# Patient Record
Sex: FEMALE | Race: WHITE | NOT HISPANIC OR LATINO | Employment: FULL TIME | URBAN - METROPOLITAN AREA
[De-identification: names, ages, dates, MRNs, and addresses within clinical notes are randomized per-mention and may not be internally consistent; named-entity substitution may affect disease eponyms.]

---

## 2018-02-06 ENCOUNTER — AMBULATORY - HEALTHEAST (OUTPATIENT)
Dept: MULTI SPECIALTY CLINIC | Facility: CLINIC | Age: 63
End: 2018-02-06

## 2018-02-06 LAB
HPV_EXT - HISTORICAL: NORMAL
PAP SMEAR - HIM PATIENT REPORTED: NORMAL

## 2018-04-25 ENCOUNTER — RECORDS - HEALTHEAST (OUTPATIENT)
Dept: LAB | Facility: HOSPITAL | Age: 63
End: 2018-04-25

## 2018-04-25 LAB
MEV IGG SER IA-ACNC: POSITIVE
MUV IGG SER QL IA: POSITIVE
RUBV IGG SERPL QL IA: NEGATIVE
VZV IGG SER QL IA: POSITIVE

## 2018-04-27 LAB
QTF INTERPRETATION: NORMAL
QTF MITOGEN - NIL: >10 IU/ML
QTF NIL: 0.03 IU/ML
QTF RESULT: NEGATIVE
QTF TB ANTIGEN - NIL: 0 IU/ML

## 2019-02-18 ENCOUNTER — OFFICE VISIT - HEALTHEAST (OUTPATIENT)
Dept: OTOLARYNGOLOGY | Facility: CLINIC | Age: 64
End: 2019-02-18

## 2019-02-18 DIAGNOSIS — K11.20 PAROTIDITIS: ICD-10-CM

## 2019-03-04 ENCOUNTER — OFFICE VISIT - HEALTHEAST (OUTPATIENT)
Dept: OTOLARYNGOLOGY | Facility: CLINIC | Age: 64
End: 2019-03-04

## 2019-03-04 DIAGNOSIS — K11.9 DISEASE OF SALIVARY GLAND, UNSPECIFIED: ICD-10-CM

## 2019-03-04 DIAGNOSIS — K11.8 MASS OF PAROTID GLAND: ICD-10-CM

## 2019-04-02 ENCOUNTER — OFFICE VISIT - HEALTHEAST (OUTPATIENT)
Dept: FAMILY MEDICINE | Facility: CLINIC | Age: 64
End: 2019-04-02

## 2019-04-02 ENCOUNTER — COMMUNICATION - HEALTHEAST (OUTPATIENT)
Dept: TELEHEALTH | Facility: CLINIC | Age: 64
End: 2019-04-02

## 2019-04-02 DIAGNOSIS — F17.218 CIGARETTE NICOTINE DEPENDENCE WITH OTHER NICOTINE-INDUCED DISORDER: ICD-10-CM

## 2019-04-02 DIAGNOSIS — M54.2 NECK PAIN: ICD-10-CM

## 2019-04-02 DIAGNOSIS — R53.82 CHRONIC FATIGUE: ICD-10-CM

## 2019-04-02 DIAGNOSIS — S12.300A CLOSED DISPLACED FRACTURE OF FOURTH CERVICAL VERTEBRA, UNSPECIFIED FRACTURE MORPHOLOGY, INITIAL ENCOUNTER (H): ICD-10-CM

## 2019-04-02 DIAGNOSIS — K11.20 SIALADENITIS: ICD-10-CM

## 2019-04-02 ASSESSMENT — MIFFLIN-ST. JEOR: SCORE: 1180.91

## 2019-04-19 ENCOUNTER — COMMUNICATION - HEALTHEAST (OUTPATIENT)
Dept: OTOLARYNGOLOGY | Facility: CLINIC | Age: 64
End: 2019-04-19

## 2019-04-27 ENCOUNTER — COMMUNICATION - HEALTHEAST (OUTPATIENT)
Dept: FAMILY MEDICINE | Facility: CLINIC | Age: 64
End: 2019-04-27

## 2019-10-14 ENCOUNTER — COMMUNICATION - HEALTHEAST (OUTPATIENT)
Dept: OTOLARYNGOLOGY | Facility: CLINIC | Age: 64
End: 2019-10-14

## 2019-10-14 DIAGNOSIS — K11.20 PAROTIDITIS: ICD-10-CM

## 2019-10-21 ENCOUNTER — OFFICE VISIT - HEALTHEAST (OUTPATIENT)
Dept: OTOLARYNGOLOGY | Facility: CLINIC | Age: 64
End: 2019-10-21

## 2019-10-21 DIAGNOSIS — K11.20 PAROTIDITIS: ICD-10-CM

## 2019-10-25 ENCOUNTER — COMMUNICATION - HEALTHEAST (OUTPATIENT)
Dept: SURGERY | Facility: CLINIC | Age: 64
End: 2019-10-25

## 2020-06-23 ENCOUNTER — COMMUNICATION - HEALTHEAST (OUTPATIENT)
Dept: FAMILY MEDICINE | Facility: CLINIC | Age: 65
End: 2020-06-23

## 2020-07-13 ENCOUNTER — OFFICE VISIT - HEALTHEAST (OUTPATIENT)
Dept: FAMILY MEDICINE | Facility: CLINIC | Age: 65
End: 2020-07-13

## 2020-07-13 DIAGNOSIS — Z00.00 ROUTINE GENERAL MEDICAL EXAMINATION AT A HEALTH CARE FACILITY: ICD-10-CM

## 2020-07-13 DIAGNOSIS — M54.2 NECK PAIN: ICD-10-CM

## 2020-07-13 DIAGNOSIS — R53.82 CHRONIC FATIGUE: ICD-10-CM

## 2020-07-13 DIAGNOSIS — K11.20 SIALADENITIS: ICD-10-CM

## 2020-07-13 DIAGNOSIS — Z12.11 SCREENING FOR COLON CANCER: ICD-10-CM

## 2020-07-13 RX ORDER — POLYVINYL ALCOHOL 14 MG/ML
1 SOLUTION/ DROPS OPHTHALMIC
Status: SHIPPED | COMMUNITY
Start: 2020-07-13 | End: 2023-01-27

## 2020-07-15 ENCOUNTER — AMBULATORY - HEALTHEAST (OUTPATIENT)
Dept: LAB | Facility: CLINIC | Age: 65
End: 2020-07-15

## 2020-07-15 DIAGNOSIS — R53.82 CHRONIC FATIGUE: ICD-10-CM

## 2020-07-15 DIAGNOSIS — R73.9 ELEVATED BLOOD SUGAR: ICD-10-CM

## 2020-07-15 DIAGNOSIS — Z00.00 ROUTINE GENERAL MEDICAL EXAMINATION AT A HEALTH CARE FACILITY: ICD-10-CM

## 2020-07-15 LAB
ALBUMIN SERPL-MCNC: 4 G/DL (ref 3.5–5)
ALP SERPL-CCNC: 100 U/L (ref 45–120)
ALT SERPL W P-5'-P-CCNC: 18 U/L (ref 0–45)
ANION GAP SERPL CALCULATED.3IONS-SCNC: 9 MMOL/L (ref 5–18)
AST SERPL W P-5'-P-CCNC: 14 U/L (ref 0–40)
BILIRUB SERPL-MCNC: 0.7 MG/DL (ref 0–1)
BUN SERPL-MCNC: 12 MG/DL (ref 8–22)
CALCIUM SERPL-MCNC: 9.5 MG/DL (ref 8.5–10.5)
CHLORIDE BLD-SCNC: 102 MMOL/L (ref 98–107)
CHOLEST SERPL-MCNC: 208 MG/DL
CO2 SERPL-SCNC: 27 MMOL/L (ref 22–31)
CREAT SERPL-MCNC: 0.98 MG/DL (ref 0.6–1.1)
ERYTHROCYTE [DISTWIDTH] IN BLOOD BY AUTOMATED COUNT: 11.2 % (ref 11–14.5)
FASTING STATUS PATIENT QL REPORTED: YES
GFR SERPL CREATININE-BSD FRML MDRD: 57 ML/MIN/1.73M2
GLUCOSE BLD-MCNC: 377 MG/DL (ref 70–125)
HCT VFR BLD AUTO: 42.8 % (ref 35–47)
HDLC SERPL-MCNC: 69 MG/DL
HGB BLD-MCNC: 14.6 G/DL (ref 12–16)
IRON SATN MFR SERPL: 35 % (ref 20–50)
IRON SERPL-MCNC: 95 UG/DL (ref 42–175)
LDLC SERPL CALC-MCNC: 114 MG/DL
MCH RBC QN AUTO: 30.7 PG (ref 27–34)
MCHC RBC AUTO-ENTMCNC: 34.1 G/DL (ref 32–36)
MCV RBC AUTO: 90 FL (ref 80–100)
PLATELET # BLD AUTO: 134 THOU/UL (ref 140–440)
PMV BLD AUTO: 8.3 FL (ref 7–10)
POTASSIUM BLD-SCNC: 4.5 MMOL/L (ref 3.5–5)
PROT SERPL-MCNC: 6.9 G/DL (ref 6–8)
RBC # BLD AUTO: 4.74 MILL/UL (ref 3.8–5.4)
SODIUM SERPL-SCNC: 138 MMOL/L (ref 136–145)
TIBC SERPL-MCNC: 275 UG/DL (ref 313–563)
TRANSFERRIN SERPL-MCNC: 220 MG/DL (ref 212–360)
TRIGL SERPL-MCNC: 127 MG/DL
TSH SERPL DL<=0.005 MIU/L-ACNC: 1.1 UIU/ML (ref 0.3–5)
WBC: 5.6 THOU/UL (ref 4–11)

## 2020-07-16 ENCOUNTER — AMBULATORY - HEALTHEAST (OUTPATIENT)
Dept: FAMILY MEDICINE | Facility: CLINIC | Age: 65
End: 2020-07-16

## 2020-07-16 DIAGNOSIS — R73.9 ELEVATED BLOOD SUGAR: ICD-10-CM

## 2020-07-16 LAB — HBA1C MFR BLD: >14 % (ref 3.5–6)

## 2020-07-17 ENCOUNTER — COMMUNICATION - HEALTHEAST (OUTPATIENT)
Dept: FAMILY MEDICINE | Facility: CLINIC | Age: 65
End: 2020-07-17

## 2020-07-20 ENCOUNTER — OFFICE VISIT - HEALTHEAST (OUTPATIENT)
Dept: FAMILY MEDICINE | Facility: CLINIC | Age: 65
End: 2020-07-20

## 2020-07-20 ENCOUNTER — COMMUNICATION - HEALTHEAST (OUTPATIENT)
Dept: FAMILY MEDICINE | Facility: CLINIC | Age: 65
End: 2020-07-20

## 2020-07-20 DIAGNOSIS — E11.9 TYPE 2 DIABETES MELLITUS WITHOUT COMPLICATION, WITHOUT LONG-TERM CURRENT USE OF INSULIN (H): ICD-10-CM

## 2020-07-20 DIAGNOSIS — Z12.11 SCREEN FOR COLON CANCER: ICD-10-CM

## 2020-07-20 DIAGNOSIS — Z12.31 VISIT FOR SCREENING MAMMOGRAM: ICD-10-CM

## 2020-07-20 ASSESSMENT — MIFFLIN-ST. JEOR: SCORE: 1093.37

## 2020-07-24 ENCOUNTER — RECORDS - HEALTHEAST (OUTPATIENT)
Dept: ADMINISTRATIVE | Facility: OTHER | Age: 65
End: 2020-07-24

## 2020-07-24 LAB — RETINOPATHY: NEGATIVE

## 2020-07-28 ENCOUNTER — RECORDS - HEALTHEAST (OUTPATIENT)
Dept: HEALTH INFORMATION MANAGEMENT | Facility: CLINIC | Age: 65
End: 2020-07-28

## 2020-08-04 ENCOUNTER — HOSPITAL ENCOUNTER (OUTPATIENT)
Dept: MAMMOGRAPHY | Facility: CLINIC | Age: 65
Discharge: HOME | End: 2020-08-04
Attending: FAMILY MEDICINE

## 2020-08-04 DIAGNOSIS — Z12.31 VISIT FOR SCREENING MAMMOGRAM: ICD-10-CM

## 2020-08-06 ENCOUNTER — OFFICE VISIT - HEALTHEAST (OUTPATIENT)
Dept: EDUCATION SERVICES | Facility: CLINIC | Age: 65
End: 2020-08-06

## 2020-08-06 DIAGNOSIS — E11.9 TYPE 2 DIABETES MELLITUS WITHOUT COMPLICATION, WITHOUT LONG-TERM CURRENT USE OF INSULIN (H): ICD-10-CM

## 2020-08-16 ENCOUNTER — COMMUNICATION - HEALTHEAST (OUTPATIENT)
Dept: FAMILY MEDICINE | Facility: CLINIC | Age: 65
End: 2020-08-16

## 2020-08-16 DIAGNOSIS — E11.9 TYPE 2 DIABETES MELLITUS WITHOUT COMPLICATION, WITHOUT LONG-TERM CURRENT USE OF INSULIN (H): ICD-10-CM

## 2020-08-17 ENCOUNTER — HOSPITAL ENCOUNTER (OUTPATIENT)
Dept: MAMMOGRAPHY | Facility: CLINIC | Age: 65
Discharge: HOME | End: 2020-08-17
Attending: FAMILY MEDICINE

## 2020-08-17 DIAGNOSIS — N64.89 BREAST ASYMMETRY: ICD-10-CM

## 2020-08-17 DIAGNOSIS — N64.89 DISTORTION OF CONTOUR OF BREAST: ICD-10-CM

## 2020-08-17 RX ORDER — BLOOD SUGAR DIAGNOSTIC
STRIP MISCELLANEOUS
Qty: 200 STRIP | Refills: 3 | Status: SHIPPED | OUTPATIENT
Start: 2020-08-17 | End: 2022-03-21

## 2020-09-03 ENCOUNTER — OFFICE VISIT - HEALTHEAST (OUTPATIENT)
Dept: EDUCATION SERVICES | Facility: CLINIC | Age: 65
End: 2020-09-03

## 2020-09-03 DIAGNOSIS — E11.9 TYPE 2 DIABETES MELLITUS WITHOUT COMPLICATION, WITHOUT LONG-TERM CURRENT USE OF INSULIN (H): ICD-10-CM

## 2020-10-14 ENCOUNTER — COMMUNICATION - HEALTHEAST (OUTPATIENT)
Dept: FAMILY MEDICINE | Facility: CLINIC | Age: 65
End: 2020-10-14

## 2020-10-27 ENCOUNTER — COMMUNICATION - HEALTHEAST (OUTPATIENT)
Dept: SCHEDULING | Facility: CLINIC | Age: 65
End: 2020-10-27

## 2020-11-01 ENCOUNTER — AMBULATORY - HEALTHEAST (OUTPATIENT)
Dept: MULTI SPECIALTY CLINIC | Facility: CLINIC | Age: 65
End: 2020-11-01

## 2020-11-03 ENCOUNTER — COMMUNICATION - HEALTHEAST (OUTPATIENT)
Dept: FAMILY MEDICINE | Facility: CLINIC | Age: 65
End: 2020-11-03

## 2020-11-03 DIAGNOSIS — E11.9 TYPE 2 DIABETES MELLITUS WITHOUT COMPLICATION, WITHOUT LONG-TERM CURRENT USE OF INSULIN (H): ICD-10-CM

## 2020-11-08 ENCOUNTER — COMMUNICATION - HEALTHEAST (OUTPATIENT)
Dept: FAMILY MEDICINE | Facility: CLINIC | Age: 65
End: 2020-11-08

## 2020-11-09 ENCOUNTER — OFFICE VISIT - HEALTHEAST (OUTPATIENT)
Dept: FAMILY MEDICINE | Facility: CLINIC | Age: 65
End: 2020-11-09

## 2020-11-09 DIAGNOSIS — E11.9 TYPE 2 DIABETES MELLITUS WITHOUT COMPLICATION, WITHOUT LONG-TERM CURRENT USE OF INSULIN (H): ICD-10-CM

## 2020-11-09 DIAGNOSIS — F17.218 CIGARETTE NICOTINE DEPENDENCE WITH OTHER NICOTINE-INDUCED DISORDER: ICD-10-CM

## 2020-11-09 DIAGNOSIS — Z00.00 ROUTINE GENERAL MEDICAL EXAMINATION AT A HEALTH CARE FACILITY: ICD-10-CM

## 2020-11-09 LAB
ALBUMIN SERPL-MCNC: 4.5 G/DL (ref 3.5–5)
ALP SERPL-CCNC: 69 U/L (ref 45–120)
ALT SERPL W P-5'-P-CCNC: 19 U/L (ref 0–45)
ANION GAP SERPL CALCULATED.3IONS-SCNC: 10 MMOL/L (ref 5–18)
AST SERPL W P-5'-P-CCNC: 16 U/L (ref 0–40)
BILIRUB SERPL-MCNC: 0.5 MG/DL (ref 0–1)
BUN SERPL-MCNC: 16 MG/DL (ref 8–22)
CALCIUM SERPL-MCNC: 10.2 MG/DL (ref 8.5–10.5)
CHLORIDE BLD-SCNC: 104 MMOL/L (ref 98–107)
CHOLEST SERPL-MCNC: 164 MG/DL
CO2 SERPL-SCNC: 27 MMOL/L (ref 22–31)
CREAT SERPL-MCNC: 0.91 MG/DL (ref 0.6–1.1)
CREAT UR-MCNC: 36.1 MG/DL
FASTING STATUS PATIENT QL REPORTED: NO
GFR SERPL CREATININE-BSD FRML MDRD: >60 ML/MIN/1.73M2
GLUCOSE BLD-MCNC: 110 MG/DL (ref 70–125)
HBA1C MFR BLD: 6.6 %
HDLC SERPL-MCNC: 66 MG/DL
LDLC SERPL CALC-MCNC: 77 MG/DL
MICROALBUMIN UR-MCNC: <0.5 MG/DL (ref 0–1.99)
MICROALBUMIN/CREAT UR: NORMAL MG/G{CREAT}
POTASSIUM BLD-SCNC: 4.3 MMOL/L (ref 3.5–5)
PROT SERPL-MCNC: 7.5 G/DL (ref 6–8)
SODIUM SERPL-SCNC: 141 MMOL/L (ref 136–145)
TRIGL SERPL-MCNC: 107 MG/DL

## 2020-11-10 LAB
HCV AB SERPL QL IA: POSITIVE
HIV 1+2 AB+HIV1 P24 AG SERPL QL IA: NEGATIVE

## 2020-11-11 ENCOUNTER — COMMUNICATION - HEALTHEAST (OUTPATIENT)
Dept: FAMILY MEDICINE | Facility: CLINIC | Age: 65
End: 2020-11-11

## 2020-11-11 ENCOUNTER — AMBULATORY - HEALTHEAST (OUTPATIENT)
Dept: FAMILY MEDICINE | Facility: CLINIC | Age: 65
End: 2020-11-11

## 2020-11-11 DIAGNOSIS — R79.9 ABNORMAL BLOOD FINDING: ICD-10-CM

## 2020-11-12 ENCOUNTER — COMMUNICATION - HEALTHEAST (OUTPATIENT)
Dept: EDUCATION SERVICES | Facility: CLINIC | Age: 65
End: 2020-11-12

## 2020-11-18 ENCOUNTER — COMMUNICATION - HEALTHEAST (OUTPATIENT)
Dept: FAMILY MEDICINE | Facility: CLINIC | Age: 65
End: 2020-11-18

## 2020-11-25 ENCOUNTER — AMBULATORY - HEALTHEAST (OUTPATIENT)
Dept: LAB | Facility: CLINIC | Age: 65
End: 2020-11-25

## 2020-11-25 DIAGNOSIS — R79.9 ABNORMAL BLOOD FINDING: ICD-10-CM

## 2020-11-30 ENCOUNTER — COMMUNICATION - HEALTHEAST (OUTPATIENT)
Dept: PEDIATRICS | Facility: CLINIC | Age: 65
End: 2020-11-30

## 2020-11-30 LAB
HCV RNA SERPL NAA+PROBE-ACNC: NORMAL [IU]/ML
HCV RNA SERPL NAA+PROBE-LOG IU: NORMAL LOG IU/ML

## 2021-01-15 ENCOUNTER — HEALTH MAINTENANCE LETTER (OUTPATIENT)
Age: 66
End: 2021-01-15

## 2021-02-08 ENCOUNTER — COMMUNICATION - HEALTHEAST (OUTPATIENT)
Dept: ADMINISTRATIVE | Facility: CLINIC | Age: 66
End: 2021-02-08

## 2021-03-01 ENCOUNTER — OFFICE VISIT - HEALTHEAST (OUTPATIENT)
Dept: FAMILY MEDICINE | Facility: CLINIC | Age: 66
End: 2021-03-01

## 2021-03-01 DIAGNOSIS — E11.9 TYPE 2 DIABETES MELLITUS WITHOUT COMPLICATION, WITHOUT LONG-TERM CURRENT USE OF INSULIN (H): ICD-10-CM

## 2021-03-01 DIAGNOSIS — F17.218 CIGARETTE NICOTINE DEPENDENCE WITH OTHER NICOTINE-INDUCED DISORDER: ICD-10-CM

## 2021-03-01 RX ORDER — METFORMIN HCL 500 MG
500 TABLET, EXTENDED RELEASE 24 HR ORAL 2 TIMES DAILY
Qty: 180 TABLET | Refills: 2 | Status: SHIPPED | OUTPATIENT
Start: 2021-03-01 | End: 2022-03-21

## 2021-03-01 RX ORDER — SIMVASTATIN 20 MG
20 TABLET ORAL EVERY EVENING
Qty: 90 TABLET | Refills: 2 | Status: SHIPPED | OUTPATIENT
Start: 2021-03-01 | End: 2021-12-02

## 2021-03-01 RX ORDER — LISINOPRIL 5 MG/1
5 TABLET ORAL DAILY
Qty: 90 TABLET | Refills: 2 | Status: SHIPPED | OUTPATIENT
Start: 2021-03-01 | End: 2021-12-02

## 2021-04-13 ENCOUNTER — HOSPITAL ENCOUNTER (OUTPATIENT)
Dept: CT IMAGING | Facility: HOSPITAL | Age: 66
Discharge: HOME | End: 2021-04-13
Attending: FAMILY MEDICINE

## 2021-04-13 DIAGNOSIS — F17.218 CIGARETTE NICOTINE DEPENDENCE WITH OTHER NICOTINE-INDUCED DISORDER: ICD-10-CM

## 2021-04-14 ENCOUNTER — COMMUNICATION - HEALTHEAST (OUTPATIENT)
Dept: FAMILY MEDICINE | Facility: CLINIC | Age: 66
End: 2021-04-14

## 2021-05-15 ENCOUNTER — HEALTH MAINTENANCE LETTER (OUTPATIENT)
Age: 66
End: 2021-05-15

## 2021-05-27 NOTE — PROGRESS NOTES
Family Medicine Office Visit  [facility]  Patient Name: Taylor Aburto  Patient Age: 63 y.o.  YOB: 1955  MRN: [id number]    Date of Visit: 2019  Reason for Office Visit:   Chief Complaint   Patient presents with     Follow-up     ER 19 Neck Injury            Assessment / Plan / Medical Decision Makin. Chronic fatigue  Will check thyroid and call with the results  - Thyroid [place]; Future    2. Sialadenitis  Offered CT and pt declined at this time.  Recommend trial of repeat antibiotics and see if any improvement - if not then plan for CT  - cefuroxime (CEFTIN) 500 MG tablet; Take 1 tablet (500 mg total) by mouth 2 (two) times a day for 10 days.  Dispense: 20 tablet; Refill: 0    3. Neck pain  Trial of zanaflex at night for muscle strain  - TiZANidine (ZANAFLEX) 2 MG capsule; Take 1 capsule (2 mg total) by mouth at bedtime.  Dispense: 30 capsule; Refill: 0    4. Cigarette nicotine dependence with other nicotine-induced disorder  - nicotine (NICODERM CQ) 14 mg/24 hr; Place 1 patch on the skin daily.  Dispense: 30 patch; Refill: 0    5. Closed displaced fracture of fourth cervical vertebra, unspecified fracture morphology, initial encounter (H)  Continue to follow up with spine center as scheduled and wear brace as directed.        Health Maintenance Review  Health Maintenance   Topic Date Due     MAMMOGRAM  1955     ADVANCE DIRECTIVES DISCUSSED WITH PATIENT  1974     COLONOSCOPY  2006     ZOSTER VACCINES (1 of 2) 2006     TD 18+ HE  2019     PAP SMEAR  2022     INFLUENZA VACCINE RULE BASED  Completed     TDAP ADULT ONE TIME DOSE  Completed         I am having Taylor Aburto maintain her multivitamin therapeutic, nicotine, cefuroxime, and TiZANidine.      HPI:  Taylor Aburto is a 63 y.o. year old who presents to the office today for follow up on neck fracture from fall when slipping down the stairs.  Walking  "down the stairs and slipped on ice, smacking head on the stair and fracturing C4.  CT as follows:    CT Head 3/16/2019  1. No evidence of acute intracranial hemorrhage or mass effect.  2. Mild nonspecific white matter changes.  3. Mild brain parenchymal volume loss.    CT C-Spine 3/16/2019  1. Minimally displaced fracture involving the right C4 inferior articular facet  2. No other evidence of acute fracture or subluxation of the cervical spine by CT imaging.  3. Degenerative cervical spondylosis as described above.     Pt states pain well controlled for the most part.  Not taking oxycodone.  Taking tylenol 1-2x/day.  Pain worse at the end of the day from having to keep body position upright.  Feels like muscles in the neck are sore.  Told needed to wear the Aspen collar when out of bed and then soft collar when in bed.      Hx of \"lump\" on the left side of the neck and then developed cyst in the mouth that eventually popped and drained.  Seen by dentistry and then orthodontics and finally ENT.  ENT put her on keflex for 10 days and pt reports that helped significantly but now returned.  Recommended CT and pt would like to wait at this time.     Chronic fatigue and losing hair.  Problems gaining weight.  Would like to have thyroid checked     Smoking and would like to quit.  Currently at 1/2 PPD.  Tried lozenges and gum and both made her nausteated.        Review of Systems- pertinent positive in bold:  Constitutional: Fever, chills, night sweats, fainting, weight change, fatigue, seizures, dizziness, sleeping difficulties, loud snoring/pauses in breathing  Eyes: change in vision, blurred or double vision, redness/eye pain  Ears, nose, mouth, throat: change in hearing, ear pain, hoarseness, difficulty swallowing, sores in the mouth or throat  Respiratory: shortness of breath, cough, bloody sputum, wheezing  Cardiovascular: chest pain, palpitations   Gastrointestinal: abdominal pain, heartburn/indigestion, " nausea/vomiting, change in appetite, change in bowel habits, constipation or diarrhea, rectal bleeding/dark stools, difficulty swallowing  Urinary: painful urination, frequent urination, urinary urgency/incontinence, blood in urine/dark urine, nocturia  Musculoskeletal: backache/back pain (new or increasing), weakness, joint pain/stiffness (new or increasing), muscle cramps, swelling of hands, feet, ankles, leg pain/redness  Skin: change in moles/freckles, rash, nodules  Hematologic/lymphatic: swollen lymph glands, abnormal bruising/bleeding  Endocrine: excessive thirst/urination, cold or heat intolerance  Neurologic/emotional: worrisome memory change, numbness/tingling, anxiety, mood swings      Current Scheduled Meds:  Outpatient Encounter Medications as of 4/2/2019   Medication Sig Dispense Refill     multivitamin therapeutic tablet Take 1 tablet by mouth daily.       [DISCONTINUED] multivitamin with minerals (THERA-M) 9 mg iron-400 mcg Tab tablet Take 1 tablet by mouth daily.       cefuroxime (CEFTIN) 500 MG tablet Take 1 tablet (500 mg total) by mouth 2 (two) times a day for 10 days. 20 tablet 0     nicotine (NICODERM CQ) 14 mg/24 hr Place 1 patch on the skin daily. 30 patch 0     TiZANidine (ZANAFLEX) 2 MG capsule Take 1 capsule (2 mg total) by mouth at bedtime. 30 capsule 0     [DISCONTINUED] cefuroxime (CEFTIN) 500 MG tablet Take 1 tablet (500 mg total) by mouth 2 (two) times a day for 10 days. 20 tablet 0     [DISCONTINUED] nicotine (NICODERM CQ) 14 mg/24 hr Place 1 patch on the skin daily. 30 patch 0     [DISCONTINUED] TiZANidine (ZANAFLEX) 2 MG capsule Take 1 capsule (2 mg total) by mouth at bedtime. 30 capsule 0     No facility-administered encounter medications on file as of 4/2/2019.      No past medical history on file.  No past surgical history on file.  Social History     Tobacco Use     Smoking status: Current Every Day Smoker     Packs/day: 0.50     Smokeless tobacco: Never Used   Substance Use  "Topics     Alcohol use: Not on file     Drug use: Not on file       Objective / Physical Examination:  Vitals:    04/02/19 1338   BP: 136/74   Pulse: 64   Resp: 16   Temp: 98.1  F (36.7  C)   TempSrc: Oral   SpO2: 97%   Weight: 133 lb (60.3 kg)   Height: 5' 7\" (1.702 m)     Wt Readings from Last 3 Encounters:   04/02/19 133 lb (60.3 kg)   02/11/19 132 lb (59.9 kg)     BP Readings from Last 3 Encounters:   04/02/19 136/74   02/11/19 (!) 203/94     Body mass index is 20.83 kg/m .   Results for orders placed or performed in visit on 04/25/18   Mumps Antibody, IgG   Result Value Ref Range    Mumps Antibody, IgG Positive    Rubella Antibody, IgG   Result Value Ref Range    Rubella Antibody, IgG Negative    Rubeola Antibody, IgG   Result Value Ref Range    Rubeola Antibody, IgG Positive    Varicella Zoster Antibody, IgG   Result Value Ref Range    Varicella Zoster Antibody IgG Positive    QTF-Mycobacterium tuberculosis by QuantiFERON-TB Gold   Result Value Ref Range    QTF RESULT Negative Negative    QTF INTREPRETATION       No interferon-gamma response to M. tuberculosis antigens was detected.  Infecton with M. tuberculosis is unlikely.  A negative result alone does not exclude infection with M. tuberculosis    QTF NIL 0.03 IU/mL    QTF TB ANTIGEN - NIL 0.00 IU/mL    QTF MITOGEN - NIL >10.00 IU/mL           General Appearance: Alert and oriented, cooperative, affect appropriate, speech clear, in no apparent distress  Head: Normocephalic, atraumatic  Ears: Tympanic membrane clear with landmarks well visualized bilaterally  Eyes: PERRL, fundi appear clear bilaterally. EOMI. Conjunctivae clear and sclerae non-icteric  Nose: Septum midline, nares patent, no visible polyps, mucosa moist and without drainage  Throat: Lips and mucosa moist. Teeth in good repair, pharynx without erythema or exudate  Neck: Supple, trachea midline. No cervical adenopathy  Back: Symmetrical and nontender  Lungs: Clear to auscultation bilaterally. " Normal inspiratory and expiratory effort  Cardiovascular: Regular rate, normal S1, S2. No murmurs, rubs, or gallops  Abdomen: Bowel sounds active all four quadrants. Soft, non-tender. No hepatomegaly or splenomegaly. No bruits detected.   Extremities: Pulses 2+ and equal throughout. No edema. Strength equal throughout.  Integumentary: Warm and dry. Without suspicious looking lesions  Neuro: Alert and oriented, follows commands appropriately.     Orders Placed This Encounter   Procedures     Thyroid [place]   Followup: No Follow-up on file. earlier if needed.    Total time spent with patient was 45 min with >50% of time spent in face-to-face counseling regarding the above plan       Margaret Holly MD

## 2021-05-28 NOTE — TELEPHONE ENCOUNTER
Left message on answering service regarding the results of the CT scan. I advised follow up to discuss if questions.

## 2021-05-28 NOTE — TELEPHONE ENCOUNTER
RN cannot approve Refill Request    RN can NOT refill this medication No established PCP. Last office visit: 4/2/2019 Margaret Holly MD Last Physical: Visit date not found Last MTM visit: Visit date not found Last visit same specialty: 4/2/2019 Margaret Holly MD.  Next visit within 3 mo: Visit date not found  Next physical within 3 mo: Visit date not found      Yasmeen Mendoza, [facility] Triage/Med Refill 4/27/2019    Requested Prescriptions   Pending Prescriptions Disp Refills     cefuroxime (CEFTIN) 500 MG tablet [Pharmacy Med Name: CEFUROXIME 500MG    TAB] 20 tablet 0     Sig: TAKE 1 TABLET BY MOUTH TWICE DAILY FOR 10 DAYS       There is no refill protocol information for this order

## 2021-05-28 NOTE — TELEPHONE ENCOUNTER
Last OV 04/02/19 Vani    2. Sialadenitis  Offered CT and pt declined at this time.  Recommend trial of repeat antibiotics and see if any improvement - if not then plan for CT  - cefuroxime (CEFTIN) 500 MG tablet; Take 1 tablet (500 mg total) by mouth 2 (two) times a day for 10 days.  Dispense: 20 tablet; Refill: 0     Return in about 4 weeks (around 4/30/2019).         Nothing scheduled

## 2021-06-02 VITALS — BODY MASS INDEX: 20.88 KG/M2 | HEIGHT: 67 IN | WEIGHT: 133 LBS

## 2021-06-02 NOTE — TELEPHONE ENCOUNTER
Patient called stating she was seen by Dr. Major in February for parotiditis and was told she has a stone. She was doing well after the antibiotics but now has pain and swelling of the left cheek again and has drainage coming out as well. I spoke to Dr. Major, he would like to prescribe Ceftin and have her follow up in 1 week. Patient notified. Prescription sent to pharmacy and appointment scheduled for in a week.    Shania Castillo RN  [facility]  [phone]

## 2021-06-02 NOTE — PROGRESS NOTES
HISTORY OF PRESENT ILLNESS  Patient returns for recurrent swelling of the left parotid. It is swollen and tender today. Worse with eating. Known sialolith of the left parotid duct.    REVIEW OF SYSTEMS  Review of Systems: a 10-system review was performed. Pertinent positives are noted in the HPI and on a separate scanned document in the chart.    PMH, PSH, FH and SH has documented in the EHR.      EXAM    CONSTITUTIONAL  General Appearance:  Normal, well developed, well nourished, no obvious distress  Ability to Communicate:  communicates appropriately.    HEAD AND FACE  Appearance and Symmetry:  Normal, no scalp or facial scarring or suspicious lesions.  Paranasal sinuses tenderness:  Normal, Paranasal sinuses non tender    EARS  Clinical speech reception threshold:  Normal, able to hear normal speech.  Auricle:  Normal, Auricles without scars, lesions, masses.  External auditory canal:  Normal, External auditory canal normal.  Tympanic membrane:  Normal, Tympanic membranes normal without swelling or erythema.  Tympanic membrane mobility:  Normal, Normal tympanic membrane mobility.    NOSE (speculum or scope)  Architecture:  Normal, Grossly normal external nasal architecture with no masses or lesions.  Mucosa:  Normal mucosa, No polyps or masses.  Septum:  Normal, Septum non-obstructing.  Turbinates:  Normal, No turbinate abnormalities    ORAL CAVITY AND OROPHARYNX  Lips:  Normal.  Dental and gingiva:  Normal, No obvious dental or gingival disease.  Mucosa:  Normal, Moist mucous membranes.  Tongue:  Normal, Tongue mobile with no mucosal abnormalities  Hard and soft palate:  Normal, Hard and soft palate without cleft or mucosal lesions.  Oral pharynx:  Normal, Posterior pharynx without lesions or remarkable asymmetry.  Saliva:  Normal, Clear saliva.  Masses:  Normal, No palpable masses or pathologically enlarged lymph nodes.    NECK  Masses/lymph nodes:  Normal, No worrisome neck masses or lymph nodes.  Salivary  glands:  Left parotid swelling with pus coming from the left parotid duct.  Trachea and larynx position:  Normal, Trachea and larynx midline.  Thyroid:  Normal, No thyroid abnormality.  Tenderness:  Normal, No cervical tenderness.  Suppleness:  Normal, Neck supple    NEUROLOGICAL  Speech pattern:  Normal, Proasaic    RESPIRATORY  Symmetry and Respiratory effort:  Normal, Symmetric chest movement and expansion with no increased intercostal retractions or use of accessory muscles.     IMPRESSION  Recurrent parotid sialadenitis.     RECOMMENDATION  I discussed acute management with antibiotics and steroids. She wants to go with this prior to considering more aggressive management with surgery. Follow up as needed.    Lars Major MD

## 2021-06-02 NOTE — TELEPHONE ENCOUNTER
Patient called stating she started taking the prednisone on Wednesday that was prescribed by Dr. Major for parotitis and last evening she could not sleep and was very dizzy, unbalanced, and the room was spinning. She feels lightheaded currently, but the other symptoms have resolved. She is still talking Ceftin and says her facial swelling has much improved. Per ALVA Perez she should continue taking the Ceftin and follow up with Dr. Major if the facial swelling returns. I told her dizziness, and feeling unbalanced can be side effects from the prednisone and she can stop taking it. She agreed and will call us if symptoms persist. I also told her if she becomes more dizzy and unbalanced to go to the ER to be evaluated. She agreed.    Shania Castillo RN  [facility]  [phone]

## 2021-06-04 VITALS
WEIGHT: 113.7 LBS | OXYGEN SATURATION: 97 % | BODY MASS INDEX: 17.84 KG/M2 | SYSTOLIC BLOOD PRESSURE: 126 MMHG | HEIGHT: 67 IN | HEART RATE: 80 BPM | DIASTOLIC BLOOD PRESSURE: 66 MMHG

## 2021-06-05 VITALS
DIASTOLIC BLOOD PRESSURE: 70 MMHG | SYSTOLIC BLOOD PRESSURE: 130 MMHG | HEART RATE: 101 BPM | BODY MASS INDEX: 18.32 KG/M2 | OXYGEN SATURATION: 98 % | WEIGHT: 117 LBS

## 2021-06-09 NOTE — TELEPHONE ENCOUNTER
Upcoming Appointment Question  When is the appointment: Today  What is your appointment for?: 10:15 a  Who is your appointment scheduled with?: Dr Holly  What is your question/concern?: Can patient's daughter join her today in clinic for the appointment.  Please call.   Okay to leave a detailed message?: Yes

## 2021-06-09 NOTE — TELEPHONE ENCOUNTER
Left message for pt to call back. Pt has appointment on Monday July 6th with Dr. Holly and she will not be in clinic. Please assist in rescheduling with another provider for either office visit or video visit.

## 2021-06-09 NOTE — PROGRESS NOTES
Sugar very elevated and added on a lab to look at diabetes.  Thyroid is normal.  Iron levels are normal, cholesterol very minimally elevated.  Fatigue could be due to the high sugars and will wait on the other lab

## 2021-06-09 NOTE — PROGRESS NOTES
Family Medicine Office Visit  [facility] [place]  Patient Name: Taylor Aburto  Patient Age: 65 y.o.  YOB: 1955  MRN: [id number]    Date of Visit: 2020  Reason for Office Visit:   Chief Complaint   Patient presents with     Follow-up     labs- newly diabetic            Assessment / Plan / Medical Decision Makin. Visit for screening mammogram  Routine screening recommended.  - Mammo Screening Bilateral; Future    2. Screen for colon cancer  Colonoscopy referral placed previously    3. Type 2 diabetes mellitus without complication, without long-term current use of insulin (H)  Will order glocomter to use 2x a day, fasting and postprandial.  Recommend using log book to track sugars.  Start metformin, simvastatin, lisinopril and continue with the baby aspirin.  Referral for eye exam and to DM education.  Follow up in 3 months   - blood glucose meter (GLUCOMETER); Use 1 each As Directed as needed. Dispense glucometer brand per patient's insurance at pharmacy discretion. DX: E11.9  Dispense: 1 each; Refill: 0  - generic lancets (FINGERSTIX LANCETS); Dispense brand per patient's insurance at pharmacy discretion.  Dispense: 100 each; Refill: 2  - blood glucose test strips; Use 1 each As Directed as needed. Dispense brand per patient's insurance at pharmacy discretion. Dx: E11.65 Check sugars BID  Dispense: 100 strip; Refill: 0  - Ambulatory referral to Ophthalmology  - metFORMIN (GLUCOPHAGE XR) 500 MG 24 hr tablet; Take 1 tablet (500 mg total) by mouth 2 (two) times a day.  Dispense: 180 tablet; Refill: 0  - simvastatin (ZOCOR) 20 MG tablet; Take 1 tablet (20 mg total) by mouth every evening.  Dispense: 30 tablet; Refill: 11  - lisinopriL (PRINIVIL,ZESTRIL) 5 MG tablet; Take 1 tablet (5 mg total) by mouth daily.  Dispense: 30 tablet; Refill: 11  - Basic Metabolic Panel; Future  - Ambulatory referral to Diabetes Education Program (CDE)        Health Maintenance  Review  Health Maintenance   Topic Date Due     HEPATITIS C SCREENING  1955     PREVENTIVE CARE VISIT  1955     MAMMOGRAM  1955     HIV SCREENING  04/24/1971     ADVANCE CARE PLANNING  04/24/1974     PNEUMOCOCCAL IMMUNIZATION 19-64 MEDIUM RISK (1 of 1 - PPSV23) 04/24/1975     ZOSTER VACCINES (1 of 2) 04/24/2006     TD 18+ HE  09/29/2019     COLORECTAL CANCER SCREENING  10/20/2020 (Originally 1955)     INFLUENZA VACCINE RULE BASED (1) 08/01/2020     PAP SMEAR  02/06/2023     HPV TEST  02/06/2023     LIPID  07/15/2025     TDAP ADULT ONE TIME DOSE  Completed     HEPATITIS B VACCINES  Aged Out         I am having Taylor Aburto start on blood glucose meter, generic lancets, blood glucose test, metFORMIN, simvastatin, and lisinopriL. I am also having her maintain her multivitamin therapeutic, polyvinyl alcohol, docosahexaenoic acid-epa, cholecalciferol (vitamin D3), aspirin, MAGNESIUM HYDROXIDE ORAL, ascorbic acid (VITAMIN C ORAL), vitamin B complex-folic acid, and vitamin A-vitamin C-vit E-min.      HPI:  Taylor Aburto is a 65 y.o. year old who presents to the office today for follow up on labs.  Found to have A1c >14 on lab visit following virtual visit for fatigue, hair loss and dizziness.  Admits to enjoying sweets.  Still smoking and trying to quit.  Moved in with daughter and currently on furlough so increased stress at home.  On reflection, some incontinence and increased urination with increased thirst.  Losing weight however.  Hx of small stroke she says in the past.  Some changes in vision recently with last eye exam over 1 year ago.        Review of Systems- pertinent positive in bold:  Constitutional: Fever, chills, night sweats, fainting, weight change, fatigue, seizures, dizziness, sleeping difficulties, loud snoring/pauses in breathing  Eyes: change in vision, blurred or double vision, redness/eye pain  Ears, nose, mouth, throat: change in hearing, ear pain, hoarseness,  difficulty swallowing, sores in the mouth or throat  Respiratory: shortness of breath, cough, bloody sputum, wheezing  Cardiovascular: chest pain, palpitations   Gastrointestinal: abdominal pain, heartburn/indigestion, nausea/vomiting, change in appetite, change in bowel habits, constipation or diarrhea, rectal bleeding/dark stools, difficulty swallowing  Urinary: painful urination, frequent urination, urinary urgency/incontinence, blood in urine/dark urine, nocturia  Musculoskeletal: backache/back pain (new or increasing), weakness, joint pain/stiffness (new or increasing), muscle cramps, swelling of hands, feet, ankles, leg pain/redness  Skin: change in moles/freckles, rash, nodules  Hematologic/lymphatic: swollen lymph glands, abnormal bruising/bleeding  Endocrine: excessive thirst/urination, cold or heat intolerance  Neurologic/emotional: worrisome memory change, numbness/tingling, anxiety, mood swings      Current Scheduled Meds:  Outpatient Encounter Medications as of 7/20/2020   Medication Sig Dispense Refill     ascorbic acid (VITAMIN C ORAL) Take by mouth.       aspirin 81 MG EC tablet Take 81 mg by mouth.       cholecalciferol, vitamin D3, 50 mcg (2,000 unit) Tab Take 4,000 Units by mouth.       MAGNESIUM HYDROXIDE ORAL Take 1 tablet by mouth.       polyvinyl alcohol (LIQUIFILM TEARS) 1.4 % ophthalmic solution 1 drop.       vitamin A-vitamin C-vit E-min (OCUVITE) Tab tablet Take by mouth daily.       vitamin B complex-folic acid (B COMPLEX 1, WITH FOLIC ACID,) 0.4 mg Tab Take by mouth.       blood glucose meter (GLUCOMETER) Use 1 each As Directed as needed. Dispense glucometer brand per patient's insurance at pharmacy discretion. DX: E11.9 1 each 0     blood glucose test strips Use 1 each As Directed as needed. Dispense brand per patient's insurance at pharmacy discretion. Dx: E11.65 Check sugars  strip 0     docosahexaenoic acid-epa 120-180 mg cap Take 2 g by mouth.       generic lancets  "(FINGERSTIX LANCETS) Dispense brand per patient's insurance at pharmacy discretion. 100 each 2     lisinopriL (PRINIVIL,ZESTRIL) 5 MG tablet Take 1 tablet (5 mg total) by mouth daily. 30 tablet 11     metFORMIN (GLUCOPHAGE XR) 500 MG 24 hr tablet Take 1 tablet (500 mg total) by mouth 2 (two) times a day. 180 tablet 0     multivitamin therapeutic tablet Take 1 tablet by mouth daily.       simvastatin (ZOCOR) 20 MG tablet Take 1 tablet (20 mg total) by mouth every evening. 30 tablet 11     No facility-administered encounter medications on file as of 7/20/2020.      Past Medical History:   Diagnosis Date     Arthritis 6/27/2013     H/O ETOH abuse 2/6/2018     Moderate smoker (20 or less per day) 6/27/2013     Vitamin D deficiency 2/6/2018     No past surgical history on file.  Social History     Tobacco Use     Smoking status: Current Every Day Smoker     Packs/day: 0.50     Smokeless tobacco: Never Used   Substance Use Topics     Alcohol use: Not on file     Drug use: Not on file       Objective / Physical Examination:  Vitals:    07/20/20 1007   BP: 126/66   Patient Site: Right Arm   Patient Position: Sitting   Cuff Size: Adult Regular   Pulse: 80   SpO2: 97%   Weight: 113 lb 11.2 oz (51.6 kg)   Height: 5' 7\" (1.702 m)     Wt Readings from Last 3 Encounters:   07/20/20 113 lb 11.2 oz (51.6 kg)   10/26/19 130 lb (59 kg)   04/02/19 133 lb (60.3 kg)     BP Readings from Last 3 Encounters:   07/20/20 126/66   10/26/19 168/73   04/02/19 136/74     Body mass index is 17.81 kg/m .   Results for orders placed or performed in visit on 07/15/20   Thyroid [place]   Result Value Ref Range    TSH 1.10 0.30 - 5.00 uIU/mL   Comprehensive Metabolic Panel   Result Value Ref Range    Sodium 138 136 - 145 mmol/L    Potassium 4.5 3.5 - 5.0 mmol/L    Chloride 102 98 - 107 mmol/L    CO2 27 22 - 31 mmol/L    Anion Gap, Calculation 9 5 - 18 mmol/L    Glucose 377 (H) 70 - 125 mg/dL    BUN 12 8 - 22 mg/dL    Creatinine 0.98 0.60 - 1.10 mg/dL "    GFR MDRD Af Amer >60 >60 mL/min/1.73m2    GFR MDRD Non Af Amer 57 (L) >60 mL/min/1.73m2    Bilirubin, Total 0.7 0.0 - 1.0 mg/dL    Calcium 9.5 8.5 - 10.5 mg/dL    Protein, Total 6.9 6.0 - 8.0 g/dL    Albumin 4.0 3.5 - 5.0 g/dL    Alkaline Phosphatase 100 45 - 120 U/L    AST 14 0 - 40 U/L    ALT 18 0 - 45 U/L   HM2(CBC w/o Differential)   Result Value Ref Range    WBC 5.6 4.0 - 11.0 thou/uL    RBC 4.74 3.80 - 5.40 mill/uL    Hemoglobin 14.6 12.0 - 16.0 g/dL    Hematocrit 42.8 35.0 - 47.0 %    MCV 90 80 - 100 fL    MCH 30.7 27.0 - 34.0 pg    MCHC 34.1 32.0 - 36.0 g/dL    RDW 11.2 11.0 - 14.5 %    Platelets 134 (L) 140 - 440 thou/uL    MPV 8.3 7.0 - 10.0 fL   Iron and Transferrin Iron Binding Capacity   Result Value Ref Range    Iron 95 42 - 175 ug/dL    Transferrin 220 212 - 360 mg/dL    Transferrin Saturation, Calculated 35 20 - 50 %    Transferrin IBC, Calculated 275 (L) 313 - 563 ug/dL   Lipid Cascade   Result Value Ref Range    Cholesterol 208 (H) <=199 mg/dL    Triglycerides 127 <=149 mg/dL    HDL Cholesterol 69 >=50 mg/dL    LDL Calculated 114 <=129 mg/dL    Patient Fasting > 8hrs? Yes    Glycosylated Hemoglobin A1c   Result Value Ref Range    Hemoglobin A1c >14.0 (H) 3.5 - 6.0 %           General Appearance: Alert and oriented, cooperative, affect appropriate, speech clear, in no apparent distress  Head: Normocephalic, atraumatic  Lungs: Clear to auscultation bilaterally. Normal inspiratory and expiratory effort  Cardiovascular: Regular rate, normal S1, S2. No murmurs, rubs, or gallops  Abdomen: Bowel sounds active all four quadrants. Soft, non-tender. No hepatomegaly or splenomegaly. No bruits detected.   Extremities: Pulses 2+ and equal throughout. No edema. Strength equal throughout.  Integumentary: Warm and dry. Without suspicious looking lesions  Neuro: Alert and oriented, follows commands appropriately.     Orders Placed This Encounter   Procedures     Mammo Screening Bilateral     Td, Preservative  Free (green label)     Pneumococcal polysaccharide vaccine 23-valent 1 yo or older, subq/IM     Basic Metabolic Panel     Ambulatory referral to Ophthalmology     Ambulatory referral to Diabetes Education Program (CDE)   Followup: Return in about 3 months (around 10/20/2020) for Recheck. earlier if needed.    Total time spent with patient was 30 min with >50% of time spent in face-to-face counseling regarding the above plan       Margaret Holly MD

## 2021-06-09 NOTE — PROGRESS NOTES
"Taylor Aburto is a 65 y.o. female who is being evaluated via a billable video visit.      The patient has been notified of following:     \"This video visit will be conducted via a call between you and your physician/provider. We have found that certain health care needs can be provided without the need for an in-person physical exam.  This service lets us provide the care you need with a video conversation.  If a prescription is necessary we can send it directly to your pharmacy.  If lab work is needed we can place an order for that and you can then stop by our lab to have the test done at a later time.    Video visits are billed at different rates depending on your insurance coverage. Please reach out to your insurance provider with any questions.    If during the course of the call the physician/provider feels a video visit is not appropriate, you will not be charged for this service.\"    Patient has given verbal consent to a Video visit? Yes  How would you like to obtain your AVS? AVS Preference: MyChart.  Patient would like the video invitation sent by: Send to e-mail at: [email or web address]  Will anyone else be joining your video visit? No        Video Start Time: 9:00 AM  Family Medicine Office Visit  [facility]  Patient Name: Taylor Aburto  Patient Age: 65 y.o.  YOB: 1955  MRN: [id number]    Date of Visit: 2020  Reason for Office Visit:   Chief Complaint   Patient presents with     Weight loss     discuss having thyroid checked- thinning hair- fatigued- daily headaches            Assessment / Plan / Medical Decision Makin. Neck pain  Hx of fracture and improving    2. Sialadenitis  Still having problems at times.  Seen by ENT    3. Chronic fatigue  Will do labs and follow up   - Thyroid [place]; Future  - Comprehensive Metabolic Panel; Future  - HM2(CBC w/o Differential); Future  - Iron and Transferrin Iron Binding Capacity; Future    4. " "Screening for colon cancer  Will do referral for colonoscopy.  - Ambulatory referral for Colonoscopy    5. Routine general medical examination at a health care facility  - Lipid [place]; Future        Health Maintenance Review  Health Maintenance   Topic Date Due     HEPATITIS C SCREENING  1955     PREVENTIVE CARE VISIT  1955     MAMMOGRAM  1955     HIV SCREENING  04/24/1971     ADVANCE CARE PLANNING  04/24/1974     COLORECTAL CANCER SCREENING  04/24/1974     PNEUMOCOCCAL IMMUNIZATION 19-64 MEDIUM RISK (1 of 1 - PPSV23) 04/24/1975     LIPID  04/24/2001     ZOSTER VACCINES (1 of 2) 04/24/2006     TD 18+ HE  09/29/2019     PAP SMEAR  01/01/2020     INFLUENZA VACCINE RULE BASED (1) 08/01/2020     TDAP ADULT ONE TIME DOSE  Completed         I have discontinued Taylor Aburto's TiZANidine, predniSONE, meclizine, and ondansetron. I am also having her maintain her multivitamin therapeutic, polyvinyl alcohol, docosahexaenoic acid-epa, cholecalciferol (vitamin D3), aspirin, MAGNESIUM HYDROXIDE ORAL, ascorbic acid (VITAMIN C ORAL), vitamin B complex-folic acid, and vitamin A-vitamin C-vit E-min.      HPI:  Taylor Aburto is a 65 y.o. year old who presents for a video visit today for concerns about vertigo.  Coming on more often since broken neck last year.  Will be standing still and start to tip.  Eyes will be open.  Rarely ringing in the ears.  Some hearing loss.  No true dizziness.  Tipping sensation and \"elevator sensation\" lasting under a minute.  Not taken any meclizine when this happens.        Daughter has hypothyroidism.  Some hair thinning.  Weight loss.  Down to a size 2 now and trying to gain more weight since furloughed 100% this summer.  Fatigue.  Having to lay down 1-2 hours a day due to feeling like \"hitting a wall\".  Appetite normal.  Daily headaches.    Pap done in 2018.  No colonoscopy.      \"rock in the cheek\" - went to ENT last year with swelling and given prednisone for swollen left " parotid.  Hx of sialolith of the left parotid.  Review of Systems- pertinent positive in bold:  Constitutional: Fever, chills, night sweats, fainting, weight change, fatigue, seizures, dizziness, sleeping difficulties, loud snoring/pauses in breathing  Eyes: change in vision, blurred or double vision, redness/eye pain  Ears, nose, mouth, throat: change in hearing, ear pain, hoarseness, difficulty swallowing, sores in the mouth or throat  Respiratory: shortness of breath, cough, bloody sputum, wheezing  Cardiovascular: chest pain, palpitations   Gastrointestinal: abdominal pain, heartburn/indigestion, nausea/vomiting, change in appetite, change in bowel habits, constipation or diarrhea, rectal bleeding/dark stools, difficulty swallowing  Urinary: painful urination, frequent urination, urinary urgency/incontinence, blood in urine/dark urine, nocturia  Musculoskeletal: backache/back pain (new or increasing), weakness, joint pain/stiffness (new or increasing), muscle cramps, swelling of hands, feet, ankles, leg pain/redness  Skin: change in moles/freckles, rash, nodules  Hematologic/lymphatic: swollen lymph glands, abnormal bruising/bleeding  Endocrine: excessive thirst/urination, cold or heat intolerance  Neurologic/emotional: worrisome memory change, numbness/tingling, anxiety, mood swings      Current Scheduled Meds:  Outpatient Encounter Medications as of 7/13/2020   Medication Sig Dispense Refill     ascorbic acid (VITAMIN C ORAL) Take by mouth.       aspirin 81 MG EC tablet Take 81 mg by mouth.       cholecalciferol, vitamin D3, 50 mcg (2,000 unit) Tab Take 4,000 Units by mouth.       docosahexaenoic acid-epa 120-180 mg cap Take 2 g by mouth.       MAGNESIUM HYDROXIDE ORAL Take 1 tablet by mouth.       multivitamin therapeutic tablet Take 1 tablet by mouth daily.       polyvinyl alcohol (LIQUIFILM TEARS) 1.4 % ophthalmic solution 1 drop.       vitamin A-vitamin C-vit E-min (OCUVITE) Tab tablet Take by mouth daily.        vitamin B complex-folic acid (B COMPLEX 1, WITH FOLIC ACID,) 0.4 mg Tab Take by mouth.       [DISCONTINUED] meclizine (ANTIVERT) 25 mg tablet Take 1 tablet (25 mg total) by mouth 3 (three) times a day as needed for dizziness. 30 tablet 0     [DISCONTINUED] ondansetron (ZOFRAN ODT) 4 MG disintegrating tablet Take 1 tablet (4 mg total) by mouth every 8 (eight) hours as needed. 10 tablet 0     [DISCONTINUED] predniSONE (DELTASONE) 20 MG tablet Take 40 mg by mouth daily. 10 tablet 0     [DISCONTINUED] TiZANidine (ZANAFLEX) 2 MG capsule Take 1 capsule (2 mg total) by mouth at bedtime. 30 capsule 0     No facility-administered encounter medications on file as of 7/13/2020.      Past Medical History:   Diagnosis Date     Arthritis 6/27/2013     H/O ETOH abuse 2/6/2018     Moderate smoker (20 or less per day) 6/27/2013     Vitamin D deficiency 2/6/2018     No past surgical history on file.  Social History     Tobacco Use     Smoking status: Current Every Day Smoker     Packs/day: 0.50     Smokeless tobacco: Never Used   Substance Use Topics     Alcohol use: Not on file     Drug use: Not on file       Objective / Physical Examination:  There were no vitals filed for this visit.  Wt Readings from Last 3 Encounters:   10/26/19 130 lb (59 kg)   04/02/19 133 lb (60.3 kg)   02/11/19 132 lb (59.9 kg)     BP Readings from Last 3 Encounters:   10/26/19 168/73   04/02/19 136/74   02/11/19 (!) 203/94     There is no height or weight on file to calculate BMI.   Results for orders placed or performed during the hospital encounter of 10/26/19   HM2 (CBC W/O DIFF)   Result Value Ref Range    WBC 6.6 4.0 - 11.0 thou/uL    RBC 4.67 3.80 - 5.40 mill/uL    Hemoglobin 14.4 12.0 - 16.0 g/dL    Hematocrit 42.7 35.0 - 47.0 %    MCV 91 80 - 100 fL    MCH 30.8 27.0 - 34.0 pg    MCHC 33.7 32.0 - 36.0 g/dL    RDW 12.1 11.0 - 14.5 %    Platelets 154 140 - 440 thou/uL    MPV 10.1 8.5 - 12.5 fL   Basic Metabolic Panel   Result Value Ref Range     Sodium 136 136 - 145 mmol/L    Potassium 4.4 3.5 - 5.0 mmol/L    Chloride 103 98 - 107 mmol/L    CO2 25 22 - 31 mmol/L    Anion Gap, Calculation 8 5 - 18 mmol/L    Glucose 336 (H) 70 - 125 mg/dL    Calcium 9.9 8.5 - 10.5 mg/dL    BUN 13 8 - 22 mg/dL    Creatinine 0.92 0.60 - 1.10 mg/dL    GFR MDRD Af Amer >60 >60 mL/min/1.73m2    GFR MDRD Non Af Amer >60 >60 mL/min/1.73m2   BNP(B-type Natriuretic Peptide)   Result Value Ref Range    BNP 28 0 - 101 pg/mL   D-dimer, Quantitative   Result Value Ref Range    D-Dimer, Quant 0.46 <=0.50 FEU ug/mL   Troponin I   Result Value Ref Range    Troponin I <0.01 0.00 - 0.29 ng/mL   ECG 12 lead nursing unit performed   Result Value Ref Range    SYSTOLIC BLOOD PRESSURE      DIASTOLIC BLOOD PRESSURE      VENTRICULAR RATE 78 BPM    ATRIAL RATE 78 BPM    P-R INTERVAL 132 ms    QRS DURATION 76 ms    Q-T INTERVAL 376 ms    QTC CALCULATION (BEZET) 428 ms    P Axis 68 degrees    R AXIS 60 degrees    T AXIS 66 degrees    MUSE DIAGNOSIS       Normal sinus rhythm  Normal ECG  No previous ECGs available  Confirmed by RUDY CARRILLO MD LOC: ([id number]) on 10/27/2019 7:07:06 PM           GENERAL: Healthy, alert and no distress  EYES: Eyes grossly normal to inspection. No discharge or erythema, or obvious scleral/conjunctival abnormalities.  RESP: No audible wheeze, cough, or visible cyanosis.  No visible retractions or increased work of breathing.    NEURO: Cranial nerves grossly intact. Mentation and speech appropriate for age.  PSYCH: Mentation appears normal, affect normal/bright, judgement and insight intact, normal speech and appearance well-groomed        Orders Placed This Encounter   Procedures     Thyroid [place]     Comprehensive Metabolic Panel     HM2(CBC w/o Differential)     Iron and Transferrin Iron Binding Capacity     Lipid [place]     Ambulatory referral for Colonoscopy   Followup: Return in about 1 week (around 7/20/2020) for Recheck. earlier if needed.    Total time spent with  patient was 20 min with >50% of time spent in face-to-face counseling regarding the above plan       Margaret Holly MD          Video-Visit Details    Type of service:  Video Visit    Video End Time (time video stopped): 9:20  Originating Location (pt. Location): Home    Distant Location (provider location):  [facility]     Platform [facility] for Video Visit[facility]      Margaret Holly MD

## 2021-06-10 NOTE — TELEPHONE ENCOUNTER
Refill Approved    Rx renewed per Medication Renewal Policy. Medication was last renewed on 7/20/20.    Yokasta Love, [facility] Triage/Med Refill 8/17/2020     Requested Prescriptions   Pending Prescriptions Disp Refills     ACCU-CHEK GUIDE TEST STRIPS strips [Pharmacy Med Name: ACCU-CHEK GUIDE TEST STRIP] 100 strip 0     Sig: TEST BLOOD GLUCOSE LEVELS TWICE A DAY       Diabetic Supplies Refill Protocol Passed - 8/16/2020  1:50 PM        Passed - Visit with PCP or prescribing provider visit in last 6 months     Last office visit with prescriber/PCP: 7/20/2020 Margaret Holly MD OR same dept: 7/20/2020 Margaret Holly MD OR same specialty: 7/20/2020 Margaret Holly MD  Last physical: Visit date not found Last MTM visit: Visit date not found   Next visit within 3 mo: Visit date not found  Next physical within 3 mo: Visit date not found  Prescriber OR PCP: Margaret Holly MD  Last diagnosis associated with med order: 1. Type 2 diabetes mellitus without complication, without long-term current use of insulin (H)  - ACCU-CHEK GUIDE TEST STRIPS strips [Pharmacy Med Name: ACCU-CHEK GUIDE TEST STRIP]; TEST BLOOD GLUCOSE LEVELS TWICE A DAY  Dispense: 100 strip; Refill: 0    If protocol passes may refill for 12 months if within 3 months of last provider visit (or a total of 15 months).             Passed - A1C in last 6 months     Hemoglobin A1c   Date Value Ref Range Status   07/15/2020 >14.0 (H) 3.5 - 6.0 % Final

## 2021-06-11 NOTE — PROGRESS NOTES
Diabetes Educator has received verbal consent for a telephone visit for the patient.  Declined video visit    DIABETES CARE PLAN    Assessment/Plan:     One month follow-up telephone visit for diabetes education and counseling. At our last visit Metformin was increased. Tyalor's blood sugars have improved. Her 30 day blood sugar average is 153. Patient is working hard to eat smaller amounts more often and avoid sweets. This is a big change for her. She is pleased she gained a couple of pounds.     Taylor has been under more stress lately as her boyfriend Kostas was diagnosed with liver cancer that is not operable. This is his 3rd cancer. Reviewed factors that raise blood sugar including stress and lack of sleep.     Current Diabetes Medications:   Metformin Extended Release 9826-4-0-1000  2-3 hours after taking Metformin may feel nauseas, light headed    PLAN:   1. Continue to test blood sugar 2 time(s) per day. Pair up before breakfast and bedtime or before and 2. hours after the largest meal.  3. Continue Metformin Extended Release to 1000 mg twice per day (2 tablets twice per day).  4. Continue to eat 3 meals and 3 snacks per day. Limit bedtime snack to 30 grams of carbohydrate to see if the morning blood sugar decreases.   5. Continue to avoid sugary beverages, juice and sweets.  6. Stay active every day and consider re-starting lifting light weights.  7. Next diabetes check 10/14/2020 at 11:45 with Dr. Margaret Holly.  8. Next diabetes telephone visit 11/12/2020 at 10:30 am.    Subjective/Objective:     Taylor Aburto is a 65 y.o. female referred by Margaret Holly MD.Currently on a work [employer]. 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 Moved to an apartment with her sister. Has a daughter that is a nurse and a boyfriend Kostas with Type 2 diabetes who is supportive.     Wt Readings from Last 3 Encounters:   07/20/20 113 lb 11.2 oz (51.6 kg)   10/26/19 130 lb (59 kg)   04/02/19 133 lb  (60.3 kg)     Food Recall: Eats meals per day and snacks. Drinks almond milk. Lactose intolerant  Wakes up early and drinks coffee with cream  10:00 breakfast: boiled poached egg, whole wheat english muffin or fruit. Weekends pancakes  12:00-2:00 pm Lunch: sandwich on wheat bread  5:00-6:00 pm Dinner:Baked chicken, roasted veggies, no sugar ice cream  Snacks yogurt with berries, fruit and nuts,     Activity: Active in apartment organizing and decorating. Walking the stairs of the apartment    SMBG pattern/BG ranges:  Uses an Accu-Chek Guide. May switch to [employer] free meter (free test strips, lancets)  Date Breakfast Dinner Bedtime    Before Before    9/3/20 155     9/2 149  144   9/1 146     8/31 154  156 large dinner and snack   8/30 143  140   8/29 165  190 ice cream   8/28 150  163     8/27 150     8/26 147     8/23  155  138   8/22 139  134   8/21 136  175   8/20   118 125   8/21 182  149       Lab Results   Component Value Date    HGBA1C >14.0 (H) 07/15/2020     EDUCATION RECORD    Literature sent by email with permission  ADA Factors that raise blood sugar    Monitoring   Meter: Discussed  Monitoring: Discussed and Literature provided  BG goals: Discussed and Literature provided    Nutrition Management  Nutrition Management: Discussed and Literature provided  Weight: Assessed and Discussed  Portions/Balance: Assessed and Discussed  Carb ID/Count: Assessed and Discussed  Label Reading: Assessed and Discussed  Heart Healthy Fats: Discussed  Physical Activity: Discussed and Literature provided  Medications: Assessed and Discussed    Diabetes Disease Process: Competent    Acute Complications: Prevent, Detect, Treat:  Hypoglycemia: Competent  Hyperglycemia: Competent  Sick Days: Not addressed    Chronic Complications  Foot Care:Not addressed  Skin Care: Not addressed  Eye: Not addressed  ABC: Discussed  Teeth:Not addressed  Goal Setting and Problem Solving: Discussed and Literature provided  Barriers:  Assessed  Psychosocial Adjustments: Assessed    Time spent with the patient: 60 minutes   Visit Type:Diabetes Self-Management Training ()  Diagnosis per referral: Type 2 diabetes without complication, without long-term use of insulin (E11.9)    Mary Car RD, LD, [facility]  Certified Diabetes Care and   9/3/2020

## 2021-06-11 NOTE — PATIENT INSTRUCTIONS - HE
1. Continue to test blood sugar 2 time(s) per day. Pair up before breakfast and bedtime or before and 2. hours after the largest meal.  3. Continue Metformin Extended Release to 1000 mg twice per day (2 tablets twice per day).  4. Continue to eat 3 meals and 3 snacks per day. Limit bedtime snack to 30 grams of carbohydrate to see if the morning blood sugar decreases.   5. Continue to avoid sugary beverages, juice and sweets.  6. Stay active every day and consider re-starting lifting light weights.  7. Next diabetes check 10/14/2020 at 11:45 with Dr. Margaret Holly.  8. Next diabetes telephone visit 11/12/2020 at 10:30 am.    Blood sugar goals:  Before meals   1-2 hours after meals: less 180  Bedtime:     A1c: less than 7.0% (estimated average blood sugar of 154 mg/dl)

## 2021-06-12 NOTE — TELEPHONE ENCOUNTER
RN cannot approve Refill Request    RN can NOT refill this medication Protocol failed and NO refill given. Last office visit: 7/20/2020 Margaret Holly MD Last Physical: Visit date not found Last MTM visit: Visit date not found Last visit same specialty: 7/20/2020 Margaret Holly MD.  Next visit within 3 mo: Visit date not found  Next physical within 3 mo: Visit date not found      [facility] Love, [facility] Triage/Med Refill 11/5/2020    Requested Prescriptions   Pending Prescriptions Disp Refills     metFORMIN (GLUCOPHAGE-XR) 500 MG 24 hr tablet [Pharmacy Med Name: METFORMIN HCL  MG TABLET] 60 tablet 2     Sig: TAKE 1 TABLET BY MOUTH TWICE A DAY       Metformin Refill Protocol Failed - 11/3/2020 12:16 AM        Failed - Microalbumin in last year      No results found for: MICROALBUR               Passed - Blood pressure in last 12 months     BP Readings from Last 1 Encounters:   07/20/20 126/66             Passed - LFT or AST or ALT in last 12 months     Albumin   Date Value Ref Range Status   07/15/2020 4.0 3.5 - 5.0 g/dL Final     Bilirubin, Total   Date Value Ref Range Status   07/15/2020 0.7 0.0 - 1.0 mg/dL Final     Alkaline Phosphatase   Date Value Ref Range Status   07/15/2020 100 45 - 120 U/L Final     AST   Date Value Ref Range Status   07/15/2020 14 0 - 40 U/L Final     ALT   Date Value Ref Range Status   07/15/2020 18 0 - 45 U/L Final     Protein, Total   Date Value Ref Range Status   07/15/2020 6.9 6.0 - 8.0 g/dL Final                Passed - GFR or Serum Creatinine in last 6 months     GFR MDRD Non Af Amer   Date Value Ref Range Status   07/15/2020 57 (L) >60 mL/min/1.73m2 Final     GFR MDRD Af Amer   Date Value Ref Range Status   07/15/2020 >60 >60 mL/min/1.73m2 Final             Passed - Visit with PCP or prescribing provider visit in last 6 months or next 3 months     Last office visit with prescriber/PCP: 7/20/2020 OR same dept: 7/20/2020 Margaret Holly MD OR same specialty:  7/20/2020 Margaret Holly MD Last physical: Visit date not found Last MTM visit: Visit date not found         Next appt within 3 mo: Visit date not found  Next physical within 3 mo: Visit date not found  Prescriber OR PCP: Margaret Holly MD  Last diagnosis associated with med order: 1. Type 2 diabetes mellitus without complication, without long-term current use of insulin (H)  - metFORMIN (GLUCOPHAGE-XR) 500 MG 24 hr tablet [Pharmacy Med Name: METFORMIN HCL  MG TABLET]; TAKE 1 TABLET BY MOUTH TWICE A DAY  Dispense: 60 tablet; Refill: 2     If protocol passes may refill for 12 months if within 3 months of last provider visit (or a total of 15 months).           Passed - A1C in last 6 months     Hemoglobin A1c   Date Value Ref Range Status   07/15/2020 >14.0 (H) 3.5 - 6.0 % Final

## 2021-06-12 NOTE — TELEPHONE ENCOUNTER
Taylor requests for a COVID test, no recent exposure to COVID. She reports recovering from a cold and has runny nose. 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    Disposition: advised to call [facility] [phone], offered to transfer but she states that she will call later this afternoon. Advised that she may have to leave a message and EO will call her back.    Teresa Zhang RN/[place] Nurse Advisor          Reason for Disposition    Nursing judgment    Protocols used: INFORMATION ONLY CALL - NO TRIAGE-A-OH

## 2021-06-13 NOTE — TELEPHONE ENCOUNTER
Called patient to notify of the need for further testing.  No answer and left message.  Please relay results and ensure letter went out to the patient

## 2021-06-13 NOTE — PROGRESS NOTES
Tom Vazquez,     I tried to call you but I am also sending a my chart message.  Your hepatitis C came back positive which just tells us we need to do another blood test.  It doesn't tell us any definitive results yet.  I am going to place an order for you to come back in and have the other blood test done to determine more information so please call and make a lab only visit.  Your diabetes look much much better!  Keep on the same medications and continue with diabetic education.  Call if any questions or concerns or my chart message back.    Margaret Holly MD

## 2021-06-13 NOTE — PROGRESS NOTES
Jamila Aburto,    Your result is/are normal.  Call if any questions or concerns.    Margaret Holly MD

## 2021-06-15 NOTE — PROGRESS NOTES
Taylor Aburto is a 65 y.o. female who is being evaluated via a billable video visit.      How would you like to obtain your AVS? Mail a copy.  If dropped from the video visit, the video invitation should be resent by: Text to cell phone: [phone]  Will anyone else be joining your video visit? No      Video Start Time: 4:22 PM    Assessment & Plan     Cigarette nicotine dependence with other nicotine-induced disorder  Recommend smoking cessation and would like to see hypnotist.  - CT Low Dose Lung Screening Chest    Type 2 diabetes mellitus without complication, without long-term current use of insulin (H)  Reviewed previous labs and will refill medication and new labs today  - Glycosylated Hemoglobin A1c  - Basic Metabolic Panel  - lisinopriL (PRINIVIL,ZESTRIL) 5 MG tablet  Dispense: 90 tablet; Refill: 2  - metFORMIN (GLUCOPHAGE-XR) 500 MG 24 hr tablet  Dispense: 180 tablet; Refill: 2  - simvastatin (ZOCOR) 20 MG tablet  Dispense: 90 tablet; Refill: 2      Review of the result(s) of each unique test - labs  25 minutes spent on the date of the encounter doing chart review, history and exam, documentation and further activities as noted above       Tobacco Cessation:   reports that she has been smoking. She has a 20.00 pack-year smoking history. She has never used smokeless tobacco.  I have counseled the patient for tobacco cessation and the follow up will occur  at the next visit.  Return in about 3 months (around 6/1/2021) for Diabetic follow up.    Margaret Holly MD  [facility]   Taylor Aburto is 65 y.o. and presents today for the following health issues   HPI   Sugars doing well - slightly high in the am.  Running 200 at the highest in the am.  Typically running 140s fasting.  Postprandial running 108 last night.      Lung Cancer Screening pre-scan counseling Visit    The patient fits the risk profile of patients who benefit from this screening:  -The patient is >55  years old and <80 years old (55-77 years for Medicare patients)  -The patient has 30 pack year history (over 30)  -The patient has smoked within the past 15 years  -The patient has no medical comorbidity severe enough that it would cause mortality prior to mortality due to the lung cancer attempting to be detected.    Discussion with patient regarding the harms associated with LDCT screening include false-negative and false-positive results, incidental findings, overdiagnosis, and radiation exposure were reviewed at length.   The patient understands that pursuing this screening test may result in a biopsy that was not necessary. It may also produce added stress over a nodule that is likely not cancer.    Of 100 patients who get screening, 25 will have a positive scan. Of those 25, only 1 will have cancer.  Overdiagnosis is estimated at 10% of patients-- they would not have been detected in the patient's lifetime without screening. Less than 1% of patients likely had death related to radiation exposure increase.   Average low-dose CT associated with 0.61 to 1.5 mSv. Annual background radiation exposure in the [place] averages 2.4 mSv; mammogram is 0.7mSv.    The benefits are reduction in risk of death from lung cancer. The number needed to treat is 320 (for every 320 patients who undergo screening, 1 patient will have a benefit in mortality from early detection from the screening).    Undergoing this screening implies willingness to pursue further potentially invasive testing to discover potential cancer.    All questions were answered.    The patient was counseled regarding smoking cessation and its risk for lung cancer.          Review of Systems  A review of systems was obtained and is negative other than what is stated in the HPI.         Objective       Vitals:  No vitals were obtained today due to virtual visit.    Physical Exam    General Appearance: Alert and oriented, cooperative, affect appropriate,  speech clear, in no apparent distress  Neuro: Alert and oriented, follows commands appropriately.       Margaret Holly MD  Health Maintenance Review  Health Maintenance   Topic Date Due     PREVENTIVE CARE VISIT  1955     ADVANCE CARE PLANNING  04/24/1974     ZOSTER VACCINES (1 of 2) 04/24/2006     A1C  02/09/2021     DIABETIC EYE EXAM  07/24/2021     BMP  11/09/2021     DIABETIC FOOT EXAM  11/09/2021     LIPID  11/09/2021     MICROALBUMIN  11/09/2021     MAMMOGRAM  08/17/2022     PAP SMEAR  02/06/2023     HPV TEST  02/06/2023     Pneumococcal Vaccine: Pediatrics (0 to 5 Years) and At-Risk Patients (6 to 64 Years) (2 of 2) 07/20/2025     TD 18+ HE  07/20/2030     COLORECTAL CANCER SCREENING  11/01/2030     HEPATITIS C SCREENING  Completed     HIV SCREENING  Completed     INFLUENZA VACCINE RULE BASED  Completed     TDAP ADULT ONE TIME DOSE  Completed         I am having Taylor Aburto maintain her polyvinyl alcohol, docosahexaenoic acid-epa, cholecalciferol (vitamin D3), aspirin, MAGNESIUM HYDROXIDE ORAL, ascorbic acid (VITAMIN C ORAL), vitamin B complex-folic acid, vitamin A-vitamin C-vit E-min, blood glucose meter, generic lancets, Accu-Chek Guide test strips, lisinopriL, metFORMIN, and simvastatin.        Current Scheduled Meds:  Outpatient Encounter Medications as of 3/1/2021   Medication Sig Dispense Refill     ACCU-CHEK GUIDE TEST STRIPS strips TEST BLOOD GLUCOSE LEVELS TWICE A  strip 3     ascorbic acid (VITAMIN C ORAL) Take by mouth.       aspirin 81 MG EC tablet Take 81 mg by mouth.       blood glucose meter (GLUCOMETER) Use 1 each As Directed as needed. Dispense glucometer brand per patient's insurance at pharmacy discretion. DX: E11.9 1 each 0     cholecalciferol, vitamin D3, 50 mcg (2,000 unit) Tab Take 4,000 Units by mouth.       generic lancets (FINGERSTIX LANCETS) Dispense brand per patient's insurance at pharmacy discretion. 100 each 2     lisinopriL (PRINIVIL,ZESTRIL) 5 MG tablet Take  1 tablet (5 mg total) by mouth daily. 90 tablet 2     MAGNESIUM HYDROXIDE ORAL Take 1 tablet by mouth.       metFORMIN (GLUCOPHAGE-XR) 500 MG 24 hr tablet Take 1 tablet (500 mg total) by mouth 2 (two) times a day. 180 tablet 2     polyvinyl alcohol (LIQUIFILM TEARS) 1.4 % ophthalmic solution 1 drop.       simvastatin (ZOCOR) 20 MG tablet Take 1 tablet (20 mg total) by mouth every evening. 90 tablet 2     [DISCONTINUED] lisinopriL (PRINIVIL,ZESTRIL) 5 MG tablet Take 1 tablet (5 mg total) by mouth daily. 90 tablet 2     [DISCONTINUED] metFORMIN (GLUCOPHAGE-XR) 500 MG 24 hr tablet Take 1 tablet (500 mg total) by mouth 2 (two) times a day. 180 tablet 2     [DISCONTINUED] simvastatin (ZOCOR) 20 MG tablet Take 1 tablet (20 mg total) by mouth every evening. 90 tablet 2     docosahexaenoic acid-epa 120-180 mg cap Take 2 g by mouth.       vitamin A-vitamin C-vit E-min (OCUVITE) Tab tablet Take by mouth daily.       vitamin B complex-folic acid (B COMPLEX 1, WITH FOLIC ACID,) 0.4 mg Tab Take by mouth.       No facility-administered encounter medications on file as of 3/1/2021.      Past Medical History:   Diagnosis Date     Arthritis 6/27/2013     H/O ETOH abuse 2/6/2018     Moderate smoker (20 or less per day) 6/27/2013     Vitamin D deficiency 2/6/2018     No past surgical history on file.  Social History     Tobacco Use     Smoking status: Current Every Day Smoker     Packs/day: 0.50     Years: 40.00     Pack years: 20.00     Smokeless tobacco: Never Used   Substance Use Topics     Alcohol use: Not on file     Drug use: Not on file                       Video-Visit Details    Type of service:  Video Visit    Video End Time (time video stopped): 4:36 PM  Originating Location (pt. Location): Home    Distant Location (provider location):  [facility]     Platform used for Video Visit: Well

## 2021-06-15 NOTE — TELEPHONE ENCOUNTER
Left voicemail asking if pt can come earlier for her appointment per Dr. Holly.  Pt is okay to come at either 1:30 or 2:30 if she is able to get here early.

## 2021-06-16 PROBLEM — E11.9 DIABETES MELLITUS, TYPE 2 (H): Status: ACTIVE | Noted: 2020-11-09

## 2021-06-16 PROBLEM — F10.11 H/O ETOH ABUSE: Status: ACTIVE | Noted: 2018-02-06

## 2021-06-16 PROBLEM — F17.218 CIGARETTE NICOTINE DEPENDENCE WITH OTHER NICOTINE-INDUCED DISORDER: Status: ACTIVE | Noted: 2020-11-09

## 2021-06-16 PROBLEM — E55.9 VITAMIN D DEFICIENCY: Status: ACTIVE | Noted: 2018-02-06

## 2021-06-16 NOTE — PROGRESS NOTES
Jamila Aburto,    Your result is/are normal. We recommend yearly scans. Call if any questions or concerns.    Margaret Holly MD

## 2021-06-17 NOTE — PATIENT INSTRUCTIONS - HE
Patient Instructions by Margaret Holly MD at 4/2/2019  1:30 PM     Author: Margaret Holly MD Service: -- Author Type: Physician    Filed: 4/2/2019  2:08 PM Encounter Date: 4/2/2019 Status: Addendum    : Margaret Holly MD (Physician)    Related Notes: Original Note by Margaret Holly MD (Physician) filed at 4/2/2019  2:07 PM       1-800 quit now  Patient Education     How to Quit Smoking  Smoking is one of the hardest habits to break. About half of all people who have ever smoked have been able to quit. Most people who still smoke want to quit. Here are some of the best ways to stop smoking.    Keep trying  Most smokers make many attempts at quitting before they are successful. Its important not to give up.  Go cold turkey  Most former smokers quit cold turkey (all at once). Trying to cut back gradually doesn't seem to work as well, perhaps because it continues the smoking habit. Also, it is possible to inhale more while smoking fewer cigarettes. This results in the same amount of nicotine in your body.  Get support  Support programs can be a big help, especially for heavy smokers. These groups offer lectures, ways to change behavior, and peer support. Here are some ways to find a support program:    Free national quitline: 800-QUIT-NOW (349-999-3584).    [facility] quit-smoking programs.    American Lung Association: (841.289.2009).    American Cancer Society (240-436-3232).  Support at home is important too. Nonsmokers can offer praise and encouragement. If the smoker in your life finds it hard to quit, encourage them to keep trying.  Over-the-counter medicines  Nicotine replacement therapy may make quitting easier. Certain aids, such as the nicotine patch, gum, and lozenges, are available without a prescription. It is best to use these under a doctors care, though. The skin patch provides a steady supply of nicotine. Nicotine gum and lozenges give temporary bursts of low levels of nicotine. Both methods  "reduce the craving for cigarettes. Warning: If you have nausea, vomiting, dizziness, weakness, or a fast heartbeat, stop using these products and see your doctor.  Prescription medicines  After reviewing your smoking patterns and past attempts to quit, your doctor may offer a prescription medicine such as bupropion, varenicline, a nicotine inhaler, or nasal spray. Each has advantages and side effects. Your doctor can review these with you.  Health benefits of quitting  The benefits of quitting start right away and keep improving the longer you go without smoking. These benefits occur at any age.  So whether you are 17 or 70, quitting is a good decision. Some of the benefits include:    20 minutes: Blood pressure and pulse return to normal.    8 hours: Oxygen levels return to normal.    2 days: Ability to smell and taste begin to improve as damaged nerves regrow.    2 to 3 weeks: Circulation and lung function improve.    1 to 9 months: Coughing, congestion, and shortness of breath decrease; tiredness decreases.    1 year: Risk of heart attack decreases by half.    5 years: Risk of lung cancer decreases by half; risk of stroke becomes the same as a nonsmokers.  For more on how to quit smoking, try these online resources:     Smokefree.gov    \"Clearing the Air\" booklet from the National Cancer [place]: smokefree.gov/sites/default/files/pdf/clearing-the-air-accessible.pdf  Date Last Reviewed: 3/1/2017    4216-1075 [employer]. [address] [place] [address]. All rights reserved. This information is not intended as a substitute for professional medical care. Always follow your healthcare professional's instructions.           Patient Education     Getting Support for Quitting Smoking  You dont have to go through the process of quitting smoking without support. Tell people you are quitting. The support of friends, coworkers, and family members can make a big difference. Face-to-face or telephone " counseling can also be helpful, as can a stop-smoking class or an ex-smokers group.    Set a quit date  If youre serious about quitting smoking, choose a date within the next 2 to 4 weeks. Geraldo it in bright, bold letters on a calendar you use often. Tell people about your quit date. Ask for their support. Let your friends and family know how they can help you quit.  Make a contract  A quit-smoking contract gives you a goal. Write out the contract and sign it. Have it witnessed, if you like. Then keep the contract where youll see it often, or carry it with you. Read the contract when youre tempted to smoke.  Take action  On the day you quit, reread your quit contract. Think about the benefits you gain by quitting, such as better health and an improved sense of taste.    Remove cigarettes from your home, car, or any other place where you stash them.    Throw away all smoking materials, including matches, lighters, and ashtrays.    Review your list of triggers and your plan for coping with them.    Stay away from people or settings you link with smoking.    Make a survival kit that includes gum, mints, carrot sticks, and things to keep your hands busy.    Talk to your healthcare provider about using quit-smoking products, such as medication or a nicotine patch, inhaler, nasal spray, gum, or lozenges.  Ask for help  Sometimes you may just need to talk when you miss smoking. Ex-smokers are good to talk to, because theyre likely to know how you feel. You may need extra support in the first few weeks after you quit. Ask a friend to call you each day to see how youre doing. Telephone counseling can also help you keep on track. Ask your healthcare provider, local hospital, or public health department to put you in touch with a phone counselor. You may also have to deal with doubters when you decide to quit. Explain to any doubters why you are quitting. Tell them that quitting is important to you. Ask for their support. Tell  your smoking buddies that you can walk together instead of smoking together. If someone thinks you wont succeed, say that you have a good quit plan and ask for their support. Let him or her know youre sticking with it.     For more information    https://smokefree.gov/xbjz-oh-py-expert    National Cancer [place] Smoking Quitline: 877-44U-QUIT (764-551-3253)   Date Last Reviewed: 2/1/2017 2000-2017 [employer]. [address] [place] [address], [place] [address]. All rights reserved. This information is not intended as a substitute for professional medical care. Always follow your healthcare professional's instructions.

## 2021-06-17 NOTE — TELEPHONE ENCOUNTER
Telephone Encounter by Scarlett Marvin LPN at 11/19/2020 10:45 AM     Author: Scarlett aMrvin LPN Service: -- Author Type: Certified Medical Assistant    Filed: 11/19/2020 10:46 AM Encounter Date: 11/18/2020 Status: Signed    : Scarlett Marvin LPN (Licensed Nurse)       Called and lvm for patient to call back for message from provider regarding results. Advised patient this was also sent via [employer] as well so she can see the message there.    Letter was mailed.    If patient calls back, please relay result note and assist with scheduling     Margaret Holly MD   11/11/2020  9:44 AM CST      Tom Vazquez,      I tried to call you but I am also sending a my chart message.  Your hepatitis C came back positive which just tells us we need to do another blood test.  It doesn't tell us any definitive results yet.  I am going to place an order for you to come back in and have the other blood test done to determine more information so please call and make a lab only visit.  Your diabetes look much much better!  Keep on the same medications and continue with diabetic education.  Call if any questions or concerns or my chart message back.     Margaret Holly MD

## 2021-06-18 NOTE — PATIENT INSTRUCTIONS - HE
Patient Instructions by Margaret Holly MD at 7/20/2020 10:15 AM     Author: Margaret Holly MD Service: -- Author Type: Physician    Filed: 7/20/2020 10:31 AM Encounter Date: 7/20/2020 Status: Signed    : Margaret Holly MD (Physician)         Patient Education     The Benefits of Living Smoke Free  What do you want to gain from quitting? Check off some reasons to quit.  Health benefits  ___  Improve my ability to breathe without coughing or shortness of breath  ___  Reduce my risk of lung cancer, heart disease, chronic lung disease  ___  Have fewer wrinkles and softer skin  ___  Improve my sense of taste and smell  ___  For pregnant women--reduce the risk of having a miscarriage, stillbirth, premature birth, or low-birth-weight baby  Personal benefits  ___  Feel more in control of my life  ___  Have better-smelling hair, breath, clothes, home, and car  ___  Save time by not having to take smoke breaks, buy cigarettes, or hunt for a light  ___  Have whiter teeth  Family benefits  ___  Reduce my childrens respiratory tract infections  ___  Set a good example for my children  ___  Reduce my familys cancer risk  Financial benefits  ___  Save hundreds of dollars each year that would be spent on cigarettes  ___  Save money on medical bills  ___  Save on life, health, and car insurance premiums     Those dollars add up!  Cigarettes are expensive, and getting more expensive all the time. Do you realize how much money you are spending on cigarettes per year? What is the average amount you spend on a pack of cigarettes? What is the average number of packs that you smoke per day? Using your answers to these questions, fill in this formula to help you find out:  ($ _____ per pack) ×  ( _____ number of packs per day) × (365 days) =  $ _____ yearly cost of smoking  Besides tobacco, there are other costs, including extra cleaning bills and replacement costs for clothing and furniture; medical expenses for  smoking-related illnesses; and higher health, life, and car insurance premiums.  Cigars and pipes count too!  Cigars and pipes are also dangerous. So are smokeless (chewing) tobacco and snuff. All of these products contain nicotine, a highly addictive substance that has harmful effects on your body. Quitting smoking means giving up all tobacco products.      For more information    https://smokefree.gov/byvf-gc-fe-expert    National Cancer [place] Smoking Quitline: 877-44U-QUIT (006-300-3358)   Date Last Reviewed: 2/1/2017 2000-2019 [employer]. [address] [place], [place] [address]. All rights reserved. This information is not intended as a substitute for professional medical care. Always follow your healthcare professional's instructions.           Patient Education     Using a Blood Sugar Log    You have diabetes. This means your body has trouble controlling blood sugar (glucose). To help manage your diabetes, check your blood sugar level as directed by your healthcare provider. Keep a log of your blood sugar levels to help you track it. Its a simple and easy way to see how well you are controlling your diabetes.  Checking your blood sugar level  Check your blood sugar with a blood glucose meter. First, wash and dry your hands. Next, youll prick the side of your finger with a tiny lancet or with a spring-loaded lancing device to draw a tiny drop of blood onto a test strip.   The strip goes into the meter first. Next a drop of blood is placed on the tip of the strip. The meter shows your blood sugar level. Your readings should be in your target range as often as possible. This means not too high or too low. Staying in this range helps lower your risk for problems. Your healthcare provider will help you find the target range that is best for you.  Some glucose meters let you use another place on your body to test. But these other places should not be used in some cases as they may not be  accurate. Follow the instructions for your glucose meter. And talk with your healthcare provider before doing the test on other places.  Continuous glucose monitoring  Continuous glucose monitoring (CGM) may be an option for checking your blood sugar levels. It tracks your blood sugar level throughout the day and night. This can help you make better choices about food and physical activities, and taking medicines. It can also find trends and patterns that can help your healthcare provider better manage your diabetes. Ask your healthcare provider if CGM is right for you.  Several CGM devices are available. They are approved by the FDA with a prescription from a healthcare provider. It includes a sensor, transmitter, and a  or monitor. The sensor is a small device placed under the skin. It will measure your blood sugar several times a minute. A transmitter sends the information to a . This may be a part of an insulin pump or a separate device.  Your blood sugar will still need to be checked a few times a day with a regular glucose meter to look for accuracy. The sensor under the skin needs to be replaced every 3 to 7 days.   Tracking your readings  Record the readings in your log book or using an online rito each time you check your blood sugar. Your meter may also have a memory feature. Your healthcare provider can check this at your next visit. You may be told by your healthcare provider to check your blood sugar in the morning, at bedtime, and before and after meals. Be sure to write down all of your numbers. Also use your log to record things that might have affected your blood sugar. Some examples include being sick or dehydrated. It may also include taking certain medicines, being physically active, feeling stressed, or skipping meals.   Lessons learned from your readings  Tracking your blood sugar readings helps you see patterns. This tells you how your actions affect your blood sugar. For  instance, you may have higher numbers after eating certain foods. Or you may have lower numbers after exercise. This helps you understand how to stay in your target range.  Sharing your log with your healthcare team  Bring your blood sugar log and glucose meter with you to all of your appointments. This can help your healthcare team make changes to your treatment plan, if needed. This may mean making changes in what you eat, what medicines you take, or how much you exercise.  To learn more  The resources below can help you learn more:    American Diabetes Association, 503.607.6682, www.diabetes.org    [employer], 486.966.5968, www.lighthouse.org    [facility] [place], 773.635.4888, www.nei.nih.gov    [facility], 955.499.4811, www.hormone.org  Date Last Reviewed: 9/1/2018 2000-2019 [employer]. [address] [place] [address]. All rights reserved. This information is not intended as a substitute for professional medical care. Always follow your healthcare professional's instructions.           Patient Education     How to Check Your Blood Sugar    Keeping track of how much sugar (glucose) is in your blood is an important part of self-care when you have diabetes. This is also called self-monitoring of blood glucose (SMBG). To make sure your glucose and insulin are in balance, check your blood sugar as instructed by your healthcare provider. You may need to check your blood glucose levels at certain times every day. Or you may need to check them only a few times a week.  What you need  To check your blood sugar, make sure you have the following:     A small pricking needle (lancing device)    Test strips    A glucose meter. Be sure the test strips match the glucose meter.    A notebook, chart, or log book and pen or pencil  Using a blood glucose meter  You can check your blood sugar at home, at work, and anywhere else. Your diabetes team will help you choose a  blood glucose meter. A meter measures the amount of glucose in a tiny drop of blood. Youll use a device called a lancet to draw a drop of blood. Put the strip in the meter first. Then touch the test strip to the drop of blood. The meter then gives you a number (reading) that tells you the level of your blood sugar.  Aim for your target range  Your blood sugar should be in your target range--not too high and not too low. A target range is where your blood sugar level is healthiest. Staying in this range as much as possible will help lower your risk for health problems (complications). Your diabetes team will help you figure out the best target range for you. That range depends on many things. They include your age, other health problems, how well your diabetes is controlled, and how long you have had diabetes. In general, target ranges are:    Control of blood glucose (hemoglobin A1c or A1c). This should generally be 7.0% or less. You will usually have this test at the lab.    Before a meal (preprandial glucose). The target range is between 80 and 130 mg/dL.    One to 2 hours after a meal (postprandial glucose). The range is less than 180 mg/dL.  Track your readings  Use a notebook, chart, or log book to keep track of your readings. Write down the date, time, and your blood sugar level numbers. This helps you see patterns, such as high blood sugar after eating certain foods. Take your log along when you see your healthcare provider.  If you would rather track your readings digitally, the American Diabetes Association (ADA) has a program called Diabetes 24/7. This online program gives you tools to help monitor diabetes, including blood glucose levels. Your provider may also have an electronic medical record that offers the same type of tracking options.  Your blood glucose levels will help your provider decide if he or she needs to make any changes to your management plan. To check your blood sugar, follow the steps  below  Step 1. Get ready    Wash your hands with soap and warm (not hot) water.    Follow all of the instructions that came with your glucose meter. Be sure that your test strips were designed to be used with your meter and are not .   Step 2. Draw a drop of blood    Prick the side of your finger with the lancet. Squeeze gently until you get a drop of blood. Squeezing too hard can cause an inaccurate reading.    Put the lancet in a special sharps container. Ask your healthcare team where you can buy one or what you can use to throw away any sharps.    If you are unable to get enough blood, hold your hand at your side and gently shake it. If this is a common problem for you, ask your healthcare provider if you can use other parts of your body to get the blood from.  Step 3. Place the drop on a strip    Wait for the meter to show a message or symbol that it is time to test.    Touch the test strip to the drop of blood.    Follow the instructions included with meter.  Step 4. Read and record your results    Wait for your meter to show the result.    If you see an error message, recheck using a fresh strip and a fresh drop of blood. Also recheck if the glucose numbers aren't what you expect--too low without symptoms, or too high for no reason.    Write the results in your log book. Bring your log book to you next appointment.  Date Last Reviewed: 2018-2019 [employer]. [address] [place] [address], [place] [address]. All rights reserved. This information is not intended as a substitute for professional medical care. Always follow your healthcare professional's instructions.

## 2021-06-20 NOTE — LETTER
Letter by Margaret Holly MD at      Author: Margaret Holly MD Service: -- Author Type: --    Filed:  Encounter Date: 7/17/2020 Status: (Other)         Taylor Aburto  [address]  [address]  [facility] [address]             July 17, 2020         Dear Ms. Aburto,    Below are the results from your recent visit:    Resulted Orders   Thyroid Cascade   Result Value Ref Range    TSH 1.10 0.30 - 5.00 uIU/mL   Comprehensive Metabolic Panel   Result Value Ref Range    Sodium 138 136 - 145 mmol/L    Potassium 4.5 3.5 - 5.0 mmol/L    Chloride 102 98 - 107 mmol/L    CO2 27 22 - 31 mmol/L    Anion Gap, Calculation 9 5 - 18 mmol/L    Glucose 377 (H) 70 - 125 mg/dL    BUN 12 8 - 22 mg/dL    Creatinine 0.98 0.60 - 1.10 mg/dL    GFR MDRD Af Amer >60 >60 mL/min/1.73m2    GFR MDRD Non Af Amer 57 (L) >60 mL/min/1.73m2    Bilirubin, Total 0.7 0.0 - 1.0 mg/dL    Calcium 9.5 8.5 - 10.5 mg/dL    Protein, Total 6.9 6.0 - 8.0 g/dL    Albumin 4.0 3.5 - 5.0 g/dL    Alkaline Phosphatase 100 45 - 120 U/L    AST 14 0 - 40 U/L    ALT 18 0 - 45 U/L    Narrative    Fasting Glucose reference range is 70-99 mg/dL per  American Diabetes Association (ADA) guidelines.   HM2(CBC w/o Differential)   Result Value Ref Range    WBC 5.6 4.0 - 11.0 thou/uL    RBC 4.74 3.80 - 5.40 mill/uL    Hemoglobin 14.6 12.0 - 16.0 g/dL    Hematocrit 42.8 35.0 - 47.0 %    MCV 90 80 - 100 fL    MCH 30.7 27.0 - 34.0 pg    MCHC 34.1 32.0 - 36.0 g/dL    RDW 11.2 11.0 - 14.5 %    Platelets 134 (L) 140 - 440 thou/uL    MPV 8.3 7.0 - 10.0 fL   Iron and Transferrin Iron Binding Capacity   Result Value Ref Range    Iron 95 42 - 175 ug/dL    Transferrin 220 212 - 360 mg/dL    Transferrin Saturation, Calculated 35 20 - 50 %    Transferrin IBC, Calculated 275 (L) 313 - 563 ug/dL   Lipid Cascade   Result Value Ref Range    Cholesterol 208 (H) <=199 mg/dL    Triglycerides 127 <=149 mg/dL    HDL Cholesterol 69 >=50 mg/dL    LDL Calculated 114 <=129 mg/dL    Patient Fasting >  8hrs? Yes         Sugar very elevated and added on a lab to look at diabetes.  Thyroid is normal.  Iron levels are normal,  cholesterol very minimally elevated.  Fatigue could be due to the high sugars and will wait on the other  lab     Please call with questions or contact us using MyChart.    Sincerely,        Electronically signed by Margaret Holly MD

## 2021-06-21 NOTE — LETTER
Letter by Margaret Holly MD at      Author: Margaret Holly MD Service: -- Author Type: --    Filed:  Encounter Date: 11/30/2020 Status: (Other)       Taylor Aburto  [address]  [facility] [address]             November 30, 2020         Dear Taylor Aburto,    Below are the results from Taylor's recent visit:    Resulted Orders   Hepatitis C RNA Quantitation by RT-PCR   Result Value Ref Range    HCV RNA Quant HCV RNA Not Detected HCVND [IU]/mL      Comment:      The MARCELINO AmpliPrep/MARCELINO TaqMan HCV Test is an FDA-approved in vitro nucleic  acid amplification test for the quantitation of HCV RNA in human plasma (ETDA  plasma) or serum using the MARCELINO AmpliPrep Instrument for automated viral  nucleic acid extraction and the MARCELINO TaqMan Analyzer or MARCELINO TaqMan for  automated Real Time PCR amplification and detection of the viral nucleic acid  target.  Titer results are reported in International Units/mL (IU/mL) using the 1st WHO  International standard for HCV for Nucleic Acid Amplification based assays.    Log of HCV RNA Qt Not Calculated <1.2 Log IU/mL      Comment:        Performed and/or entered by:  [facility]  [address] [place] [address]       Your result is/are normal.    Please call with questions or contact us using [employer].    Sincerely,        Electronically signed by Margaret Holly MD

## 2021-06-21 NOTE — LETTER
Letter by Margaret Holly MD at      Author: Margaret Holly MD Service: -- Author Type: --    Filed:  Encounter Date: 4/14/2021 Status: (Other)       Talyor Aburto  [address]  [facility] [address]             April 14, 2021         Dear Taylor Lamonte,    Below are the results from Taylor's recent visit:    Resulted Orders   CT Low Dose Lung Screening Chest    Narrative    EXAM: LOW DOSE LUNG CANCER SCREENING CT CHEST  LOCATION: [facility]  DATE/TIME: 4/13/2021 2:29 PM    INDICATION: Lung cancer screening. History of smoking. High risk patient with greater than 30 pack year smoking history.  COMPARISON: None.    TECHNIQUE: Low-dose lung cancer screening non-contrast CT chest. Dose reduction techniques were used.     FINDINGS:  NODULES: Few incidental calcified granulomas.    LUNGS AND PLEURA: Minimal emphysema. Lungs are clear.    MEDIASTINUM: No adenopathy. Minimal pericardial fluid. Incidental old granulomatous terrie calcifications. Nonaneurysmal aorta.    CORONARY ARTERY CALCIFICATION: Nothing significant.    LIMITED UPPER ABDOMEN: Aortic atherosclerosis.    MUSCULOSKELETAL: Unremarkable.      Impression    1.  Negative for lung cancer screening purposes.    LungRADS CATEGORY: 1: Negative.    RADIOLOGIST RECOMMENDATION: Continue annual screening with low-dose CT chest in 12 months.          Jamila Aburto,     Your result is/are normal. We recommend yearly scans. Call if any questions or concerns.      Margaret Holly MD     Please call with questions or contact us using [employer].    Sincerely,        Electronically signed by Margaret Holly MD

## 2021-06-21 NOTE — LETTER
"Letter by Margaret Holly MD at      Author: Margaret Holly MD Service: -- Author Type: --    Filed:  Encounter Date: 11/11/2020 Status: (Other)        Taylor Aburto  [address]  [facility] [address]             November 11, 2020         Dear Taylor Aburto,    Below are the results from Taylor's recent visit:    Resulted Orders   Microalbumin, Random Urine   Result Value Ref Range    Microalbumin, Random Urine <0.50 0.00 - 1.99 mg/dL    Creatinine, Urine 36.1 mg/dL    Microalbumin/Creatinine Ratio Random Urine        Comment:      \"Unable to calculate: Creatinine and/or Microalbumin value below detectable level\"    Narrative    Microalbumin, Random Urine  <2.0 mg/dL . . . . . . . . Normal  3.0-30.0 mg/dL . . . . . . Microalbuminuria  >30.0 mg/dL . . . . . .  . Clinical Proteinuria    Microalbumin/Creatinine Ratio, Random Urine  <20 mg/g . . . . .. . . . Normal   mg/g . . . . . . . Microalbuminuria  >300 mg/g . . . . . . . . Clinical Proteinuria       Lipid panel   Result Value Ref Range    Triglycerides 107 <=149 mg/dL    Cholesterol 164 <=199 mg/dL    LDL Calculated 77 <=129 mg/dL    HDL Cholesterol 66 >=50 mg/dL    Patient Fasting > 8hrs? No    Hemoglobin A1c   Result Value Ref Range    Hemoglobin A1c 6.6 (H) <=5.6 %      Comment:      Normal <5.7% Prediabete 5.7-6.4% Diabletes 6.5% or higher - adopted from ADA consensus guidelines   Comprehensive Metabolic Panel   Result Value Ref Range    Sodium 141 136 - 145 mmol/L    Potassium 4.3 3.5 - 5.0 mmol/L    Chloride 104 98 - 107 mmol/L    CO2 27 22 - 31 mmol/L    Anion Gap, Calculation 10 5 - 18 mmol/L    Glucose 110 70 - 125 mg/dL    BUN 16 8 - 22 mg/dL    Creatinine 0.91 0.60 - 1.10 mg/dL    GFR MDRD Af Amer >60 >60 mL/min/1.73m2    GFR MDRD Non Af Amer >60 >60 mL/min/1.73m2    Bilirubin, Total 0.5 0.0 - 1.0 mg/dL    Calcium 10.2 8.5 - 10.5 mg/dL    Protein, Total 7.5 6.0 - 8.0 g/dL    Albumin 4.5 3.5 - 5.0 g/dL    Alkaline Phosphatase " 69 45 - 120 U/L    AST 16 0 - 40 U/L    ALT 19 0 - 45 U/L    Narrative    Fasting Glucose reference range is 70-99 mg/dL per  American Diabetes Association (ADA) guidelines.   Hepatitis C Antibody (Anti-HCV)   Result Value Ref Range    Hepatitis C Ab Positive (!) Negative   HIV Antigen/Antibody Screening Cascade   Result Value Ref Range    HIV Antigen / Antibody Negative Negative    Narrative    Method is Abbott HIV Ag/Ab for the detection of HIV p24 antigen, HIV-1 antibodies and HIV-2 antibodies.        Hi Taylor,       I tried to call you but I am also sending a my chart message.  Your hepatitis C came back positive which just tells us we need to do another blood test.  It doesn't tell us any definitive results yet.  I am going to place an order for you to come back in and have the other blood test done to determine more information so please call and make a lab only visit.  Your diabetes look much much better!  Keep on the same medications and continue with diabetic education.  Call if any questions or concerns or my chart message back.      Margaret Holly MD     Please call with questions or contact us using [employer].    Sincerely,        Electronically signed by Margaret Holly MD

## 2021-06-24 NOTE — PROGRESS NOTES
HISTORY OF PRESENT ILLNESS  Patient reports history of bilateral neck swelling. She reports that it has gone down. She has discomfort on the left side of her face. She also reports a pimple in the left check that she has ruptured in the past, which relieved pressure in the left neck. She has tried swishing with hydrogen peroxide without improvement. She reports some problems swallowing for quite a long time but no recent changes. No fever, sweats or chills.     REVIEW OF SYSTEMS  Review of Systems: a 10-system review was performed. Pertinent positives are noted in the HPI and on a separate scanned document in the chart.    PMH, PSH, FH and SH has documented in the EHR.      EXAM    CONSTITUTIONAL  General Appearance:  Normal, well developed, well nourished, no obvious distress  Ability to Communicate:  communicates appropriately.    HEAD AND FACE  Appearance and Symmetry:  Normal, no scalp or facial scarring or suspicious lesions.    EARS  Clinical speech reception threshold:  Normal, able to hear normal speech.  Auricle:  Normal, Auricles without scars, lesions, masses.  External auditory canal:  Normal, External auditory canal normal.  Tympanic membrane:  Normal, Tympanic membranes normal without swelling or erythema.  Tympanic membrane mobility:  Normal, Normal tympanic membrane mobility.    NOSE (speculum or scope)  Architecture:  Normal, Grossly normal external nasal architecture with no masses or lesions.  Mucosa:  Normal mucosa, No polyps or masses.  Septum:  Normal, Septum non-obstructing.  Turbinates:  Normal, No turbinate abnormalities    ORAL CAVITY AND OROPHARYNX  Lips:  Normal.  Dental and gingiva:  Normal, No obvious dental or gingival disease.  Mucosa:  Normal, Moist mucous membranes.  Tongue:  Normal, Tongue mobile with no mucosal abnormalities  Hard and soft palate:  Normal, Hard and soft palate without cleft or mucosal lesions.  Oral pharynx:  Normal, Posterior pharynx without lesions or remarkable  asymmetry.  Saliva:  Normal, Clear saliva.  Masses:  Normal, No palpable masses or pathologically enlarged lymph nodes.    NECK  Masses/lymph nodes:  Normal, No worrisome neck masses or lymph nodes.  Salivary glands:  Firm swelling of the left parotid with purulent secretions coming from the left parotid duct. Trachea and larynx position:  Normal, Trachea and larynx midline.  Thyroid:  Normal, No thyroid abnormality.  Tenderness:  Normal, No cervical tenderness.  Suppleness:  Normal, Neck supple    NEUROLOGICAL  Speech pattern:  Normal, Proasaic    RESPIRATORY  Symmetry and Respiratory effort:  Normal, Symmetric chest movement and expansion with no increased intercostal retractions or use of accessory muscles.     IMPRESSION  Bacterial right parotid sialadenitis.    RECOMMENDATION  Cefuroxime x 10 days. Follow up in 10-14 days for recheck. Consider CT scan if symptoms have not resolved.    Lars Major MD

## 2021-06-24 NOTE — PROGRESS NOTES
HISTORY OF PRESENT ILLNESS  Patient returns for recheck of left parotid gland infection. She reports that she is better. Although she continues to have a lump in the left upper neck.    REVIEW OF SYSTEMS  Review of Systems: a 10-system review was performed. Pertinent positives are noted in the HPI and on a separate scanned document in the chart.    PMH, PSH, FH and SH has documented in the EHR.      EXAM    CONSTITUTIONAL  General Appearance:  Normal, well developed, well nourished, no obvious distress  Ability to Communicate:  communicates appropriately.    HEAD AND FACE  Appearance and Symmetry:  Normal, no scalp or facial scarring or suspicious lesions.    NECK  Masses/lymph nodes:  Normal, No worrisome neck masses or lymph nodes.  Salivary glands:  Mass left tail of parotid  Trachea and larynx position:  Normal, Trachea and larynx midline.  Thyroid:  Normal, No thyroid abnormality.  Tenderness:  Normal, No cervical tenderness.  Suppleness:  Normal, Neck supple    NEUROLOGICAL  Speech pattern:  Normal, Proasaic    RESPIRATORY  Symmetry and Respiratory effort:  Normal, Symmetric chest movement and expansion with no increased intercostal retractions or use of accessory muscles.     IMPRESSION  Tail of parotid mass    RECOMMENDATION  CT soft tissue neck with contrast. Will set this up in the near future.    Lars Major MD

## 2021-06-29 NOTE — PROGRESS NOTES
Progress Notes by Mary Car RD, CDE at 8/6/2020  3:00 PM     Author: Mary Car RD, CDE Service: -- Author Type: Diabetes Ed    Filed: 8/6/2020  4:55 PM Encounter Date: 8/6/2020 Status: Attested    : Mary Car RD, CDE (Registered Dietitian) Cosigner: Lizeth Anderson PA-C at 8/7/2020 11:00 AM    Attestation signed by Lizeth Anderson PA-C at 8/7/2020 11:00 AM    Agree with plan                Diabetes Educator has received verbal consent for a telephone visit for the patient. Declined video visit    DIABETES CARE PLAN    Assessment/Plan:     Initial visit for newly diagnosed Type 2 diabetes. Instructed on what is diabetes and how to control it. Reviewed symptoms and treatment of high and low blood sugar. Instructed on general diet guidelines and the importance of daily activity. Taylor has changed the way she eats since learning she has diabetes. She has stopped eating candy, desserts and sugary beverages. Patient would like to gain some weight. Encouraged her to return to lifting light weights and eat enough protein to build muscle. She may need to eat bigger snacks to get enough calories in without spiking blood sugar.    Taylor is checking her blood sugar twice per day. See chart below. Her blood sugars have improved dramatically but continue above targets. Per medication protocol increased Metformin Extended Release to the maximum effective dose of 2000 mg per day,    7 day blood sugar average: 173  14 day blood sugar average: 181  30 day blood sugar average: 193 (has not been testing for an entire month)      Current Diabetes Medications:  Metformin  mg twice per day as prescribed. Tolerating well    PLAN  1. Test blood sugar 2 time(s) per day. Pair up before breakfast and bedtime or before and 2 hours after the largest meal.  2. Increase Metformin Extended Release to 1000 mg twice per day (2 tablets twice per day).  3. Continue to eat 3 meals and 3 snacks per day.  To help gain weight try snacks with 30-45 grams of carbohydrate. Adding protein to meals and snacks will help to stabilize your blood sugar.  4. Continue to avoid sugary beverages, juice and sweets.  5. Include high fiber, whole grain foods instead of processed white foods. Healthier choices are whole grain cereals, whole wheat pasta, brown or wild rice and whole wheat bread. Try [employer]'s brand of whole wheat pasta in a black box.   6. Aim for foods with 3 grams or more fiber per serving. Limit added sugars to 20 grams or less per day.  7. Stay active every day; try not to sit for more than 30 minutes and lift light weights will help build muscle.  8. Next telephone visit on 9/3/2020 at 9:30.    Subjective/Objective:   Taylor Aburto is a 65 y.o. female referred by Margaret Holly MD  Currently on a work [employer]. 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 Moved to an apartment with her sister. Has a daughter that is a nurse and a boyfriend with Type 2 diabetes who is supportive.     Eating Habits: Eats 2 meals per day and fruit for snacks. Addicted to chocolate-avoiding Drinks a lot of coffee with nondairy sugar-free creamer. Uses honey in tea.   Used to eat a lot of candy and cookies    Activity: packing and unpacking the past 2 months. Has weights but has not used them lately.    Date Breakfast Dinner  Bedtime    Before Before After    8/6  160      8/5 165 125  189   8/4 166   258   8/3 179  157    8/2 155   137   8/1 193   233   7/31 154  128      Usual body weight: 130 lb which is her goal weight. Lost weight over the past year without trying due to elevated blood sugars  Wt Readings from Last 3 Encounters:   07/20/20 113 lb 11.2 oz (51.6 kg)   10/26/19 130 lb (59 kg)   04/02/19 133 lb (60.3 kg)      Lab Results   Component Value Date    HGBA1C >14.0 (H) 07/15/2020       Education Record  Literature sent by email with permission  [facility] Understanding Diabetes  [facility] Healthy Snack  List  American Diabetes Association (ADA) A1c/eAG  ADA Factors that raise blood sugar    Monitoring   Meter:Assessed and Discussed and Competent  Monitoring: Discussed and Competent  BG goals: Discussed and Literature provided    Nutrition Management  Nutrition Management: Discussed and Literature provided  Healthy Snack Ideas: Discussed and Literature provided  Weight: Discussed and Competent  Portions/Balance: Discussed     Physical Activity: Discussed and Literature provided  Medications: Assessed and Discussed     Diabetes Disease Process: Discussed and Literature provided  ABC: Discussed and Literature provided  A1c test and how it relates to meter numbers: Discussed and Literature provided    Acute Complications: Prevent, Detect, Treat:  Hypoglycemia: Discussed and Literature provided  Hyperglycemia: Discussed and Literature provided    Goal Setting and Problem Solving: Discussed and Literature provided  Barriers: Assessed  Psychosocial Adjustments: Assessed    Time: 60 minutes   Visit Type: Diabetes Self-Management Training ()  Diagnosis per referral:Type 2 diabetes without complication, without long-term use of insulin (E11.9)    Mary Car, RD, LD, CDCES  Certified Diabetes Care and   8/6/2020

## 2021-07-03 NOTE — ADDENDUM NOTE
Addendum Note by Екаетрина Major MD at 2/18/2019  8:45 AM     Author: Екатерина Major MD Service: -- Author Type: Physician    Filed: 2/18/2019  9:40 AM Encounter Date: 2/18/2019 Status: Signed    : Екатерина Major MD (Physician)    Addended by: ЕКАТЕРИНА MAJOR on: 2/18/2019 09:40 AM        Modules accepted: Level of Service

## 2021-07-10 ENCOUNTER — HEALTH MAINTENANCE LETTER (OUTPATIENT)
Age: 66
End: 2021-07-10

## 2021-08-01 ENCOUNTER — TRANSFERRED RECORDS (OUTPATIENT)
Dept: MULTI SPECIALTY CLINIC | Facility: CLINIC | Age: 66
End: 2021-08-01
Payer: COMMERCIAL

## 2021-08-01 LAB — RETINOPATHY: NORMAL

## 2021-09-04 ENCOUNTER — HEALTH MAINTENANCE LETTER (OUTPATIENT)
Age: 66
End: 2021-09-04

## 2021-11-30 DIAGNOSIS — E11.9 TYPE 2 DIABETES MELLITUS WITHOUT COMPLICATION, WITHOUT LONG-TERM CURRENT USE OF INSULIN (H): ICD-10-CM

## 2021-12-02 RX ORDER — SIMVASTATIN 20 MG
20 TABLET ORAL EVERY EVENING
Qty: 90 TABLET | Refills: 0 | Status: SHIPPED | OUTPATIENT
Start: 2021-12-02 | End: 2022-03-21

## 2021-12-02 RX ORDER — LISINOPRIL 5 MG/1
5 TABLET ORAL DAILY
Qty: 90 TABLET | Refills: 0 | Status: SHIPPED | OUTPATIENT
Start: 2021-12-02 | End: 2022-03-02

## 2021-12-02 NOTE — TELEPHONE ENCOUNTER
"Last Written Prescription Date:  3/1/21  Last Fill Quantity: 90,  # refills: 2   Last office visit provider:  Margaret Holly    Refilling joshua x1, due for appt for updated labs.     Requested Prescriptions   Pending Prescriptions Disp Refills     simvastatin (ZOCOR) 20 MG tablet 90 tablet 2     Sig: Take 1 tablet (20 mg) by mouth every evening       Statins Protocol Failed - 11/30/2021  2:01 PM        Failed - LDL on file in past 12 months     Recent Labs   Lab Test 11/09/20  1732   LDL 77             Passed - No abnormal creatine kinase in past 12 months     No lab results found.             Passed - Recent (12 mo) or future (30 days) visit within the authorizing provider's specialty     Patient has had an office visit with the authorizing provider or a provider within the authorizing providers department within the previous 12 mos or has a future within next 30 days. See \"Patient Info\" tab in inbasket, or \"Choose Columns\" in Meds & Orders section of the refill encounter.              Passed - Medication is active on med list        Passed - Patient is age 18 or older        Passed - No active pregnancy on record        Passed - No positive pregnancy test in past 12 months           lisinopril (ZESTRIL) 5 MG tablet 90 tablet 2     Sig: Take 1 tablet (5 mg) by mouth daily       ACE Inhibitors (Including Combos) Protocol Failed - 11/30/2021  2:01 PM        Failed - Blood pressure under 140/90 in past 12 months     BP Readings from Last 3 Encounters:   11/09/20 130/70   07/20/20 126/66                 Failed - Normal serum creatinine on file in past 12 months     Recent Labs   Lab Test 11/09/20  1732   CR 0.91       Ok to refill medication if creatinine is low          Failed - Normal serum potassium on file in past 12 months     Recent Labs   Lab Test 11/09/20  1732   POTASSIUM 4.3             Passed - Recent (12 mo) or future (30 days) visit within the authorizing provider's specialty     Patient has had an office " "visit with the authorizing provider or a provider within the authorizing providers department within the previous 12 mos or has a future within next 30 days. See \"Patient Info\" tab in inbasket, or \"Choose Columns\" in Meds & Orders section of the refill encounter.              Passed - Medication is active on med list        Passed - Patient is age 18 or older        Passed - No active pregnancy on record        Passed - No positive pregnancy test within past 12 months             Georgiana [facility] 12/02/21 9:49 AM      "

## 2021-12-05 ENCOUNTER — MYC REFILL (OUTPATIENT)
Dept: FAMILY MEDICINE | Facility: CLINIC | Age: 66
End: 2021-12-05
Payer: COMMERCIAL

## 2021-12-05 DIAGNOSIS — E11.9 TYPE 2 DIABETES MELLITUS WITHOUT COMPLICATION, WITHOUT LONG-TERM CURRENT USE OF INSULIN (H): ICD-10-CM

## 2021-12-07 NOTE — TELEPHONE ENCOUNTER
"Routing refill request to provider for review/approval because:  Gely given x1 and patient did not follow up, please advise  Labs not current:  Multiple  BP not current  Early refill requested.    Last Written Prescription Date:  12/2/21  Last Fill Quantity: 90,  # refills: 0   Last office visit provider:  3/1/21     Requested Prescriptions   Pending Prescriptions Disp Refills     lisinopril (ZESTRIL) 5 MG tablet 90 tablet 0     Sig: Take 1 tablet (5 mg) by mouth daily       ACE Inhibitors (Including Combos) Protocol Failed - 12/5/2021  8:30 AM        Failed - Blood pressure under 140/90 in past 12 months     BP Readings from Last 3 Encounters:   11/09/20 130/70   07/20/20 126/66                 Failed - Normal serum creatinine on file in past 12 months     Recent Labs   Lab Test 11/09/20  1732   CR 0.91       Ok to refill medication if creatinine is low          Failed - Normal serum potassium on file in past 12 months     Recent Labs   Lab Test 11/09/20  1732   POTASSIUM 4.3             Passed - Recent (12 mo) or future (30 days) visit within the authorizing provider's specialty     Patient has had an office visit with the authorizing provider or a provider within the authorizing providers department within the previous 12 mos or has a future within next 30 days. See \"Patient Info\" tab in inbasket, or \"Choose Columns\" in Meds & Orders section of the refill encounter.              Passed - Medication is active on med list        Passed - Patient is age 18 or older        Passed - No active pregnancy on record        Passed - No positive pregnancy test within past 12 months             Geraldo Suazo RN 12/07/21 8:55 AM  "

## 2021-12-08 RX ORDER — LISINOPRIL 5 MG/1
5 TABLET ORAL DAILY
Qty: 90 TABLET | Refills: 0 | OUTPATIENT
Start: 2021-12-08

## 2021-12-25 ENCOUNTER — HEALTH MAINTENANCE LETTER (OUTPATIENT)
Age: 66
End: 2021-12-25

## 2022-03-02 DIAGNOSIS — E11.9 TYPE 2 DIABETES MELLITUS WITHOUT COMPLICATION, WITHOUT LONG-TERM CURRENT USE OF INSULIN (H): ICD-10-CM

## 2022-03-02 RX ORDER — SIMVASTATIN 20 MG
20 TABLET ORAL EVERY EVENING
Qty: 90 TABLET | Refills: 0 | OUTPATIENT
Start: 2022-03-02

## 2022-03-02 RX ORDER — LISINOPRIL 5 MG/1
5 TABLET ORAL DAILY
Qty: 90 TABLET | Refills: 0 | Status: SHIPPED | OUTPATIENT
Start: 2022-03-02 | End: 2022-05-20

## 2022-03-02 NOTE — TELEPHONE ENCOUNTER
Refill request for simvastatin     Pt has been advised to schedule appt for more refills     Ryan Cortes Jr., CMA on 3/2/2022 at 2:35 PM

## 2022-03-02 NOTE — TELEPHONE ENCOUNTER
Left message for patient to call back. When she calls back please relay message below and assist as needed.

## 2022-03-16 ENCOUNTER — NURSE TRIAGE (OUTPATIENT)
Dept: NURSING | Facility: CLINIC | Age: 67
End: 2022-03-16
Payer: COMMERCIAL

## 2022-03-16 NOTE — TELEPHONE ENCOUNTER
Call patient back     If isolated reading of 333 - nothing to do. Just stay hydrated and monitor symptoms. As long as sugars are coming down, nothing to do. If continuing to feel weak and dizzy, should be seen in the office with any available provider as elevated sugar may be due another cause such as infection. Needs updated bloodwork for his sugars.

## 2022-03-16 NOTE — TELEPHONE ENCOUNTER
Routing to Dr. Cherry has she is the covering provider    Ryan Cortes Jr., CHIKIS on 3/16/2022 at 9:47 AM

## 2022-03-16 NOTE — TELEPHONE ENCOUNTER
"Nurse Triage SBAR    Is this a 2nd Level Triage? YES, LICENSED PRACTITIONER REVIEW IS REQUIRED  Please leave detailed message ph.[phone]/and or Well[employer] Message.  Patient is unable to answer phone until after 1230 pm today.    Situation: Patient calling reporting elevated fasting blood sugar of 333 this morning.     Background:  Type 2 Diabetic   Taking Metformin 500 mg XR daily.       Assessment:     Patient reporting last evening feeling \"dizzy and lightheaded.\"  Stating she checked her blood sugar and it was in the \"300's, I don't remember the exact reading.\" Reporting nausea.    Reporting waking this morning \"and didn't feel right, like the feeling was coming back.\" Denies feeling dizzy or lightheaded during triage .     Denies nausea or vomiting.     Blood sugar 333 now/fasting.    Metformin taken 40 minutes ago.    Stating she had been using a neighbors creamer in past few days that was sweetened in coffee. Reporting replacing it this morning with sugar free.     Typically fasting blood sugars are \"below 200.\"     Protocol Recommended Disposition:   Discuss With PCP And Call Back By Nurse Within 1 Hour    Recommendation: Please advise patient if you prefer she be seen/or further recommendations.    Routed to provider    Does the patient meet one of the following criteria for ADS visit consideration? No      Reason for Disposition    Blood glucose > 300 mg/dL (16.7 mmol/L) AND two or more times in a row    Additional Information    Negative: Unconscious or difficult to awaken    Negative: Acting confused (e.g., disoriented, slurred speech)    Negative: Very weak (can't stand)    Negative: Sounds like a life-threatening emergency to the triager    Negative: Vomiting and signs of dehydration (e.g., very dry mouth, lightheaded, dark urine)    Negative: Blood glucose > 240 mg/dL (13.3 mmol/L) and rapid breathing    Negative: Blood glucose > 500 mg/dL (27.8 mmol/L)    Negative: Blood glucose > 240 mg/dL (13.3 " mmol/L) AND urine ketones moderate-large (or more than 1+)    Negative: Blood glucose > 240 mg/dL (13.3 mmol/L) and blood ketones > 1.4 mmol/L    Negative: Blood glucose > 240 mg/dL (13.3 mmol/L) AND vomiting AND unable to check for ketones (in blood or urine)    Negative: Vomiting lasting > 4 hours    Negative: Patient sounds very sick or weak to the triager    Negative: Fever > 100.4 F (38.0 C)    Negative: Caller has URGENT medication or insulin pump question and triager unable to answer question    Negative: Blood glucose > 400 mg/dL (22.2 mmol/L)    Protocols used: DIABETES - HIGH BLOOD SUGAR-A-OH

## 2022-03-16 NOTE — TELEPHONE ENCOUNTER
Placed call to [facility] requesting review of second level triage request below.    Amy Jade RN  [facility]

## 2022-03-16 NOTE — TELEPHONE ENCOUNTER
Contacted patient and relayed below advise from provider.  Patient reports her BG as following today-    8:30 am- 333  9:11 am- 342  10:55 am- 283  1:12 pm- 241      Patient has not any further episodes of dizziness or nausea today.  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  Advised any further dizziness, weakness, or feeling that she just isn't right would indicate need to seek evaluation in acute setting like ED or UC.  Also scheduled patient with Dr. Cherry on 3/21/22 as she is overdue for diabetic visit as well as labs.  Patient thankful for call back.  Encouraged to call with any further questions or concerns.

## 2022-03-21 ENCOUNTER — OFFICE VISIT (OUTPATIENT)
Dept: FAMILY MEDICINE | Facility: CLINIC | Age: 67
End: 2022-03-21
Payer: COMMERCIAL

## 2022-03-21 VITALS
DIASTOLIC BLOOD PRESSURE: 80 MMHG | BODY MASS INDEX: 18.75 KG/M2 | SYSTOLIC BLOOD PRESSURE: 130 MMHG | HEART RATE: 84 BPM | WEIGHT: 119.7 LBS

## 2022-03-21 DIAGNOSIS — J34.3 NASAL TURBINATE HYPERTROPHY: ICD-10-CM

## 2022-03-21 DIAGNOSIS — F17.200 NICOTINE DEPENDENCE, UNCOMPLICATED, UNSPECIFIED NICOTINE PRODUCT TYPE: ICD-10-CM

## 2022-03-21 DIAGNOSIS — E11.9 TYPE 2 DIABETES MELLITUS WITHOUT COMPLICATION, WITHOUT LONG-TERM CURRENT USE OF INSULIN (H): Primary | ICD-10-CM

## 2022-03-21 DIAGNOSIS — Z13.220 SCREENING FOR HYPERLIPIDEMIA: ICD-10-CM

## 2022-03-21 DIAGNOSIS — D22.9 ATYPICAL NEVUS: ICD-10-CM

## 2022-03-21 DIAGNOSIS — Z72.0 TOBACCO ABUSE: ICD-10-CM

## 2022-03-21 LAB
CREAT UR-MCNC: 19 MG/DL
HBA1C MFR BLD: 11.8 % (ref 0–5.6)
MICROALBUMIN UR-MCNC: <0.5 MG/DL (ref 0–1.99)
MICROALBUMIN/CREAT UR: NORMAL MG/G{CREAT}

## 2022-03-21 PROCEDURE — 82043 UR ALBUMIN QUANTITATIVE: CPT | Performed by: STUDENT IN AN ORGANIZED HEALTH CARE EDUCATION/TRAINING PROGRAM

## 2022-03-21 PROCEDURE — 99215 OFFICE O/P EST HI 40 MIN: CPT | Mod: 25 | Performed by: STUDENT IN AN ORGANIZED HEALTH CARE EDUCATION/TRAINING PROGRAM

## 2022-03-21 PROCEDURE — 84443 ASSAY THYROID STIM HORMONE: CPT | Performed by: STUDENT IN AN ORGANIZED HEALTH CARE EDUCATION/TRAINING PROGRAM

## 2022-03-21 PROCEDURE — 83036 HEMOGLOBIN GLYCOSYLATED A1C: CPT | Performed by: STUDENT IN AN ORGANIZED HEALTH CARE EDUCATION/TRAINING PROGRAM

## 2022-03-21 PROCEDURE — 80061 LIPID PANEL: CPT | Performed by: STUDENT IN AN ORGANIZED HEALTH CARE EDUCATION/TRAINING PROGRAM

## 2022-03-21 PROCEDURE — 99406 BEHAV CHNG SMOKING 3-10 MIN: CPT | Performed by: STUDENT IN AN ORGANIZED HEALTH CARE EDUCATION/TRAINING PROGRAM

## 2022-03-21 PROCEDURE — 36415 COLL VENOUS BLD VENIPUNCTURE: CPT | Performed by: STUDENT IN AN ORGANIZED HEALTH CARE EDUCATION/TRAINING PROGRAM

## 2022-03-21 PROCEDURE — 80048 BASIC METABOLIC PNL TOTAL CA: CPT | Performed by: STUDENT IN AN ORGANIZED HEALTH CARE EDUCATION/TRAINING PROGRAM

## 2022-03-21 RX ORDER — SIMVASTATIN 20 MG
20 TABLET ORAL EVERY EVENING
Qty: 90 TABLET | Refills: 0 | Status: SHIPPED | OUTPATIENT
Start: 2022-03-21 | End: 2022-06-13

## 2022-03-21 RX ORDER — METFORMIN HCL 500 MG
500 TABLET, EXTENDED RELEASE 24 HR ORAL 2 TIMES DAILY
Qty: 180 TABLET | Refills: 2 | Status: SHIPPED | OUTPATIENT
Start: 2022-03-21 | End: 2022-06-23

## 2022-03-21 RX ORDER — FLUTICASONE PROPIONATE 50 MCG
1 SPRAY, SUSPENSION (ML) NASAL DAILY
Qty: 16 G | Refills: 0 | Status: SHIPPED | OUTPATIENT
Start: 2022-03-21 | End: 2022-05-17

## 2022-03-21 RX ORDER — BLOOD SUGAR DIAGNOSTIC
STRIP MISCELLANEOUS
Qty: 200 STRIP | Refills: 3 | Status: SHIPPED | OUTPATIENT
Start: 2022-03-21 | End: 2024-08-23

## 2022-03-21 NOTE — PROGRESS NOTES
"  Assessment & Plan       ICD-10-CM    1. Type 2 diabetes mellitus without complication, without long-term current use of insulin (H)  E11.9 metFORMIN (GLUCOPHAGE-XR) 500 MG 24 hr tablet     blood glucose (ACCU-CHEK GUIDE) test strip     simvastatin (ZOCOR) 20 MG tablet     HEMOGLOBIN A1C     BASIC METABOLIC PANEL     Albumin Random Urine Quantitative with Creat Ratio     TSH with free T4 reflex     blood glucose (NO BRAND SPECIFIED) lancets standard   2. Screening for hyperlipidemia  Z13.220 Lipid panel reflex to direct LDL Fasting   3. Atypical nevus  D22.9 Adult Dermatology Referral   4. Nasal turbinate hypertrophy  J34.3 fluticasone (FLONASE) 50 MCG/ACT nasal spray   5. Tobacco abuse  Z72.0    6. Nicotine dependence, uncomplicated, unspecified nicotine product type  F17.200 SMOKING CESSATION COUNSELING 3-10 MIN       Patient was last seen almost a year ago.  At that time, PCP had ordered lab work but never got done  Last HbA1c on file is 11/2020-was 6.6.  Blood glucose at home have been 200s to 300s.  Has not been checking it consistently  Has been experiencing polyuria, polydipsia, fatigue, possible intermittent neuropathy and intermittently blurry vision.  No polyphasia  Continues to smoke cigarettes.  Has been unable to quit in the past.  Was referred to hypnotist but did not follow-up with them.  Patient acknowledges that she has not been doing a good job taking care of herself but is now motivated to \"get back\" to taking care of herself.  Is a addiction counselor.  Today: We will repeat HbA1c.  Based on HbA1c, will make further recommendations for medication titration/management.  Based on her numbers, I suspicious that she may need to start injectable medication.  Patient is not amenable to this.  Tobacco cessation advised ( ~ 5 mins) .  Continue Metformin for now 500 mg twice daily.  Follow-up will also be based on HbA1c.  Due for baseline blood work including BMP, microalbumin levels, " "TSH-ordered  Advised to start checking her AM sugars daily     Patient has several atypical nevi on her arms and trunk.  Noted that few of them on her arm have \"recently changed shape\".  Referral to [facility] dermatology placed. Patient made aware that this is an external referral and she needs to     Over the last year, has been experiencing postnasal drip, nasal congestion and itchy/runny or watery eyes.  Physical exam is consistent with irritation of the posterior oropharynx, postnasal drip and nasal turbinate hypertrophy.  Most likely in the setting of chronic tobacco abuse+ ?seasonal allergies.  Advised tobacco cessation.  Will trial Flonase daily x14 days to see if that will help with her symptoms.  Education provided on correct use of nasal spray.      48 minutes spent on the date of the encounter doing chart review, history and exam, documentation and further activities per the note    Return for based on test results .    Soraya Cherry DO  [facility]    Eb Vazquez is a 66 year old who presents for the following health issues: diabetes       Review of Systems   As per HPI       Objective    /80 (BP Location: Right arm, Patient Position: Sitting, Cuff Size: Adult Regular)   Pulse 84   Wt 54.3 kg (119 lb 11.2 oz)   BMI 18.75 kg/m    Body mass index is 18.75 kg/m .  Physical Exam   GENERAL: healthy, alert and no distress  EYES: Eyes grossly normal to inspection, PERRL and conjunctivae and sclerae normal  HENT: ear canals and TM's normal, nose and mouth without ulcers or lesions  (+) PND, (+) bilateral basal turbinate hypertrophy   NECK: no adenopathy, no asymmetry, masses, or scars and thyroid normal to palpation  RESP: lungs clear to auscultation - no rales, rhonchi or wheezes  CV: regular rate and rhythm, normal S1 S2, no S3 or S4, no murmur, click or rub, no peripheral edema and peripheral pulses strong  ABDOMEN: soft, nontender, no hepatosplenomegaly, no masses and " bowel sounds normal  MS: no gross musculoskeletal defects noted, no edema  SKIN: no suspicious lesions or rashes  NEURO: Normal strength and tone, mentation intact and speech normal  PSYCH: mentation appears normal, affect normal/bright

## 2022-03-22 ENCOUNTER — TELEPHONE (OUTPATIENT)
Dept: FAMILY MEDICINE | Facility: CLINIC | Age: 67
End: 2022-03-22
Payer: COMMERCIAL

## 2022-03-22 LAB
ANION GAP SERPL CALCULATED.3IONS-SCNC: 16 MMOL/L (ref 5–18)
BUN SERPL-MCNC: 8 MG/DL (ref 8–22)
CALCIUM SERPL-MCNC: 10.1 MG/DL (ref 8.5–10.5)
CHLORIDE BLD-SCNC: 104 MMOL/L (ref 98–107)
CHOLEST SERPL-MCNC: 147 MG/DL
CO2 SERPL-SCNC: 20 MMOL/L (ref 22–31)
CREAT SERPL-MCNC: 0.83 MG/DL (ref 0.6–1.1)
FASTING STATUS PATIENT QL REPORTED: NORMAL
GFR SERPL CREATININE-BSD FRML MDRD: 78 ML/MIN/1.73M2
GLUCOSE BLD-MCNC: 208 MG/DL (ref 70–125)
HDLC SERPL-MCNC: 61 MG/DL
LDLC SERPL CALC-MCNC: 71 MG/DL
POTASSIUM BLD-SCNC: 4 MMOL/L (ref 3.5–5)
SODIUM SERPL-SCNC: 140 MMOL/L (ref 136–145)
TRIGL SERPL-MCNC: 75 MG/DL
TSH SERPL DL<=0.005 MIU/L-ACNC: 1.62 UIU/ML (ref 0.3–5)

## 2022-03-22 NOTE — TELEPHONE ENCOUNTER
Patient received lab results via [employer]. Dr. Cherry's note indicates to increase Metformin 100 - per patient she is not sure if that is what Dr. Cherry meant.  Her RX at pharmacy did not change.    Currently she takes Metformin 500 mg 1 tablet twice per day     Requesting call back with clarification.

## 2022-04-26 NOTE — PROGRESS NOTES
"    Assessment & Plan   Problem List Items Addressed This Visit        Digestive    Vitamin D deficiency - Primary      Other Visit Diagnoses     Nicotine dependence, uncomplicated, unspecified nicotine product type             Uncontrolled type 2 diabetes:  Hemoglobin A1C   Date Value Ref Range Status   03/21/2022 11.8 (H) 0.0 - 5.6 % Final   Given uncontrolled a.m. fasting sugars, significant increase in her creatinine [id number] advise that patient add perhaps Trulicity to her regiment.  Patient is somewhat hesitant about doing injectable medication but ultimately amenable.  Had nursing come in and do diabetes education with her as she has never had to do injectable medications before.  Would continue max dose of metformin.  Continue lifestyle interventions and increase activity level.  We will see her back in 2 months for ongoing medication titration.    Tobacco abuse: Has tried to quit several times in the past without success.  Today, she is still smoking (unclear how much) but says that she is cut down significantly.  Is working on it.  Declines resources today.    Postnasal drip: Was advised to trial Flonase the last time she was here.  Did try it but it made her nauseous and so she discontinued it.  Has been using guaifenesin OTC to see if that will help with the cough.  Told her that guaifenesin is an expectorant and I do not think that will help much with postnasal drip.  Advised Allegra daily use.  Patient will trial it and let me know.    Atypical nevi-noted on the arms and trunk last visit.  Was sent to dermatology.  Patient aware of the referral and says that \"life's been busy\" but she intends to make an appointment.    Offer DEXA scan at next visit.         Tobacco Cessation:   reports that she has been smoking. She has a 20.00 pack-year smoking history. She has never used smokeless tobacco.  Tobacco Cessation Action Plan: Information offered: Patient not interested at this time      No follow-ups " on file.    Soraya Cherry DO  [facility] KYMBERLY Vazquez is a 67 year old who presents for the following health issues: DM2    Patient was recently seen in the office for type 2 diabetes, postnasal drip, tobacco cessation.  Repeat blood work at that time showed significantly worsened diabetes with HbA1c of 11.8 (was 6.6 a year ago).  Metformin was increased to 1000 mg twice daily.    Today, patient reports that she has started to check her blood sugars consistently.  A.m. fasting blood sugars are 1 .  Patient denies any polyphagia, polydipsia, polyuria or probably neuropathy.  Does seem quite concerned about her elevated blood sugars and is very surprised by how high her blood sugars have been.  Would like to discuss neck steps.    Review of Systems   As per HPI       Objective    BP (!) 150/62 (BP Location: Right arm, Patient Position: Sitting, Cuff Size: Adult Regular)   Pulse 88   Temp 97.8  F (36.6  C) (Oral)   Resp 20   Wt 54.3 kg (119 lb 9.6 oz)   SpO2 98%   BMI 18.73 kg/m    Body mass index is 18.73 kg/m .  Physical Exam   GENERAL: healthy, alert and no distress  MS: no gross musculoskeletal defects noted, no edema  PSYCH: mentation appears normal, affect normal/bright

## 2022-04-27 ENCOUNTER — OFFICE VISIT (OUTPATIENT)
Dept: FAMILY MEDICINE | Facility: CLINIC | Age: 67
End: 2022-04-27
Payer: COMMERCIAL

## 2022-04-27 VITALS
OXYGEN SATURATION: 98 % | HEART RATE: 88 BPM | BODY MASS INDEX: 18.73 KG/M2 | RESPIRATION RATE: 20 BRPM | TEMPERATURE: 97.8 F | SYSTOLIC BLOOD PRESSURE: 122 MMHG | DIASTOLIC BLOOD PRESSURE: 63 MMHG | WEIGHT: 119.6 LBS

## 2022-04-27 DIAGNOSIS — E11.9 TYPE 2 DIABETES MELLITUS WITHOUT COMPLICATION, WITHOUT LONG-TERM CURRENT USE OF INSULIN (H): ICD-10-CM

## 2022-04-27 DIAGNOSIS — F17.200 NICOTINE DEPENDENCE, UNCOMPLICATED, UNSPECIFIED NICOTINE PRODUCT TYPE: Primary | ICD-10-CM

## 2022-04-27 PROCEDURE — 99214 OFFICE O/P EST MOD 30 MIN: CPT | Performed by: STUDENT IN AN ORGANIZED HEALTH CARE EDUCATION/TRAINING PROGRAM

## 2022-04-27 RX ORDER — DULAGLUTIDE 0.75 MG/.5ML
0.75 INJECTION, SOLUTION SUBCUTANEOUS
Qty: 6 ML | Refills: 0 | Status: SHIPPED | OUTPATIENT
Start: 2022-04-27 | End: 2022-07-28

## 2022-04-27 NOTE — PATIENT INSTRUCTIONS
Try using over-the-counter Allegra daily to see if that will help with some of your postnasal drip and the cough.

## 2022-05-17 DIAGNOSIS — J34.3 NASAL TURBINATE HYPERTROPHY: ICD-10-CM

## 2022-05-18 RX ORDER — FLUTICASONE PROPIONATE 50 MCG
1 SPRAY, SUSPENSION (ML) NASAL DAILY
Qty: 16 G | Refills: 4 | Status: SHIPPED | OUTPATIENT
Start: 2022-05-18 | End: 2024-03-22

## 2022-05-19 DIAGNOSIS — E11.9 TYPE 2 DIABETES MELLITUS WITHOUT COMPLICATION, WITHOUT LONG-TERM CURRENT USE OF INSULIN (H): ICD-10-CM

## 2022-05-19 NOTE — TELEPHONE ENCOUNTER
"Last Written Prescription Date:  3/21/22  Last Fill Quantity: 16,  # refills: 0   Last office visit provider:  4/27/22     Requested Prescriptions   Pending Prescriptions Disp Refills     fluticasone (FLONASE) 50 MCG/ACT nasal spray 16 g 1     Sig: Spray 1 spray into both nostrils daily       Nasal Allergy Protocol Passed - 5/17/2022  3:17 PM        Passed - Patient is age 12 or older        Passed - Recent (12 mo) or future (30 days) visit within the authorizing provider's specialty     Patient has had an office visit with the authorizing provider or a provider within the authorizing providers department within the previous 12 mos or has a future within next 30 days. See \"Patient Info\" tab in inbasket, or \"Choose Columns\" in Meds & Orders section of the refill encounter.              Passed - Medication is active on med Yokasta Mcconnell RN 05/18/22 9:01 PM  "

## 2022-05-20 RX ORDER — LISINOPRIL 5 MG/1
5 TABLET ORAL DAILY
Qty: 90 TABLET | Refills: 3 | Status: SHIPPED | OUTPATIENT
Start: 2022-05-20 | End: 2023-06-26

## 2022-05-20 NOTE — TELEPHONE ENCOUNTER
"Last Written Prescription Date:  3/2/22  Last Fill Quantity: 90,  # refills: 0   Last office visit provider:  4/27/22     Requested Prescriptions   Pending Prescriptions Disp Refills     lisinopril (ZESTRIL) 5 MG tablet 90 tablet 0     Sig: Take 1 tablet (5 mg) by mouth daily       ACE Inhibitors (Including Combos) Protocol Passed - 5/20/2022  1:44 PM        Passed - Blood pressure under 140/90 in past 12 months     BP Readings from Last 3 Encounters:   04/27/22 122/63   03/21/22 130/80   11/09/20 130/70                 Passed - Recent (12 mo) or future (30 days) visit within the authorizing provider's specialty     Patient has had an office visit with the authorizing provider or a provider within the authorizing providers department within the previous 12 mos or has a future within next 30 days. See \"Patient Info\" tab in inbasket, or \"Choose Columns\" in Meds & Orders section of the refill encounter.              Passed - Medication is active on med list        Passed - Patient is age 18 or older        Passed - No active pregnancy on record        Passed - Normal serum creatinine on file in past 12 months     Recent Labs   Lab Test 03/21/22  1356   CR 0.83       Ok to refill medication if creatinine is low          Passed - Normal serum potassium on file in past 12 months     Recent Labs   Lab Test 03/21/22  1356   POTASSIUM 4.0             Passed - No positive pregnancy test within past 12 months             Geraldo Sauzo RN 05/20/22 1:44 PM  "

## 2022-06-12 DIAGNOSIS — E11.9 TYPE 2 DIABETES MELLITUS WITHOUT COMPLICATION, WITHOUT LONG-TERM CURRENT USE OF INSULIN (H): ICD-10-CM

## 2022-06-13 RX ORDER — SIMVASTATIN 20 MG
20 TABLET ORAL EVERY EVENING
Qty: 90 TABLET | Refills: 3 | Status: SHIPPED | OUTPATIENT
Start: 2022-06-13 | End: 2023-06-26

## 2022-06-13 NOTE — TELEPHONE ENCOUNTER
"Last Written Prescription Date:  3/21/22  Last Fill Quantity: 90,  # refills: 0   Last office visit provider:  4/27/22     Requested Prescriptions   Pending Prescriptions Disp Refills     simvastatin (ZOCOR) 20 MG tablet 90 tablet 0     Sig: Take 1 tablet (20 mg) by mouth every evening       Statins Protocol Passed - 6/13/2022 10:32 AM        Passed - LDL on file in past 12 months     Recent Labs   Lab Test 03/21/22  1356   LDL 71             Passed - No abnormal creatine kinase in past 12 months     No lab results found.             Passed - Recent (12 mo) or future (30 days) visit within the authorizing provider's specialty     Patient has had an office visit with the authorizing provider or a provider within the authorizing providers department within the previous 12 mos or has a future within next 30 days. See \"Patient Info\" tab in inbasket, or \"Choose Columns\" in Meds & Orders section of the refill encounter.              Passed - Medication is active on med list        Passed - Patient is age 18 or older        Passed - No active pregnancy on record        Passed - No positive pregnancy test in past 12 months             Geraldo Suazo RN 06/13/22 10:32 AM  "

## 2022-06-22 ENCOUNTER — TELEPHONE (OUTPATIENT)
Dept: FAMILY MEDICINE | Facility: CLINIC | Age: 67
End: 2022-06-22

## 2022-06-22 DIAGNOSIS — E11.9 TYPE 2 DIABETES MELLITUS WITHOUT COMPLICATION, WITHOUT LONG-TERM CURRENT USE OF INSULIN (H): Primary | ICD-10-CM

## 2022-06-22 NOTE — TELEPHONE ENCOUNTER
Incoming FAX Prescription Refill Request    Patient is requesting NEW RX    Metformin ER 1000 mg instead of taking two 500 mg tablets a day

## 2022-06-23 RX ORDER — METFORMIN HYDROCHLORIDE 1000 MG/1
1000 TABLET, FILM COATED, EXTENDED RELEASE ORAL
Qty: 90 TABLET | Refills: 3 | Status: SHIPPED | OUTPATIENT
Start: 2022-06-23 | End: 2022-06-30

## 2022-06-28 ENCOUNTER — TELEPHONE (OUTPATIENT)
Dept: FAMILY MEDICINE | Facility: CLINIC | Age: 67
End: 2022-06-28

## 2022-06-28 NOTE — TELEPHONE ENCOUNTER
[facility] Alternative Request    Message to Provider:Alternative requested insurance won't cover resent with regular metformin 1000 mg tablet

## 2022-06-30 DIAGNOSIS — E11.9 TYPE 2 DIABETES MELLITUS WITHOUT COMPLICATION, WITHOUT LONG-TERM CURRENT USE OF INSULIN (H): ICD-10-CM

## 2022-06-30 RX ORDER — METFORMIN HYDROCHLORIDE 1000 MG/1
1000 TABLET, FILM COATED, EXTENDED RELEASE ORAL
Qty: 90 TABLET | Refills: 3 | Status: SHIPPED | OUTPATIENT
Start: 2022-06-30 | End: 2022-09-06

## 2022-06-30 NOTE — TELEPHONE ENCOUNTER
Left message for pt to call back. Please verify if she would like the medication dose change still.

## 2022-06-30 NOTE — TELEPHONE ENCOUNTER
The 1000mg ER was not covered by insurance so sent in immediate release 1000mg.  If patient would like to go back on her 500mg ER 2 tablets twice a day please let us know

## 2022-06-30 NOTE — TELEPHONE ENCOUNTER
"      TELEPHONE CALL - Returning a call   She has received a VOICE MAIL from the clinic:  Left message for pt to call back. Please verify if she would like the medication dose change still.  Reason for call-  Prescription - She is not requesting a changed and her dose - She CLARIFY stating that at her last OFFICE VISIT her metformin was increased by Dr. Cherry - who [facility] saw instead of her PCP Dr. Holly     Notes from appt visit 04/27/2022   Patient was recently seen in the office for type 2 diabetes, postnasal drip, tobacco cessation.  Repeat blood work at that time showed significantly worsened diabetes with HbA1c of 11.8 (was 6.6 a year ago).  Metformin was increased to 1000 mg twice daily.    Since then, she has been using the increased in dose, running out of her medication to soon - Her pharmacy has sent a FAX Prescription Refill Request \" Patient is requesting NEW RX - Metformin ER 1000 mg instead of taking two 500 mg tablets a day\" - But refills continued to be metformin 1000 mg tablet, Take 1 tablet (1,000 mg) by mouth daily.    She is currently again out of her medication and would like her prescription to be send to her [facility]    [id number] [id number]   A note to her [facility]   and CT for   Soraya Cherry Don -Family Medicine      Margaret Holly MD - Family Medicine    For a NEW Rx for  Metformin 1000 mg twice daily.  Elena Malone RN [place] Nurse Advisor,  2:51 PM 6/30/2022              "

## 2022-07-01 NOTE — TELEPHONE ENCOUNTER
LMTCB  2nd attempt. Please relay below messages if call is returned and update message for care team   Scottie Arias CMA on 7/1/2022 at 9:46 AM

## 2022-07-01 NOTE — TELEPHONE ENCOUNTER
LMTCB please relay below message from Dr. Holly if call is returned. Please update care team if patient has any further questions or needs.   Scottie Arias CMA on 7/1/2022 at 9:37 AM

## 2022-07-05 ENCOUNTER — MYC MEDICAL ADVICE (OUTPATIENT)
Dept: FAMILY MEDICINE | Facility: CLINIC | Age: 67
End: 2022-07-05

## 2022-07-06 RX ORDER — METFORMIN HCL 500 MG
1000 TABLET, EXTENDED RELEASE 24 HR ORAL 2 TIMES DAILY
Qty: 360 TABLET | Refills: 3 | Status: SHIPPED | OUTPATIENT
Start: 2022-07-06 | End: 2023-07-19

## 2022-07-06 NOTE — TELEPHONE ENCOUNTER
Per mychart message from pt:  Thank you for this message. I apologize if I didn t answer your call & did not receive a message.  I get so many robo / spam calls while I m working that I changed my voice message to say that if I don t recognize the phone number or a message is not left, I won t return the call. Yes, if I can get enough of the 500 mg metformin to last until the next refill is due, that would be great! Much appreciated!       Rx sarah'd up for review & approval

## 2022-08-06 ENCOUNTER — HEALTH MAINTENANCE LETTER (OUTPATIENT)
Age: 67
End: 2022-08-06

## 2022-09-06 ENCOUNTER — OFFICE VISIT (OUTPATIENT)
Dept: FAMILY MEDICINE | Facility: CLINIC | Age: 67
End: 2022-09-06
Payer: COMMERCIAL

## 2022-09-06 VITALS
DIASTOLIC BLOOD PRESSURE: 60 MMHG | WEIGHT: 111.2 LBS | HEART RATE: 68 BPM | TEMPERATURE: 98.4 F | BODY MASS INDEX: 17.42 KG/M2 | OXYGEN SATURATION: 96 % | SYSTOLIC BLOOD PRESSURE: 120 MMHG | RESPIRATION RATE: 16 BRPM

## 2022-09-06 DIAGNOSIS — Z78.0 POST-MENOPAUSAL: ICD-10-CM

## 2022-09-06 DIAGNOSIS — R63.4 UNINTENTIONAL WEIGHT LOSS: ICD-10-CM

## 2022-09-06 DIAGNOSIS — Z23 ENCOUNTER FOR IMMUNIZATION: ICD-10-CM

## 2022-09-06 DIAGNOSIS — R09.82 POST-NASAL DRIP: ICD-10-CM

## 2022-09-06 DIAGNOSIS — E11.9 TYPE 2 DIABETES MELLITUS WITHOUT COMPLICATION, WITHOUT LONG-TERM CURRENT USE OF INSULIN (H): Primary | ICD-10-CM

## 2022-09-06 DIAGNOSIS — D17.30 LIPOMA OF SKIN AND SUBCUTANEOUS TISSUE: ICD-10-CM

## 2022-09-06 DIAGNOSIS — Z71.6 ENCOUNTER FOR TOBACCO USE CESSATION COUNSELING: ICD-10-CM

## 2022-09-06 DIAGNOSIS — R82.90 CLOUDY URINE: ICD-10-CM

## 2022-09-06 DIAGNOSIS — Z12.31 VISIT FOR SCREENING MAMMOGRAM: ICD-10-CM

## 2022-09-06 DIAGNOSIS — F17.200 NICOTINE DEPENDENCE, UNCOMPLICATED, UNSPECIFIED NICOTINE PRODUCT TYPE: ICD-10-CM

## 2022-09-06 DIAGNOSIS — R05.3 CHRONIC COUGH: ICD-10-CM

## 2022-09-06 LAB
ALBUMIN UR-MCNC: NEGATIVE MG/DL
APPEARANCE UR: CLEAR
BACTERIA #/AREA URNS HPF: ABNORMAL /HPF
BILIRUB UR QL STRIP: NEGATIVE
COLOR UR AUTO: YELLOW
GLUCOSE UR STRIP-MCNC: NEGATIVE MG/DL
HBA1C MFR BLD: 5.8 % (ref 0–5.6)
HGB UR QL STRIP: NEGATIVE
KETONES UR STRIP-MCNC: NEGATIVE MG/DL
LEUKOCYTE ESTERASE UR QL STRIP: NEGATIVE
NITRATE UR QL: NEGATIVE
PH UR STRIP: 5.5 [PH] (ref 5–8)
RBC #/AREA URNS AUTO: ABNORMAL /HPF
SP GR UR STRIP: <=1.005 (ref 1–1.03)
SQUAMOUS #/AREA URNS AUTO: ABNORMAL /LPF
UROBILINOGEN UR STRIP-ACNC: 0.2 E.U./DL
WBC #/AREA URNS AUTO: ABNORMAL /HPF

## 2022-09-06 PROCEDURE — 90662 IIV NO PRSV INCREASED AG IM: CPT | Performed by: STUDENT IN AN ORGANIZED HEALTH CARE EDUCATION/TRAINING PROGRAM

## 2022-09-06 PROCEDURE — 36415 COLL VENOUS BLD VENIPUNCTURE: CPT | Performed by: STUDENT IN AN ORGANIZED HEALTH CARE EDUCATION/TRAINING PROGRAM

## 2022-09-06 PROCEDURE — 99214 OFFICE O/P EST MOD 30 MIN: CPT | Mod: 25 | Performed by: STUDENT IN AN ORGANIZED HEALTH CARE EDUCATION/TRAINING PROGRAM

## 2022-09-06 PROCEDURE — 99406 BEHAV CHNG SMOKING 3-10 MIN: CPT | Mod: 59 | Performed by: STUDENT IN AN ORGANIZED HEALTH CARE EDUCATION/TRAINING PROGRAM

## 2022-09-06 PROCEDURE — 83036 HEMOGLOBIN GLYCOSYLATED A1C: CPT | Performed by: STUDENT IN AN ORGANIZED HEALTH CARE EDUCATION/TRAINING PROGRAM

## 2022-09-06 PROCEDURE — 90471 IMMUNIZATION ADMIN: CPT | Performed by: STUDENT IN AN ORGANIZED HEALTH CARE EDUCATION/TRAINING PROGRAM

## 2022-09-06 PROCEDURE — 81001 URINALYSIS AUTO W/SCOPE: CPT | Performed by: STUDENT IN AN ORGANIZED HEALTH CARE EDUCATION/TRAINING PROGRAM

## 2022-09-06 PROCEDURE — 90472 IMMUNIZATION ADMIN EACH ADD: CPT | Performed by: STUDENT IN AN ORGANIZED HEALTH CARE EDUCATION/TRAINING PROGRAM

## 2022-09-06 PROCEDURE — 90677 PCV20 VACCINE IM: CPT | Performed by: STUDENT IN AN ORGANIZED HEALTH CARE EDUCATION/TRAINING PROGRAM

## 2022-09-06 RX ORDER — INHALER, ASSIST DEVICES
SPACER (EA) MISCELLANEOUS
Qty: 1 EACH | Refills: 0 | Status: SHIPPED | OUTPATIENT
Start: 2022-09-06 | End: 2023-01-27

## 2022-09-06 RX ORDER — ALBUTEROL SULFATE 90 UG/1
2 AEROSOL, METERED RESPIRATORY (INHALATION) EVERY 6 HOURS
Qty: 18 G | Refills: 1 | Status: SHIPPED | OUTPATIENT
Start: 2022-09-06 | End: 2023-01-27

## 2022-09-06 ASSESSMENT — PAIN SCALES - GENERAL: PAINLEVEL: NO PAIN (0)

## 2022-09-06 NOTE — PROGRESS NOTES
Assessment & Plan       ICD-10-CM    1. Type 2 diabetes mellitus without complication, without long-term current use of insulin (H)  E11.9 Hemoglobin A1c   2. Visit for screening mammogram  Z12.31 MA SCREENING DIGITAL BILAT - Future  (s+30)   3. Lipoma of skin and subcutaneous tissue  D17.30    4. Unintentional weight loss  R63.4    5. Encounter for tobacco use cessation counseling  Z71.6    6. Cloudy urine  R82.90 UA with Microscopic reflex to Culture - lab collect   7. Encounter for immunization  Z23 Pneumococcal 20 Valent Conjugate (PCV20)     INFLUENZA, QUAD, HD, PF, 65+ (FLUZONE HD)   8. Post-menopausal  Z78.0 DEXA HIP/PELVIS/SPINE - Future   9. Chronic cough  R05.3 albuterol (PROAIR HFA/PROVENTIL HFA/VENTOLIN HFA) 108 (90 Base) MCG/ACT inhaler     spacer (OPTICHAMBER MIRIAM) holding chamber   10. Post-nasal drip  R09.82 albuterol (PROAIR HFA/PROVENTIL HFA/VENTOLIN HFA) 108 (90 Base) MCG/ACT inhaler   11. Nicotine dependence, uncomplicated, unspecified nicotine product type  F17.200 SMOKING CESSATION COUNSELING 3-10 MIN         Type 2 diabetes:  Unintentional weight loss  Was last seen March 2022-at that time her A1c was 11.8.  Was started on Trulicity + metformin dose was increased.  Today, patient notes that she has been losing weight but her sugars are much improved.  Her a.m. fasting sugars are 130s and last.  Her low sugar has been 88x1.  No side effects from the medications.  Feels she has not been eating as much.  Plan:  - Due for repeat A1c, will check today  - I am a bit worried about her weight loss.  She is not 111 pounds today.  This is the smallest she has been in a long time.  Most likely 2/2 Trulicity.  Discussed this with the patient.  Trulicity may not be the best for her and we may opt to switch to daily insulin as that is anabolic.  Patient is a bit hesitant.  After much discussion, we will see what the A1c looks like and decide neck steps.  Patient notes that if she has to start insulin,  "she will.    Lipoma:  Patient had noted \"swelling\" on the left flank.  Is worried about it because her mother had kidney cancer.  On exam, it is a lipoma.  Freely mobile, nontender.  Okay to monitor clinically unless patient would like it to be removed.    Atypical nevi: Patient has atypical nevi on left arm and the trunk.  Has been referred to dermatology but has not been able to make it out there.  We will just have her come back to me within the next few weeks to do a shave biopsy.  Patient verbalized understanding.    Postmenopausal  Low BMI  Tobacco abuse  Patient is 67 and has never had DEXA scan.  Has several risk factors for osteoporosis.  Is amenable to getting a DEXA scan done.  Referral placed.    Tobacco abuse:  3 to 5 minutes spent on tobacco cessation  Longstanding history of tobacco abuse.  Is now smoking 6 to 10 cigarettes a day.  Has had a lot of change in her work and that has been stressful.  Her significant other has cancer and she has stresses at home from being the primary caregiver.  Declines resources today.  Is working on cutting down on her own.    Chronic cough  Tobacco abuse  Postnasal drip  Last time she was here, was advised to try Allegra nightly.  Patient has not done so.  Instead, she retrial Flonase which has been helpful.  However, she continues to have a cough and a lot of drainage in the back of her throat.  Advised her to add Allegra nightly to daily Flonase.  We will also trial albuterol to see if there is underlying COPD that could be contributing.  Patient amenable.    Cloudy/dark urine:  Patient feels that her urine has been cloudy or the normal and there is a foul smell with it.  Will check UA.          36 minutes spent on the date of the encounter doing chart review, history and exam, documentation and further activities per the note    Return for anywhere on my schedule, ok to override VV for shave bx and also need appt for AWV+ DM in 3 months .    Soraya Cherry, DO HERNANDEZ " [facility] KYMBERLY Vazquez is a 67 year old, presenting for the following health issues:  Diabetes (Pt reports doing well since last visit- she has been staying under 120 after meals and notes her glu is high in am and she is still losing wt) and Kidney Problem (Pt notes dark foul smelling year and low back x 4-6 wks- pt has concerns as fm hx of kidney ca)        Review of Systems   As per HPI       Objective    /60 (BP Location: Left arm, Patient Position: Sitting, Cuff Size: Adult Regular)   Pulse 68   Temp 98.4  F (36.9  C) (Oral)   Resp 16   Wt 50.4 kg (111 lb 3.2 oz)   SpO2 96%   BMI 17.42 kg/m    Body mass index is 17.42 kg/m .  Physical Exam   GENERAL:  alert and no distress, thin, frail appearing  NECK: no adenopathy, no asymmetry, masses, or scars and thyroid normal to palpation  RESP: lungs clear to auscultation - no rales, rhonchi or wheezes  CV: regular rate and rhythm, normal S1 S2, no S3 or S4, no murmur, click or rub, no peripheral edema and peripheral pulses strong  MS: no gross musculoskeletal defects noted, no edema  SKIN:   Atypical nevi on the left arm + one on the mid back   No ulceration or bleeding  PSYCH: mentation appears normal, affect slightly flat, somewhat anxious

## 2022-10-07 ENCOUNTER — OFFICE VISIT (OUTPATIENT)
Dept: FAMILY MEDICINE | Facility: CLINIC | Age: 67
End: 2022-10-07
Payer: COMMERCIAL

## 2022-10-07 ENCOUNTER — HOSPITAL ENCOUNTER (OUTPATIENT)
Dept: BONE DENSITY | Facility: HOSPITAL | Age: 67
Discharge: HOME | End: 2022-10-07
Attending: STUDENT IN AN ORGANIZED HEALTH CARE EDUCATION/TRAINING PROGRAM | Admitting: STUDENT IN AN ORGANIZED HEALTH CARE EDUCATION/TRAINING PROGRAM
Payer: COMMERCIAL

## 2022-10-07 VITALS
WEIGHT: 115 LBS | BODY MASS INDEX: 18.01 KG/M2 | DIASTOLIC BLOOD PRESSURE: 74 MMHG | RESPIRATION RATE: 16 BRPM | HEART RATE: 66 BPM | SYSTOLIC BLOOD PRESSURE: 116 MMHG

## 2022-10-07 DIAGNOSIS — Z78.0 POST-MENOPAUSAL: ICD-10-CM

## 2022-10-07 DIAGNOSIS — D22.9 ATYPICAL NEVI: Primary | ICD-10-CM

## 2022-10-07 PROCEDURE — 77080 DXA BONE DENSITY AXIAL: CPT

## 2022-10-07 PROCEDURE — 11401 EXC TR-EXT B9+MARG 0.6-1 CM: CPT | Mod: 59 | Performed by: STUDENT IN AN ORGANIZED HEALTH CARE EDUCATION/TRAINING PROGRAM

## 2022-10-07 PROCEDURE — 88305 TISSUE EXAM BY PATHOLOGIST: CPT | Mod: 59 | Performed by: PATHOLOGY

## 2022-10-07 PROCEDURE — 11400 EXC TR-EXT B9+MARG 0.5 CM<: CPT | Performed by: STUDENT IN AN ORGANIZED HEALTH CARE EDUCATION/TRAINING PROGRAM

## 2022-10-07 RX ORDER — LIDOCAINE HYDROCHLORIDE AND EPINEPHRINE 10; 10 MG/ML; UG/ML
0.25 INJECTION, SOLUTION INFILTRATION; PERINEURAL ONCE
Status: DISCONTINUED | OUTPATIENT
Start: 2022-10-07 | End: 2023-04-07

## 2022-10-07 ASSESSMENT — PAIN SCALES - GENERAL: PAINLEVEL: NO PAIN (0)

## 2022-10-07 NOTE — PROGRESS NOTES
Procedure: Shave Biopsy x 2  The patient was informed of the nature of the procedure and was informed that the procedure will heal with a scar. She  was also informed of the risk of unattractive scarring, recurrence of the lesion, infection, bleeding and pain and that further treatment may be needed. She  consents to the procedure.  The area surrounding the lesion(s) were  prepped with alcohol. Local anesthesia (1% xylocaine with epinephrine) was injected. The lesion(s) was  biopsied with a blue blade. The specimen was sent to Pathology. silver nitratewas used for hemostasis. The wound(s) was  dressed with bacitracin ointment and a bandage. Wound care instructions were explained and a wound care sheet was given. Follow up with any wound problems, poor healing or infection.     The following lesions were removed/biopsied:  A) Location: left lateral bicep,  0.6 cm  Clinical dx: atypical nevi   B) Location: upper left back, less than 0.5 cm  Clinical Diagnosis: atypical nevi

## 2022-10-10 PROBLEM — M85.89 OSTEOPENIA OF MULTIPLE SITES: Status: ACTIVE | Noted: 2022-10-10

## 2022-10-11 LAB
PATH REPORT.COMMENTS IMP SPEC: NORMAL
PATH REPORT.COMMENTS IMP SPEC: NORMAL
PATH REPORT.FINAL DX SPEC: NORMAL
PATH REPORT.GROSS SPEC: NORMAL
PATH REPORT.RELEVANT HX SPEC: NORMAL
PHOTO IMAGE: NORMAL

## 2022-12-04 ENCOUNTER — HEALTH MAINTENANCE LETTER (OUTPATIENT)
Age: 67
End: 2022-12-04

## 2023-01-27 ENCOUNTER — OFFICE VISIT (OUTPATIENT)
Dept: FAMILY MEDICINE | Facility: CLINIC | Age: 68
End: 2023-01-27
Payer: COMMERCIAL

## 2023-01-27 VITALS
RESPIRATION RATE: 20 BRPM | TEMPERATURE: 98 F | WEIGHT: 112 LBS | OXYGEN SATURATION: 97 % | BODY MASS INDEX: 18 KG/M2 | SYSTOLIC BLOOD PRESSURE: 138 MMHG | HEART RATE: 88 BPM | DIASTOLIC BLOOD PRESSURE: 62 MMHG | HEIGHT: 66 IN

## 2023-01-27 DIAGNOSIS — R63.4 UNINTENTIONAL WEIGHT LOSS: ICD-10-CM

## 2023-01-27 DIAGNOSIS — N39.46 MIXED INCONTINENCE URGE AND STRESS (MALE)(FEMALE): ICD-10-CM

## 2023-01-27 DIAGNOSIS — E11.9 TYPE 2 DIABETES MELLITUS WITHOUT COMPLICATION, WITHOUT LONG-TERM CURRENT USE OF INSULIN (H): ICD-10-CM

## 2023-01-27 DIAGNOSIS — R35.1 NOCTURIA: ICD-10-CM

## 2023-01-27 DIAGNOSIS — Z12.2 ENCOUNTER FOR SCREENING FOR LUNG CANCER: ICD-10-CM

## 2023-01-27 DIAGNOSIS — R53.83 OTHER FATIGUE: ICD-10-CM

## 2023-01-27 DIAGNOSIS — Z63.79 STRESS DUE TO ILLNESS OF FAMILY MEMBER: ICD-10-CM

## 2023-01-27 DIAGNOSIS — Z72.0 TOBACCO ABUSE: ICD-10-CM

## 2023-01-27 DIAGNOSIS — Z72.820 POOR SLEEP: ICD-10-CM

## 2023-01-27 DIAGNOSIS — Z87.891 PERSONAL HISTORY OF TOBACCO USE: ICD-10-CM

## 2023-01-27 DIAGNOSIS — Z00.00 ENCOUNTER FOR MEDICARE ANNUAL WELLNESS EXAM: Primary | ICD-10-CM

## 2023-01-27 DIAGNOSIS — F17.200 NICOTINE DEPENDENCE, UNCOMPLICATED, UNSPECIFIED NICOTINE PRODUCT TYPE: ICD-10-CM

## 2023-01-27 DIAGNOSIS — R35.0 URINARY FREQUENCY: ICD-10-CM

## 2023-01-27 LAB
ALBUMIN UR-MCNC: NEGATIVE MG/DL
APPEARANCE UR: CLEAR
BACTERIA #/AREA URNS HPF: NORMAL /HPF
BASOPHILS # BLD AUTO: 0 10E3/UL (ref 0–0.2)
BASOPHILS NFR BLD AUTO: 0 %
BILIRUB UR QL STRIP: NEGATIVE
CHOLEST SERPL-MCNC: 161 MG/DL
COLOR UR AUTO: YELLOW
EOSINOPHIL # BLD AUTO: 0.1 10E3/UL (ref 0–0.7)
EOSINOPHIL NFR BLD AUTO: 2 %
ERYTHROCYTE [DISTWIDTH] IN BLOOD BY AUTOMATED COUNT: 12.3 % (ref 10–15)
GLUCOSE UR STRIP-MCNC: NEGATIVE MG/DL
HBA1C MFR BLD: 6.8 % (ref 0–5.6)
HCT VFR BLD AUTO: 41.4 % (ref 35–47)
HDLC SERPL-MCNC: 67 MG/DL
HGB BLD-MCNC: 13.7 G/DL (ref 11.7–15.7)
HGB UR QL STRIP: NEGATIVE
IMM GRANULOCYTES # BLD: 0 10E3/UL
IMM GRANULOCYTES NFR BLD: 0 %
KETONES UR STRIP-MCNC: ABNORMAL MG/DL
LDLC SERPL CALC-MCNC: 64 MG/DL
LEUKOCYTE ESTERASE UR QL STRIP: NEGATIVE
LYMPHOCYTES # BLD AUTO: 1.7 10E3/UL (ref 0.8–5.3)
LYMPHOCYTES NFR BLD AUTO: 29 %
MCH RBC QN AUTO: 30.9 PG (ref 26.5–33)
MCHC RBC AUTO-ENTMCNC: 33.1 G/DL (ref 31.5–36.5)
MCV RBC AUTO: 94 FL (ref 78–100)
MONOCYTES # BLD AUTO: 0.4 10E3/UL (ref 0–1.3)
MONOCYTES NFR BLD AUTO: 7 %
NEUTROPHILS # BLD AUTO: 3.5 10E3/UL (ref 1.6–8.3)
NEUTROPHILS NFR BLD AUTO: 61 %
NITRATE UR QL: NEGATIVE
NONHDLC SERPL-MCNC: 94 MG/DL
PH UR STRIP: 6.5 [PH] (ref 5–8)
PLATELET # BLD AUTO: 127 10E3/UL (ref 150–450)
RBC # BLD AUTO: 4.43 10E6/UL (ref 3.8–5.2)
RBC #/AREA URNS AUTO: NORMAL /HPF
SP GR UR STRIP: 1.02 (ref 1–1.03)
SQUAMOUS #/AREA URNS AUTO: NORMAL /LPF
TRIGL SERPL-MCNC: 150 MG/DL
TSH SERPL DL<=0.005 MIU/L-ACNC: 2.36 UIU/ML (ref 0.3–4.2)
UROBILINOGEN UR STRIP-ACNC: 0.2 E.U./DL
VIT B12 SERPL-MCNC: 666 PG/ML (ref 232–1245)
WBC # BLD AUTO: 5.8 10E3/UL (ref 4–11)
WBC #/AREA URNS AUTO: NORMAL /HPF

## 2023-01-27 PROCEDURE — 82607 VITAMIN B-12: CPT | Performed by: STUDENT IN AN ORGANIZED HEALTH CARE EDUCATION/TRAINING PROGRAM

## 2023-01-27 PROCEDURE — 36415 COLL VENOUS BLD VENIPUNCTURE: CPT | Performed by: STUDENT IN AN ORGANIZED HEALTH CARE EDUCATION/TRAINING PROGRAM

## 2023-01-27 PROCEDURE — 85025 COMPLETE CBC W/AUTO DIFF WBC: CPT | Performed by: STUDENT IN AN ORGANIZED HEALTH CARE EDUCATION/TRAINING PROGRAM

## 2023-01-27 PROCEDURE — 83036 HEMOGLOBIN GLYCOSYLATED A1C: CPT | Performed by: STUDENT IN AN ORGANIZED HEALTH CARE EDUCATION/TRAINING PROGRAM

## 2023-01-27 PROCEDURE — 81001 URINALYSIS AUTO W/SCOPE: CPT | Performed by: STUDENT IN AN ORGANIZED HEALTH CARE EDUCATION/TRAINING PROGRAM

## 2023-01-27 PROCEDURE — G0296 VISIT TO DETERM LDCT ELIG: HCPCS | Performed by: STUDENT IN AN ORGANIZED HEALTH CARE EDUCATION/TRAINING PROGRAM

## 2023-01-27 PROCEDURE — 80061 LIPID PANEL: CPT | Performed by: STUDENT IN AN ORGANIZED HEALTH CARE EDUCATION/TRAINING PROGRAM

## 2023-01-27 PROCEDURE — 82306 VITAMIN D 25 HYDROXY: CPT | Performed by: STUDENT IN AN ORGANIZED HEALTH CARE EDUCATION/TRAINING PROGRAM

## 2023-01-27 PROCEDURE — 99397 PER PM REEVAL EST PAT 65+ YR: CPT | Performed by: STUDENT IN AN ORGANIZED HEALTH CARE EDUCATION/TRAINING PROGRAM

## 2023-01-27 PROCEDURE — 84443 ASSAY THYROID STIM HORMONE: CPT | Performed by: STUDENT IN AN ORGANIZED HEALTH CARE EDUCATION/TRAINING PROGRAM

## 2023-01-27 RX ORDER — MIRTAZAPINE 7.5 MG/1
7.5 TABLET, FILM COATED ORAL AT BEDTIME
Qty: 90 TABLET | Refills: 0 | Status: SHIPPED | OUTPATIENT
Start: 2023-01-27 | End: 2023-03-31

## 2023-01-27 RX ORDER — OXYBUTYNIN CHLORIDE 5 MG/1
5 TABLET, EXTENDED RELEASE ORAL DAILY
Qty: 90 TABLET | Refills: 0 | Status: SHIPPED | OUTPATIENT
Start: 2023-01-27 | End: 2023-03-31

## 2023-01-27 ASSESSMENT — PAIN SCALES - GENERAL: PAINLEVEL: NO PAIN (0)

## 2023-01-27 ASSESSMENT — ENCOUNTER SYMPTOMS
PARESTHESIAS: 0
FREQUENCY: 1
BREAST MASS: 0
DIARRHEA: 0
CONSTIPATION: 0
ARTHRALGIAS: 1
PALPITATIONS: 0
COUGH: 1
NERVOUS/ANXIOUS: 0
SORE THROAT: 0
HEMATURIA: 0
EYE PAIN: 0
SHORTNESS OF BREATH: 1
FEVER: 0
DIZZINESS: 0
HEARTBURN: 0
HEMATOCHEZIA: 0
WEAKNESS: 0
CHILLS: 0
ABDOMINAL PAIN: 0
MYALGIAS: 0
DYSURIA: 0

## 2023-01-27 ASSESSMENT — ACTIVITIES OF DAILY LIVING (ADL): CURRENT_FUNCTION: NO ASSISTANCE NEEDED

## 2023-01-27 NOTE — PATIENT INSTRUCTIONS
Patient Education   Personalized Prevention Plan  You are due for the preventive services outlined below.  Your care team is available to assist you in scheduling these services.  If you have already completed any of these items, please share that information with your care team to update in your medical record.  Health Maintenance Due   Topic Date Due     ANNUAL REVIEW OF HM ORDERS  Never done     Discuss Advance Care Planning  Never done     Zoster (Shingles) Vaccine (1 of 2) Never done     Annual Wellness Visit  Never done     LUNG CANCER SCREENING  04/13/2022     Diabetic Foot Exam  05/20/2022     Eye Exam  08/01/2022     Mammogram  08/17/2022     A1[facility] Lab  12/06/2022       Exercise for a Healthier Heart  You may wonder how you can improve the health of your heart. If you re thinking about exercise, you re on the right track. You don t need to become an athlete. But you do need a certain amount of brisk exercise to help strengthen your heart. If you have been diagnosed with a heart condition, your healthcare provider may advise exercise to help stabilize your condition. To help make exercise a habit, choose safe, fun activities.      Exercise with a friend. When activity is fun, you're more likely to stick with it.   Before you start  Check with your healthcare provider before starting an exercise program. This is especially important if you have not been active for a while. It's also important if you have a long-term (chronic) health problem such as heart disease, diabetes, or obesity. Or if you are at high risk for having these problems.   Why exercise?  Exercising regularly offers many healthy rewards. It can help you do all of the following:     Improve your blood cholesterol level to help prevent further heart trouble    Lower your blood pressure to help prevent a stroke or heart attack    Control diabetes, or reduce your risk of getting this disease    Improve your heart and lung function    Reach and  stay at a healthy weight    Make your muscles stronger so you can stay active    Prevent falls and fractures by slowing the loss of bone mass (osteoporosis)    Manage stress better    Reduce your blood pressure    Improve your sense of self and your body image  Exercise tips      Ease into your routine. Set small goals. Then build on them. If you are not sure what your activity level should be, talk with your healthcare provider first before starting an exercise routine.    Exercise on most days. Aim for a total of 150 minutes (2 hours and 30 minutes) or more of moderate-intensity aerobic activity each week. Or 75 minutes (1 hour and 15 minutes) or more of vigorous-intensity aerobic activity each week. Or try for a combination of both. Moderate activity means that you breathe heavier and your heart rate increases but you can still talk. Think about doing 40 minutes of moderate exercise, 3 to 4 times a week. For best results, activity should last for about 40 minutes to lower blood pressure and cholesterol. It's OK to work up to the 40-minute period over time. Examples of moderate-intensity activity are walking 1 mile in 15 minutes. Or doing 30 to 45 minutes of yard work.    Step up your daily activity level.  Along with your exercise program, try being more active the whole day. Walk instead of drive. Or park further away so that you take more steps each day. Do more household tasks or yard work. You may not be able to meet the advised mount of physical activity. But doing some moderate- or vigorous-intensity aerobic activity can help reduce your risk for heart disease. Your healthcare provider can help you figure out what is best for you.    Choose 1 or more activities you enjoy.  Walking is one of the easiest things you can do. You can also try swimming, riding a bike, dancing, or taking an exercise class.    When to call your healthcare provider  Call your healthcare provider if you have any of these:     Chest  pain or feel dizzy or lightheaded    Burning, tightness, pressure, or heaviness in your chest, neck, shoulders, back, or arms    Abnormal shortness of breath    More joint or muscle pain    A very fast or irregular heartbeat (palpitations)  Ronan last reviewed this educational content on 7/1/2019 2000-2021 [employer]. All rights reserved. This information is not intended as a substitute for professional medical care. Always follow your healthcare professional's instructions.          Understanding USDA MyPlate  The USDA has guidelines to help you make healthy food choices. These are called MyPlate. MyPlate shows the food groups that make up healthy meals using the image of a place setting. Before you eat, think about the healthiest choices for what to put on your plate or in your cup or bowl. To learn more about building a healthy plate, visit www.choosemyplate.gov.    The food groups    Fruits. Any fruit or 100% fruit juice counts as part of the Fruit Group. Fruits may be fresh, canned, frozen, or dried, and may be whole, cut-up, or pureed. Make 1/2 of your plate fruits and vegetables.    Vegetables. Any vegetable or 100% vegetable juice counts as a member of the Vegetable Group. Vegetables may be fresh, frozen, canned, or dried. They can be served raw or cooked and may be whole, cut-up, or mashed. Make 1/2 of your plate fruits and vegetables.    Grains. All foods made from grains are part of the Grains Group. These include wheat, rice, oats, cornmeal, and barley. Grains are often used to make foods such as bread, pasta, oatmeal, cereal, tortillas, and grits. Grains should be no more than 1/4 of your plate. At least half of your grains should be whole grains.    Protein. This group includes meat, poultry, seafood, beans and peas, eggs, processed soy products (such as tofu), nuts (including nut butters), and seeds. Make protein choices no more than 1/4 of your plate. Meat and poultry choices  should be lean or low fat.    Dairy. The Dairy Group includes all fluid milk products and foods made from milk that contain calcium, such as yogurt and cheese. (Foods that have little calcium, such as cream, butter, and cream cheese, are not part of this group.) Most dairy choices should be low-fat or fat-free.    Oils. Oils aren't a food group, but they do contain essential nutrients. However it's important to watch your intake of oils. These are fats that are liquid at room temperature. They include canola, corn, olive, soybean, vegetable, and sunflower oil. Foods that are mainly oil include mayonnaise, certain salad dressings, and soft margarines. You likely already get your daily oil allowance from the foods you eat.  Things to limit  Eating healthy also means limiting these things in your diet:       Salt (sodium). Many processed foods have a lot of sodium. To keep sodium intake down, eat fresh vegetables, meats, poultry, and seafood when possible. Purchase low-sodium, reduced-sodium, or no-salt-added food products at the store. And don't add salt to your meals at home. Instead, season them with herbs and spices such as dill, oregano, cumin, and paprika. Or try adding flavor with lemon or lime zest and juice.    Saturated fat. Saturated fats are most often found in animal products such as beef, pork, and chicken. They are often solid at room temperature, such as butter. To reduce your saturated fat intake, choose leaner cuts of meat and poultry. And try healthier cooking methods such as grilling, broiling, roasting, or baking. For a simple lower-fat swap, use plain nonfat yogurt instead of mayonnaise when making potato salad or macaroni salad.    Added sugars. These are sugars added to foods. They are in foods such as ice cream, candy, soda, fruit drinks, sports drinks, energy drinks, cookies, pastries, jams, and syrups. Cut down on added sugars by sharing sweet treats with a family member or friend. You can  also choose fruit for dessert, and drink water or other unsweetened beverages.     [employer] last reviewed this educational content on 6/1/2020 2000-2021 [employer]. All rights reserved. This information is not intended as a substitute for professional medical care. Always follow your healthcare professional's instructions.          Signs of Hearing Loss      Hearing much better with one ear can be a sign of hearing loss.   Hearing loss is a problem shared by many people. In fact, it is one of the most common health problems, particularly as people age. Most people age 65 and older have some hearing loss. By age 80, almost everyone does. Hearing loss often occurs slowly over the years. So you may not realize your hearing has gotten worse.  Have your hearing checked  Call your healthcare provider if you:    Have to strain to hear normal conversation    Have to watch other people s faces very carefully to follow what they re saying    Need to ask people to repeat what they ve said    Often misunderstand what people are saying    Turn the volume of the television or radio up so high that others complain    Feel that people are mumbling when they re talking to you    Find that the effort to hear leaves you feeling tired and irritated    Notice, when using the phone, that you hear better with one ear than the other  [employer] last reviewed this educational content on 1/1/2020 2000-2021 [employer]. All rights reserved. This information is not intended as a substitute for professional medical care. Always follow your healthcare professional's instructions.          Urinary Incontinence, Female (Adult)   Urinary incontinence means loss of bladder control. This problem affects many women, especially as they get older. If you have incontinence, you may be embarrassed to ask for help. But know that this problem can be treated.   Types of Incontinence  There are different types of incontinence.  Two of the main types are described here. You can have more than one type.     Stress incontinence. With this type, urine leaks when pressure (stress) is put on the bladder. This may happen when you cough, sneeze, or laugh. Stress incontinence most often occurs because the pelvic floor muscles that support the bladder and urethra are weak. This can happen after pregnancy and vaginal childbirth or a hysterectomy. It can also be due to excess body weight or hormone changes.    Urge incontinence (also called overactive bladder). With this type, a sudden urge to urinate is felt often. This may happen even though there may not be much urine in the bladder. The need to urinate often during the night is common. Urge incontinence most often occurs because of bladder spasms. This may be due to bladder irritation or infection. Damage to bladder nerves or pelvic muscles, constipation, and certain medicines can also lead to urge incontinence.  Treatment depends on the cause. Further evaluation is needed to find the type you have. This will likely include an exam and certain tests. Based on the results, you and your healthcare provider can then plan treatment. Until a diagnosis is made, the home care tips below can help ease symptoms.   Home care    Do pelvic floor muscle exercises, if they are prescribed. The pelvic floor muscles help support the bladder and urethra. Many women find that their symptoms improve when doing special exercises that strengthen these muscles. To do the exercises, contract the muscles you would use to stop your stream of urine. But do this when you re not urinating. Hold for 10 seconds, then relax. Repeat 10 to 20 times in a row, at least 3 times a day. Your healthcare provider may give you other instructions for how to do the exercises and how often.    Keep a bladder diary. This helps track how often and how much you urinate over a set period of time. Bring this diary with you to your next visit with  the provider. The information can help your provider learn more about your bladder problem.    Lose weight, if advised to by your provider. Extra weight puts pressure on the bladder. Your provider can help you create a weight-loss plan that s right for you. This may include exercising more and making certain diet changes.    Don't have foods and drinks that may irritate the bladder. These can include alcohol and caffeinated drinks.    Quit smoking. Smoking and other tobacco use can lead to a long-term (chronic) cough that strains the pelvic floor muscles. Smoking may also damage the bladder and urethra. Talk with your provider about treatments or methods you can use to quit smoking.    If drinking large amounts of fluid makes you have symptoms, you may be advised to limit your fluid intake. You may also be advised to drink most of your fluids during the day and to limit fluids at night.    If you re worried about urine leakage or accidents, you may wear absorbent pads to catch urine. Change the pads often. This helps reduce discomfort. It may also reduce the risk of skin or bladder infections.    Follow-up care  Follow up with your healthcare provider, or as directed. It may take some to find the right treatment for your problem. But healthy lifestyle changes can be made right away. These include such things as exercising on a regular basis, eating a healthy diet, losing weight (if needed), and quitting smoking. Your treatment plan may include special therapies or medicines. Certain procedures or surgery may also be options. Talk about any questions you have with your provider.   When to seek medical advice  Call the healthcare provider right away if any of these occur:    Fever of 100.4 F (38 C) or higher, or as directed by your provider    Bladder pain or fullness    Belly swelling    Nausea or vomiting    Back pain    Weakness, dizziness, or fainting  [employer]onna last reviewed this educational content on 1/1/2020     2964-6861 [employer]. All rights reserved. This information is not intended as a substitute for professional medical care. Always follow your healthcare professional's instructions.           Lung Cancer Screening   Frequently Asked Questions  If you are at high-risk for lung cancer, getting screened with low-dose computed tomography (LDCT) every year can help save your life. This handout offers answers to some of the most common questions about lung cancer screening. If you have other questions, please call [phone] ([phone]).     What is it?  Lung cancer screening uses special X-ray technology to create an image of your lung tissue. The exam is quick and easy and takes less than 10 seconds. We don t give you any medicine or use any needles. You can eat before and after the exam. You don t need to change your clothes as long as the clothing on your chest doesn t contain metal. But, you do need to be able to hold your breath for at least 6 seconds during the exam.    What is the goal of lung cancer screening?  The goal of lung cancer screening is to save lives. Many times, lung cancer is not found until a person starts having physical symptoms. Lung cancer screening can help detect lung cancer in the earliest stages when it may be easier to treat.    Who should be screened for lung cancer?  We suggest lung cancer screening for anyone who is at high-risk for lung cancer. You are in the high-risk group if you:      are between the ages of 55 and 79, and    have smoked at least 1 pack of cigarettes a day for 20 or more years, and    still smoke or have quit within the past 15 years.    However, if you have a new cough or shortness of breath, you should talk to your doctor before being screened.    Why does it matter if I have symptoms?  Certain symptoms can be a sign that you have a condition in your lungs that should be checked and treated by your doctor. These symptoms include fever,  chest pain, a new or changing cough, shortness of breath that you have never felt before, coughing up blood or unexplained weight loss. Having any of these symptoms can greatly affect the results of lung cancer screening.       Should all smokers get an LDCT lung cancer screening exam?  It depends. Lung cancer screening is for a very specific group of men and women who have a history of heavy smoking over a long period of time (see  Who should be screened for lung cancer  above).  I am in the high-risk group, but have been diagnosed with cancer in the past. Is LDCT lung cancer screening right for me?  In some cases, you should not have LDCT lung screening, such as when your doctor is already following your cancer with CT scan studies. Your doctor will help you decide if LDCT lung screening is right for you.  Do I need to have a screening exam every year?  Yes. If you are in the high-risk group described earlier, you should get an LDCT lung cancer screening exam every year until you are 79, or are no longer willing or able to undergo screening and possible procedures to diagnose and treat lung cancer.  How effective is LDCT at preventing death from lung cancer?  Studies have shown that LDCT lung cancer screening can lower the risk of death from lung cancer by 20 percent in people who are at high-risk.  What are the risks?  There are some risks and limitations of LDCT lung cancer screening. We want to make sure you understand the risks and benefits, so please let us know if you have any questions. Your doctor may want to talk with you more about these risks.    Radiation exposure: As with any exam that uses radiation, there is a very small increased risk of cancer. The amount of radiation in LDCT is small--about the same amount a person would get from a mammogram. Your doctor orders the exam when he or she feels the potential benefits outweigh the risks.    False negatives: No test is perfect, including LDCT. It is  possible that you may have a medical condition, including lung cancer, that is not found during your exam. This is called a false negative result.    False positives and more testing: LDCT very often finds something in the lung that could be cancer, but in fact is not. This is called a false positive result. False positive tests often cause anxiety. To make sure these findings are not cancer, you may need to have more tests. These tests will be done only if you give us permission. Sometimes patients need a treatment that can have side effects, such as a biopsy. For more information on false positives, see  What can I expect from the results?     Findings not related to lung cancer: Your LDCT exam also takes pictures of areas of your body next to your lungs. In a very small number of cases, the CT scan will show an abnormal finding in one of these areas, such as your kidneys, adrenal glands, liver or thyroid. This finding may not be serious, but you may need more tests. Your doctor can help you decide what other tests you may need, if any.  What can I expect from the results?  About 1 out of 4 LDCT exams will find something that may need more tests. Most of the time, these findings are lung nodules. Lung nodules are very small collections of tissue in the lung. These nodules are very common, and the vast majority--more than 97 percent--are not cancer (benign). Most are normal lymph nodes or small areas of scarring from past infections.  But, if a small lung nodule is found to be cancer, the cancer can be cured more than 90 percent of the time. To know if the nodule is cancer, we may need to get more images before your next yearly screening exam. If the nodule has suspicious features (for example, it is large, has an odd shape or grows over time), we will refer you to a specialist for further testing.  Will my doctor also get the results?  Yes. Your doctor will get a copy of your results.  Is it okay to keep smoking  now that there s a cancer screening exam?  No. Tobacco is one of the strongest cancer-causing agents. It causes not only lung cancer, but other cancers and cardiovascular (heart) diseases as well. The damage caused by smoking builds over time. This means that the longer you smoke, the higher your risk of disease. While it is never too late to quit, the sooner you quit, the better.  Where can I find help to quit smoking?  The best way to prevent lung cancer is to stop smoking. If you have already quit smoking, congratulations and keep it up! For help on quitting smoking, please call [employer] at 5-231-QUIT-NOW (1-157.169.8543) or the [employer] at 1-751.526.4529 to find local resources near you.  One-on-one health coaching:  If you d prefer to work individually with a health care provider on tobacco cessation, we offer:      Medication Therapy Management:  Our specially trained pharmacists work closely with you and your doctor to help you quit smoking.  Call [phone] or [phone] (toll free).

## 2023-01-27 NOTE — PROGRESS NOTES
"SUBJECTIVE:   Taylor is a 67 year old who presents for Preventive Visit.  Patient has been advised of split billing requirements and indicates understanding: Yes  Are you in the first 12 months of your Medicare coverage?  No    Healthy Habits:     In general, how would you rate your overall health?  Good    Frequency of exercise:  1 day/week    Duration of exercise:  Less than 15 minutes    Do you usually eat at least 4 servings of fruit and vegetables a day, include whole grains    & fiber and avoid regularly eating high fat or \"junk\" foods?  No    Taking medications regularly:  Yes    Medication side effects:  None    Ability to successfully perform activities of daily living:  No assistance needed    Home Safety:  No safety concerns identified    Hearing Impairment:  Difficulty understanding soft or whispered speech    In the past 6 months, have you been bothered by leaking of urine? Yes    In general, how would you rate your overall mental or emotional health?  Excellent      PHQ-2 Total Score: 0    Additional concerns today:  No      Have you ever done Advance Care Planning? (For example, a Health Directive, POLST, or a discussion with a medical provider or your loved ones about your wishes): No, advance care planning information given to patient to review.  Patient declined advance care planning discussion at this time.       Fall risk  Fallen 2 or more times in the past year?: No  Any fall with injury in the past year?: No    Cognitive Screening pass     Do you have sleep apnea, excessive snoring or daytime drowsiness?: yes    Reviewed and updated as needed this visit by clinical staff   Tobacco  Allergies  Meds              Reviewed and updated as needed this visit by Provider                 Social History     Tobacco Use     Smoking status: Every Day     Packs/day: 0.50     Years: 40.00     Pack years: 20.00     Types: Cigarettes     Smokeless tobacco: Never   Substance Use Topics     Alcohol use: Not " Currently     Comment: Sober     If you drink alcohol do you typically have >3 drinks per day or >7 drinks per week? No    Alcohol Use 1/27/2023   Prescreen: >3 drinks/day or >7 drinks/week? Not Applicable   Prescreen: >3 drinks/day or >7 drinks/week? -       BP Readings from Last 2 Encounters:   01/27/23 138/62   10/07/22 116/74     Hemoglobin A1C (%)   Date Value   01/27/2023 6.8 (H)   09/06/2022 5.8 (H)     LDL Cholesterol Calculated (mg/dL)   Date Value   01/27/2023 64   03/21/2022 71                 Current providers sharing in care for this patient include:   Patient Care Team:  Soraya Cherry DO as PCP - General (Family Medicine)  Soraya Cherry DO as Assigned PCP    The following health maintenance items are reviewed in Epic and correct as of today:  Health Maintenance   Topic Date Due     ANNUAL REVIEW OF HM ORDERS  Never done     ADVANCE CARE PLANNING  Never done     ZOSTER IMMUNIZATION (1 of 2) Never done     MEDICARE ANNUAL WELLNESS VISIT  Never done     LUNG CANCER SCREENING  04/13/2022     DIABETIC FOOT EXAM  05/20/2022     EYE EXAM  08/01/2022     MAMMO SCREENING  08/17/2022     BMP  03/21/2023     MICROALBUMIN  03/21/2023     A1C  04/27/2023     NICOTINE/TOBACCO CESSATION COUNSELING Q 1 YR  01/27/2024     LIPID  01/27/2024     FALL RISK ASSESSMENT  01/27/2024     DTAP/TDAP/TD IMMUNIZATION (3 - Td or Tdap) 07/20/2030     COLORECTAL CANCER SCREENING  11/01/2030     DEXA  10/07/2037     HEPATITIS C SCREENING  Completed     PHQ-2 (once per calendar year)  Completed     INFLUENZA VACCINE  Completed     Pneumococcal Vaccine: 65+ Years  Completed     COVID-19 Vaccine  Completed     IPV IMMUNIZATION  Aged Out     MENINGITIS IMMUNIZATION  Aged Out     PAP  Discontinued     Lab work is in process  Labs reviewed in EPIC  BP Readings from Last 3 Encounters:   01/27/23 138/62   10/07/22 116/74   09/06/22 120/60    Wt Readings from Last 3 Encounters:   01/27/23 50.8 kg (112 lb)   10/07/22 52.2 kg (115 lb)    09/06/22 50.4 kg (111 lb 3.2 oz)                  Patient Active Problem List   Diagnosis     H/O ETOH abuse     Arthritis     Moderate smoker (20 or less per day)     Vitamin D deficiency     Diabetes mellitus, type 2 (H)     Cigarette nicotine dependence with other nicotine-induced disorder     Lipoma of skin and subcutaneous tissue     Post-menopausal     Chronic cough     Unintentional weight loss     Osteopenia of multiple sites     No past surgical history on file.    Social History     Tobacco Use     Smoking status: Every Day     Packs/day: 0.50     Years: 40.00     Pack years: 20.00     Types: Cigarettes     Smokeless tobacco: Never   Substance Use Topics     Alcohol use: Not Currently     Comment: Sober     No family history on file.      Current Outpatient Medications   Medication Sig Dispense Refill     aspirin 81 MG EC tablet [ASPIRIN 81 MG EC TABLET] Take 81 mg by mouth.       blood glucose (ACCU-CHEK GUIDE) test strip [ACCU-CHEK GUIDE TEST STRIPS STRIPS] TEST BLOOD GLUCOSE LEVELS TWICE A  strip 3     blood glucose (NO BRAND SPECIFIED) lancets standard Use to test blood sugar two times daily or as directed. 200 lancet 0     blood glucose meter (GLUCOMETER) [BLOOD GLUCOSE METER (GLUCOMETER)] Use 1 each As Directed as needed. Dispense glucometer brand per patient's insurance at pharmacy discretion. DX: E11.9 1 each 0     cholecalciferol, vitamin D3, 50 mcg (2,000 unit) Tab [CHOLECALCIFEROL, VITAMIN D3, 50 MCG (2,000 UNIT) TAB] Take 4,000 Units by mouth.       docosahexaenoic acid-epa 120-180 mg cap Take 2 g by mouth       fluticasone (FLONASE) 50 MCG/ACT nasal spray Spray 1 spray into both nostrils daily 16 g 4     generic lancets (FINGERSTIX LANCETS) [GENERIC LANCETS (FINGERSTIX LANCETS)] Dispense brand per patient's insurance at pharmacy discretion. 100 each 2     lisinopril (ZESTRIL) 5 MG tablet Take 1 tablet (5 mg) by mouth daily 90 tablet 3     metFORMIN (GLUCOPHAGE XR) 500 MG 24 hr tablet  "Take 2 tablets (1,000 mg) by mouth 2 times daily 360 tablet 3     mirtazapine (REMERON) 7.5 MG tablet Take 1 tablet (7.5 mg) by mouth At Bedtime 90 tablet 0     oxybutynin ER (DITROPAN XL) 5 MG 24 hr tablet Take 1 tablet (5 mg) by mouth daily 90 tablet 0     simvastatin (ZOCOR) 20 MG tablet Take 1 tablet (20 mg) by mouth every evening 90 tablet 3     vitamin A-vitamin C-vit E-min (OCUVITE) Tab tablet [VITAMIN A-VITAMIN C-VIT E-MIN (OCUVITE) TAB TABLET] Take by mouth daily.       vitamin B complex-folic acid (B COMPLEX 1, WITH FOLIC ACID,) 0.4 mg Tab [VITAMIN B COMPLEX-FOLIC ACID (B COMPLEX 1, WITH FOLIC ACID,) 0.4 MG TAB] Take by mouth.       albuterol (PROAIR HFA/PROVENTIL HFA/VENTOLIN HFA) 108 (90 Base) MCG/ACT inhaler Inhale 2 puffs into the lungs every 6 hours (Patient not taking: Reported on 1/27/2023) 18 g 1     No Known Allergies  Pneumonia Vaccine:For adults 65 years or older who do not have an immunocompromising condition, cerebrospinal fluid leak, or cochlear implant and want to receive PPSV23 ONLY: Administer 1 dose of PPSV23. Anyone who received any doses of PPSV23 before age 65 should receive 1 final dose of the vaccine at age 65 or older. Administer this last dose at least 5 years after the prior PPSV23 dose.  Mammogram Screening: Mammogram Screening: Recommended mammography every 1-2 years with patient discussion and risk factor consideration  Last 3 Pap and HPV Results:   PAP / HPV 2/6/2018   HPV See Scanned Report     Any new diagnosis of family breast, ovarian, or bowel cancer? No    FHS-7: No flowsheet data found.      Pertinent mammograms are reviewed under the imaging tab.    Review of Systems  As per HPI     OBJECTIVE:   /62 (BP Location: Right arm)   Pulse 88   Temp 98  F (36.7  C) (Oral)   Resp 20   Ht 1.676 m (5' 6\")   Wt 50.8 kg (112 lb)   SpO2 97%   BMI 18.08 kg/m   Estimated body mass index is 18.08 kg/m  as calculated from the following:    Height as of this encounter: 1.676 " "m (5' 6\").    Weight as of this encounter: 50.8 kg (112 lb).  Physical Exam  GENERAL: healthy, alert and no distress  NECK: no adenopathy, no asymmetry, masses, or scars and thyroid normal to palpation  RESP: lungs clear to auscultation - no rales, rhonchi or wheezes  CV: regular rate and rhythm, normal S1 S2, no S3 or S4, no murmur, click or rub, no peripheral edema and peripheral pulses strong  ABDOMEN: soft, nontender, no hepatosplenomegaly, no masses and bowel sounds normal  MS: no gross musculoskeletal defects noted, no edema  PSYCH: mentation appears normal, affect flat, patient appears tired.     ASSESSMENT / PLAN:       ICD-10-CM    1. Encounter for Medicare annual wellness exam  Z00.00 *MA Screening Digital Bilateral      2. Urinary frequency  R35.0 UA with Microscopic reflex to Culture - lab collect     Urine Microscopic      3. Mixed incontinence urge and stress (male)(female)  N39.46 oxybutynin ER (DITROPAN XL) 5 MG 24 hr tablet      4. Unintentional weight loss  R63.4 Vitamin D Deficiency     Vitamin B12     Colonoscopy Screening  Referral     CBC with platelets and differential     Lipid Profile (Chol, Trig, HDL, LDL calc)     TSH with free T4 reflex     Hemoglobin A1c     Comprehensive metabolic panel (BMP + Alb, Alk Phos, ALT, AST, Total. Bili, TP)      5. Tobacco abuse  Z72.0 General PFT Lab (Please always keep checked)     Pulmonary Function Test     CT Chest Lung Cancer Scrn Low Dose wo      6. Other fatigue  R53.83 Vitamin D Deficiency     Vitamin B12     General PFT Lab (Please always keep checked)     Pulmonary Function Test     Comprehensive metabolic panel (BMP + Alb, Alk Phos, ALT, AST, Total. Bili, TP)      7. Poor sleep  Z72.820 mirtazapine (REMERON) 7.5 MG tablet      8. Encounter for screening for lung cancer  Z12.2 CT Chest Lung Cancer Scrn Low Dose wo      9. Type 2 diabetes mellitus without complication, without long-term current use of insulin (H)  E11.9       10. Nicotine " "dependence, uncomplicated, unspecified nicotine product type  F17.200 SMOKING CESSATION COUNSELING 3-10 MIN     Prof fee: Shared Decision Making for Lung Cancer Screening      11. Personal history of tobacco use  Z87.891       12. Stress due to illness of family member  Z63.79       13. Nocturia  R35.1         Patient is a pleasant 67-year-old female with PMH of EtOH abuse in remission,Tobacco abuse, type 2 diabetes, vitamin D deficiency, osteopenia who presents today for annual wellness visit.    Home life: Lives with her boyfriend, Landon.  The last year has been stressful as Landon was diagnosed with leukemia and is currently undergoing chemotherapy.  Occupation: Works as a therapist for addiction  Sexually active: Unknown  Safety concerns: None identified  Diet/exercise: Poor  Family history of cancers: None identified  Eye exam/dental exam: Needs updated eye exam  Alcohol use: None, in remission for several years  Tobacco use/marijuana use/drug use: Smoking 0.5 -1PPD for 20+ years.  States she \"knows she needs to quit\".  She has tried to quit on her own but has been unsuccessful.  Quit cold turkey for about 2 days and then felt like she was very anxious/lightheaded.  Feels like she needs help but is open to medications.  We will initially trial her on the mirtazapine to see if that will help with some of her anxiety which can then inform smoking habits.  Ultimately, we may opt for Wellbutrin.  She declined MTM referral today.  35 minutes spent on tobacco cessation.  Mental health: Feels that she has underlying anxiety and has been dealing with a lot over the last year being the primary caretaker for her boyfriend who has leukemia  Vaccines: UTD, will check in with insurance about shingles  Blood work: Ordered      Menses/menopause: Postmenopausal without any postmenopausal bleeding  DEXA : Recently underwent DEXA 10/22- consistent with osteopenia.  Taking daily vitamin D and calcium.  Last Pap smear: Last Pap " "smear on file was 2018.  I am not able to access the records.  Per patient it was normal.  Is due for repeat Pap this year.  Would like to come back at a different time for it.  Mammogram: Last done 2020.  Repeat ordered today. In 2020, she had BI-RADS 0 on initial screening with diagnostic mammogram with no findings to suggest malignancy.  Colon cancer screening: Is due, ordered today  lung cancer screening: Ordered today after shared decision-making was done.  CT chest last year was negative.    IADLs/ADLs intact: Yes  Driving: Yes  Memory: Okay  Constipation, vision, hearing concerns: No issues  Falls over the last year: None    Unintentional weight loss:  Tobacco abuse  Fatigue  Patient has been steadily losing weight since 2019.  Then dropped quite a few pounds from 119 --> 111 when she was put on Trulicity.  Trulicity was discontinued and she was started to gain weight.  Today, however her she is back down to 112.  Patient was recently sick with COVID x12 days and recently tested negative for COVID on Wednesday morning.  She denies any loss of appetite.  Says she has been eating regularly.  However, she notes that she eats perhaps 1-2 meals a day.  Generally skips breakfast and tries to \"power through her meeting\".  Is also continuing to smoke and has been smoking for 20+ years  Patient also tries to \"eat healthy and sparingly\" given her diabetes.  She tries to strike a balance which she finds difficult on most days  Has also been feeling extremely fatigued.  Denies any fevers or chills.  Plan:  - Given longstanding tobacco abuse and continued unintentional weight loss, need to broaden our work-up.  - Stressed the importance of updating her preventative care.  Colonoscopy, lung cancer screening and mammogram ordered today.  - Patient would like to come back for Pap smear which is fine.  - We will getBaseline blood work as well.  - Patient has also been quite stressed/anxious with her partner's leukemia " diagnosis over the last year.  Her sleep is quite disordered and and wondering if some of that is contributing to her weight loss.  We will start her on mirtazapine.  She is amenable.  - We will follow-up with her electively soon in 1 to 2 months for ongoing surveillance.  In the interim, if she develops any systemic symptoms, she is to call me immediately.  - Stressed importance of tobacco cessation (see discussion above)  - Patient is a longtime smoker, wondering if underlying COPD or pulmonary process is contributing to some of the weight loss.  PFTs ordered.    Nocturia  Stress/urge incontinence  She wakes up several times at night to go urinate.  She will also have urinary incontinence if she coughs/laughs too hard.  Most likely has combination of stress/urge incontinence.  Was trialed on oxybutynin in the past which was very helpful for her.  We will retrial that today.  Patient is not amenable to going to PT today.  Continue this discussion at future visits.    Patient has been advised of split billing requirements and indicates understanding: Yes      COUNSELING:  Reviewed preventive health counseling, as reflected in patient instructions        She reports that she has been smoking. She has a 20.00 pack-year smoking history. She has never used smokeless tobacco.  Nicotine/Tobacco Cessation Plan:   See discussion above      Appropriate preventive services were discussed with this patient, including applicable screening as appropriate for cardiovascular disease, diabetes, osteopenia/osteoporosis, and glaucoma.  As appropriate for age/gender, discussed screening for colorectal cancer, prostate cancer, breast cancer, and cervical cancer. Checklist reviewing preventive services available has been given to the patient.    Reviewed patients plan of care and provided an AVS. The Basic Care Plan (routine screening as documented in Health Maintenance) for Taylor meets the Care Plan requirement. This Care Plan has  been established and reviewed with the Patient.      Soraya Cherry DO  [facility]    Identified Health Risks:    She is at risk for lack of exercise and has been provided with information to increase physical activity for the benefit of her well-being.  The patient was counseled and encouraged to consider modifying their diet and eating habits. She was provided with information on recommended healthy diet options.  The patient was provided with written information regarding signs of hearing loss.  Information on urinary incontinence and treatment options given to patient.  Lung Cancer Screening Shared Decision Making Visit     Taylor Aburto, a 67 year old female, is eligible for lung cancer screening    History   Smoking Status     Every Day     Packs/day: 0.50     Years: 40.00   Smokeless Tobacco     Never       I have discussed with patient the risks and benefits of screening for lung cancer with low-dose CT.     The risks include:    radiation exposure: one low dose chest CT has as much ionizing radiation as about 15 chest x-rays, or 6 months of background radiation living in [place]      false positives: most findings/nodules are NOT cancer, but some might still require additional diagnostic evaluation, including biopsy    over-diagnosis: some slow growing cancers that might never have been clinically significant will be detected and treated unnecessarily     The benefit of early detection of lung cancer is contingent upon adherence to annual screening or more frequent follow up if indicated.     Furthermore, to benefit from screening, Taylor must be willing and able to undergo diagnostic procedures, if indicated. Although no specific guide is available for determining severity of comorbidities, it is reasonable to withhold screening in patients who have greater mortality risk from other diseases.     We did discuss that the best way to prevent lung cancer is to not smoke.    Some  patients may value a numeric estimation of lung cancer risk when evaluating if lung cancer screening is right for them, here is one calculator:    ShouldIScreen

## 2023-01-29 DIAGNOSIS — D69.6 THROMBOCYTOPENIA (H): ICD-10-CM

## 2023-01-29 DIAGNOSIS — R63.4 UNINTENTIONAL WEIGHT LOSS: Primary | ICD-10-CM

## 2023-01-30 ENCOUNTER — MYC MEDICAL ADVICE (OUTPATIENT)
Dept: ONCOLOGY | Facility: CLINIC | Age: 68
End: 2023-01-30
Payer: COMMERCIAL

## 2023-01-30 DIAGNOSIS — D69.6 THROMBOCYTOPENIA (H): Primary | ICD-10-CM

## 2023-01-30 LAB — DEPRECATED CALCIDIOL+CALCIFEROL SERPL-MC: 80 UG/L (ref 20–75)

## 2023-02-01 ENCOUNTER — TELEPHONE (OUTPATIENT)
Dept: GASTROENTEROLOGY | Facility: CLINIC | Age: 68
End: 2023-02-01
Payer: COMMERCIAL

## 2023-02-01 ENCOUNTER — HOSPITAL ENCOUNTER (OUTPATIENT)
Facility: CLINIC | Age: 68
End: 2023-02-01
Attending: INTERNAL MEDICINE | Admitting: INTERNAL MEDICINE
Payer: COMMERCIAL

## 2023-02-01 ENCOUNTER — HOSPITAL ENCOUNTER (OUTPATIENT)
Facility: AMBULATORY SURGERY CENTER | Age: 68
End: 2023-02-01
Attending: INTERNAL MEDICINE
Payer: COMMERCIAL

## 2023-02-01 NOTE — PROGRESS NOTES
RECORDS STATUS - ALL OTHER DIAGNOSIS    downtrending thrombocytopenia with unintentional weight losss  RECORDS RECEIVED FROM: [facility]   DATE RECEIVED: 2/3/2023   NOTES STATUS DETAILS   OFFICE NOTE from referring provider Complete   referral Soraya Cherry DO, records [facility]   DISCHARGE SUMMARY from hospital     DISCHARGE REPORT from the ER     OPERATIVE REPORT     MEDICATION LIST Complete [facility]   CLINICAL TRIAL TREATMENTS TO DATE     LABS     PATHOLOGY REPORTS     ANYTHING RELATED TO DIAGNOSIS Complete Labs last updated on 1/31/2023   GENONOMIC TESTING     TYPE:     IMAGING (NEED IMAGES & REPORT)     CT SCANS     MRI     MAMMO     ULTRASOUND     PET

## 2023-02-01 NOTE — TELEPHONE ENCOUNTER
"    Screening Questions  BLUE  KIND OF PREP RED  LOCATION [review exclusion criteria] GREEN  SEDATION TYPE        Y Are you active on mychart?       Soraya Cherry, DO   Ordering/Referring Provider?        [facility] What type of coverage do you have?      YES. 1/25  Have you had a positive covid test in the last 14 days?     18 1. BMI  [BMI 40+ - review exclusion criteria]    Y  2. Are you able to give consent for your medical care? [IF NO,RN REVIEW]          N  3. Are you taking any prescription pain medications on a routine schedule   (ex narcotics: tramadol, oxycodone, roxicodone, oxycontin,  and percocet)?          3a. EXTENDED PREP What kind of prescription?     N 4. Do you have any chemical dependencies such as alcohol, street drugs, or methadone?        **If yes 3- 5 , please schedule with MAC sedation.**          IF YES TO ANY 6 - 10 - HOSPITAL SETTING ONLY.     N 6.   Do you need assistance transferring?     N 7.   Have you had a heart or lung transplant?    N 8.   Are you currently on dialysis?   N 9.   Do you use daily home oxygen?   N 10. Do you take nitroglycerin?   10a.  If yes, how often?     11. [FEMALES]  N Are you currently pregnant?    11a.  If yes, how many weeks? [ Greater than 12 weeks, OR NEEDED]    N 12. Do you have Pulmonary Hypertension? *NEED PAC APPT AT [facility]*     N 13. [review exclusion criteria]  Do you have any implantable devices in your body (pacemaker, defib, LVAD)?    N 14. In the past 6 months, have you had any heart related issues including cardiomyopathy or heart attack?     14a.  If yes, did it require cardiac stenting if so when?     N 15. Have you had a stroke or Transient ischemic attack (TIA - aka  mini stroke ) within 6 months?      N 16. Do you have mod to severe Obstructive Sleep Apnea?  [Hospital only]    N 17. Do you have SEVERE AND UNCONTROLLED asthma? *NEED PAC APPT AT [facility]*     N 18. Are you currently taking any blood thinners?     18a. If yes, inform patient to \"follow " "up w/ ordering provider for bridging instructions.\"    N 19. Do you take the medication Phentermine?    19a. If yes, \"Hold for 7 days before procedure.  Please consult your prescribing provider if you have questions about holding this medication.\"     N  20. Do you have chronic kidney disease?      Y  21. Do you have a diagnosis of diabetes?     N  22. On a regular basis do you go 3-5 days between bowel movements?      23. Preferred LOCAL Pharmacy for Pre Prescription    [ LIST ONLY ONE PHARMACY]       [facility] [id number] [place] [facility] [place], [place] - [address]      - CLOSING REMINDERS -    Informed patient they will need an adult    Cannot take any type of public or medical transportation alone    Conscious Sedation- Needs  for 6 hours after the procedure       MAC/General-Needs  for 24 hours after procedure    Pre-Procedure Covid test to be completed [[facility] PCR Testing Required]    Confirmed Nurse will call to complete assessment       - SCHEDULING DETAILS -  NO Hospital Setting Required? If yes, what is the exclusion?:    RUPERTO  Surgeon    3/31  Date of Procedure  Lower Endoscopy [Colonoscopy]  Type of Procedure Scheduled  [facility]-If you answer yes to questions #8, #20, #21Which Colonoscopy Prep was Sent?     MODERATE Sedation Type     N PAC / Pre-op Required                 "

## 2023-02-03 ENCOUNTER — LAB (OUTPATIENT)
Dept: LAB | Facility: CLINIC | Age: 68
End: 2023-02-03
Payer: COMMERCIAL

## 2023-02-03 ENCOUNTER — PRE VISIT (OUTPATIENT)
Dept: TRANSPLANT | Facility: CLINIC | Age: 68
End: 2023-02-03

## 2023-02-03 ENCOUNTER — ANCILLARY PROCEDURE (OUTPATIENT)
Dept: CT IMAGING | Facility: CLINIC | Age: 68
End: 2023-02-03
Attending: STUDENT IN AN ORGANIZED HEALTH CARE EDUCATION/TRAINING PROGRAM
Payer: COMMERCIAL

## 2023-02-03 ENCOUNTER — ANCILLARY PROCEDURE (OUTPATIENT)
Dept: MAMMOGRAPHY | Facility: CLINIC | Age: 68
End: 2023-02-03
Attending: STUDENT IN AN ORGANIZED HEALTH CARE EDUCATION/TRAINING PROGRAM
Payer: COMMERCIAL

## 2023-02-03 DIAGNOSIS — D69.6 THROMBOCYTOPENIA (H): Primary | ICD-10-CM

## 2023-02-03 DIAGNOSIS — Z12.31 VISIT FOR SCREENING MAMMOGRAM: ICD-10-CM

## 2023-02-03 DIAGNOSIS — D69.6 THROMBOCYTOPENIA (H): ICD-10-CM

## 2023-02-03 DIAGNOSIS — Z12.2 ENCOUNTER FOR SCREENING FOR LUNG CANCER: ICD-10-CM

## 2023-02-03 DIAGNOSIS — Z72.0 TOBACCO ABUSE: ICD-10-CM

## 2023-02-03 DIAGNOSIS — Z00.00 ENCOUNTER FOR MEDICARE ANNUAL WELLNESS EXAM: ICD-10-CM

## 2023-02-03 LAB
ALBUMIN SERPL BCG-MCNC: 4.6 G/DL (ref 3.5–5.2)
ALP SERPL-CCNC: 55 U/L (ref 35–104)
ALT SERPL W P-5'-P-CCNC: 17 U/L (ref 10–35)
ANION GAP SERPL CALCULATED.3IONS-SCNC: 10 MMOL/L (ref 7–15)
AST SERPL W P-5'-P-CCNC: 19 U/L (ref 10–35)
BASOPHILS # BLD AUTO: 0 10E3/UL (ref 0–0.2)
BASOPHILS NFR BLD AUTO: 0 %
BILIRUB SERPL-MCNC: 0.5 MG/DL
BUN SERPL-MCNC: 16 MG/DL (ref 8–23)
CALCIUM SERPL-MCNC: 10.3 MG/DL (ref 8.8–10.2)
CHLORIDE SERPL-SCNC: 103 MMOL/L (ref 98–107)
CREAT SERPL-MCNC: 0.9 MG/DL (ref 0.51–0.95)
CRP SERPL-MCNC: <3 MG/L
DEPRECATED HCO3 PLAS-SCNC: 27 MMOL/L (ref 22–29)
EOSINOPHIL # BLD AUTO: 0.1 10E3/UL (ref 0–0.7)
EOSINOPHIL NFR BLD AUTO: 1 %
ERYTHROCYTE [DISTWIDTH] IN BLOOD BY AUTOMATED COUNT: 12.6 % (ref 10–15)
FERRITIN SERPL-MCNC: 308 NG/ML (ref 11–328)
FOLATE SERPL-MCNC: 17.6 NG/ML (ref 4.6–34.8)
GFR SERPL CREATININE-BSD FRML MDRD: 70 ML/MIN/1.73M2
GLUCOSE SERPL-MCNC: 138 MG/DL (ref 70–99)
HBV CORE AB SERPL QL IA: NONREACTIVE
HBV SURFACE AG SERPL QL IA: NONREACTIVE
HCT VFR BLD AUTO: 40.9 % (ref 35–47)
HGB BLD-MCNC: 13.4 G/DL (ref 11.7–15.7)
HIV 1+2 AB+HIV1 P24 AG SERPL QL IA: NONREACTIVE
IMM GRANULOCYTES # BLD: 0 10E3/UL
IMM GRANULOCYTES NFR BLD: 0 %
INR PPP: 1.01 (ref 0.85–1.15)
LDH SERPL L TO P-CCNC: 145 U/L (ref 0–250)
LYMPHOCYTES # BLD AUTO: 1.3 10E3/UL (ref 0.8–5.3)
LYMPHOCYTES NFR BLD AUTO: 23 %
MCH RBC QN AUTO: 30.6 PG (ref 26.5–33)
MCHC RBC AUTO-ENTMCNC: 32.8 G/DL (ref 31.5–36.5)
MCV RBC AUTO: 93 FL (ref 78–100)
MONOCYTES # BLD AUTO: 0.4 10E3/UL (ref 0–1.3)
MONOCYTES NFR BLD AUTO: 7 %
NEUTROPHILS # BLD AUTO: 4.1 10E3/UL (ref 1.6–8.3)
NEUTROPHILS NFR BLD AUTO: 69 %
NRBC # BLD AUTO: 0 10E3/UL
NRBC BLD AUTO-RTO: 0 /100
PLATELET # BLD AUTO: 170 10E3/UL (ref 150–450)
POTASSIUM SERPL-SCNC: 4.5 MMOL/L (ref 3.4–5.3)
PROT SERPL-MCNC: 7.3 G/DL (ref 6.4–8.3)
RBC # BLD AUTO: 4.38 10E6/UL (ref 3.8–5.2)
RETICS # AUTO: 0.04 10E6/UL (ref 0.03–0.1)
RETICS/RBC NFR AUTO: 1 % (ref 0.5–2)
SODIUM SERPL-SCNC: 140 MMOL/L (ref 136–145)
URATE SERPL-MCNC: 6.6 MG/DL (ref 2.4–5.7)
WBC # BLD AUTO: 6 10E3/UL (ref 4–11)

## 2023-02-03 PROCEDURE — 87340 HEPATITIS B SURFACE AG IA: CPT | Performed by: INTERNAL MEDICINE

## 2023-02-03 PROCEDURE — 85025 COMPLETE CBC W/AUTO DIFF WBC: CPT | Performed by: PATHOLOGY

## 2023-02-03 PROCEDURE — 84550 ASSAY OF BLOOD/URIC ACID: CPT | Performed by: INTERNAL MEDICINE

## 2023-02-03 PROCEDURE — 86704 HEP B CORE ANTIBODY TOTAL: CPT | Performed by: INTERNAL MEDICINE

## 2023-02-03 PROCEDURE — 36415 COLL VENOUS BLD VENIPUNCTURE: CPT | Performed by: PATHOLOGY

## 2023-02-03 PROCEDURE — 86803 HEPATITIS C AB TEST: CPT | Performed by: INTERNAL MEDICINE

## 2023-02-03 PROCEDURE — 85610 PROTHROMBIN TIME: CPT | Performed by: PATHOLOGY

## 2023-02-03 PROCEDURE — 77067 SCR MAMMO BI INCL CAD: CPT | Performed by: RADIOLOGY

## 2023-02-03 PROCEDURE — 86140 C-REACTIVE PROTEIN: CPT | Performed by: PATHOLOGY

## 2023-02-03 PROCEDURE — 99207 BLOOD MORPHOLOGY PATHOLOGIST REVIEW: CPT | Performed by: PATHOLOGY

## 2023-02-03 PROCEDURE — 82525 ASSAY OF COPPER: CPT | Mod: 90 | Performed by: PATHOLOGY

## 2023-02-03 PROCEDURE — 71271 CT THORAX LUNG CANCER SCR C-: CPT | Mod: GC | Performed by: RADIOLOGY

## 2023-02-03 PROCEDURE — 82728 ASSAY OF FERRITIN: CPT | Performed by: INTERNAL MEDICINE

## 2023-02-03 PROCEDURE — 99000 SPECIMEN HANDLING OFFICE-LAB: CPT | Performed by: PATHOLOGY

## 2023-02-03 PROCEDURE — 80053 COMPREHEN METABOLIC PANEL: CPT | Performed by: PATHOLOGY

## 2023-02-03 PROCEDURE — 87522 HEPATITIS C REVRS TRNSCRPJ: CPT | Performed by: INTERNAL MEDICINE

## 2023-02-03 PROCEDURE — 82746 ASSAY OF FOLIC ACID SERUM: CPT | Performed by: INTERNAL MEDICINE

## 2023-02-03 PROCEDURE — 86038 ANTINUCLEAR ANTIBODIES: CPT | Performed by: INTERNAL MEDICINE

## 2023-02-03 PROCEDURE — 87389 HIV-1 AG W/HIV-1&-2 AB AG IA: CPT | Performed by: INTERNAL MEDICINE

## 2023-02-03 PROCEDURE — 83615 LACTATE (LD) (LDH) ENZYME: CPT | Performed by: INTERNAL MEDICINE

## 2023-02-03 PROCEDURE — 85045 AUTOMATED RETICULOCYTE COUNT: CPT | Performed by: PATHOLOGY

## 2023-02-03 PROCEDURE — 77063 BREAST TOMOSYNTHESIS BI: CPT | Performed by: RADIOLOGY

## 2023-02-05 LAB — COPPER SERPL-MCNC: 101.7 UG/DL

## 2023-02-06 LAB
ANA SER QL IF: NEGATIVE
HCV AB SERPL QL IA: REACTIVE
HCV RNA SERPL NAA+PROBE-ACNC: NOT DETECTED IU/ML
PATH REPORT.COMMENTS IMP SPEC: NORMAL
PATH REPORT.COMMENTS IMP SPEC: NORMAL
PATH REPORT.FINAL DX SPEC: NORMAL
PATH REPORT.MICROSCOPIC SPEC OTHER STN: NORMAL
PATH REPORT.MICROSCOPIC SPEC OTHER STN: NORMAL
PATH REPORT.RELEVANT HX SPEC: NORMAL

## 2023-02-13 NOTE — PROGRESS NOTES
RECORDS STATUS - ALL OTHER DIAGNOSIS      Unintentional weight loss   Thrombocytopenia (H)     RECORDS RECEIVED FROM: [facility]   DATE RECEIVED: 2/23/2023   NOTES STATUS DETAILS   OFFICE NOTE from referring provider Complete [facility]   Soraya Cherry DO   DISCHARGE SUMMARY from hospital     DISCHARGE REPORT from the ER     MEDICATION LIST Complete [facility]   CLINICAL TRIAL TREATMENTS TO DATE     LABS     PATHOLOGY REPORTS     ANYTHING RELATED TO DIAGNOSIS Complete Labs last updated on 2/3/2023    GENONOMIC TESTING     TYPE:     IMAGING (NEED IMAGES & REPORT)     CT SCANS Complete CT Chest 2/3/2023, 4/13/2021   MRI     MAMMO     ULTRASOUND     PET

## 2023-02-16 ENCOUNTER — TELEPHONE (OUTPATIENT)
Dept: FAMILY MEDICINE | Facility: CLINIC | Age: 68
End: 2023-02-16
Payer: COMMERCIAL

## 2023-02-16 NOTE — TELEPHONE ENCOUNTER
Lung CT showed some nodules but they are below 6mm threshold and appears to be benign in appearance. Recommendation is to repeat in 1 year.

## 2023-02-16 NOTE — TELEPHONE ENCOUNTER
Left message for patient to call back. When she calls back please relay results below.    Letter has been sent.

## 2023-02-23 ENCOUNTER — PRE VISIT (OUTPATIENT)
Dept: TRANSPLANT | Facility: CLINIC | Age: 68
End: 2023-02-23
Payer: COMMERCIAL

## 2023-02-23 ENCOUNTER — VIRTUAL VISIT (OUTPATIENT)
Dept: TRANSPLANT | Facility: CLINIC | Age: 68
End: 2023-02-23
Attending: STUDENT IN AN ORGANIZED HEALTH CARE EDUCATION/TRAINING PROGRAM
Payer: COMMERCIAL

## 2023-02-23 DIAGNOSIS — D69.6 THROMBOCYTOPENIA (H): ICD-10-CM

## 2023-02-23 DIAGNOSIS — R63.4 UNINTENTIONAL WEIGHT LOSS: ICD-10-CM

## 2023-02-23 PROCEDURE — 99205 OFFICE O/P NEW HI 60 MIN: CPT | Mod: VID | Performed by: INTERNAL MEDICINE

## 2023-02-23 NOTE — NURSING NOTE
Is the patient currently in the state of [place]? YES    Visit mode:VIDEO    If the visit is dropped, the patient can be reconnected by: VIDEO VISIT: Text to cell phone: [phone]    Will anyone else be joining the visit? NO      How would you like to obtain your AVS? MyChart    Are changes needed to the allergy or medication list? NO    Reason for visit: brenda SEE

## 2023-02-23 NOTE — LETTER
2023         RE: Taylor Aburto   [address]  [facility] [address]        Dear Colleague,    Thank you for referring your patient, Taylor Aburto, to the [facility] [place]. Please see a copy of my visit note below.    Video-Visit Details    Type of service:  Video Visit    Video Start Time (time video started): 3:48pm    Video End Time (time video stopped): 4:08pm    Originating Location (pt. Location): Home        Distant Location (provider location):  Off-site (snow emergency)    Mode of Communication:  Video Conference via [facility]  HEMATOLOGY CONSULTATION    Taylor Aubrto   : 1955   MRN: [id number]  Date of service: 2023     REASON FOR CONSULTATION:  We are asked by Dr. Soraya Cherry to evaluate Taylor Aburto for concern for thrombocytopenia.    HISTORY OF PRESENT ILLNESS:  Taylor Aburto is a 67 year old woman with a history of EtOH abuse in the past, arthrtitis, continued moderate tobacco smoker, vitamin D deficiency, diabetes type 2, osteopenia, chronic cough, who is referred for thrombocytopenia.    Per chart review and discussion with the patient, her earliest available CBCs for my review are in  and . Her CBC in  was normal, platelet count was 154. Her CBC in 7/15/20 was normal except for a platelet count of 134. She then had another CBC on 23 which showed plt count of 127. WBC, diff, and hgb were all normal. She has been having some weight loss and fatigue. Because of this she was referred to hematology. She had labs done for me on 2/3 (appt needed to be rescheduled from my end) which showed normal CBC, plts 170, Ca 10.3, no paraprotein gap, continued hep C reactivity but negative HCV viral load, negative HIV, Hep C, MARK, ferritin, folic acid, copper level, LDH, and INR. Uric acid mildly elevated at 6.6. CRP neg. A previous  B12 was normal. Mammogram negative 2/3. Screening chest CT with category 2 benign nodules.    She had COVID around the time of the blood test. She had just gotten over the COVID and had just negative on her COVID test. No blood clots or swelling of the legs. 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 She did have some swelling in her leg radiating up to her hip area, but that has resolved. She has some swelling above the hip in the waist area. It comes and goes and can be painful. It is always gone when it is time for her to go to the doctor. She is still trying to quit smoking.     She denies epistaxis, gum bleeding, hematemesis, hemoptysis, hematochezia, melena, hematuria. She is post menopausal. She has not had excessive bleeding with dental procedures or after any surgeries.    She denies fevers, chills. Night sweats. She does wake up with insomnia. She has been having some weight loss. She has been eating well and her hair has been falling out. No early satiety. 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 Does get headache daily, she thinks it is due to dental issues. She does have bruxism. She also had some neck fracture a few years ago. No chest pain, does have shortness of breath, and some cough with her smoking. She does have nausea when she is hungry or if some vitamins don't mix well with her medications. She has stopped taking the MVI and takes a different vitamin. No abdominal pain, constipation, diarrhea. Focal weakness, numbness. No peripheral neuropathy which is better with diabetes care.     She is independent at home and is able to take care of her ADLs.     REVIEW OF SYSTEMS  A 10 point review of systems was performed and was otherwise negative except as mentioned in the HPI.     PAST MEDICAL HISTORY  Past Medical History:   Diagnosis Date     Arthritis 6/27/2013     H/O ETOH abuse 2/6/2018     Moderate smoker (20 or less per day) 6/27/2013     Vitamin D  deficiency 2/6/2018     PAST SURGICAL HISTORY  No past surgical history on file.  SOCIAL HISTORY  Reviewed, and any changes made accordingly  Social History     Socioeconomic History     Marital status: Single     Spouse name: Not on file     Number of children: Not on file     Years of education: Not on file     Highest education level: Not on file   Occupational History     Not on file   Tobacco Use     Smoking status: Every Day     Packs/day: 0.50     Years: 40.00     Pack years: 20.00     Types: Cigarettes     Smokeless tobacco: Never   Substance and Sexual Activity     Alcohol use: Not Currently     Comment: Sober     Drug use: Not Currently     Sexual activity: Not on file   Other Topics Concern     Not on file   Social History Narrative    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    FAMILY HISTORY  Reviewed, and any changes made accordingly  No family history on file.    MEDICATIONS  Current Outpatient Medications   Medication     aspirin 81 MG EC tablet     blood glucose (ACCU-CHEK GUIDE) test strip     blood glucose (NO BRAND SPECIFIED) lancets standard     blood glucose meter (GLUCOMETER)     cholecalciferol, vitamin D3, 50 mcg (2,000 unit) Tab     docosahexaenoic acid-epa 120-180 mg cap     fluticasone (FLONASE) 50 MCG/ACT nasal spray     generic lancets (FINGERSTIX LANCETS)     lisinopril (ZESTRIL) 5 MG tablet     metFORMIN (GLUCOPHAGE XR) 500 MG 24 hr tablet     mirtazapine (REMERON) 7.5 MG tablet     oxybutynin ER (DITROPAN XL) 5 MG 24 hr tablet     simvastatin (ZOCOR) 20 MG tablet     vitamin A-vitamin C-vit E-min (OCUVITE) Tab tablet     vitamin B  complex-folic acid (B COMPLEX 1, WITH FOLIC ACID,) 0.4 mg Tab     Current Facility-Administered Medications   Medication     lidocaine 1% with EPINEPHrine 1:100,000 injection 0.25 mL     ALLERGIES  No Known Allergies    PHYSICAL EXAM  There were no vitals taken for this visit.   Wt Readings from Last 10 Encounters:   01/27/23 50.8 kg (112 lb)   10/07/22 52.2 kg (115 lb)   09/06/22 50.4 kg (111 lb 3.2 oz)   04/27/22 54.3 kg (119 lb 9.6 oz)   03/21/22 54.3 kg (119 lb 11.2 oz)   11/09/20 53.1 kg (117 lb)   07/20/20 51.6 kg (113 lb 11.2 oz)   04/02/19 60.3 kg (133 lb)     General: Well appearing.  HEENT: Sclerae anicteric.  Lungs: Breathing comfortably. No cough.  MSK: Grossly normal movement.  Neuro: Grossly non-focal.  Skin/access: Normal skin tone.  Psych: Alert and oriented. No distress    The rest of a comprehensive physical examination is deferred due to PHE (public health emergency) video visit restrictions    LABS  Recent Labs   Lab Test 02/03/23  1005   WBC 6.0   HGB 13.4      MCV 93   RDW 12.6     Recent Labs   Lab Test 02/03/23  1005      POTASSIUM 4.5   CHLORIDE 103   CO2 27   BUN 16.0   CR 0.90   CONSUELO 10.3*      URIC 6.6*     Recent Labs   Lab Test 02/03/23  1005 11/09/20  1732 07/15/20  1059   AST 19 16 14   ALT 17 19 18   ALKPHOS 55 69 100   ALBUMIN 4.6 4.5 4.0   PROTTOTAL 7.3 7.5 6.9   BILITOTAL 0.5 0.5 0.7      No results for input(s): IGA, IGM, IGE, ELPM, ELPINT, IEP, KAPPAFREELT, LAMBDAFREELT, KLR in the last 56727 hours.    Invalid input(s): LGG   Recent Labs   Lab Test 02/03/23  1005 01/27/23  1437 07/15/20  1059   RETICABSCT 0.044  --   --    RETP 1.0  --   --    IRON  --   --  95   XOCHITL 308  --   --    B12  --  666  --    FOLIC 17.6  --   --      --   --      No results for input(s): MORPH in the last 23671 hours.  Recent Labs   Lab Test 02/03/23  1005   HCVAB Reactive*   HBCAB Nonreactive     Recent Labs   Lab Test 02/03/23  1005   INR 1.01        IMAGING  As mentioned  above in HPI.    IMPRESSION  Taylor Aburto is a 67 year old woman with the above history, with the following issues:  1. Intermittent or transient thrombocytopenia  2. Abdominal/flank fullness on left side  3. Weight loss despite good appetite and good intake    The thrombocytopenia was just x 1 and around the time she was recovering from COVID. It may just have been from the recent infection. I don't know what caused her low platelets in 2020. WIth her weight loss and L flank fullness, will get an ultrasound to rule out splenomegaly, kidney masses, etc. She has already had TSH checked.     PLAN  - Reassurance given regarding platelet count  - Will check abd US, looking at liver, spleen, kidneys. If normal and still having issues would recommend getting a CT A/P through her PCP.   - Continue to monitor with PCP yearly.  - Depending on ultrasound result, will see if needs further follow up with hematology.    We had a long discussion with the patient about the diagnostic possibilities and necessary workup. All questions were answered to their satisfaction.    I spent 60 minutes on the date of service reviewing medical records from the referring provider, reviewing previous lab and imaging results as summarized above, obtaining and reviewing records from [facility] as summarized above, obtaining a history from the patient, performing a physical exam, counseling and educating the patient on the diagnosis and treatment, entering orders for tests, communication with the referring provider, evaluating a problem with exacerbation or progression, evaluating a problem with uncertain prognosis, coordinating care and documenting in the electronic medical record.    Thank you for allowing me to participate in the care of this patient. Please do not hesitate to contact me if there are any concerns or questions.     Brant Shankar MD   of Medicine  Classical Hematology and Blood and Marrow  Transplantation  Division of Hematology, Oncology, and Transplantation  [facility] [place][note 2] [phone]

## 2023-02-23 NOTE — PROGRESS NOTES
Video-Visit Details    Type of service:  Video Visit    Video Start Time (time video started): 3:48pm    Video End Time (time video stopped): 4:08pm    Originating Location (pt. Location): Home        Distant Location (provider location):  Off-site (snow emergency)    Mode of Communication:  Video Conference via [facility]  HEMATOLOGY CONSULTATION    Taylor Aburto   : 1955   MRN: [id number]  Date of service: 2023     REASON FOR CONSULTATION:  We are asked by Dr. Soraya Cherry to evaluate Taylor Aburto for concern for thrombocytopenia.    HISTORY OF PRESENT ILLNESS:  Taylor Aburto is a 67 year old woman with a history of EtOH abuse in the past, arthrtitis, continued moderate tobacco smoker, vitamin D deficiency, diabetes type 2, osteopenia, chronic cough, who is referred for thrombocytopenia.    Per chart review and discussion with the patient, her earliest available CBCs for my review are in  and . Her CBC in  was normal, platelet count was 154. Her CBC in 7/15/20 was normal except for a platelet count of 134. She then had another CBC on 23 which showed plt count of 127. WBC, diff, and hgb were all normal. She has been having some weight loss and fatigue. Because of this she was referred to hematology. She had labs done for me on 2/3 (appt needed to be rescheduled from my end) which showed normal CBC, plts 170, Ca 10.3, no paraprotein gap, continued hep C reactivity but negative HCV viral load, negative HIV, Hep C, MARK, ferritin, folic acid, copper level, LDH, and INR. Uric acid mildly elevated at 6.6. CRP neg. A previous B12 was normal. Mammogram negative 2/3. Screening chest CT with category 2 benign nodules.    She had COVID around the time of the blood test. She had just gotten over the COVID and had just negative on her COVID test. No blood clots or swelling of the legs. 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 She did have some swelling in her leg radiating up to her hip area, but that has resolved. She has some swelling above the hip in the waist area. It comes and goes and can be painful. It is always gone when it is time for her to go to the doctor. She is still trying to quit smoking.     She denies epistaxis, gum bleeding, hematemesis, hemoptysis, hematochezia, melena, hematuria. She is post menopausal. She has not had excessive bleeding with dental procedures or after any surgeries.    She denies fevers, chills. Night sweats. She does wake up with insomnia. She has been having some weight loss. She has been eating well and her hair has been falling out. No early satiety. 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 Does get headache daily, she thinks it is due to dental issues. She does have bruxism. She also had some neck fracture a few years ago. No chest pain, does have shortness of breath, and some cough with her smoking. She does have nausea when she is hungry or if some vitamins don't mix well with her medications. She has stopped taking the MVI and takes a different vitamin. No abdominal pain, constipation, diarrhea. Focal weakness, numbness. No peripheral neuropathy which is better with diabetes care.     She is independent at home and is able to take care of her ADLs.     REVIEW OF SYSTEMS  A 10 point review of systems was performed and was otherwise negative except as mentioned in the HPI.     PAST MEDICAL HISTORY  Past Medical History:   Diagnosis Date     Arthritis 6/27/2013     H/O ETOH abuse 2/6/2018     Moderate smoker (20 or less per day) 6/27/2013     Vitamin D deficiency 2/6/2018     PAST SURGICAL HISTORY  No past surgical history on file.  SOCIAL HISTORY  Reviewed, and any changes made accordingly  Social History     Socioeconomic History     Marital status: Single     Spouse name: Not on file     Number of children: Not on file     Years of education:  Not on file     Highest education level: Not on file   Occupational History     Not on file   Tobacco Use     Smoking status: Every Day     Packs/day: 0.50     Years: 40.00     Pack years: 20.00     Types: Cigarettes     Smokeless tobacco: Never   Substance and Sexual Activity     Alcohol use: Not Currently     Comment: Sober     Drug use: Not Currently     Sexual activity: Not on file   Other Topics Concern     Not on file   Social History Narrative    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    FAMILY HISTORY  Reviewed, and any changes made accordingly  No family history on file.    MEDICATIONS  Current Outpatient Medications   Medication     aspirin 81 MG EC tablet     blood glucose (ACCU-CHEK GUIDE) test strip     blood glucose (NO BRAND SPECIFIED) lancets standard     blood glucose meter (GLUCOMETER)     cholecalciferol, vitamin D3, 50 mcg (2,000 unit) Tab     docosahexaenoic acid-epa 120-180 mg cap     fluticasone (FLONASE) 50 MCG/ACT nasal spray     generic lancets (FINGERSTIX LANCETS)     lisinopril (ZESTRIL) 5 MG tablet     metFORMIN (GLUCOPHAGE XR) 500 MG 24 hr tablet     mirtazapine (REMERON) 7.5 MG tablet     oxybutynin ER (DITROPAN XL) 5 MG 24 hr tablet     simvastatin (ZOCOR) 20 MG tablet     vitamin A-vitamin C-vit E-min (OCUVITE) Tab tablet     vitamin B complex-folic acid (B COMPLEX 1, WITH FOLIC ACID,) 0.4 mg Tab     Current Facility-Administered Medications   Medication     lidocaine 1% with EPINEPHrine 1:100,000 injection 0.25 mL     ALLERGIES  No Known Allergies    PHYSICAL EXAM  There were no vitals taken for this visit.   Wt Readings from Last 10  Encounters:   01/27/23 50.8 kg (112 lb)   10/07/22 52.2 kg (115 lb)   09/06/22 50.4 kg (111 lb 3.2 oz)   04/27/22 54.3 kg (119 lb 9.6 oz)   03/21/22 54.3 kg (119 lb 11.2 oz)   11/09/20 53.1 kg (117 lb)   07/20/20 51.6 kg (113 lb 11.2 oz)   04/02/19 60.3 kg (133 lb)     General: Well appearing.  HEENT: Sclerae anicteric.  Lungs: Breathing comfortably. No cough.  MSK: Grossly normal movement.  Neuro: Grossly non-focal.  Skin/access: Normal skin tone.  Psych: Alert and oriented. No distress    The rest of a comprehensive physical examination is deferred due to PHE (public health emergency) video visit restrictions    LABS  Recent Labs   Lab Test 02/03/23  1005   WBC 6.0   HGB 13.4      MCV 93   RDW 12.6     Recent Labs   Lab Test 02/03/23  1005      POTASSIUM 4.5   CHLORIDE 103   CO2 27   BUN 16.0   CR 0.90   CONSUELO 10.3*      URIC 6.6*     Recent Labs   Lab Test 02/03/23  1005 11/09/20  1732 07/15/20  1059   AST 19 16 14   ALT 17 19 18   ALKPHOS 55 69 100   ALBUMIN 4.6 4.5 4.0   PROTTOTAL 7.3 7.5 6.9   BILITOTAL 0.5 0.5 0.7      No results for input(s): IGA, IGM, IGE, ELPM, ELPINT, IEP, KAPPAFREELT, LAMBDAFREELT, KLR in the last 90955 hours.    Invalid input(s): LGG   Recent Labs   Lab Test 02/03/23  1005 01/27/23  1437 07/15/20  1059   RETICABSCT 0.044  --   --    RETP 1.0  --   --    IRON  --   --  95   XOCHITL 308  --   --    B12  --  666  --    FOLIC 17.6  --   --      --   --      No results for input(s): MORPH in the last 35846 hours.  Recent Labs   Lab Test 02/03/23  1005   HCVAB Reactive*   HBCAB Nonreactive     Recent Labs   Lab Test 02/03/23  1005   INR 1.01        IMAGING  As mentioned above in HPI.    IMPRESSION  Taylor Aburto is a 67 year old woman with the above history, with the following issues:  1. Intermittent or transient thrombocytopenia  2. Abdominal/flank fullness on left side  3. Weight loss despite good appetite and good intake    The thrombocytopenia was just x 1 and  around the time she was recovering from COVID. It may just have been from the recent infection. I don't know what caused her low platelets in 2020. WIth her weight loss and L flank fullness, will get an ultrasound to rule out splenomegaly, kidney masses, etc. She has already had TSH checked.     PLAN  - Reassurance given regarding platelet count  - Will check abd US, looking at liver, spleen, kidneys. If normal and still having issues would recommend getting a CT A/P through her PCP.   - Continue to monitor with PCP yearly.  - Depending on ultrasound result, will see if needs further follow up with hematology.    We had a long discussion with the patient about the diagnostic possibilities and necessary workup. All questions were answered to their satisfaction.    I spent 60 minutes on the date of service reviewing medical records from the referring provider, reviewing previous lab and imaging results as summarized above, obtaining and reviewing records from [facility] as summarized above, obtaining a history from the patient, performing a physical exam, counseling and educating the patient on the diagnosis and treatment, entering orders for tests, communication with the referring provider, evaluating a problem with exacerbation or progression, evaluating a problem with uncertain prognosis, coordinating care and documenting in the electronic medical record.    Thank you for allowing me to participate in the care of this patient. Please do not hesitate to contact me if there are any concerns or questions.     Brant Shankar MD   of Medicine  Classical Hematology and Blood and Marrow Transplantation  Division of Hematology, Oncology, and Transplantation  [facility] [phone]

## 2023-03-03 ENCOUNTER — HOSPITAL ENCOUNTER (OUTPATIENT)
Dept: ULTRASOUND IMAGING | Facility: HOSPITAL | Age: 68
Discharge: HOME | End: 2023-03-03
Attending: INTERNAL MEDICINE | Admitting: INTERNAL MEDICINE
Payer: COMMERCIAL

## 2023-03-03 DIAGNOSIS — D69.6 THROMBOCYTOPENIA (H): ICD-10-CM

## 2023-03-03 DIAGNOSIS — R63.4 UNINTENTIONAL WEIGHT LOSS: ICD-10-CM

## 2023-03-03 PROCEDURE — 76700 US EXAM ABDOM COMPLETE: CPT

## 2023-03-08 NOTE — RESULT ENCOUNTER NOTE
There is no enlarged spleen here but the liver does look like there might have been some damage from the past. The mild prominence of the pancreatic and extrahepatic common bile ducts is of unclear significance. Will defer to PMD if worth pursuing with an MRI/MRCP liver.

## 2023-03-10 ENCOUNTER — TELEPHONE (OUTPATIENT)
Dept: FAMILY MEDICINE | Facility: CLINIC | Age: 68
End: 2023-03-10
Payer: COMMERCIAL

## 2023-03-10 NOTE — TELEPHONE ENCOUNTER
Left VM for patient to call back     Nursing: if we don't hear back from her, please call her back next week     Received communication from Heme/onc that work up was negative but she did have mild dilation of a pancreatic duct and were wondering if further work up with MRCP would be worth it. I wanted to see how patient felt about going to see GI regarding this consideration. If she doesn't want to see the specialist in person just yet, we can send an E-visit to see if they can weight in ( they may ultimately say she needs to be seeing in person). E-visit, you can incur an out of pocket cost of max of 140$.  Let me know your preference.

## 2023-03-15 ENCOUNTER — TELEPHONE (OUTPATIENT)
Dept: GASTROENTEROLOGY | Facility: CLINIC | Age: 68
End: 2023-03-15
Payer: COMMERCIAL

## 2023-03-15 DIAGNOSIS — R63.4 UNINTENTIONAL WEIGHT LOSS: Primary | ICD-10-CM

## 2023-03-15 DIAGNOSIS — Z12.11 ENCOUNTER FOR SCREENING COLONOSCOPY: ICD-10-CM

## 2023-03-15 RX ORDER — BISACODYL 5 MG/1
TABLET, DELAYED RELEASE ORAL
Qty: 4 TABLET | Refills: 0 | Status: SHIPPED | OUTPATIENT
Start: 2023-03-15 | End: 2023-04-07

## 2023-03-15 RX ORDER — ONDANSETRON 4 MG/1
TABLET, FILM COATED ORAL
Qty: 3 TABLET | Refills: 0 | Status: SHIPPED | OUTPATIENT
Start: 2023-03-15 | End: 2023-06-30

## 2023-03-15 NOTE — TELEPHONE ENCOUNTER
Pt returned call.    Pre assessment questions completed for upcoming Colonoscopy  procedure scheduled on 3/31/23    COVID policy reviewed.     Reviewed procedural arrival time 0745, procedure time 0845 and facility location [facility]; [address], [place] [address]    Designated  policy reviewed. Instructed to have someone stay 6 hours post procedure.     Diabetic? Yes - Patient to hold oral diabetic medications day of procedure    Pt reports hx nausea with Golytely prep. Pt asked about Miralax/Gatorade prep. Discussed magnesium citrate recall - pt understood and willing to proceed with Golytely prep.    Zofran script sent to pharmacy d/t hx nausea with bowel prep. Informed pt they can take 1 tablet Zofran 30 minutes prior to starting bowel prep to prevent nausea and then may take 1 tablet every 6 hours as needed for nausea.    Discussed option to space out glasses of Golytely from every 15 minutes to every 20-25 minutes if needed for nausea.    Pt declined to review procedure prep instructions/timing over the phone. Pt said they were working and wanted to review [employer] message. Instructed pt to review colonoscopy prep instructions at least 7 days prior to procedure due to dietary modifications and necessary supplement holding. NPO instructions given.     Pt verbalized understanding and was instructed to call if any questions or concerns arise.       Shanice Montoya RN  Endoscopy Procedure Pre Assessment RN

## 2023-03-15 NOTE — TELEPHONE ENCOUNTER
Attempted to contact patient regarding upcoming Colonoscopy  procedure on 3/31/23 for pre assessment questions. No answer.     Left message to return call to [phone] #[phone]    Discuss Covid policy and designated  policy.    Pre op exam? N/A    Arrival time: 0745. Procedure time: 0845    Facility location: [facility]; [address], [address], [place] [address]    Sedation type: Conscious sedation     Anticoagulants: No    Electronic implanted devices? No    Diabetic? Yes - Patient to hold oral diabetic medications day of procedure- Metformin    Indication for procedure: screening colonoscopy    Bowel prep recommendation: Standard Golytely      Prep instructions sent via [employer]. Bowel prep script sent to     [facility] [address] [facility] [employer] - [place], [place] - [address]      Myranda Flood RN  Endoscopy Procedure Pre Assessment RN

## 2023-03-27 ENCOUNTER — TELEPHONE (OUTPATIENT)
Dept: GASTROENTEROLOGY | Facility: CLINIC | Age: 68
End: 2023-03-27
Payer: COMMERCIAL

## 2023-03-27 NOTE — TELEPHONE ENCOUNTER
Caller: Taylor  Reason for Reschedule/Cancellation (please be detailed, any staff messages or encounters to note?): Has another [facility] appt and dentist appt same day.       Prior to reschedule please review:    Ordering Provider:CHRISTOPHER REECE    Sedation per order:Moderate    Does patient have any ASC Exclusions, please identify?: No      Notes on Cancelled Procedure:    Procedure:Lower Endoscopy [Colonoscopy]     Date: 3/31/2023    Location:[facility]; [address] [place] [address]    Surgeon: Doroteo        Rescheduled: Yes    Procedure: Lower Endoscopy [Colonoscopy]     Date: 5/5/2023    Location:[facility]; [address] [place] [address]    Surgeon: Ramu    Sedation Level Scheduled  Moderate,  Reason for Sedation Level Order    Prep/Instructions updated and sent: Ramesh

## 2023-03-31 ENCOUNTER — OFFICE VISIT (OUTPATIENT)
Dept: FAMILY MEDICINE | Facility: CLINIC | Age: 68
End: 2023-03-31
Payer: COMMERCIAL

## 2023-03-31 VITALS
TEMPERATURE: 98.2 F | WEIGHT: 114.2 LBS | OXYGEN SATURATION: 98 % | RESPIRATION RATE: 24 BRPM | BODY MASS INDEX: 18.43 KG/M2 | SYSTOLIC BLOOD PRESSURE: 148 MMHG | DIASTOLIC BLOOD PRESSURE: 80 MMHG | HEART RATE: 87 BPM

## 2023-03-31 DIAGNOSIS — E11.9 TYPE 2 DIABETES MELLITUS WITHOUT COMPLICATION, WITHOUT LONG-TERM CURRENT USE OF INSULIN (H): ICD-10-CM

## 2023-03-31 DIAGNOSIS — N39.46 MIXED INCONTINENCE URGE AND STRESS (MALE)(FEMALE): ICD-10-CM

## 2023-03-31 DIAGNOSIS — K86.89 PANCREATIC DUCT DILATED: ICD-10-CM

## 2023-03-31 DIAGNOSIS — R22.1 LUMP IN NECK: ICD-10-CM

## 2023-03-31 DIAGNOSIS — Z72.0 TOBACCO ABUSE: Primary | ICD-10-CM

## 2023-03-31 DIAGNOSIS — F17.200 NICOTINE DEPENDENCE, UNCOMPLICATED, UNSPECIFIED NICOTINE PRODUCT TYPE: ICD-10-CM

## 2023-03-31 DIAGNOSIS — R63.4 WEIGHT LOSS: ICD-10-CM

## 2023-03-31 DIAGNOSIS — J43.9 PULMONARY EMPHYSEMA, UNSPECIFIED EMPHYSEMA TYPE (H): ICD-10-CM

## 2023-03-31 DIAGNOSIS — E83.52 HYPERCALCEMIA: ICD-10-CM

## 2023-03-31 LAB
CALCIUM SERPL-MCNC: 10.3 MG/DL (ref 8.8–10.2)
HBA1C MFR BLD: 6.5 % (ref 0–5.6)

## 2023-03-31 PROCEDURE — 99406 BEHAV CHNG SMOKING 3-10 MIN: CPT | Performed by: STUDENT IN AN ORGANIZED HEALTH CARE EDUCATION/TRAINING PROGRAM

## 2023-03-31 PROCEDURE — 82310 ASSAY OF CALCIUM: CPT | Performed by: STUDENT IN AN ORGANIZED HEALTH CARE EDUCATION/TRAINING PROGRAM

## 2023-03-31 PROCEDURE — 36415 COLL VENOUS BLD VENIPUNCTURE: CPT | Performed by: STUDENT IN AN ORGANIZED HEALTH CARE EDUCATION/TRAINING PROGRAM

## 2023-03-31 PROCEDURE — 99215 OFFICE O/P EST HI 40 MIN: CPT | Mod: 25 | Performed by: STUDENT IN AN ORGANIZED HEALTH CARE EDUCATION/TRAINING PROGRAM

## 2023-03-31 PROCEDURE — 83036 HEMOGLOBIN GLYCOSYLATED A1C: CPT | Performed by: STUDENT IN AN ORGANIZED HEALTH CARE EDUCATION/TRAINING PROGRAM

## 2023-03-31 RX ORDER — OXYBUTYNIN CHLORIDE 10 MG/1
10 TABLET, EXTENDED RELEASE ORAL DAILY
Qty: 90 TABLET | Refills: 0
Start: 2023-03-31 | End: 2023-04-24

## 2023-03-31 ASSESSMENT — PAIN SCALES - GENERAL: PAINLEVEL: NO PAIN (0)

## 2023-03-31 NOTE — PROGRESS NOTES
Assessment & Plan   Problem List Items Addressed This Visit        Endocrine    Diabetes mellitus, type 2 (H)    Relevant Orders    Hemoglobin A1c (Completed)   Other Visit Diagnoses     Tobacco abuse    -  Primary    Relevant Orders    Adult GI  Referral - Consult Only    Med Therapy Management Referral    Mixed incontinence urge and stress (male)(female)        Relevant Medications    oxybutynin ER (DITROPAN XL) 10 MG 24 hr tablet    Pancreatic duct dilated        Relevant Orders    General PFT Lab (Please always keep checked)    Adult GI  Referral - Consult Only    Weight loss        Relevant Orders    Adult GI  Referral - Consult Only    Hypercalcemia        Relevant Orders    Calcium (Completed)         Unintentional weight loss  Have not seen patient for unintentional weight loss over the last few office visits with no obvious etiology identified as of yet  Has been steadily losing weight since 2019.  Initially, she dropped from 119 pounds to 111 pounds while on Trulicity in late 2022.    Trulicity was discontinued and she started to gain weight.  Then got sick with COVID in January and since then has been losing weight  Work-up thus far including lung CT, mammogram and blood work has been negative.  Patient did have some mild thrombocytopenia identified on CBC for which she was sent to hematology/oncology.  They felt that number cytopenia could have been 2/2 COVID infection.  No further intervention was recommended  They did get a liver ultrasound which showed incidental dilation of the pancreatic duct and the common bile duct.  Today, patient says she feels overall well  She found out that her partner's leukemia is now in remission.  He still battling pancreatic cancer but overall, this is good news  She has been working on eating more consistently and eating bigger meals.  Today, her weight is stable which is reassuring  Unfortunately, she is continuing to  "smoke  Plan:  -Reviewed work-up thus far with patient for her weight loss.  It may be that most recent dip in her weight  was ultimately in the setting of COVID.  Did review incidental findingthe pancreatic duct dilation.  Patient is not having any GI symptoms but is continuing to smoke.  - Discussed with patient that we could do a GI E consult or have her go see GI in person to make sure no further work-up is needed.  - After some discussion, patient opted to go see GI in person  - In the interim, we will hold on any further work-up and continue to monitor her weight  - We will follow-up in 3 months  -Patient was trialed on mirtazapine last time but she discontinued this because it made her too groggy.      Nocturia  Noted by patient last time  Was historically on oxybutynin which did help  Was started on oxybutynin 5 mg.  Today, she notes that it has not helped \"that much\"  She has no hematuria or UTI symptoms  Will increase oxybutynin to 10 mg.  If no improvement the next time I see her, we will collect UA.      Tobacco abuse:  Has been smoking for 20+ pack years  Last time patient was here, she was smoking about half PPD.  Since then, she has tried to cut down on her own.  She is now down to \"less than half PPD\" but she is unable to quit on her own. Knows she needs to \"quit\" but is frustrated by her lack of progress.  Acknowledges that there is a level of cognitive dissonance and that she may need more support/resources to help her quit this time.  Did quit cold turkey in the past\".  Revisited possibility of MTM referral for tobacco cessation.  Patient is amenable today.  We will not start any medication for tobacco cessation.  Will defer that to MTM.  We will continue to discuss this in the future.  ~ 10 mins spent on tobacco cessation       Type 2 diabetes:  Patient notes that her numbers have been \"doing really well\"  A.m. fasting sugars have been 110s to 120s  Has been working on eating healthy and not " "missing any meals  Doing well on her medications  Plan:  - We will repeat A1c    Reactive lymph node?  Patient has chronic throat irritation.  Presumably from chronic tobacco use.  About 2 weeks ago, she woke up with significant right-sided irritation.  She felt \"a lump\" on the right side of her neck which has her concerned.  Patient has had no progressive dysphagia or difficulty swallowing with the \"bump\".  On exam patient does have singular lymph node on the right anterior cervical chain but seems to be more prominent than the others.  Given history of ongoing tobacco abuse and recent weight loss, will get ultrasound for further clarification of the structure.  May just be a reactive lymph node    Emphysema  Tobacco abuse  Most recent lung cancer screening was negative for lung cancer purposes.  However, I did identify emphysema.  Patient does not feel short of breath.  Reviewed this finding with her.  Patient would like to know if she has underlying COPD.  We will get PFTs.  Did review with the patient that even if she has a diagnosis of COPD, she is asymptomatic, she may not need any medications to manage it.  The best thing she could do to help prevent progression of her lung disease is to discontinue smoking.      Patient is due for Pap smear.  Ran out of time today to address this.  We will discuss this at the next visit.      40 minutes spent by me on the date of the encounter doing chart review, history and exam, documentation and further activities per the note      Soraya Cherry DO  [facility]    Eb Vazquez is a 67 year old, presenting for the following health issues:  Follow Up (Diabetes/) and Throat Problem (Lump three weeks ago//)    Additional Questions 3/31/2023   Roomed by HOLGER Lala         BP Readings from Last 2 Encounters:   03/31/23 (!) 148/80   01/27/23 138/62     Hemoglobin A1C (%)   Date Value   03/31/2023 6.5 (H)   01/27/2023 6.8 (H)     LDL " Cholesterol Calculated (mg/dL)   Date Value   01/27/2023 64   03/21/2022 71         Review of Systems   As per HPI       Objective    BP (!) 148/80 (BP Location: Right arm, Patient Position: Sitting, Cuff Size: Adult Small)   Pulse 87   Temp 98.2  F (36.8  C) (Oral)   Resp 24   Wt 51.8 kg (114 lb 3.2 oz)   SpO2 98%   BMI 18.43 kg/m    Body mass index is 18.43 kg/m .  Physical Exam   GENERAL: healthy, alert and no distress, thin   NECK: no adenopathy, no asymmetry, masses, or scars and thyroid normal to palpation  (+) area of localized prominence along the anterior cervical lymph node chain with mild TTP   No overlying cellulitis or fluctuance  RESP: lungs clear to auscultation - no rales, rhonchi or wheezes  CV: regular rate and rhythm,no murmur, click or rub, no peripheral edema and peripheral pulses strong  ABDOMEN: soft, nontender, no hepatosplenomegaly, no masses and bowel sounds normal  PSYCH: mentation appears normal, affect normal

## 2023-04-01 NOTE — PATIENT INSTRUCTIONS
"    How to Quit Smoking  Smoking is a hard habit to break. About half of all people who've ever smoked have been able to quit. Most people who still smoke want to quit. Here are some of the best ways to stop smoking.     Keep in mind the health benefits of quitting  The health benefits of quitting start right away. They keep improving the longer you go without smoking. Knowing this can help inspire you to stay on track. These benefits occur at any age. If you're 17 or 70, quitting is a good choice. Some of the health benefits after your last cigarette include:     After 20 minutes:  Your blood pressure and pulse return to normal.    After 8 hours: Your oxygen levels return to normal.    After 2 days: Your ability to smell and taste start to improve as damaged nerves regrow.    After 2 to 3 weeks: Your circulation and lung function improve.    After 1 to 9 months: Your coughing, congestion, and shortness of breath decrease. Your tiredness decreases.    After 1 year: Your risk of heart attack decreases by 50%.    After 5 years: Your risk of lung cancer decreases by 50%. Your risk of stroke becomes the same as a nonsmoker s.  What about going cold turkey?  You may have heard about quitting \"cold turkey.\" This means stopping all at once. Going cold turkey is an option. But it's not the most successful way to quit smoking. Also, trying to cut back slowly often doesn't work as well. This may be because it continues the habit of smoking. You may also inhale more smoke while smoking fewer cigarettes. This leads to the same amount of nicotine in your body.   But quitting cold turkey or cutting back slowly aren't your only choices. Using tobacco cessation medicines with behavioral counseling may be a better option to help you succeed. Talk with your provider about your options for support while you quit smoking.   Get support  Support programs can be a big help, especially for heavy smokers. These groups offer information, " ways to change behavior, and peer support. Ask your provider for some resources. Here are some other ways to find support:     Smokefree.gov at www.smokefree.gov  800-QUIT-NOW (865-743-1370)    American Lung Association at www.lung.org/quit-smoking  242.760.2193    American Cancer Society  at www.cancer.org/quitsmoking  118.968.5174  Support at home is important too. Family and friends can offer praise and reassurance. Ask your friends who smoke to support your decision. Try to stay away from situations that trigger the desire to smoke. This may include smoking with your morning coffee. Or smoking after a meal. Create new routines to help decrease your cravings.   If the smoker in your life finds it hard to quit, encourage them to keep trying. Remind them of all of the benefits to themselves and people they love.   Try over-the-counter nicotine replacements   Nicotine replacement therapy may make it easier to quit. You can buy some aids without a prescription. These include a nicotine patch, gum, and lozenges. But it's best to use these under the care of your healthcare provider. The skin patch gives a steady supply of nicotine. Nicotine gum and lozenges give short-time doses of low levels of nicotine. Both methods reduce the craving for cigarettes. If you have upset stomach (nausea), vomiting, dizziness, weakness, or a fast heartbeat, stop using these products and see your provider.   Ask about prescription medicine  After reviewing your smoking patterns and past attempts to quit, your provider may offer a prescription medicine. These include bupropion, varenicline, a nicotine inhaler, or nasal spray. Each has advantages and side effects. Your provider can go over these with you.   Keep trying  Most smokers try to quit many times before they succeed. It s important not to give up.   [employer] last reviewed this educational content on 12/1/2019 2000-2022 [employer]. All rights reserved. This  information is not intended as a substitute for professional medical care. Always follow your healthcare professional's instructions.

## 2023-04-03 ENCOUNTER — TELEPHONE (OUTPATIENT)
Dept: GASTROENTEROLOGY | Facility: CLINIC | Age: 68
End: 2023-04-03
Payer: COMMERCIAL

## 2023-04-03 NOTE — TELEPHONE ENCOUNTER
Advanced [facility]     Referring provider:  Soraya Cherry DO    Referred to: [facility]     Provider Requested:   NA     Referral Received: 04/03/23     Records received: Epic     Images received: PACS    Evaluation for: dilated pancreatic duct, weight loss     Clinical History (per RN review):      67 year old woman with a history of EtOH abuse in the past, arthrtitis, continued moderate tobacco smoker, vitamin D deficiency, diabetes type 2, osteopenia, chronic cough,  thrombocytopenia.       Abdominal Ultra sound 3-3-23  INDICATION: Abdominal pain and swelling. Liver, spleen, kidneys, feels swelling in the L flank.  PANCREAS: The visualized parenchyma is normal. Prominence of the main pancreatic duct measuring up to 4 mm in diameter.                                                                   IMPRESSION:  1.  Sonographic features suggestive of hepatic steatosis/fibrosis. No suspicious hepatic mass.  2.  Mild prominence of the main pancreatic and extrahepatic common bile ducts of unclear etiology or significance.  3.  Simple subcentimeter cyst in the left kidney requiring no further imaging follow-up.    MD review date: 04/03/23    MD Decision for clinic consultation/Orders:            Referral updates/Patient contacted:

## 2023-04-06 NOTE — TELEPHONE ENCOUNTER
Per Dr. Vidal  Nothing urgent as of yet, but imaging may suggest otherwise   We should begin with an MRI/MRCP with IV contrast and likely move on to a clinic visit from there.     Called to discuss with patient, left message    ML

## 2023-04-06 NOTE — PROGRESS NOTES
Medication Therapy Management (MTM) Encounter    ASSESSMENT:                            Medication Adherence/Access: No issues identified    Smoking Cessation: Combination therapy is appropriate for patient. Due to dreams and somnambulism, Chantix was ruled out per previous provider. Considering no history of seizures, it is reasonable to order Zyban. She is also a candidate for Nicotine replacement therapy (NRT), which had used before,but did not complete. There are some studies that support triple therapy; one medication and two types of NRT. Reasonable to add nicotine gum to be used as PRN when patient feels strong craving. Will order depending on how patient responds to the current ordered medications.     Type 2 Diabetes: Patient is meeting A1c goal of < 7%. Controlled on metformin.     Mixed incontinence urge and stress:  Improved with increased dose of Oxybutynin.     Unintentional weight loss: Patient is referred to Adult GI . Not able to gain weight.       PLAN:                            1.  Start taking bupropion 150 mg once daily for 3 days, then after 3 days of initial dose, increase to 300 mg/day (150 mg BID) with minimum 8-hour interval between doses. Treatment should be continued for 7 to 12 weeks. If complete smoking cessation not achieved after 7 -12 weeks, stop taking bupropion and PharmD will reassess.         - You can take the medication in the morning with or without food. The side effects include dry mouth, insomnia, and headache. If side effects are not tolerated, please report to your provider or your pharmacist.       2. Start applying nicotine transdermal Patch 21 mg per day for weeks 1 to 6, then use 14 mg patch per day for weeks 7 to 8, then use the 7 mg patch per day for weeks 9 - 10.              Proper instruction on how to apply the nicotine patch:                   - Apply one new patch every 24 hours on skin that is dry, clean and hairless (save pouch for disposing of the  patch after use)           - Remove backing from patch and immediately press onto skin (hold for 10 seconds)           - Wash hands after applying or removing patches           - Throw away the use patch by folding sticky ends together (replace in its pouch and discard)           - The used patch should be removed and a new one applied to a different skin site at the same time each day           - Do not wear more than one patch at a time           - Do not cut patch in half or into smaller pieces           - Do not leave patch on for more than 24 hours because it may irritate your skin and loses strength after 24 hours          - Wear each patch for 16-24 hours per day          -  If you crave cigarettes when you wake up, wear the patch for 24 hours            -  If you have vivid dreams or other sleep disturbances, you may remove the patch at bedtime and apply a new one in the morning      Follow-up: Due to 05/12/2023    SUBJECTIVE/OBJECTIVE:                          Taylor Aburto is a 67 year old female contacted via secure video for an initial visit. She was referred to me from Dr. Cherry.      Reason for visit: Smoking Cessation.    Allergies/ADRs: Reviewed in chart  Past Medical History: Reviewed in chart  Tobacco: She reports that she has been smoking. She has a 20.00 pack-year smoking history. She has never used smokeless tobacco.Nicotine/Tobacco Cessation Plan:   Pharmacotherapies : bupropion (Zyban) and Nicotine patch  Alcohol: none  Caffeine: Drinks coffee in the morning    Medication Adherence/Access: no issues reported    Smoking Cessation:  Currently not taking any medication for smoking cessation. Patient had recent visit with PCP, and discussed about quitting smoking. Per last PCP visit, patient was smoking less than half PPD, which is lower than before.      Reported she was able to successfully reduce the number of cigarettes she used to smoke down to 5 a day in the past, but with recent family  issues she is back to 12 to 15 cigarettes per day.     Patient reports that rituals of waking up in the morning and drinking a coffee and smoking a couple of cigarettes, and also being alone in her car triggers her to want to smoke. Said Chantix was ruled out by her previous provider due to her sleep walking, and vivid dreams.  Said she tried cold turkey in the past, but it was really hard due to withdrawal symptoms.     Patient reported her mood is good at this time, but she works a lot because she has to take care of her boyfriend (partner) who is dealing with number of conditions like leukemia that is in remission and pancreatic cancer. Due to the above situations patient said that she is not getting enough time to socialize. Recently she also dealt with her ceiling falling on her apartment, which took a month or so to get fixed.  Patient notes normally she is not a person be easily stressed.     Reports she does not have cardiovascular issues, but sometimes her blood pressure goes high in the morning.     Type 2 Diabetes:  Currently taking metformin  mg 2000mg/day. Patient is not experiencing any medication related side effects.  Blood sugar monitoring: 3-5 time(s) week. Ranges (patient reported): Higher in the morning around 140, but normal during the day. The other day it was 180s after dinner, and on the same day at 9:30 AM it was 144.   Symptoms of low blood sugar? none  Symptoms of high blood sugar?Said she gets tired and her vision is changing, and she gets light headed sometimes.   Eye exam: up to date  Foot exam: up to date  Diet/Exercise: Said she watches what she eats; she tries to avoid sugary food. Said she is not exercising right now, but she plans to exercise in the future.   Aspirin: Taking 81mg daily   Statin: Yes:Simvastatin 20 mg  ACEi/ARB: Yes: Lisinopril 5mg  Urine Albumin: No results found for: UMALCR   Lab Results   Component Value Date    A1C 6.5 03/31/2023    A1C 6.8 01/27/2023     A1C 5.8 09/06/2022    A1C 11.8 03/21/2022    A1C 6.6 11/09/2020       Mixed incontinence urge and stress:  Currently taking oxybutynin XL 10 mg daily. Said dose got doubled and it is working now. She has less visits to the bathroom than before.     Unintentional weight loss: Patient is referred to [facility]. Said she did not know she had diabetes until she was diagnosed. Then, she was losing weight, and even not it really hard for her to gain weight.     Today's Vitals: There were no vitals taken for this visit.  ----------------    I spent 55 minutes with this patient today. All changes were made via collaborative practice agreement with Soraya Cherry DO. A copy of the visit note was provided to the patient's provider(s).    A summary of these recommendations was sent via [employer].      Telemedicine Visit Details  Type of service:  Video Conference via [employer]  Start Time: 09:07 AM  End Time: 10:02 AM     Medication Therapy Recommendations  Tobacco abuse disorder    Current Medication: buPROPion (ZYBAN) 150 MG 12 hr tablet   Rationale: Untreated condition - Needs additional medication therapy - Indication   Recommendation: Start Medication - Zyban 150 MG Tb12   Status: Accepted per CPA          Current Medication: nicotine (NICODERM CQ) 21 MG/24HR 24 hr patch   Rationale: Synergistic therapy - Needs additional medication therapy - Indication   Recommendation: Start Medication - Nicoderm CQ 21 MG/24HR Pt24 - Apply a 21 mg patch daily for 6 weeks, then 14 mg patch daily for 2 weeks, and then 7 mg patch daily for another 2 weeks.   Status: Accepted per CPA                Namrata Eckert, PharmD     Medication Therapy Management (MTM) Pharmacist     [phone]     [email or web address]

## 2023-04-07 ENCOUNTER — VIRTUAL VISIT (OUTPATIENT)
Dept: PHARMACY | Facility: CLINIC | Age: 68
End: 2023-04-07
Attending: STUDENT IN AN ORGANIZED HEALTH CARE EDUCATION/TRAINING PROGRAM
Payer: COMMERCIAL

## 2023-04-07 DIAGNOSIS — E11.9 TYPE 2 DIABETES MELLITUS WITHOUT COMPLICATION, WITHOUT LONG-TERM CURRENT USE OF INSULIN (H): ICD-10-CM

## 2023-04-07 DIAGNOSIS — N39.46 MIXED INCONTINENCE URGE AND STRESS (MALE)(FEMALE): ICD-10-CM

## 2023-04-07 DIAGNOSIS — Z72.0 TOBACCO ABUSE DISORDER: Primary | ICD-10-CM

## 2023-04-07 DIAGNOSIS — R63.4 UNINTENTIONAL WEIGHT LOSS: ICD-10-CM

## 2023-04-07 PROCEDURE — 99605 MTMS BY PHARM NP 15 MIN: CPT | Mod: VID

## 2023-04-07 PROCEDURE — 99607 MTMS BY PHARM ADDL 15 MIN: CPT | Mod: VID

## 2023-04-07 RX ORDER — BUPROPION HYDROCHLORIDE 150 MG/1
TABLET, FILM COATED, EXTENDED RELEASE ORAL
Qty: 60 TABLET | Refills: 3 | Status: SHIPPED | OUTPATIENT
Start: 2023-04-07 | End: 2023-06-30

## 2023-04-07 RX ORDER — NICOTINE 21 MG/24HR
1 PATCH, TRANSDERMAL 24 HOURS TRANSDERMAL EVERY 24 HOURS
Qty: 14 PATCH | Refills: 0 | Status: SHIPPED | OUTPATIENT
Start: 2023-04-07 | End: 2023-04-21

## 2023-04-07 RX ORDER — NICOTINE 21 MG/24HR
1 PATCH, TRANSDERMAL 24 HOURS TRANSDERMAL EVERY 24 HOURS
Qty: 42 PATCH | Refills: 0 | Status: SHIPPED | OUTPATIENT
Start: 2023-04-07 | End: 2023-05-19

## 2023-04-07 NOTE — PATIENT INSTRUCTIONS
Recommendations from today's MTM visit:                                                    MTM (medication therapy management) is a service provided by a clinical pharmacist designed to help you get the most of out of your medicines.   Today we reviewed what your medicines are for, how to know if they are working, that your medicines are safe and how to make your medicine regimen as easy as possible.        1.  Start taking bupropion 50 mg once daily for 3 days, then after 3 days of initial dose, increase to 300 mg/day (150 mg BID) with minimum 8-hour interval between doses. Treatment should be continued for 7 to 12 weeks. If complete smoking cessation not achieved after 7 -12 weeks, stop taking bupropion and PharmD will reassess.         - You can take the medication in the morning with or without food. The side effects include dry mouth, insomnia, and headache. If side effects are not tolerated, please report to your provider or your pharmacist.       2. Start applying nicotine transdermal Patch 21 mg per day for weeks 1 to 6, then use 14 mg patch per day for weeks 7 to 8, then use the 7 mg patch per day for weeks 9 - 10.              Proper instruction on how to apply the nicotine patch:                   - Apply one new patch every 24 hours on skin that is dry, clean and hairless (save pouch for disposing of the patch after use)           - Remove backing from patch and immediately press onto skin (hold for 10 seconds)           - Wash hands after applying or removing patches           - Throw away the use patch by folding sticky ends together (replace in its pouch and discard)           - The used patch should be removed and a new one applied to a different skin site at the same time each day           - Do not wear more than one patch at a time           - Do not cut patch in half or into smaller pieces           - Do not leave patch on for more than 24 hours because it may irritate your skin and loses  "strength after 24 hours          - Wear each patch for 16-24 hours per day          -  If you crave cigarettes when you wake up, wear the patch for 24 hours            -  If you have vivid dreams or other sleep disturbances, you may remove the patch at bedtime and apply a new one in the morning      Follow-up: Due to 05/12/2023    It was great speaking with you today.  I value your experience and would be very thankful for your time in providing feedback in our clinic survey. In the next few days, you may receive an email or text message from [facility] [employer] with a link to a survey related to your  clinical pharmacist.\"     To schedule another MTM appointment, please call the clinic directly or you may call the MTM scheduling line at [phone] or toll-free at [phone].     My Clinical Pharmacist's contact information:                                                      Please feel free to contact me with any questions or concerns you have.        Namrata Eckert, PharmD     Medication Therapy Management (MTM) Pharmacist     [phone]     [email or web address]     [facility]    "

## 2023-04-17 ENCOUNTER — MYC REFILL (OUTPATIENT)
Dept: FAMILY MEDICINE | Facility: CLINIC | Age: 68
End: 2023-04-17
Payer: COMMERCIAL

## 2023-04-17 DIAGNOSIS — N39.46 MIXED INCONTINENCE URGE AND STRESS (MALE)(FEMALE): ICD-10-CM

## 2023-04-18 RX ORDER — OXYBUTYNIN CHLORIDE 10 MG/1
10 TABLET, EXTENDED RELEASE ORAL DAILY
Qty: 90 TABLET | Refills: 0 | OUTPATIENT
Start: 2023-04-18

## 2023-04-18 NOTE — TELEPHONE ENCOUNTER
"Refill too soon.      Disp Refills Start End CHEIKH   oxybutynin ER (DITROPAN XL) 10 MG 24 hr tablet 90 tablet 0 3/31/2023  No   Sig - Route: Take 1 tablet (10 mg) by mouth daily - Oral         Requested Prescriptions   Pending Prescriptions Disp Refills     oxybutynin ER (DITROPAN XL) 10 MG 24 hr tablet 90 tablet 0     Sig: Take 1 tablet (10 mg) by mouth daily       Muscarinic Antagonists (Urinary Incontinence Agents) Passed - 4/17/2023  7:54 PM        Passed - Recent (12 mo) or future (30 days) visit within the authorizing provider's specialty     Patient has had an office visit with the authorizing provider or a provider within the authorizing providers department within the previous 12 mos or has a future within next 30 days. See \"Patient Info\" tab in inbasket, or \"Choose Columns\" in Meds & Orders section of the refill encounter.              Passed - Medication is Oxybutynin and patient is 5 years of age or older        Passed - Patient does not have a diagnosis of glaucoma on the problem list     If glaucoma diagnosis is new, refer refill to physician.          Passed - Medication is active on med list        Passed - Patient is 18 years of age or older             Sapphire Canchola RN 04/18/23 12:06 PM  "

## 2023-04-19 ENCOUNTER — TELEPHONE (OUTPATIENT)
Dept: PULMONOLOGY | Facility: OTHER | Age: 68
End: 2023-04-19

## 2023-04-20 NOTE — TELEPHONE ENCOUNTER
Rescheduled colonoscopy    Attempted to contact patient regarding upcoming Colonoscopy  procedure on 5/5/23 for pre assessment questions. No answer.     Mailbox is full - unable to leave a message    Discuss Covid policy and designated  policy.    Pre op exam? N/A    Arrival time: 0630. Procedure time: 0730    Facility location: [facility]; [address], [address], [place] [address]    Sedation type: Conscious sedation     Anticoagulants: No    Electronic implanted devices? No    Diabetic? Yes - Patient to hold oral diabetic medications day of procedure - Metformin    Indication for procedure: Unintentional weight loss    Bowel prep recommendation: Standard Golytely  +zofran - from previously scheduled procedure    Prep instructions sent via [employer]. Bowel prep script sent to     [facility] [id number] [facility] [employer] - [place], [place] - [address]    See if pt has from previously scheduled procedure    Myranda Flood RN  Endoscopy Procedure Pre Assessment RN

## 2023-04-23 DIAGNOSIS — N39.46 MIXED INCONTINENCE URGE AND STRESS (MALE)(FEMALE): ICD-10-CM

## 2023-04-23 NOTE — TELEPHONE ENCOUNTER
FAX Refill Request     Epic reflect RX created on 03/31/23 PRINT instead of ePrescribe    oxybutynin ER (DITROPAN XL) 10 MG 24 hr tablet 90 tablet 0 3/31/2023  No   Sig - Route: Take 1 tablet (10 mg) by mouth daily - Oral   Class: No Print Out   Order: [id number]

## 2023-04-24 RX ORDER — OXYBUTYNIN CHLORIDE 10 MG/1
10 TABLET, EXTENDED RELEASE ORAL DAILY
Qty: 90 TABLET | Refills: 0 | Status: SHIPPED | OUTPATIENT
Start: 2023-04-24 | End: 2023-08-01

## 2023-04-28 NOTE — TELEPHONE ENCOUNTER
Second attempt for pre-assessment prior to upcoming colonoscopy     No answer.  Left message to return call [phone] #[phone]    Mychart message sent    Lucille Lane RN  Endoscopy Procedure Pre Assessment RN

## 2023-05-03 RX ORDER — PROCHLORPERAZINE MALEATE 5 MG
5 TABLET ORAL EVERY 6 HOURS PRN
Status: CANCELLED | OUTPATIENT
Start: 2023-05-03

## 2023-05-03 RX ORDER — BISACODYL 5 MG/1
TABLET, DELAYED RELEASE ORAL
Qty: 4 TABLET | Refills: 0 | Status: SHIPPED | OUTPATIENT
Start: 2023-05-03 | End: 2023-06-30

## 2023-05-03 RX ORDER — LIDOCAINE 40 MG/G
CREAM TOPICAL
Status: CANCELLED | OUTPATIENT
Start: 2023-05-03

## 2023-05-03 RX ORDER — NALOXONE HYDROCHLORIDE 0.4 MG/ML
0.2 INJECTION, SOLUTION INTRAMUSCULAR; INTRAVENOUS; SUBCUTANEOUS
Status: CANCELLED | OUTPATIENT
Start: 2023-05-03

## 2023-05-03 RX ORDER — NALOXONE HYDROCHLORIDE 0.4 MG/ML
0.4 INJECTION, SOLUTION INTRAMUSCULAR; INTRAVENOUS; SUBCUTANEOUS
Status: CANCELLED | OUTPATIENT
Start: 2023-05-03

## 2023-05-03 RX ORDER — ONDANSETRON 4 MG/1
4 TABLET, ORALLY DISINTEGRATING ORAL EVERY 6 HOURS PRN
Status: CANCELLED | OUTPATIENT
Start: 2023-05-03

## 2023-05-03 RX ORDER — FLUMAZENIL 0.1 MG/ML
0.2 INJECTION, SOLUTION INTRAVENOUS
Status: CANCELLED | OUTPATIENT
Start: 2023-05-03 | End: 2023-05-03

## 2023-05-03 RX ORDER — ONDANSETRON 2 MG/ML
4 INJECTION INTRAMUSCULAR; INTRAVENOUS EVERY 6 HOURS PRN
Status: CANCELLED | OUTPATIENT
Start: 2023-05-03

## 2023-05-03 RX ORDER — ONDANSETRON 2 MG/ML
4 INJECTION INTRAMUSCULAR; INTRAVENOUS
Status: CANCELLED | OUTPATIENT
Start: 2023-05-03

## 2023-05-03 NOTE — TELEPHONE ENCOUNTER
Third attempt for pre-assessment prior to upcoming colonoscopy     No answer.  Left message to return call [phone] #[phone]    Leesa Ruth RN  Endoscopy Procedure Pre Assessment RN

## 2023-05-04 ENCOUNTER — TELEPHONE (OUTPATIENT)
Dept: GASTROENTEROLOGY | Facility: CLINIC | Age: 68
End: 2023-05-04
Payer: COMMERCIAL

## 2023-05-04 NOTE — TELEPHONE ENCOUNTER
Caller:   Reason for Reschedule/Cancellation (please be detailed, any staff messages or encounters to note?): MESSED UP ON DIET      Prior to reschedule please review:    Ordering Provider:CHRISTOPHER REECE    Sedation per order:CS    Does patient have any ASC Exclusions, please identify?: N      Notes on Cancelled Procedure:    Procedure:Lower Endoscopy [Colonoscopy]     Date: 5/5    Location:[facility]; [address], [address] [place] [address]    Surgeon: ARIC        Rescheduled: Yes    Procedure: Lower Endoscopy [Colonoscopy]     Date: 6/30    Location:[facility]; [address], [place] [address]    Surgeon: ARIC    Sedation Level Scheduled  CS,  Reason for Sedation Level ORDER    Prep/Instructions updated and sent: N

## 2023-05-04 NOTE — TELEPHONE ENCOUNTER
Patient called to go over pre assessment but has not changed her diet at all.  Over the last few days has been eating high fiber foods.  Informed patient that it is our recommendation to change diet but ultimately her decision if she wants to cancel appointment or not.  Patient understood and decided to reschedule and not take chance of having to redo.  Transferred to endo scheduling.     Gisel Aburto RN

## 2023-05-12 ENCOUNTER — TELEPHONE (OUTPATIENT)
Dept: FAMILY MEDICINE | Facility: CLINIC | Age: 68
End: 2023-05-12
Payer: COMMERCIAL

## 2023-05-12 ENCOUNTER — TELEPHONE (OUTPATIENT)
Dept: PHARMACY | Facility: CLINIC | Age: 68
End: 2023-05-12
Payer: COMMERCIAL

## 2023-05-12 DIAGNOSIS — Z72.0 TOBACCO ABUSE: Primary | ICD-10-CM

## 2023-05-12 NOTE — TELEPHONE ENCOUNTER
Iona,    Patient is not able to afford deductible that applies for MTM and cancelled appointment. She states she will reach out to schedule follow up when she is more financially secured.    Thank you,    Lili Mata, [facility]

## 2023-05-12 NOTE — TELEPHONE ENCOUNTER
I can do a quick clinical consult with her that is at no cost for her. Let me try to call her? What do you think?

## 2023-05-12 NOTE — TELEPHONE ENCOUNTER
Patient cancelled today's appointment due to financial constraints, but notes the struggle she has with quitting smoking. She did not start Bupropion for the fear of side effects she read on the Internet. Said she is not doing as she intended or desired to do with smoking cessation, and started going in person to the smoking cessation group she have.     To help her with strong crazing for cigarettes, we will order nicotine gums. Patient smokes within 30 minutes after waking up in the morning so, the nicotine 4 mg is appropriate dose for her.      Thanks,    Namrata Eckert, PharmD     Medication Therapy Management (MTM) Pharmacist     [phone]     [email or web address]

## 2023-06-19 NOTE — TELEPHONE ENCOUNTER
Called Pt to PA for her upcoming colonoscopy on 6/30/23. Pt has a lot going on and has another DrPancho Appointment that same day so she will need to reschedule. She was transferred to scheduling.     Myranda Flood RN on 6/19/2023 at 10:10 AM

## 2023-06-21 ENCOUNTER — TELEPHONE (OUTPATIENT)
Dept: GASTROENTEROLOGY | Facility: CLINIC | Age: 68
End: 2023-06-21
Payer: COMMERCIAL

## 2023-06-21 NOTE — TELEPHONE ENCOUNTER
Caller: Taylor Aburto    Reason for Reschedule/Cancellation (please be detailed, any staff messages or encounters to note?): Patient has an appointment with her PCP same day and requested to reschedule. Patient request to schedule on Fridays at [facility].      Prior to reschedule please review:    Ordering Provider:Soraya Cherry DO    Sedation per order:MODERATE    Does patient have any ASC Exclusions, please identify?: NO      Notes on Cancelled Procedure:    Procedure:Lower Endoscopy [Colonoscopy]     Date: 6/30    Location:[facility]; [address], [address] [place] [address]    Surgeon: ARIC        Rescheduled: Yes    Procedure: Lower Endoscopy [Colonoscopy]     Date: 9/8    Location:[facility]; [address], [address] [place] [address]    Surgeon: IAN    Sedation Level Scheduled  MODERATE,  Reason for Sedation Level PER ORDER    Prep/Instructions updated and sent: CRYSTAL

## 2023-06-26 DIAGNOSIS — E11.9 TYPE 2 DIABETES MELLITUS WITHOUT COMPLICATION, WITHOUT LONG-TERM CURRENT USE OF INSULIN (H): ICD-10-CM

## 2023-06-26 RX ORDER — SIMVASTATIN 20 MG
20 TABLET ORAL EVERY EVENING
Qty: 90 TABLET | Refills: 1 | Status: SHIPPED | OUTPATIENT
Start: 2023-06-26 | End: 2023-11-07

## 2023-06-26 RX ORDER — LISINOPRIL 5 MG/1
5 TABLET ORAL DAILY
Qty: 90 TABLET | Refills: 3 | Status: SHIPPED | OUTPATIENT
Start: 2023-06-26 | End: 2024-03-22

## 2023-06-26 NOTE — TELEPHONE ENCOUNTER
"Routing refill request to provider for review/approval because:  bp out of range    Last Written Prescription Date:  5/20/22  Last Fill Quantity: 90,  # refills: 3   Last office visit provider:  3/31/23     Requested Prescriptions   Pending Prescriptions Disp Refills     simvastatin (ZOCOR) 20 MG tablet 90 tablet 3     Sig: Take 1 tablet (20 mg) by mouth every evening       Statins Protocol Passed - 6/26/2023  9:55 AM        Passed - LDL on file in past 12 months     Recent Labs   Lab Test 01/27/23  1437   LDL 64             Passed - No abnormal creatine kinase in past 12 months     No lab results found.             Passed - Recent (12 mo) or future (30 days) visit within the authorizing provider's specialty     Patient has had an office visit with the authorizing provider or a provider within the authorizing providers department within the previous 12 mos or has a future within next 30 days. See \"Patient Info\" tab in inbasket, or \"Choose Columns\" in Meds & Orders section of the refill encounter.              Passed - Medication is active on med list        Passed - Patient is age 18 or older        Passed - No active pregnancy on record        Passed - No positive pregnancy test in past 12 months           lisinopril (ZESTRIL) 5 MG tablet 90 tablet 3     Sig: Take 1 tablet (5 mg) by mouth daily       ACE Inhibitors (Including Combos) Protocol Failed - 6/26/2023  9:55 AM        Failed - Blood pressure under 140/90 in past 12 months     BP Readings from Last 3 Encounters:   03/31/23 (!) 148/80   01/27/23 138/62   10/07/22 116/74                 Passed - Recent (12 mo) or future (30 days) visit within the authorizing provider's specialty     Patient has had an office visit with the authorizing provider or a provider within the authorizing providers department within the previous 12 mos or has a future within next 30 days. See \"Patient Info\" tab in inbasket, or \"Choose Columns\" in Meds & Orders section of the refill " encounter.              Passed - Medication is active on med list        Passed - Patient is age 18 or older        Passed - No active pregnancy on record        Passed - Normal serum creatinine on file in past 12 months     Recent Labs   Lab Test 02/03/23  1005   CR 0.90       Ok to refill medication if creatinine is low          Passed - Normal serum potassium on file in past 12 months     Recent Labs   Lab Test 02/03/23  1005   POTASSIUM 4.5             Passed - No positive pregnancy test within past 12 months             Yokasta Love, RN 06/26/23 5:31 PM

## 2023-06-30 ENCOUNTER — HOSPITAL ENCOUNTER (OUTPATIENT)
Dept: ULTRASOUND IMAGING | Facility: HOSPITAL | Age: 68
Discharge: HOME | End: 2023-06-30
Attending: STUDENT IN AN ORGANIZED HEALTH CARE EDUCATION/TRAINING PROGRAM | Admitting: STUDENT IN AN ORGANIZED HEALTH CARE EDUCATION/TRAINING PROGRAM
Payer: COMMERCIAL

## 2023-06-30 ENCOUNTER — OFFICE VISIT (OUTPATIENT)
Dept: FAMILY MEDICINE | Facility: CLINIC | Age: 68
End: 2023-06-30
Payer: COMMERCIAL

## 2023-06-30 VITALS
RESPIRATION RATE: 16 BRPM | DIASTOLIC BLOOD PRESSURE: 68 MMHG | HEART RATE: 72 BPM | SYSTOLIC BLOOD PRESSURE: 114 MMHG | BODY MASS INDEX: 18.09 KG/M2 | WEIGHT: 112.6 LBS | HEIGHT: 66 IN

## 2023-06-30 DIAGNOSIS — Z79.4 TYPE 2 DIABETES MELLITUS WITHOUT COMPLICATION, WITH LONG-TERM CURRENT USE OF INSULIN (H): ICD-10-CM

## 2023-06-30 DIAGNOSIS — K86.89 PANCREATIC DUCT DILATED: ICD-10-CM

## 2023-06-30 DIAGNOSIS — E83.52 HYPERCALCEMIA: Primary | ICD-10-CM

## 2023-06-30 DIAGNOSIS — F17.200 NICOTINE DEPENDENCE, UNCOMPLICATED, UNSPECIFIED NICOTINE PRODUCT TYPE: ICD-10-CM

## 2023-06-30 DIAGNOSIS — J43.9 PULMONARY EMPHYSEMA, UNSPECIFIED EMPHYSEMA TYPE (H): ICD-10-CM

## 2023-06-30 DIAGNOSIS — R22.1 LUMP IN NECK: ICD-10-CM

## 2023-06-30 DIAGNOSIS — R63.4 UNINTENTIONAL WEIGHT LOSS: ICD-10-CM

## 2023-06-30 DIAGNOSIS — Z72.0 TOBACCO ABUSE: ICD-10-CM

## 2023-06-30 DIAGNOSIS — E11.9 TYPE 2 DIABETES MELLITUS WITHOUT COMPLICATION, WITH LONG-TERM CURRENT USE OF INSULIN (H): ICD-10-CM

## 2023-06-30 PROCEDURE — 76536 US EXAM OF HEAD AND NECK: CPT

## 2023-06-30 PROCEDURE — 99406 BEHAV CHNG SMOKING 3-10 MIN: CPT | Performed by: STUDENT IN AN ORGANIZED HEALTH CARE EDUCATION/TRAINING PROGRAM

## 2023-06-30 PROCEDURE — 99214 OFFICE O/P EST MOD 30 MIN: CPT | Performed by: STUDENT IN AN ORGANIZED HEALTH CARE EDUCATION/TRAINING PROGRAM

## 2023-06-30 ASSESSMENT — PAIN SCALES - GENERAL: PAINLEVEL: MILD PAIN (2)

## 2023-06-30 NOTE — PROGRESS NOTES
Assessment & Plan     .    ICD-10-CM    1. Hypercalcemia  E83.52 Ionized Calcium     Hepatic panel (Albumin, ALT, AST, Bili, Alk Phos, TP)     Vitamin D Deficiency     Parathyroid Hormone Intact      2. Type 2 diabetes mellitus without complication, with long-term current use of insulin (H)  E11.9 Albumin Random Urine Quantitative with Creat Ratio    Z79.4 HEMOGLOBIN A1C      3. Unintentional weight loss  R63.4       4. Pulmonary emphysema, unspecified emphysema type (H)  J43.9       5. Tobacco abuse  Z72.0       6. Pancreatic duct dilated  K86.89       7. Nicotine dependence, uncomplicated, unspecified nicotine product type  F17.200 SMOKING CESSATION COUNSELING 3-10 MIN          Emphysema  Tobacco abuse  PFTs were ordered last time  Patient would like to hold off for now    Hypercalcemia:  Somewhat persistent on last set of blood work  Patient is not currently on any calcium supplementation  Is not vitamin D deficient  Patient does have history of osteopenia  At this time, would like to start work-up to rule out secondary etiologies of hypercalcemia.  We will get repeat vitamin D, PTH, iCal as a starting point.  Further work-up will be dictated by the results.    ?Reactive lymph node  Noted on the right side of her neck last time  Ultrasound was ordered to further clarify the structure but it does not look like she had it done  Today, patient continues to have mild prominence of the right side of the neck.  Would like to get ultrasound.  #4 radiology provided so she can get that imaging done ASAP.    Unintentional weight loss:  Incidental pancreatic dilatation  Extensive work-up for weight loss has been negative except for the image finding of the pancreatic dilatation  At this time, weight loss may be due to acute COVID infection  Weight today is down a few pounds but overall stable.  Was sent to GI for the pancreatic dilatation.  Patient did not make the appointment.  Advised her to do so ASAP.    Colon cancer  screening:  Patient has a colonoscopy scheduled     Nocturia:  Oxybutynin was increased to 10 mg last time  Patient notes that it has helped  She is going to the bathroom less and overall sense of urgency is decreased throughout the day  Patient is happy at the current dose and not interested in augmentation of therapy.  Continue to monitor clinically    Tobacco abuse:  Was sent to [facility]  They started her on tapered ramp-up of bupropion but it caused her to have significant increasing hunger anxiety  The bill for the consultation was very high and she was unable to afford ongoing sessions  She has been try to cut down on her own.  She has been using the Nicorette gum.  She is now down to less than half a pack per day.  She set a date and she will start using the patch tomorrow  3 to 5 minutes spent on tobacco cessation    Type 2 diabetes:  Stable  Notes that her a.m. fasting sugars are generally less than 130s  We will recheck A1c today        32 minutes spent by me on the date of the encounter doing chart review, history and exam, documentation and further activities per the note    DO MARY Navas [facility]    Eb Vazquez is a 68 year old, presenting for the following health issues:  Diabetes (3 month follow up)        3/31/2023     2:17 PM   Additional Questions   Roomed by HOLGER Lala       Diabetes Follow-up    How often are you checking your blood sugar? A few times a week  What time of day are you checking your blood sugars (select all that apply)?  Before and after meals  Have you had any blood sugars above 200?  Yes 210  Have you had any blood sugars below 70?  No    What symptoms do you notice when your blood sugar is low?  Shaky, Weak and Blurred vision    What concerns do you have today about your diabetes? None     Do you have any of these symptoms? (Select all that apply)  No numbness or tingling in feet.  No redness, sores or blisters on feet.  No complaints of  "excessive thirst.  No reports of blurry vision.  No significant changes to weight.    Have you had a diabetic eye exam in the last 12 months? No        BP Readings from Last 2 Encounters:   06/30/23 114/68   03/31/23 (!) 148/80     Hemoglobin A1C (%)   Date Value   03/31/2023 6.5 (H)   01/27/2023 6.8 (H)     LDL Cholesterol Calculated (mg/dL)   Date Value   01/27/2023 64   03/21/2022 71               Review of Systems   As per HPI       Objective    /68 (BP Location: Right arm, Patient Position: Sitting, Cuff Size: Adult Regular)   Pulse 72   Resp 16   Ht 1.676 m (5' 6\")   Wt 51.1 kg (112 lb 9.6 oz)   LMP  (LMP Unknown)   BMI 18.17 kg/m    Body mass index is 18.17 kg/m .  Physical Exam   GENERAL: healthy, alert and no distress  RESP: lungs clear to auscultation - no rales, rhonchi or wheezes  CV: regular rate and rhythm, normal S1 S2, no S3 or S4, no murmur, click or rub, no peripheral edema and peripheral pulses strong  MS: no gross musculoskeletal defects noted, no edema  PSYCH: mentation appears normal, affect normal                     "

## 2023-07-10 ENCOUNTER — LAB (OUTPATIENT)
Dept: LAB | Facility: CLINIC | Age: 68
End: 2023-07-10
Payer: COMMERCIAL

## 2023-07-10 DIAGNOSIS — E11.9 TYPE 2 DIABETES MELLITUS WITHOUT COMPLICATION, WITH LONG-TERM CURRENT USE OF INSULIN (H): ICD-10-CM

## 2023-07-10 DIAGNOSIS — Z79.4 TYPE 2 DIABETES MELLITUS WITHOUT COMPLICATION, WITH LONG-TERM CURRENT USE OF INSULIN (H): ICD-10-CM

## 2023-07-10 DIAGNOSIS — E83.52 HYPERCALCEMIA: ICD-10-CM

## 2023-07-10 LAB
ALBUMIN SERPL BCG-MCNC: 4.7 G/DL (ref 3.5–5.2)
ALP SERPL-CCNC: 56 U/L (ref 35–104)
ALT SERPL W P-5'-P-CCNC: 16 U/L (ref 0–50)
AST SERPL W P-5'-P-CCNC: 22 U/L (ref 0–45)
BILIRUB DIRECT SERPL-MCNC: <0.2 MG/DL (ref 0–0.3)
BILIRUB SERPL-MCNC: 0.7 MG/DL
CA-I BLD-MCNC: 5 MG/DL (ref 4.4–5.2)
CREAT UR-MCNC: 56.9 MG/DL
DEPRECATED CALCIDIOL+CALCIFEROL SERPL-MC: 109 UG/L (ref 20–75)
HBA1C MFR BLD: 6.3 % (ref 0–5.6)
MICROALBUMIN UR-MCNC: 64.1 MG/L
MICROALBUMIN/CREAT UR: 112.65 MG/G CR (ref 0–25)
PROT SERPL-MCNC: 7.2 G/DL (ref 6.4–8.3)
PTH-INTACT SERPL-MCNC: 29 PG/ML (ref 15–65)

## 2023-07-10 PROCEDURE — 82330 ASSAY OF CALCIUM: CPT

## 2023-07-10 PROCEDURE — 83970 ASSAY OF PARATHORMONE: CPT

## 2023-07-10 PROCEDURE — 80076 HEPATIC FUNCTION PANEL: CPT

## 2023-07-10 PROCEDURE — 36415 COLL VENOUS BLD VENIPUNCTURE: CPT

## 2023-07-10 PROCEDURE — 82570 ASSAY OF URINE CREATININE: CPT

## 2023-07-10 PROCEDURE — 83036 HEMOGLOBIN GLYCOSYLATED A1C: CPT

## 2023-07-10 PROCEDURE — 82306 VITAMIN D 25 HYDROXY: CPT

## 2023-07-10 PROCEDURE — 82043 UR ALBUMIN QUANTITATIVE: CPT

## 2023-07-18 ENCOUNTER — TELEPHONE (OUTPATIENT)
Dept: FAMILY MEDICINE | Facility: CLINIC | Age: 68
End: 2023-07-18
Payer: COMMERCIAL

## 2023-07-18 DIAGNOSIS — E11.9 TYPE 2 DIABETES MELLITUS WITHOUT COMPLICATION, WITHOUT LONG-TERM CURRENT USE OF INSULIN (H): ICD-10-CM

## 2023-07-18 NOTE — TELEPHONE ENCOUNTER
Called and left message for patient to return call, please relay the below provider message to patient if she calls back. Letter was also created and mailed.    Zuly MCLEAN  [facility] Clinical Staff      ----- Message from Soraya Cherry DO sent at 7/18/2023  9:45 AM CDT -----  MyC message not read. Please call x 2 and if no response, please send letter.     Vit D levels are going up. Are you on Vit D supplementations?      A1c is stable      No concerning cause for elevated calcium identified. Ok to monitor for now.      Soraya Cherry DO

## 2023-07-19 RX ORDER — METFORMIN HCL 500 MG
1000 TABLET, EXTENDED RELEASE 24 HR ORAL 2 TIMES DAILY
Qty: 360 TABLET | Refills: 1 | Status: SHIPPED | OUTPATIENT
Start: 2023-07-19 | End: 2024-02-02

## 2023-07-19 NOTE — TELEPHONE ENCOUNTER
"    Last Written Prescription Date:  7/6/2022  Last Fill Quantity: 360,  # refills: 3   Last office visit provider:  6/30/2023     Requested Prescriptions   Pending Prescriptions Disp Refills    metFORMIN (GLUCOPHAGE XR) 500 MG 24 hr tablet 360 tablet 3     Sig: Take 2 tablets (1,000 mg) by mouth 2 times daily       Biguanide Agents Passed - 7/19/2023  9:54 AM        Passed - Patient is age 10 or older        Passed - Patient has documented A1c within the specified period of time.     If HgbA1C is 8 or greater, it needs to be on file within the past 3 months.  If less than 8, must be on file within the past 6 months.     Recent Labs   Lab Test 07/10/23  1325   A1C 6.3*             Passed - Patient's CR is NOT>1.4 OR Patient's EGFR is NOT<45 within past 12 mos.     Recent Labs   Lab Test 02/03/23  1005 03/21/22  1356 11/09/20  1732   GFRESTIMATED 70   < > >60   GFRESTBLACK  --   --  >60    < > = values in this interval not displayed.       Recent Labs   Lab Test 02/03/23  1005   CR 0.90             Passed - Patient does NOT have a diagnosis of CHF.        Passed - Medication is active on med list        Passed - Patient is not pregnant        Passed - Patient has not had a positive pregnancy test within the past 12 mos.         Passed - Recent (6 mo) or future (30 days) visit within the authorizing provider's specialty     Patient had office visit in the last 6 months or has a visit in the next 30 days with authorizing provider or within the authorizing provider's specialty.  See \"Patient Info\" tab in inbasket, or \"Choose Columns\" in Meds & Orders section of the refill encounter.                 Marva Corley RN 07/19/23 9:54 AM  "

## 2023-07-30 DIAGNOSIS — N39.46 MIXED INCONTINENCE URGE AND STRESS (MALE)(FEMALE): ICD-10-CM

## 2023-08-01 NOTE — TELEPHONE ENCOUNTER
Refill request received from [facility] pharmacy for refill on Oxybutynin.    Zuly MCLEAN  [facility]

## 2023-08-02 RX ORDER — OXYBUTYNIN CHLORIDE 10 MG/1
10 TABLET, EXTENDED RELEASE ORAL DAILY
Qty: 90 TABLET | Refills: 3 | Status: SHIPPED | OUTPATIENT
Start: 2023-08-02 | End: 2024-03-22

## 2023-08-02 NOTE — TELEPHONE ENCOUNTER
"    Last Written Prescription Date:  4/24/2023  Last Fill Quantity: 90,  # refills: 0   Last office visit provider:  6/30/2023     Requested Prescriptions   Pending Prescriptions Disp Refills    oxybutynin ER (DITROPAN XL) 10 MG 24 hr tablet 90 tablet 1     Sig: Take 1 tablet (10 mg) by mouth daily       Muscarinic Antagonists (Urinary Incontinence Agents) Passed - 8/2/2023 10:50 AM        Passed - Recent (12 mo) or future (30 days) visit within the authorizing provider's specialty     Patient has had an office visit with the authorizing provider or a provider within the authorizing providers department within the previous 12 mos or has a future within next 30 days. See \"Patient Info\" tab in inbasket, or \"Choose Columns\" in Meds & Orders section of the refill encounter.              Passed - Medication is Oxybutynin and patient is 5 years of age or older        Passed - Patient does not have a diagnosis of glaucoma on the problem list     If glaucoma diagnosis is new, refer refill to physician.          Passed - Medication is active on med list        Passed - Patient is 18 years of age or older             Marva Corley RN 08/02/23 10:50 AM  "

## 2023-08-23 RX ORDER — BISACODYL 5 MG/1
TABLET, DELAYED RELEASE ORAL
Qty: 4 TABLET | Refills: 0 | Status: SHIPPED | OUTPATIENT
Start: 2023-08-23 | End: 2024-02-14

## 2023-08-23 NOTE — TELEPHONE ENCOUNTER
Attempted to contact patient in order to complete pre assessment questions.     No answer. Left message to return call to [phone] option 4      Leesa Ruth RN  Endoscopy Procedure Pre Assessment RN

## 2023-08-23 NOTE — TELEPHONE ENCOUNTER
Pre visit planning completed.      Procedure details:    Patient scheduled for Colonoscopy  on 9/8/2023.     Arrival time: 0745. Procedure time 0845    Pre op exam needed? N/A    Facility location: [facility]; [address] [place] [address], [address] Floor, [place] [address]    Sedation type: Conscious sedation     Indication for procedure: Unintentional weight loss       Chart review:     Electronic implanted devices? No    Diabetic? Yes. See medication holding recommendations     Diabetic medication HOLDING recommendations: (if applicable)  Oral diabetic medications: Yes:  Metformin (glucophage): HOLD day of procedure.  Diabetic injectables: No  Insulin: No      Medication review:    Anticoagulants? No    NSAIDS? No NSAID medications per patient's medication list.  RN will verify with pre-assessment call.    Other medication HOLDING recommendations:  N/A      Prep for procedure:     Bowel prep recommendation: Standard Golytely    Due to:  diabetes.     Prep instructions sent via [employer]     Bowel prep script sent to [facility] [id number] [place], [place] - [address]        Leesa Ruth RN  Endoscopy Procedure Pre Assessment RN  [phone] option 4

## 2023-08-26 ENCOUNTER — HEALTH MAINTENANCE LETTER (OUTPATIENT)
Age: 68
End: 2023-08-26

## 2023-08-30 NOTE — TELEPHONE ENCOUNTER
Second call attempt to complete pre assessment.     No answer.  Left message to return call to [phone] #4 within 24 hours or risk procedure being cancelled.     Additional information needed?  N/A      Leesa Ruth RN  Endoscopy Procedure Pre Assessment RN

## 2023-09-01 ENCOUNTER — TELEPHONE (OUTPATIENT)
Dept: GASTROENTEROLOGY | Facility: CLINIC | Age: 68
End: 2023-09-01
Payer: COMMERCIAL

## 2023-09-01 NOTE — TELEPHONE ENCOUNTER
Reason for Reschedule/Cancellation (please be detailed, any staff messages or encounters to note?): pre assessment call not returned       Prior to reschedule please review:  Ordering Provider:     Soraya Cherry DO in [facility] FAMILY MEDICINE/OB     Sedation per order: moderate  Does patient have any ASC Exclusions, please identify?: n      Notes on Cancelled Procedure:  Procedure: Lower Endoscopy [Colonoscopy]   Date: 09/08/2023  Location: [facility]; [address] [place] [address], [address] Floor, [place] [address]  Surgeon: Balwinder      Rescheduled: No

## 2023-09-01 NOTE — TELEPHONE ENCOUNTER
No return call received.   Pre assessment was not completed for upcoming scheduled procedure.     Staff message sent to endoscopy scheduling to cancel procedure per policy.       Traci Schneider RN   Endoscopy Procedure Pre Assessment RN

## 2023-09-01 NOTE — TELEPHONE ENCOUNTER
----- Message from Traci Schneider RN sent at 9/1/2023 10:14 AM CDT -----  Regarding: cancel  Hello,    No return call received.   Pre assessment was not completed for upcoming scheduled procedure.     Please cancel per policy.    Thank you,    Traci

## 2023-09-05 ENCOUNTER — TELEPHONE (OUTPATIENT)
Dept: GASTROENTEROLOGY | Facility: CLINIC | Age: 68
End: 2023-09-05

## 2023-09-05 ENCOUNTER — HOSPITAL ENCOUNTER (OUTPATIENT)
Facility: CLINIC | Age: 68
End: 2023-09-05
Attending: INTERNAL MEDICINE | Admitting: INTERNAL MEDICINE
Payer: COMMERCIAL

## 2023-09-05 NOTE — TELEPHONE ENCOUNTER
"Endoscopy Scheduling Screen    Have you had a positive Covid test in the last 14 days?  No    Are you active on MyChart?   Yes    What insurance is in the chart?  Other:  [facility]    Ordering/Referring Provider:     CHRISTOPHER REECE      (If ordering provider performs procedure, schedule with ordering provider unless otherwise instructed. )    BMI: Estimated body mass index is 18.17 kg/m  as calculated from the following:    Height as of 6/30/23: 1.676 m (5' 6\").    Weight as of 6/30/23: 51.1 kg (112 lb 9.6 oz).     Sedation Ordered  moderate sedation.   If patient BMI > 50 do not schedule in ASC.    If patient BMI > 45 do not schedule at [facility].    Are you taking methadone or Suboxone?  No    Are you taking any prescription medications for pain 3 or more times per week?   No    Do you have a history of malignant hyperthermia or adverse reaction to anesthesia?  No    (Females) Are you currently pregnant?        Have you been diagnosed or told you have pulmonary hypertension?   No    Do you have an LVAD?  No    Have you been told you have moderate to severe sleep apnea?  No    Have you been told you have COPD, asthma, or any other lung disease?  Yes     What breathing problems do you have?  COPD     Do you use home oxygen?  No    Have your breathing problems required an ED visit or hospitalization in the last year?  No    Do you have any heart conditions?  No     Have you ever had an organ transplant?   No    Have you ever had or are you awaiting a heart or lung transplant?   No    Have you had a stroke or transient ischemic attack (TIA aka \"mini stroke\" in the last 6 months?   No    Have you been diagnosed with or been told you have cirrhosis of the liver?   No    Are you currently on dialysis?   No    Do you need assistance transferring?   No    BMI: Estimated body mass index is 18.17 kg/m  as calculated from the following:    Height as of 6/30/23: 1.676 m (5' 6\").    Weight as of 6/30/23: 51.1 kg (112 lb 9.6 oz).     Is " patients BMI > 40 and scheduling location [facility]?  No    Do you take an injectable medication for weight loss or diabetes (excluding insulin)?  No    Do you take the medication Naltrexone?  No    Do you take blood thinners?  No       Prep   Are you currently on dialysis or do you have chronic kidney disease?  No    Do you have a diagnosis of diabetes?  Yes (Golytely Prep)    Do you have a diagnosis of cystic fibrosis (CF)?  No    On a regular basis do you go 3 -5 days between bowel movements?  No    BMI > 40?  No    Preferred Pharmacy:    [id number] - [place], [place] - [address]  [address] [address]  Phone: [phone] Fax: [phone]      Final Scheduling Details   Colonoscopy prep sent?  Standard Golytely    Procedure scheduled  Colonoscopy    Surgeon:  LYNDA     Date of procedure:  12/1     Pre-OP / PAC:   No - Not required for this site.    Location  [facility] - Patient preference.    Sedation   Moderate Sedation - Per order.      Patient Reminders:   You will receive a call from a Nurse to review instructions and health history.  This assessment must be completed prior to your procedure.  Failure to complete the Nurse assessment may result in the procedure being cancelled.      On the day of your procedure, please designate an adult(s) who can drive you home stay with you for the next 24 hours. The medicines used in the exam will make you sleepy. You will not be able to drive.      You cannot take public transportation, ride share services, or non-medical taxi service without a responsible caregiver.  Medical transport services are allowed with the requirement that a responsible caregiver will receive you at your destination.  We require that drivers and caregivers are confirmed prior to your procedure.

## 2023-11-04 ENCOUNTER — HEALTH MAINTENANCE LETTER (OUTPATIENT)
Age: 68
End: 2023-11-04

## 2023-11-06 ENCOUNTER — HOSPITAL ENCOUNTER (OUTPATIENT)
Dept: CT IMAGING | Facility: HOSPITAL | Age: 68
Discharge: HOME | End: 2023-11-06
Attending: STUDENT IN AN ORGANIZED HEALTH CARE EDUCATION/TRAINING PROGRAM | Admitting: STUDENT IN AN ORGANIZED HEALTH CARE EDUCATION/TRAINING PROGRAM
Payer: COMMERCIAL

## 2023-11-06 ENCOUNTER — OFFICE VISIT (OUTPATIENT)
Dept: FAMILY MEDICINE | Facility: CLINIC | Age: 68
End: 2023-11-06
Payer: COMMERCIAL

## 2023-11-06 VITALS
RESPIRATION RATE: 12 BRPM | TEMPERATURE: 97.8 F | HEIGHT: 65 IN | OXYGEN SATURATION: 98 % | HEART RATE: 88 BPM | BODY MASS INDEX: 17.59 KG/M2 | WEIGHT: 105.6 LBS | DIASTOLIC BLOOD PRESSURE: 60 MMHG | SYSTOLIC BLOOD PRESSURE: 134 MMHG

## 2023-11-06 DIAGNOSIS — F17.200 NICOTINE DEPENDENCE, UNCOMPLICATED, UNSPECIFIED NICOTINE PRODUCT TYPE: Primary | ICD-10-CM

## 2023-11-06 DIAGNOSIS — R63.4 UNINTENTIONAL WEIGHT LOSS: ICD-10-CM

## 2023-11-06 DIAGNOSIS — E67.3 HYPERVITAMINOSIS D: ICD-10-CM

## 2023-11-06 DIAGNOSIS — K86.89 DILATION OF PANCREATIC DUCT: ICD-10-CM

## 2023-11-06 DIAGNOSIS — I31.39 PERICARDIAL EFFUSION: ICD-10-CM

## 2023-11-06 DIAGNOSIS — R53.83 OTHER FATIGUE: ICD-10-CM

## 2023-11-06 DIAGNOSIS — Z23 ENCOUNTER FOR IMMUNIZATION: ICD-10-CM

## 2023-11-06 DIAGNOSIS — E11.9 TYPE 2 DIABETES MELLITUS WITHOUT COMPLICATION, WITHOUT LONG-TERM CURRENT USE OF INSULIN (H): ICD-10-CM

## 2023-11-06 LAB
ALBUMIN SERPL BCG-MCNC: 4.8 G/DL (ref 3.5–5.2)
ALP SERPL-CCNC: 58 U/L (ref 35–104)
ALT SERPL W P-5'-P-CCNC: 13 U/L (ref 0–50)
ANION GAP SERPL CALCULATED.3IONS-SCNC: 11 MMOL/L (ref 7–15)
AST SERPL W P-5'-P-CCNC: 26 U/L (ref 0–45)
BILIRUB SERPL-MCNC: 0.5 MG/DL
BUN SERPL-MCNC: 14.8 MG/DL (ref 8–23)
CALCIUM SERPL-MCNC: 10.3 MG/DL (ref 8.8–10.2)
CHLORIDE SERPL-SCNC: 104 MMOL/L (ref 98–107)
CREAT BLD-MCNC: 0.7 MG/DL (ref 0.6–1.1)
CREAT SERPL-MCNC: 0.75 MG/DL (ref 0.51–0.95)
DEPRECATED HCO3 PLAS-SCNC: 26 MMOL/L (ref 22–29)
EGFRCR SERPLBLD CKD-EPI 2021: 87 ML/MIN/1.73M2
EGFRCR SERPLBLD CKD-EPI 2021: >60 ML/MIN/1.73M2
GLUCOSE SERPL-MCNC: 121 MG/DL (ref 70–99)
HBA1C MFR BLD: 6.4 % (ref 0–5.6)
POTASSIUM SERPL-SCNC: 4.2 MMOL/L (ref 3.4–5.3)
PROT SERPL-MCNC: 7.9 G/DL (ref 6.4–8.3)
SODIUM SERPL-SCNC: 141 MMOL/L (ref 135–145)
VIT D+METAB SERPL-MCNC: 52 NG/ML (ref 20–50)

## 2023-11-06 PROCEDURE — 74177 CT ABD & PELVIS W/CONTRAST: CPT

## 2023-11-06 PROCEDURE — 36415 COLL VENOUS BLD VENIPUNCTURE: CPT | Performed by: STUDENT IN AN ORGANIZED HEALTH CARE EDUCATION/TRAINING PROGRAM

## 2023-11-06 PROCEDURE — 90480 ADMN SARSCOV2 VAC 1/ONLY CMP: CPT | Performed by: STUDENT IN AN ORGANIZED HEALTH CARE EDUCATION/TRAINING PROGRAM

## 2023-11-06 PROCEDURE — 99406 BEHAV CHNG SMOKING 3-10 MIN: CPT | Mod: 25 | Performed by: STUDENT IN AN ORGANIZED HEALTH CARE EDUCATION/TRAINING PROGRAM

## 2023-11-06 PROCEDURE — 82565 ASSAY OF CREATININE: CPT

## 2023-11-06 PROCEDURE — 82306 VITAMIN D 25 HYDROXY: CPT | Performed by: STUDENT IN AN ORGANIZED HEALTH CARE EDUCATION/TRAINING PROGRAM

## 2023-11-06 PROCEDURE — 86364 TISS TRNSGLTMNASE EA IG CLAS: CPT | Performed by: STUDENT IN AN ORGANIZED HEALTH CARE EDUCATION/TRAINING PROGRAM

## 2023-11-06 PROCEDURE — 82565 ASSAY OF CREATININE: CPT | Performed by: STUDENT IN AN ORGANIZED HEALTH CARE EDUCATION/TRAINING PROGRAM

## 2023-11-06 PROCEDURE — 90471 IMMUNIZATION ADMIN: CPT | Performed by: STUDENT IN AN ORGANIZED HEALTH CARE EDUCATION/TRAINING PROGRAM

## 2023-11-06 PROCEDURE — 250N000011 HC RX IP 250 OP 636: Mod: JZ | Performed by: STUDENT IN AN ORGANIZED HEALTH CARE EDUCATION/TRAINING PROGRAM

## 2023-11-06 PROCEDURE — 90678 RSV VACC PREF BIVALENT IM: CPT | Performed by: STUDENT IN AN ORGANIZED HEALTH CARE EDUCATION/TRAINING PROGRAM

## 2023-11-06 PROCEDURE — 86301 IMMUNOASSAY TUMOR CA 19-9: CPT | Mod: 90 | Performed by: STUDENT IN AN ORGANIZED HEALTH CARE EDUCATION/TRAINING PROGRAM

## 2023-11-06 PROCEDURE — 83036 HEMOGLOBIN GLYCOSYLATED A1C: CPT | Performed by: STUDENT IN AN ORGANIZED HEALTH CARE EDUCATION/TRAINING PROGRAM

## 2023-11-06 PROCEDURE — 91320 SARSCV2 VAC 30MCG TRS-SUC IM: CPT | Performed by: STUDENT IN AN ORGANIZED HEALTH CARE EDUCATION/TRAINING PROGRAM

## 2023-11-06 PROCEDURE — 99000 SPECIMEN HANDLING OFFICE-LAB: CPT | Performed by: STUDENT IN AN ORGANIZED HEALTH CARE EDUCATION/TRAINING PROGRAM

## 2023-11-06 PROCEDURE — 99215 OFFICE O/P EST HI 40 MIN: CPT | Mod: 25 | Performed by: STUDENT IN AN ORGANIZED HEALTH CARE EDUCATION/TRAINING PROGRAM

## 2023-11-06 PROCEDURE — 86140 C-REACTIVE PROTEIN: CPT | Performed by: STUDENT IN AN ORGANIZED HEALTH CARE EDUCATION/TRAINING PROGRAM

## 2023-11-06 RX ORDER — IOPAMIDOL 755 MG/ML
90 INJECTION, SOLUTION INTRAVASCULAR ONCE
Status: COMPLETED | OUTPATIENT
Start: 2023-11-06 | End: 2023-11-06

## 2023-11-06 RX ORDER — IOPAMIDOL 755 MG/ML
72 INJECTION, SOLUTION INTRAVASCULAR ONCE
Status: DISCONTINUED | OUTPATIENT
Start: 2023-11-06 | End: 2023-11-06

## 2023-11-06 RX ADMIN — IOPAMIDOL 90 ML: 755 INJECTION, SOLUTION INTRAVENOUS at 09:53

## 2023-11-06 NOTE — PROGRESS NOTES
Assessment & Plan       Unintentional weight loss   Ongoing issue for the patient  Patient is down 7 pounds since the last time I saw her in JuneShe attributes this to the stress of moving and getting her certification for her job   Patient is at her lowest weight thus far  Initially, she dropped from 119 pounds to 111 pounds while on Trulicity in late 2022.    Trulicity was discontinued and she started to gain weight.  Then got sick with COVID in January and since then has been losing weight  Work-up thus far including lung CT, mammogram and blood work has been negative.  Patient did have some mild thrombocytopenia identified on CBC for which she was sent to hematology/oncology.  They felt that number cytopenia could have been 2/2 COVID infection.  No further intervention was recommended  Liver USN 3/23 showed incidental dilation of the pancreatic duct and the common bile duct.  At the time.had advised patient to go see GI.  Had also offered E consult.  Patient had declined E consult and had wanted to see GI in person.  Since then, unfortunately, patient has not scheduled with GI.  Has also rescheduled her colonoscopy x2 at this time.  Also had prominent lymph node on the right submandibular neck that was imaged with ultrasound and unremarkable 6/30/2023  Has no GI symptoms but continues to lose weight  Some of it may be stress-induced but I am concerned about malignancy.  Had a very leila conversation with the patient about the importance of GI follow-up at this time.  Further delay in setting up with GI could result in adverse outcomes.  Patient verbalized understanding.  Plan:  - I placed an urgent referral today to GI.  The number for GI provided to the patient again.  I also told her that I would send her a [employer] message with the number.  - I would like her to get CT abdomen/pelvis with contrast today  - We will get CA 19-9,Celiac's testing   - Discussed reconsidering E consult.  Patient is amenable to  getting an E consult.  We will hold off for now and see what the CT abdomen shows.  - Very important that patient does get colonoscopies to rule out any lower GI etiology  - Strongly encourage patient to quit smoking  - Encourage patient to schedule all her meals  - We will have her come back in 2 weeks for weight check  -Has been trialed on mirtazapine but she did not    Urge incontinence:  Continues to be stable on current dose of oxybutynin    Tobacco abuse:  Unable to afford MTM  Had been trying to cut down on cigarette smoking on her own last time  She was down to half a pack per day last time I spoke to her  Had discussed starting the patch the last time she was here but she had told me she was going to do Nicorette gum  Today, she notes that she was on the Nicorette gum x3 days before she resumes smoking  Have been under increased stress due to the recent move  5 to 10 minutes spent on cessation  Strongly encouraged patient to start nicotine patches which she has at home to help with tobacco cessation  Patient is receptive to the information  She notes that she will trial the patch starting tonight    Type 2 diabetes:  Last A1c 6.3 7/10/2023  We will repeat today   She has misplaced her glucometer and is not sure what her sugars have been running at currently    reactive lymph node:  Noted on the of the right side of the neck, submandibular region  Ultrasound was unremarkable  The lymph node is stable without any changes per patient    Hypercalcemia:  Work-up thus far is negative  Recheck calcium today    Hypervitaminosis D:  Vitamin D levels are elevated at 109  Patient was taking 3000 IUs daily at home of vitamin D.  She saw my last message on [employer] regarding the elevated vitamin D and has discontinued vitamin D supplementation since that  We will recheck and decide whether or not patient should resume vitamin D supplementation      55 minutes spent by me on the date of the encounter doing chart review,  "history and exam, documentation and further activities per the note    DO MARY Navas [facility] KYMBERLY Vazquez is a 68 year old, presenting for the following health issues:  Follow Up (Patient is here for diabetes management. Patient states she keeps her diabetes very well manage. )        11/6/2023     8:25 AM   Additional Questions   Roomed by Christopher CUNNINGHAM MA       HPI       Review of Systems   As per HPI       Objective    /60 (BP Location: Right arm, Patient Position: Sitting, Cuff Size: Adult Regular)   Pulse 88   Temp 97.8  F (36.6  C)   Resp 12   Ht 1.651 m (5' 5\")   Wt 47.9 kg (105 lb 9.6 oz)   LMP  (LMP Unknown)   SpO2 98%   BMI 17.57 kg/m    Body mass index is 17.57 kg/m .  Physical Exam   GENERAL: healthy, alert and no distress, thin  RESP: lungs clear to auscultation - no rales, rhonchi or wheezes  CV: regular rate and rhythm,  no murmur, click or rub, no peripheral edema and peripheral pulses strong  MS: no gross musculoskeletal defects noted, no edema  PSYCH: mentation appears normal, affect anxious      "

## 2023-11-07 DIAGNOSIS — E83.52 HYPERCALCEMIA: Primary | ICD-10-CM

## 2023-11-07 DIAGNOSIS — I31.39 PERICARDIAL EFFUSION: ICD-10-CM

## 2023-11-07 DIAGNOSIS — E78.5 HYPERLIPIDEMIA LDL GOAL <100: ICD-10-CM

## 2023-11-07 LAB
CANCER AG19-9 SERPL IA-ACNC: 18 U/ML
CRP SERPL-MCNC: <3 MG/L

## 2023-11-07 RX ORDER — ROSUVASTATIN CALCIUM 40 MG/1
40 TABLET, COATED ORAL DAILY
Qty: 90 TABLET | Refills: 3 | Status: SHIPPED | OUTPATIENT
Start: 2023-11-07 | End: 2024-03-22

## 2023-11-08 ENCOUNTER — PATIENT OUTREACH (OUTPATIENT)
Dept: GASTROENTEROLOGY | Facility: CLINIC | Age: 68
End: 2023-11-08
Payer: COMMERCIAL

## 2023-11-08 LAB
TTG IGA SER-ACNC: 0.2 U/ML
TTG IGG SER-ACNC: <0.6 U/ML

## 2023-11-08 NOTE — TELEPHONE ENCOUNTER
"Called pt , no answer and no way to leave VM, to discuss GI referral entered by Dr Cherry with documentation of:    Has no GI symptoms but continues to lose weight  Some of it may be stress-induced but I am concerned about malignancy.  Had a very leila conversation with the patient about the importance of GI follow-up at this time.  Further delay in setting up with GI could result in adverse outcomes.  Patient verbalized understanding.  Plan:  - I placed an urgent referral today to GI.  The number for GI provided to the patient again.  I also told her that I would send her a [employer] message with the number    CT abd/pelvis 11/6/23  IMPRESSION:   1.  Borderline dilated pancreatic duct at 0.3 cm, but no obstructing stone or lesion. This is likely physiologic/senescent, but if there is high clinical concern, suggest further evaluation with MRCP.  2.  Morphologic changes of cirrhosis.  3.  Pericardial effusion partially imaged.    Referral reviewed by Dr Price's, who is performing her colonoscopy in Dec. His response:  \"Yes, I can do EUS + colonoscopy.  I won't be able to do the EUS 12/1 b/c there is no MAC for me at [facility].  Options below.    Option 1.  Keep colonoscopy 12/1 appt and schedule EUS MAC at MG or SD 60 minutes.  Option 2.  Reschedule for EUS+COL MC @ [facility] or SD 90 minutes.\"    Will send [employer] message as well. No orders for EUS entered at this time.    Felisa Johnson, RN, BSN,   [facility]  Care coordinator      "

## 2023-11-10 ENCOUNTER — TELEPHONE (OUTPATIENT)
Dept: GASTROENTEROLOGY | Facility: CLINIC | Age: 68
End: 2023-11-10
Payer: COMMERCIAL

## 2023-11-10 DIAGNOSIS — K86.89 DILATED PANCREATIC DUCT: ICD-10-CM

## 2023-11-10 DIAGNOSIS — R63.4 UNINTENTIONAL WEIGHT LOSS: Primary | ICD-10-CM

## 2023-11-10 NOTE — TELEPHONE ENCOUNTER
----- Message from Felisa Johnson, RN sent at 11/10/2023  8:26 AM CST -----  Regarding: EUS for Dr Price's to schedule  Hi team    Please see the EUS order placed by Dr Alexander. Pt is scheduled with Dr Alexander for a colon on 12/1 and now needs an EUS with him as well. Per Dr Thomas:    Yes, I can do EUS + colonoscopy.  I won't be able to do the EUS 12/1 b/c there is no MAC for me at [facility].  Options below.    Option 1.  Keep colonoscopy 12/1 appt and schedule EUS MAC at [facility] or SD 60 minutes.  Option 2.  Reschedule for EUS+COL MC @ [facility] or SD 90 minutes.    Thank you,  Tereza

## 2023-11-10 NOTE — TELEPHONE ENCOUNTER
"Endoscopy Scheduling Screen    Have you had a positive Covid test in the last 14 days?  No    Are you active on MyChart?   Yes    What insurance is in the chart?  Other:  [facility]    Ordering/Referring Provider: LYNDA   (If ordering provider performs procedure, schedule with ordering provider unless otherwise instructed. )    BMI: Estimated body mass index is 17.57 kg/m  as calculated from the following:    Height as of 11/6/23: 1.651 m (5' 5\").    Weight as of 11/6/23: 47.9 kg (105 lb 9.6 oz).     Sedation Ordered  MAC/deep sedation.   BMI<= 45 45 < BMI <= 48 48 < BMI < = 50  BMI > 50   No Restrictions No [facility]  No [facility]  [facility] with exceptions [facility] Only [place] Only       Are you taking any prescription medications for pain 3 or more times per week?   No    Do you have a history of malignant hyperthermia or adverse reaction to anesthesia?  No    (Females) Are you currently pregnant?   No     Have you been diagnosed or told you have pulmonary hypertension?   No    Do you have an LVAD?  No    Have you been told you have moderate to severe sleep apnea?  No    Have you been told you have COPD, asthma, or any other lung disease?  Yes     What breathing problems do you have?  COPD     Do you use home oxygen?  No    Have your breathing problems required an ED visit or hospitalization in the last year?  No    Do you have any heart conditions?  No     Have you ever had an organ transplant?   No    Have you ever had or are you awaiting a heart or lung transplant?   No    Have you had a stroke or transient ischemic attack (TIA aka \"mini stroke\" in the last 6 months?   No    Have you been diagnosed with or been told you have cirrhosis of the liver?   No    Are you currently on dialysis?   No    Do you need assistance transferring?   No    BMI: Estimated body mass index is 17.57 kg/m  as calculated from the following:    Height as of 11/6/23: 1.651 m (5' 5\").    Weight as of 11/6/23: 47.9 kg (105 lb 9.6 oz).     Is " patients BMI > 40 and scheduling location [facility]?  No    Do you take an injectable medication for weight loss or diabetes (excluding insulin)?  No    Do you take the medication Naltrexone?  No    Do you take blood thinners?  No       Prep   Are you currently on dialysis or do you have chronic kidney disease?  No    Do you have a diagnosis of diabetes?  Yes (Golytely Prep)    Do you have a diagnosis of cystic fibrosis (CF)?  No    On a regular basis do you go 3 -5 days between bowel movements?  No    BMI > 40?  No    Preferred Pharmacy:    [facility] [place], [place] - [address]  [address] [address]  Phone: [phone] Fax: [phone]      Final Scheduling Details   Colonoscopy prep sent?  N/A    Procedure scheduled  Endoscopic ultrasound (EUS)    Surgeon:  LYNDA     Date of procedure:  2/22/2024     Pre-OP / PAC:   Yes - Patient informed of pre-op requirement.    Location  [facility] - Per order.    Sedation   MAC/Deep Sedation - Per order.      Patient Reminders:   You will receive a call from a Nurse to review instructions and health history.  This assessment must be completed prior to your procedure.  Failure to complete the Nurse assessment may result in the procedure being cancelled.      On the day of your procedure, please designate an adult(s) who can drive you home stay with you for the next 24 hours. The medicines used in the exam will make you sleepy. You will not be able to drive.      You cannot take public transportation, ride share services, or non-medical taxi service without a responsible caregiver.  Medical transport services are allowed with the requirement that a responsible caregiver will receive you at your destination.  We require that drivers and caregivers are confirmed prior to your procedure.

## 2023-11-15 ENCOUNTER — TELEPHONE (OUTPATIENT)
Dept: GASTROENTEROLOGY | Facility: CLINIC | Age: 68
End: 2023-11-15

## 2023-11-15 NOTE — TELEPHONE ENCOUNTER
Pre visit planning completed.      Procedure details:    Patient scheduled for Colonoscopy  on 12.01.2023.     Arrival time: 0630. Procedure time 0730    Pre op exam needed? N/A    Facility location: [facility]; [address], [address], [place] [address]    Sedation type: Conscious sedation     Indication for procedure: Unintentional weight loss        Chart review:     Electronic implanted devices? No    Recent diagnosis of diverticulitis within the last 6 weeks? No    Diabetic? Yes. See medication holding recommendations     Diabetic medication HOLDING recommendations: (if applicable)  Oral diabetic medications: Yes:  Metformin (glucophage): HOLD day of procedure.  Diabetic injectables: N/A  Insulin: N/A      Medication review:    Anticoagulants? No    NSAIDS? No NSAID medications per patient's medication list.  RN will verify with pre-assessment call.    Other medication HOLDING recommendations:  N/A      Prep for procedure:     Bowel prep recommendation: Standard Golytely    Due to:  diabetes.     Prep instructions sent via [employer] Bowel prep script sent to [facility] [id number] [place] [facility] - [place], [place] - [address]-prep was previously sent         Myranda Aburto RN  Endoscopy Procedure Pre Assessment RN  [phone] option 4

## 2023-11-16 NOTE — TELEPHONE ENCOUNTER
Attempted to contact patient in order to complete pre assessment questions.     No answer. Left message to return call to [phone] option 4    Leesa Ruth RN  Endoscopy Procedure Pre Assessment RN

## 2023-11-22 NOTE — TELEPHONE ENCOUNTER
Second call attempt to complete pre assessment.     No answer.  Left message to return call to [phone] #4 by next business day prior to 4PM or procedure will be sent to cancel.     Additional information needed?  N/A      Leesa Ruth RN  Endoscopy Procedure Pre Assessment RN

## 2023-11-27 ENCOUNTER — TELEPHONE (OUTPATIENT)
Dept: GASTROENTEROLOGY | Facility: CLINIC | Age: 68
End: 2023-11-27
Payer: COMMERCIAL

## 2023-11-27 NOTE — TELEPHONE ENCOUNTER
----- Message from Leesa Ruth RN sent at 11/27/2023  8:58 AM CST -----  Hi,    No return call received.   Pre assessment was not completed for upcoming scheduled procedure.     Please cancel procedure per policy.     Leesa Ruth RN

## 2023-11-27 NOTE — TELEPHONE ENCOUNTER
Keyanna HOFF RN messages to add patient back to scheduled procedure on 12/01/24 at 730AM procedure.  Case had to be recreated.

## 2023-11-27 NOTE — TELEPHONE ENCOUNTER
[employer] message routed to writer where patient was inquiring about keeping procedure scheduled for 12/1/23 if they called back today 11/27/23.     Upon review procedure was sent to cancel. Writer will consult with endoscopy scheduling to see if patient could be added back.    --------------------------------------------------  Addendum 11/27/23 12:46 PM     Patient was able to added back to schedule.     Writer attempted to call patient to complete pre assessment but no answer.     Left message to return call [phone] #4. [employer] message also sent.     NOTE: If no return call by 11/28/23 will send to cancel per cancellation policy.     Myranda Lorenzo RN  Endoscopy Procedure Pre Assessment RN  [phone] option 4

## 2023-11-27 NOTE — TELEPHONE ENCOUNTER
No return call received.   Pre assessment was not completed for upcoming scheduled procedure.     Staff message sent to endoscopy scheduling to cancel procedure per policy.       Leesa Ruth RN   Endoscopy Procedure Pre Assessment RN

## 2023-11-27 NOTE — TELEPHONE ENCOUNTER
Caller: No call made  Reason for Reschedule/Cancellation (please be detailed, any staff messages or encounters to note?): Cancellation policy - pre-assessment call not completed.      Prior to reschedule please review:  Ordering Provider: Soraya Cherry, DO   Sedation per order: Moderate  Does patient have any ASC Exclusions, please identify?: No      Notes on Cancelled Procedure:  Procedure: Lower Endoscopy [Colonoscopy]   Date: 12/1/2023  Location: [facility]; [address] [place] [address], [address] Floor, [place] [address]  Surgeon: Martha      Rescheduled: No, sent MyChart and CTL.     CASE IN DEPOT

## 2023-11-28 NOTE — TELEPHONE ENCOUNTER
Pre assessment completed for upcoming procedure.   (Please see previous telephone encounter notes for complete details)      Procedure details:    Arrival time and facility location reviewed.    Pre op exam needed? N/A    Designated  policy reviewed. Instructed to have someone stay 6 hours post procedure.     COVID policy reviewed.      Medication review:    Oral diabetic medication(s): Metformin (glucophage): HOLD day of procedure.  NSAID medication(s): Ibuprofen (Advil, Motrin): HOLD 1 day before procedure.      Prep for procedure:     Procedure prep instructions reviewed.        Additional information needed?  N/A      Patient  verbalized understanding and had no questions or concerns at this time.      Leesa Ruth RN  Endoscopy Procedure Pre Assessment RN  [phone] option 4

## 2023-12-01 ENCOUNTER — HOSPITAL ENCOUNTER (OUTPATIENT)
Facility: CLINIC | Age: 68
Discharge: HOME | End: 2023-12-01
Attending: INTERNAL MEDICINE | Admitting: INTERNAL MEDICINE
Payer: COMMERCIAL

## 2023-12-01 VITALS
HEART RATE: 75 BPM | DIASTOLIC BLOOD PRESSURE: 63 MMHG | RESPIRATION RATE: 14 BRPM | SYSTOLIC BLOOD PRESSURE: 128 MMHG | OXYGEN SATURATION: 95 %

## 2023-12-01 LAB — COLONOSCOPY: NORMAL

## 2023-12-01 PROCEDURE — G0500 MOD SEDAT ENDO SERVICE >5YRS: HCPCS | Performed by: INTERNAL MEDICINE

## 2023-12-01 PROCEDURE — 88305 TISSUE EXAM BY PATHOLOGIST: CPT | Mod: 26 | Performed by: PATHOLOGY

## 2023-12-01 PROCEDURE — 88305 TISSUE EXAM BY PATHOLOGIST: CPT | Mod: TC | Performed by: INTERNAL MEDICINE

## 2023-12-01 PROCEDURE — 250N000011 HC RX IP 250 OP 636: Performed by: INTERNAL MEDICINE

## 2023-12-01 PROCEDURE — 99153 MOD SED SAME PHYS/QHP EA: CPT | Performed by: INTERNAL MEDICINE

## 2023-12-01 PROCEDURE — 45385 COLONOSCOPY W/LESION REMOVAL: CPT | Performed by: INTERNAL MEDICINE

## 2023-12-01 RX ORDER — NALOXONE HYDROCHLORIDE 0.4 MG/ML
0.4 INJECTION, SOLUTION INTRAMUSCULAR; INTRAVENOUS; SUBCUTANEOUS
Status: DISCONTINUED | OUTPATIENT
Start: 2023-12-01 | End: 2023-12-01 | Stop reason: HOSPADM

## 2023-12-01 RX ORDER — ONDANSETRON 2 MG/ML
4 INJECTION INTRAMUSCULAR; INTRAVENOUS EVERY 6 HOURS PRN
Status: DISCONTINUED | OUTPATIENT
Start: 2023-12-01 | End: 2023-12-01 | Stop reason: HOSPADM

## 2023-12-01 RX ORDER — FLUMAZENIL 0.1 MG/ML
0.2 INJECTION, SOLUTION INTRAVENOUS
Status: DISCONTINUED | OUTPATIENT
Start: 2023-12-01 | End: 2023-12-01 | Stop reason: HOSPADM

## 2023-12-01 RX ORDER — LIDOCAINE 40 MG/G
CREAM TOPICAL
Status: DISCONTINUED | OUTPATIENT
Start: 2023-12-01 | End: 2023-12-01 | Stop reason: HOSPADM

## 2023-12-01 RX ORDER — NALOXONE HYDROCHLORIDE 0.4 MG/ML
0.2 INJECTION, SOLUTION INTRAMUSCULAR; INTRAVENOUS; SUBCUTANEOUS
Status: DISCONTINUED | OUTPATIENT
Start: 2023-12-01 | End: 2023-12-01 | Stop reason: HOSPADM

## 2023-12-01 RX ORDER — ONDANSETRON 4 MG/1
4 TABLET, ORALLY DISINTEGRATING ORAL EVERY 6 HOURS PRN
Status: DISCONTINUED | OUTPATIENT
Start: 2023-12-01 | End: 2023-12-01 | Stop reason: HOSPADM

## 2023-12-01 RX ORDER — FENTANYL CITRATE 50 UG/ML
INJECTION, SOLUTION INTRAMUSCULAR; INTRAVENOUS PRN
Status: DISCONTINUED | OUTPATIENT
Start: 2023-12-01 | End: 2023-12-01 | Stop reason: HOSPADM

## 2023-12-01 RX ORDER — PROCHLORPERAZINE MALEATE 5 MG
5 TABLET ORAL EVERY 6 HOURS PRN
Status: DISCONTINUED | OUTPATIENT
Start: 2023-12-01 | End: 2023-12-01 | Stop reason: HOSPADM

## 2023-12-01 RX ORDER — ONDANSETRON 2 MG/ML
4 INJECTION INTRAMUSCULAR; INTRAVENOUS
Status: DISCONTINUED | OUTPATIENT
Start: 2023-12-01 | End: 2023-12-01 | Stop reason: HOSPADM

## 2023-12-01 ASSESSMENT — ACTIVITIES OF DAILY LIVING (ADL): ADLS_ACUITY_SCORE: 33

## 2023-12-01 NOTE — H&P
ENDOSCOPY PRE-SEDATION H&P FOR OUTPATIENT PROCEDURES    Taylor Aburto  [id number]  1955    Procedure: colonoscopy    Pre-procedure diagnosis: unexplained weight loss    Past medical history:   Past Medical History:   Diagnosis Date    Arthritis 6/27/2013    H/O ETOH abuse 2/6/2018    Moderate smoker (20 or less per day) 6/27/2013    Vitamin D deficiency 2/6/2018       Past surgical history: No past surgical history on file.    No current facility-administered medications for this encounter.       Allergies   Allergen Reactions    Prednisone Dizziness       History of Anesthesia/Sedation Problems: no    PHYSICAL EXAMINATION:  Constitutional: aaox3, cooperative, pleasant  Vitals reviewed: LMP  (LMP Unknown)   Wt:   Wt Readings from Last 2 Encounters:   11/06/23 47.9 kg (105 lb 9.6 oz)   06/30/23 51.1 kg (112 lb 9.6 oz)      Eyes: Sclera anicteric/injected  Ears/nose/mouth/throat: Normal oropharynx without ulcers or exudate, mucus membranes moist, hearing intact  Neck: supple, thyroid normal size  CV: No edema  Respiratory: Unlabored breathing  Lymph: No submandibular, supraclavicular or inguinal lymphadenopathy  Abd: Nondistended, no masses, nontender  Skin: warm, perfused, no jaundice  Psych: Normal affect  MSK: normal movement on limited exam.    ASA Score: See Provation note    Assessment/Plan:     The patient is an appropriate candidate to receive sedation.    Informed consent was discussed with the patient/family, including the risks, benefits, potential complications and any alternative options associated with sedation.    Patient assessment completed just prior to sedation and while under constant observation by the provider. Condition determined to be adequate for proceeding with sedation.    The specific risks for the procedure were discussed with the patient at the time of informed consent and include but are not limited to perforation which could require surgery, missing significant neoplasm or  lesion, hemorrhage and adverse sedative complication.      Jake Thomas MD

## 2023-12-01 NOTE — OR NURSING
Pt tolerated colonoscopy with polypectomy very well, under conscious sedation.  All 4 polyps were taken with a cold Exacto snare

## 2023-12-04 LAB
PATH REPORT.COMMENTS IMP SPEC: NORMAL
PATH REPORT.COMMENTS IMP SPEC: NORMAL
PATH REPORT.FINAL DX SPEC: NORMAL
PATH REPORT.GROSS SPEC: NORMAL
PATH REPORT.MICROSCOPIC SPEC OTHER STN: NORMAL
PATH REPORT.RELEVANT HX SPEC: NORMAL
PHOTO IMAGE: NORMAL

## 2023-12-05 ENCOUNTER — VIRTUAL VISIT (OUTPATIENT)
Dept: FAMILY MEDICINE | Facility: CLINIC | Age: 68
End: 2023-12-05
Payer: COMMERCIAL

## 2023-12-05 DIAGNOSIS — Z72.0 TOBACCO ABUSE: ICD-10-CM

## 2023-12-05 DIAGNOSIS — R63.4 UNINTENTIONAL WEIGHT LOSS: Primary | ICD-10-CM

## 2023-12-05 DIAGNOSIS — E83.52 HYPERCALCEMIA: ICD-10-CM

## 2023-12-05 DIAGNOSIS — F17.200 NICOTINE DEPENDENCE, UNCOMPLICATED, UNSPECIFIED NICOTINE PRODUCT TYPE: ICD-10-CM

## 2023-12-05 PROCEDURE — 99406 BEHAV CHNG SMOKING 3-10 MIN: CPT | Mod: 25 | Performed by: STUDENT IN AN ORGANIZED HEALTH CARE EDUCATION/TRAINING PROGRAM

## 2023-12-05 PROCEDURE — 99213 OFFICE O/P EST LOW 20 MIN: CPT | Mod: VID | Performed by: STUDENT IN AN ORGANIZED HEALTH CARE EDUCATION/TRAINING PROGRAM

## 2023-12-05 NOTE — PROGRESS NOTES
"Taylor is a 68 year old who is being evaluated via a billable video visit.      How would you like to obtain your AVS? MyChart  If the video visit is dropped, the invitation should be resent by: Text to cell phone: [phone]  Will anyone else be joining your video visit? No          Assessment & Plan       ICD-10-CM    1. Unintentional weight loss  R63.4       2. Hypercalcemia  E83.52       3. Tobacco abuse  Z72.0       4. Nicotine dependence, uncomplicated, unspecified nicotine product type  F17.200 SMOKING CESSATION COUNSELING 3-10 MIN          Unintentional weight loss:  Meeting today for a weight check  Patient notes that she has been eating meals without missing them  She notes she has put on weight because \"her clothes are tighter\"  However, she cannot tell me exactly how much she weighs because she does not have a scale at home  She says she \"feels good\" without any B type symptoms or night sweats  Underwent colonoscopy which was negative for malignancy  They did recommend upper endoscopy with EUS for further for incidental dilation of pancreatic duct and CBD.  She has that set up in February.  Plan:  - We will follow-up on results of EUS and EGD  - Patient has no way of checking her weight on visit today.  Will see her back in 6 weeks and get weight check    Tobacco abuse:  Unable to afford MTM  Has cut down smoking  Did not do well with the nicotine patch  She is cut down to 5 cigarettes  Counseling provided for 5 to 10 minutes  Plan is for her to cut down to 3 cigarettes over the next few weeks.  Patient is amenable    Hypercalcemia:  Patient had persistent hypercalcemia on last set of blood work  Patient notes that she is not taking any supplements with calcium in it currently.  She is only on B12 supplementation only  PTH, vitamin D Normal.  iCal was within normal range  Will do PTH RP as last check.  If unremarkable, okay to monitor.      Soraya Cherry, DO  [facility] " KYMBERLY Vazquez is a 68 year old, presenting for the following health issues:  Follow Up and Weight Problem (Size 4 clothing is fitting better. Eating healthier )      12/5/2023     3:20 PM   Additional Questions   Roomed by Ryan MCLEAN CMA       HPI             Review of Systems   As per HPI       Objective       Jan 16th at 10 AM       Vitals:  No vitals were obtained today due to virtual visit.    Physical Exam   GENERAL: Healthy, alert and no distress  EYES: Eyes grossly normal to inspection.  No discharge or erythema, or obvious scleral/conjunctival abnormalities.  RESP: No audible wheeze, cough, or visible cyanosis.  No visible retractions or increased work of breathing.    SKIN: Visible skin clear. No significant rash, abnormal pigmentation or lesions.  NEURO: Cranial nerves grossly intact.  Mentation and speech appropriate for age.  PSYCH: Mentation appears normal, affect normal/bright, judgement and insight intact, normal speech and appearance well-groomed.    Video-Visit Details    Type of service:  Video Visit     Originating Location (pt. Location): Home    Video Start: ~3:30 pm   Video End: ~3:45 pm     Distant Location (provider location):  On-site  Platform used for Video Visit: [email or web address]

## 2024-02-02 DIAGNOSIS — E11.9 TYPE 2 DIABETES MELLITUS WITHOUT COMPLICATION, WITHOUT LONG-TERM CURRENT USE OF INSULIN (H): ICD-10-CM

## 2024-02-02 NOTE — TELEPHONE ENCOUNTER
Refill requested for Metformin.    Zuly Jacobo CMA (AAMA)  [facility]

## 2024-02-05 RX ORDER — METFORMIN HCL 500 MG
1000 TABLET, EXTENDED RELEASE 24 HR ORAL 2 TIMES DAILY
Qty: 360 TABLET | Refills: 0 | Status: SHIPPED | OUTPATIENT
Start: 2024-02-05 | End: 2024-03-22

## 2024-02-06 ENCOUNTER — TELEPHONE (OUTPATIENT)
Dept: GASTROENTEROLOGY | Facility: CLINIC | Age: 69
End: 2024-02-06
Payer: COMMERCIAL

## 2024-02-06 NOTE — TELEPHONE ENCOUNTER
Attempted to contact pt to remind them of their PAC appt prior to their endoscopy procedure. No answer- LVM- Sent [facility]

## 2024-02-08 ENCOUNTER — TELEPHONE (OUTPATIENT)
Dept: GASTROENTEROLOGY | Facility: CLINIC | Age: 69
End: 2024-02-08
Payer: COMMERCIAL

## 2024-02-08 NOTE — TELEPHONE ENCOUNTER
Pre visit planning completed.      Procedure details:    Patient scheduled for Endoscopic ultrasound (EUS) on 2/22/2024.     Arrival time: 0715. Procedure time 0815    Pre op exam needed? Yes.  Patient needs to complete a pre-operative exam prior to procedure.  Informed patient pre-op is needed via [employer] message    Facility location: [facility]; [facility] [address] [place] [address]    Sedation type: MAC    Indication for procedure:   Unintentional weight loss   Dilated pancreatic duct         Chart review:     Electronic implanted devices? No    Recent diagnosis of diverticulitis within the last 6 weeks? No    Diabetic? Yes. See medication holding recommendations     Diabetic medication HOLDING recommendations: (if applicable)  Oral diabetic medications: Yes:  Metformin (glucophage): HOLD day of procedure.  Diabetic injectables: No  Insulin: No      Medication review:    Anticoagulants? No    NSAIDS? No NSAID medications per patient's medication list.  RN will verify with pre-assessment call.    Other medication HOLDING recommendations:  N/A      Prep for procedure:     Bowel prep recommendation: N/A     Prep instructions sent via [employer]       Elizabeth Sotelo RN  Endoscopy Procedure Pre Assessment RN  [phone] option 4

## 2024-02-09 NOTE — TELEPHONE ENCOUNTER
Attempted to contact patient in order to complete pre assessment questions.     No answer. Left message to return call to [phone] option 4    Missed call communication sent via [employer].    Pre op exam needed? Yes.  Patient needs to complete a pre-operative exam prior to procedure.  Informed patient pre-op is needed via voicemail message and MyChart message    Elizabeth Sotelo RN  Endoscopy Procedure Pre-Assessment RN

## 2024-02-09 NOTE — TELEPHONE ENCOUNTER
Pre assessment completed for upcoming procedure.   (Please see previous telephone encounter notes for complete details)    Patient  returned call.       Procedure details:    Arrival time and facility location reviewed.    Pre op exam needed? Yes. Patient is aware she will need pre-op.  She will try and get in with her PCP but if not she was given the number for the PAC eval [phone] and she knows it needs to be in person visit.     Designated  policy reviewed. Instructed to have someone stay 24  hours post procedure.     COVID policy reviewed.      Medication review:    Medications reviewed. Please see supporting documentation below. Holding recommendations discussed (if applicable).       Prep for procedure:     Procedure prep instructions reviewed.        Any additional information needed:  N/A      Patient  verbalized understanding and had no questions or concerns at this time.      Myranda Flood RN  Endoscopy Procedure Pre Assessment RN  [phone] option 4

## 2024-02-14 ENCOUNTER — OFFICE VISIT (OUTPATIENT)
Dept: FAMILY MEDICINE | Facility: CLINIC | Age: 69
End: 2024-02-14
Payer: COMMERCIAL

## 2024-02-14 VITALS
HEART RATE: 78 BPM | RESPIRATION RATE: 16 BRPM | DIASTOLIC BLOOD PRESSURE: 72 MMHG | WEIGHT: 108.6 LBS | TEMPERATURE: 97.2 F | OXYGEN SATURATION: 97 % | SYSTOLIC BLOOD PRESSURE: 138 MMHG | BODY MASS INDEX: 18.09 KG/M2 | HEIGHT: 65 IN

## 2024-02-14 DIAGNOSIS — Z13.0 SCREENING FOR ENDOCRINE, NUTRITIONAL, METABOLIC AND IMMUNITY DISORDER: ICD-10-CM

## 2024-02-14 DIAGNOSIS — Z13.29 SCREENING FOR ENDOCRINE, NUTRITIONAL, METABOLIC AND IMMUNITY DISORDER: ICD-10-CM

## 2024-02-14 DIAGNOSIS — F17.218 CIGARETTE NICOTINE DEPENDENCE WITH OTHER NICOTINE-INDUCED DISORDER: ICD-10-CM

## 2024-02-14 DIAGNOSIS — Z13.21 SCREENING FOR ENDOCRINE, NUTRITIONAL, METABOLIC AND IMMUNITY DISORDER: ICD-10-CM

## 2024-02-14 DIAGNOSIS — E11.00 TYPE 2 DIABETES MELLITUS WITH HYPEROSMOLARITY WITHOUT COMA, WITHOUT LONG-TERM CURRENT USE OF INSULIN (H): Primary | ICD-10-CM

## 2024-02-14 DIAGNOSIS — Z01.818 PREOP GENERAL PHYSICAL EXAM: ICD-10-CM

## 2024-02-14 DIAGNOSIS — Z13.228 SCREENING FOR ENDOCRINE, NUTRITIONAL, METABOLIC AND IMMUNITY DISORDER: ICD-10-CM

## 2024-02-14 DIAGNOSIS — R63.4 UNINTENTIONAL WEIGHT LOSS: ICD-10-CM

## 2024-02-14 PROCEDURE — 99214 OFFICE O/P EST MOD 30 MIN: CPT | Mod: 25 | Performed by: FAMILY MEDICINE

## 2024-02-14 PROCEDURE — 90471 IMMUNIZATION ADMIN: CPT | Performed by: FAMILY MEDICINE

## 2024-02-14 PROCEDURE — 90750 HZV VACC RECOMBINANT IM: CPT | Performed by: FAMILY MEDICINE

## 2024-02-14 ASSESSMENT — PAIN SCALES - GENERAL: PAINLEVEL: MODERATE PAIN (4)

## 2024-02-14 NOTE — PROGRESS NOTES
Preoperative Evaluation  [facility]  [address] [place] [address]  Phone: [phone]  Fax: [phone]  Primary Provider: Soraya Cherry  Pre-op Performing Provider: ARIANNA TRUJILLO  Feb 14, 2024       Taylor is a 68 year old, presenting for the following:  Pre-Op Exam (Surgery/Procedure: Endoscopic ultrasound upper gastrointestinal tract (GI) /Surgery Location: [facility] /Surgeon: Dr. Thomas/Surgery Date: 02/22/24/Time of Surgery: TBD /Where patient plans to recover: At home with family/Fax number for surgical facility: Note does not need to be faxed, will be available electronically in Epic.) and Recheck Medication        2/14/2024     8:31 AM   Additional Questions   Roomed by Jo Ann     Surgical Information  Surgery/Procedure: Endoscopic ultrasound upper gastrointestinal tract (GI)   Surgery Location: [facility]   Surgeon: Dr. Thomas  Surgery Date: 02/22/24  Time of Surgery: TBD   Where patient plans to recover: At home with family  Fax number for surgical facility: Note does not need to be faxed, will be available electronically in Epic.    Assessment & Plan   The proposed surgical procedure is considered LOW risk.  Preop general physical exam    Type 2 diabetes mellitus with hyperosmolarity without coma, without long-term current use of insulin (H)  - Adult Eye  Referral    Screening for endocrine, nutritional, metabolic and immunity disorder  - Lipid Profile (Chol, Trig, HDL, LDL calc)    Unintentional weight loss  She gained 3# since her last check, but still underweight.    Cigarette nicotine dependence with other nicotine-induced disorder  Using lozenges.    Possible Sleep Apnea: no      - No identified additional risk factors other than previously addressed    Antiplatelet or Anticoagulation Medication Instructions   - aspirin: Discontinue aspirin 7-10 days prior to procedure to reduce bleeding risk. It  should be resumed postoperatively.     Additional Medication Instructions  Patient is to take all scheduled medications on the day of surgery EXCEPT for modifications listed below:  Do not take metformin that morning.  Stop OTC's for 14 days, except Tylenol.  Stop Aleve 4 days before the procedure.    Recommendation  APPROVAL GIVEN to proceed with proposed procedure, without further diagnostic evaluation.  Subjective       HPI related to upcoming procedure: Being worked up for unintentional weight loss with an upper endoscopy and EUS.        2/9/2024     5:33 PM   Preop Questions   1. Have you ever had a heart attack or stroke? UNKNOWN    2. Have you ever had surgery on your heart or blood vessels, such as a stent placement, a coronary artery bypass, or surgery on an artery in your head, neck, heart, or legs? No   3. Do you have chest pain with activity? YES    4. Do you have a history of  heart failure? No   5. Do you currently have a cold, bronchitis or symptoms of other infection? No   6. Do you have a cough, shortness of breath, or wheezing? No   7. Do you or anyone in your family have previous history of blood clots? UNKNOWN    8. Do you or does anyone in your family have a serious bleeding problem such as prolonged bleeding following surgeries or cuts? No   9. Have you ever had problems with anemia or been told to take iron pills? UNKNOWN    10. Have you had any abnormal blood loss such as black, tarry or bloody stools, or abnormal vaginal bleeding? No   11. Have you ever had a blood transfusion? No   12. Are you willing to have a blood transfusion if it is medically needed before, during, or after your surgery? Yes   13. Have you or any of your relatives ever had problems with anesthesia? YES    14. Do you have sleep apnea, excessive snoring or daytime drowsiness? YES    14a. Do you have a CPAP machine? No   15. Do you have any artifical heart valves or other implanted medical devices like a pacemaker,  defibrillator, or continuous glucose monitor? No   16. Do you have artificial joints? No   17. Are you allergic to latex? No       Health Care Directive  Patient does not have a Health Care Directive or Living Will: Discussed advance care planning with patient; information given to patient to review.    Preoperative Review of    reviewed - no record of controlled substances prescribed.    Patient Active Problem List    Diagnosis Date Noted    Osteopenia of multiple sites 10/10/2022     Priority: Medium     Dexa - 10/2022      Lipoma of skin and subcutaneous tissue 09/06/2022     Priority: Medium    Post-menopausal 09/06/2022     Priority: Medium    Chronic cough 09/06/2022     Priority: Medium    Unintentional weight loss 09/06/2022     Priority: Medium    Diabetes mellitus, type 2 (H) 11/09/2020     Priority: Medium    Cigarette nicotine dependence with other nicotine-induced disorder 11/09/2020     Priority: Medium    H/O ETOH abuse 02/06/2018     Priority: Medium    Vitamin D deficiency 02/06/2018     Priority: Medium    Arthritis 06/27/2013     Priority: Medium    Moderate smoker (20 or less per day) 06/27/2013     Priority: Medium      Past Medical History:   Diagnosis Date    Arthritis 6/27/2013    H/O ETOH abuse 2/6/2018    Moderate smoker (20 or less per day) 6/27/2013    Vitamin D deficiency 2/6/2018     Past Surgical History:   Procedure Laterality Date    COLONOSCOPY N/A 12/1/2023    Procedure: COLONOSCOPY, FLEXIBLE, WITH LESION REMOVAL USING SNARE;  Surgeon: Jake Thomas MD;  Location:  GI     Current Outpatient Medications   Medication Sig Dispense Refill    aspirin 81 MG EC tablet [ASPIRIN 81 MG EC TABLET] Take 81 mg by mouth.      blood glucose (ACCU-CHEK GUIDE) test strip [ACCU-CHEK GUIDE TEST STRIPS STRIPS] TEST BLOOD GLUCOSE LEVELS TWICE A  strip 3    blood glucose (NO BRAND SPECIFIED) lancets standard Use to test blood sugar two times daily or as directed. 200 lancet  0    blood glucose meter (GLUCOMETER) [BLOOD GLUCOSE METER (GLUCOMETER)] Use 1 each As Directed as needed. Dispense glucometer brand per patient's insurance at pharmacy discretion. DX: E11.9 1 each 0    fluticasone (FLONASE) 50 MCG/ACT nasal spray Spray 1 spray into both nostrils daily 16 g 4    lisinopril (ZESTRIL) 5 MG tablet Take 1 tablet (5 mg) by mouth daily 90 tablet 3    metFORMIN (GLUCOPHAGE XR) 500 MG 24 hr tablet Take 2 tablets (1,000 mg) by mouth 2 times daily 360 tablet 0    nicotine polacrilex (NICORETTE) 4 MG gum Chew one piece every 1- 2 hours for cravings as needed. Try to limit using the gum for strong craving only since you are taking the patch, and previous history of nausea and vomiting. 50 each 3    oxybutynin ER (DITROPAN XL) 10 MG 24 hr tablet Take 1 tablet (10 mg) by mouth daily 90 tablet 3    rosuvastatin (CRESTOR) 40 MG tablet Take 1 tablet (40 mg) by mouth daily 90 tablet 3    bisacodyl (DULCOLAX) 5 MG EC tablet Take 2 tablets at 3 pm the day before your procedure. If your procedure is before 11 am, take 2 additional tablets at 11 pm. If your procedure is after 11 am, take 2 additional tablets at 6 am. For additional instructions refer to your colonoscopy prep instructions. 4 tablet 0    docosahexaenoic acid-epa 120-180 mg cap Take 2 g by mouth (Patient not taking: Reported on 2/14/2024)      polyethylene glycol (GOLYTELY) 236 g suspension The night before the exam at 6 pm drink an 8-ounce glass every 15 minutes until the jug is half empty. If you arrive before 11 AM: Drink the other half of the Golytely jug at 11 PM night before procedure. If you arrive after 11 AM: Drink the other half of the Golytely jug at 6 AM day of procedure. For additional instructions refer to your colonoscopy prep instructions. 4000 mL 0    vitamin A-vitamin C-vit E-min (OCUVITE) Tab tablet [VITAMIN A-VITAMIN C-VIT E-MIN (OCUVITE) TAB TABLET] Take by mouth daily. (Patient not taking: Reported on 2/14/2024)       "vitamin B complex-folic acid (B COMPLEX 1, WITH FOLIC ACID,) 0.4 mg Tab [VITAMIN B COMPLEX-FOLIC ACID (B COMPLEX 1, WITH FOLIC ACID,) 0.4 MG TAB] Take by mouth. (Patient not taking: Reported on 2/14/2024)         Allergies   Allergen Reactions    Prednisone Dizziness        Social History     Tobacco Use    Smoking status: Every Day     Packs/day: 0.25     Years: 40.00     Additional pack years: 0.00     Total pack years: 10.00     Types: Cigarettes    Smokeless tobacco: Never   Substance Use Topics    Alcohol use: Not Currently     Comment: Sober     History reviewed. No pertinent family history.  History   Drug Use Unknown     Review of Systems  Constitutional, HEENT, cardiovascular, pulmonary, GI, , musculoskeletal, neuro, skin, endocrine and psych systems are negative, except as otherwise noted.  Objective    /72 (BP Location: Left arm, Patient Position: Sitting, Cuff Size: Adult Regular)   Pulse 78   Temp 97.2  F (36.2  C) (Tympanic)   Resp 16   Ht 1.651 m (5' 5\")   Wt 49.3 kg (108 lb 9.6 oz)   LMP  (LMP Unknown)   SpO2 97%   BMI 18.07 kg/m     Estimated body mass index is 18.07 kg/m  as calculated from the following:    Height as of this encounter: 1.651 m (5' 5\").    Weight as of this encounter: 49.3 kg (108 lb 9.6 oz).  Physical Exam  GENERAL: alert and no distress  EYES: Eyes grossly normal to inspection, PERRL and conjunctivae and sclerae normal  HENT: ear canals and TM's normal, nose and mouth without ulcers or lesions  NECK: no adenopathy, no asymmetry, masses, or scars  RESP: lungs clear to auscultation - no rales, rhonchi or wheezes  CV: regular rate and rhythm, normal S1 S2, no S3 or S4, no murmur, click or rub, no peripheral edema  ABDOMEN: soft, nontender, no hepatosplenomegaly, no masses and bowel sounds normal  MS: no gross musculoskeletal defects noted, no edema  SKIN: no suspicious lesions or rashes  NEURO: Normal strength and tone, mentation intact and speech normal  PSYCH: " mentation appears normal, affect normal/bright    Recent Labs   Lab Test 11/06/23  0950 11/06/23  0929 07/10/23  1325 03/31/23  1523 02/03/23  1005 01/27/23  1437   HGB  --   --   --   --  13.4 13.7   PLT  --   --   --   --  170 127*   INR  --   --   --   --  1.01  --    NA  --  141  --   --  140  --    POTASSIUM  --  4.2  --   --  4.5  --    CR 0.7 0.75  --   --  0.90  --    A1C  --  6.4* 6.3*   < >  --  6.8*    < > = values in this interval not displayed.        Diagnostics  No results found for this or any previous visit (from the past 720 hour(s)).   No EKG required for low risk surgery (cataract, skin procedure, breast biopsy, etc).    Revised Cardiac Risk Index (RCRI)  The patient has the following serious cardiovascular risks for perioperative complications:   - No serious cardiac risks = 0 points     RCRI Interpretation: 0 points: Class I (very low risk - 0.4% complication rate)  Signed Electronically by: ARIANNA TRUJILLO MD  Copy of this evaluation report is provided to requesting physician.

## 2024-02-14 NOTE — PATIENT INSTRUCTIONS
Preparing for Your Surgery  Getting started  A nurse will call you to review your health history and instructions. They will give you an arrival time based on your scheduled surgery time. Please be ready to share:  Your doctor's clinic name and phone number  Your medical, surgical, and anesthesia history  A list of allergies and sensitivities  A list of medicines, including herbal treatments and over-the-counter drugs  Whether the patient has a legal guardian (ask how to send us the papers in advance)  Please tell us if you're pregnant--or if there's any chance you might be pregnant. Some surgeries may injure a fetus (unborn baby), so they require a pregnancy test. Surgeries that are safe for a fetus don't always need a test, and you can choose whether to have one.   If you have a child who's having surgery, please ask for a copy of Preparing for Your Child's Surgery.    Preparing for surgery  Within 10 to 30 days of surgery: Have a pre-op exam (sometimes called an H&P, or History and Physical). This can be done at a clinic or pre-operative center.  If you're having a , you may not need this exam. Talk to your care team.  At your pre-op exam, talk to your care team about all medicines you take. If you need to stop any medicines before surgery, ask when to start taking them again.  We do this for your safety. Many medicines can make you bleed too much during surgery. Some change how well surgery (anesthesia) drugs work.  Call your insurance company to let them know you're having surgery. (If you don't have insurance, call [phone].)  Call your clinic if there's any change in your health. This includes signs of a cold or flu (sore throat, runny nose, cough, rash, fever). It also includes a scrape or scratch near the surgery site.  If you have questions on the day of surgery, call your hospital or surgery center.  Eating and drinking guidelines  For your safety: Unless your surgeon tells you otherwise,  follow the guidelines below.  Eat and drink as usual until 8 hours before you arrive for surgery. After that, no food or milk.  Drink clear liquids until 2 hours before you arrive. These are liquids you can see through, like water, Gatorade, and Propel Water. They also include plain black coffee and tea (no cream or milk), candy, and breath mints. You can spit out gum when you arrive.  If you drink alcohol: Stop drinking it the night before surgery.  If your care team tells you to take medicine on the morning of surgery, it's okay to take it with a sip of water.  Preventing infection  Shower or bathe the night before and morning of your surgery. Follow the instructions your clinic gave you. (If no instructions, use regular soap.)  Don't shave or clip hair near your surgery site. We'll remove the hair if needed.  Don't smoke or vape the morning of surgery. You may chew nicotine gum up to 2 hours before surgery. A nicotine patch is okay.  Note: Some surgeries require you to completely quit smoking and nicotine. Check with your surgeon.  Your care team will make every effort to keep you safe from infection. We will:  Clean our hands often with soap and water (or an alcohol-based hand rub).  Clean the skin at your surgery site with a special soap that kills germs.  Give you a special gown to keep you warm. (Cold raises the risk of infection.)  Wear special hair covers, masks, gowns and gloves during surgery.  Give antibiotic medicine, if prescribed. Not all surgeries need antibiotics.  What to bring on the day of surgery  Photo ID and insurance card  Copy of your health care directive, if you have one  Glasses and hearing aids (bring cases)  You can't wear contacts during surgery  Inhaler and eye drops, if you use them (tell us about these when you arrive)  CPAP machine or breathing device, if you use them  A few personal items, if spending the night  If you have . . .  A pacemaker, ICD (cardiac defibrillator) or other  implant: Bring the ID card.  An implanted stimulator: Bring the remote control.  A legal guardian: Bring a copy of the certified (court-stamped) guardianship papers.  Please remove any jewelry, including body piercings. Leave jewelry and other valuables at home.  If you're going home the day of surgery  You must have a responsible adult drive you home. They should stay with you overnight as well.  If you don't have someone to stay with you, and you aren't safe to go home alone, we may keep you overnight. Insurance often won't pay for this.  After surgery  If it's hard to control your pain or you need more pain medicine, please call your surgeon's office.  Questions?   If you have any questions for your care team, list them here: _________________________________________________________________________________________________________________________________________________________________________ ____________________________________ ____________________________________ ____________________________________  For informational purposes only. Not to replace the advice of your health care provider. Copyright   2003, 2019 [employer] [facility]. All rights reserved. Clinically reviewed by Beatris Calles MD. [employer] 901964 - REV 12/22.    How to Take Your Medication Before Surgery  - Take all of your medications before surgery except as noted below  - HOLD (do not take) your METFORMIN on the morning of surgery.  - HOLD (do not take) Aspirin for 4 days  - STOP taking all vitamins and herbal supplements 14 days before surgery.  - STOP Aleve 4 days before the procedure.

## 2024-02-21 ENCOUNTER — ANESTHESIA EVENT (OUTPATIENT)
Dept: GASTROENTEROLOGY | Facility: CLINIC | Age: 69
End: 2024-02-21
Payer: COMMERCIAL

## 2024-02-22 ENCOUNTER — HOSPITAL ENCOUNTER (OUTPATIENT)
Facility: CLINIC | Age: 69
Discharge: HOME | End: 2024-02-22
Attending: INTERNAL MEDICINE | Admitting: INTERNAL MEDICINE
Payer: COMMERCIAL

## 2024-02-22 ENCOUNTER — ANESTHESIA (OUTPATIENT)
Dept: GASTROENTEROLOGY | Facility: CLINIC | Age: 69
End: 2024-02-22
Payer: COMMERCIAL

## 2024-02-22 VITALS
DIASTOLIC BLOOD PRESSURE: 86 MMHG | BODY MASS INDEX: 17.99 KG/M2 | WEIGHT: 108 LBS | HEART RATE: 69 BPM | SYSTOLIC BLOOD PRESSURE: 180 MMHG | HEIGHT: 65 IN | OXYGEN SATURATION: 96 % | RESPIRATION RATE: 11 BRPM

## 2024-02-22 LAB — UPPER EUS: NORMAL

## 2024-02-22 PROCEDURE — 43239 EGD BIOPSY SINGLE/MULTIPLE: CPT | Performed by: INTERNAL MEDICINE

## 2024-02-22 PROCEDURE — 370N000017 HC ANESTHESIA TECHNICAL FEE, PER MIN: Performed by: INTERNAL MEDICINE

## 2024-02-22 PROCEDURE — 258N000003 HC RX IP 258 OP 636: Performed by: ANESTHESIOLOGY

## 2024-02-22 PROCEDURE — 43237 ENDOSCOPIC US EXAM ESOPH: CPT | Performed by: INTERNAL MEDICINE

## 2024-02-22 PROCEDURE — 43239 EGD BIOPSY SINGLE/MULTIPLE: CPT

## 2024-02-22 PROCEDURE — 999N000010 HC STATISTIC ANES STAT CODE-CRNA PER MINUTE: Performed by: INTERNAL MEDICINE

## 2024-02-22 PROCEDURE — 250N000011 HC RX IP 250 OP 636: Performed by: ANESTHESIOLOGY

## 2024-02-22 PROCEDURE — 88305 TISSUE EXAM BY PATHOLOGIST: CPT | Mod: TC | Performed by: INTERNAL MEDICINE

## 2024-02-22 PROCEDURE — 250N000009 HC RX 250: Performed by: ANESTHESIOLOGY

## 2024-02-22 RX ORDER — SODIUM CHLORIDE, SODIUM LACTATE, POTASSIUM CHLORIDE, CALCIUM CHLORIDE 600; 310; 30; 20 MG/100ML; MG/100ML; MG/100ML; MG/100ML
INJECTION, SOLUTION INTRAVENOUS CONTINUOUS PRN
Status: DISCONTINUED | OUTPATIENT
Start: 2024-02-22 | End: 2024-02-22

## 2024-02-22 RX ORDER — PROCHLORPERAZINE MALEATE 5 MG
5 TABLET ORAL EVERY 6 HOURS PRN
Status: DISCONTINUED | OUTPATIENT
Start: 2024-02-22 | End: 2024-02-22 | Stop reason: HOSPADM

## 2024-02-22 RX ORDER — ONDANSETRON 4 MG/1
4 TABLET, ORALLY DISINTEGRATING ORAL EVERY 6 HOURS PRN
Status: DISCONTINUED | OUTPATIENT
Start: 2024-02-22 | End: 2024-02-22 | Stop reason: HOSPADM

## 2024-02-22 RX ORDER — OXYCODONE HYDROCHLORIDE 5 MG/1
5 TABLET ORAL
Status: DISCONTINUED | OUTPATIENT
Start: 2024-02-22 | End: 2024-02-22 | Stop reason: HOSPADM

## 2024-02-22 RX ORDER — ONDANSETRON 2 MG/ML
INJECTION INTRAMUSCULAR; INTRAVENOUS PRN
Status: DISCONTINUED | OUTPATIENT
Start: 2024-02-22 | End: 2024-02-22

## 2024-02-22 RX ORDER — ONDANSETRON 2 MG/ML
4 INJECTION INTRAMUSCULAR; INTRAVENOUS EVERY 30 MIN PRN
Status: DISCONTINUED | OUTPATIENT
Start: 2024-02-22 | End: 2024-02-22 | Stop reason: HOSPADM

## 2024-02-22 RX ORDER — NALOXONE HYDROCHLORIDE 0.4 MG/ML
0.1 INJECTION, SOLUTION INTRAMUSCULAR; INTRAVENOUS; SUBCUTANEOUS
Status: DISCONTINUED | OUTPATIENT
Start: 2024-02-22 | End: 2024-02-22 | Stop reason: HOSPADM

## 2024-02-22 RX ORDER — NALOXONE HYDROCHLORIDE 0.4 MG/ML
0.2 INJECTION, SOLUTION INTRAMUSCULAR; INTRAVENOUS; SUBCUTANEOUS
Status: DISCONTINUED | OUTPATIENT
Start: 2024-02-22 | End: 2024-02-22 | Stop reason: HOSPADM

## 2024-02-22 RX ORDER — ACETAMINOPHEN 325 MG/1
975 TABLET ORAL
Status: DISCONTINUED | OUTPATIENT
Start: 2024-02-22 | End: 2024-02-22 | Stop reason: HOSPADM

## 2024-02-22 RX ORDER — FLUMAZENIL 0.1 MG/ML
0.2 INJECTION, SOLUTION INTRAVENOUS
Status: DISCONTINUED | OUTPATIENT
Start: 2024-02-22 | End: 2024-02-22 | Stop reason: HOSPADM

## 2024-02-22 RX ORDER — PROPOFOL 10 MG/ML
INJECTION, EMULSION INTRAVENOUS CONTINUOUS PRN
Status: DISCONTINUED | OUTPATIENT
Start: 2024-02-22 | End: 2024-02-22

## 2024-02-22 RX ORDER — ONDANSETRON 2 MG/ML
4 INJECTION INTRAMUSCULAR; INTRAVENOUS
Status: DISCONTINUED | OUTPATIENT
Start: 2024-02-22 | End: 2024-02-22 | Stop reason: HOSPADM

## 2024-02-22 RX ORDER — ONDANSETRON 4 MG/1
4 TABLET, ORALLY DISINTEGRATING ORAL EVERY 30 MIN PRN
Status: DISCONTINUED | OUTPATIENT
Start: 2024-02-22 | End: 2024-02-22 | Stop reason: HOSPADM

## 2024-02-22 RX ORDER — PROPOFOL 10 MG/ML
INJECTION, EMULSION INTRAVENOUS PRN
Status: DISCONTINUED | OUTPATIENT
Start: 2024-02-22 | End: 2024-02-22

## 2024-02-22 RX ORDER — FENTANYL CITRATE 50 UG/ML
25 INJECTION, SOLUTION INTRAMUSCULAR; INTRAVENOUS
Status: DISCONTINUED | OUTPATIENT
Start: 2024-02-22 | End: 2024-02-22 | Stop reason: HOSPADM

## 2024-02-22 RX ORDER — ONDANSETRON 2 MG/ML
4 INJECTION INTRAMUSCULAR; INTRAVENOUS EVERY 6 HOURS PRN
Status: DISCONTINUED | OUTPATIENT
Start: 2024-02-22 | End: 2024-02-22 | Stop reason: HOSPADM

## 2024-02-22 RX ORDER — OXYCODONE HYDROCHLORIDE 5 MG/1
10 TABLET ORAL
Status: DISCONTINUED | OUTPATIENT
Start: 2024-02-22 | End: 2024-02-22 | Stop reason: HOSPADM

## 2024-02-22 RX ORDER — NALOXONE HYDROCHLORIDE 0.4 MG/ML
0.4 INJECTION, SOLUTION INTRAMUSCULAR; INTRAVENOUS; SUBCUTANEOUS
Status: DISCONTINUED | OUTPATIENT
Start: 2024-02-22 | End: 2024-02-22 | Stop reason: HOSPADM

## 2024-02-22 RX ORDER — LIDOCAINE 40 MG/G
CREAM TOPICAL
Status: DISCONTINUED | OUTPATIENT
Start: 2024-02-22 | End: 2024-02-22 | Stop reason: HOSPADM

## 2024-02-22 RX ORDER — LIDOCAINE HYDROCHLORIDE 20 MG/ML
INJECTION, SOLUTION INFILTRATION; PERINEURAL PRN
Status: DISCONTINUED | OUTPATIENT
Start: 2024-02-22 | End: 2024-02-22

## 2024-02-22 RX ADMIN — ONDANSETRON 4 MG: 2 INJECTION INTRAMUSCULAR; INTRAVENOUS at 08:14

## 2024-02-22 RX ADMIN — PROPOFOL 200 MCG/KG/MIN: 10 INJECTION, EMULSION INTRAVENOUS at 08:16

## 2024-02-22 RX ADMIN — LIDOCAINE HYDROCHLORIDE 50 MG: 20 INJECTION, SOLUTION INFILTRATION; PERINEURAL at 08:14

## 2024-02-22 RX ADMIN — SODIUM CHLORIDE, POTASSIUM CHLORIDE, SODIUM LACTATE AND CALCIUM CHLORIDE: 600; 310; 30; 20 INJECTION, SOLUTION INTRAVENOUS at 08:14

## 2024-02-22 RX ADMIN — PROPOFOL 20 MG: 10 INJECTION, EMULSION INTRAVENOUS at 08:20

## 2024-02-22 RX ADMIN — PROPOFOL 20 MG: 10 INJECTION, EMULSION INTRAVENOUS at 08:29

## 2024-02-22 RX ADMIN — LIDOCAINE HYDROCHLORIDE 50 MG: 20 INJECTION, SOLUTION INFILTRATION; PERINEURAL at 08:19

## 2024-02-22 ASSESSMENT — LIFESTYLE VARIABLES: TOBACCO_USE: 1

## 2024-02-22 ASSESSMENT — ACTIVITIES OF DAILY LIVING (ADL)
ADLS_ACUITY_SCORE: 35
ADLS_ACUITY_SCORE: 35

## 2024-02-22 NOTE — ANESTHESIA PREPROCEDURE EVALUATION
Anesthesia Pre-Procedure Evaluation    Patient: Taylor Aburto   MRN: [id number] : 1955        Procedure : Procedure(s):  Endoscopic ultrasound upper gastrointestinal tract (GI)          Past Medical History:   Diagnosis Date    Arthritis 2013    H/O ETOH abuse 2018    Moderate smoker (20 or less per day) 2013    Vitamin D deficiency 2018      Past Surgical History:   Procedure Laterality Date    COLONOSCOPY N/A 2023    Procedure: COLONOSCOPY, FLEXIBLE, WITH LESION REMOVAL USING SNARE;  Surgeon: Jake Thomas MD;  Location:  GI      Allergies   Allergen Reactions    Prednisone Dizziness      Social History     Tobacco Use    Smoking status: Every Day     Packs/day: 0.25     Years: 40.00     Additional pack years: 0.00     Total pack years: 10.00     Types: Cigarettes    Smokeless tobacco: Never   Substance Use Topics    Alcohol use: Not Currently     Comment: Sober      Wt Readings from Last 1 Encounters:   24 49 kg (108 lb)        Anesthesia Evaluation   Pt has had prior anesthetic.     No history of anesthetic complications       ROS/MED HX  ENT/Pulmonary:     (+)                tobacco use, Current use,  10  Pack-Year Hx,                      Neurologic:       Cardiovascular:  - neg cardiovascular ROS     METS/Exercise Tolerance:     Hematologic:       Musculoskeletal:       GI/Hepatic:  - neg GI/hepatic ROS     Renal/Genitourinary:       Endo:     (+)  type II DM,                    Psychiatric/Substance Use: Comment: H/o alcohol abuse      Infectious Disease:       Malignancy:       Other:            Physical Exam    Airway        Mallampati: II   TM distance: > 3 FB   Neck ROM: full   Mouth opening: > 3 cm    Respiratory Devices and Support         Dental       (+) Multiple crowns, permanant bridges      Cardiovascular          Rhythm and rate: regular and normal     Pulmonary           breath sounds clear to auscultation           OUTSIDE LABS:  CBC:   Lab  "Results   Component Value Date    WBC 6.0 02/03/2023    WBC 5.8 01/27/2023    HGB 13.4 02/03/2023    HGB 13.7 01/27/2023    HCT 40.9 02/03/2023    HCT 41.4 01/27/2023     02/03/2023     (L) 01/27/2023     BMP:   Lab Results   Component Value Date     11/06/2023     02/03/2023    POTASSIUM 4.2 11/06/2023    POTASSIUM 4.5 02/03/2023    CHLORIDE 104 11/06/2023    CHLORIDE 103 02/03/2023    CO2 26 11/06/2023    CO2 27 02/03/2023    BUN 14.8 11/06/2023    BUN 16.0 02/03/2023    CR 0.7 11/06/2023    CR 0.75 11/06/2023     (H) 11/06/2023     (H) 02/03/2023     COAGS:   Lab Results   Component Value Date    INR 1.01 02/03/2023     POC: No results found for: \"BGM\", \"HCG\", \"HCGS\"  HEPATIC:   Lab Results   Component Value Date    ALBUMIN 4.8 11/06/2023    PROTTOTAL 7.9 11/06/2023    ALT 13 11/06/2023    AST 26 11/06/2023    ALKPHOS 58 11/06/2023    BILITOTAL 0.5 11/06/2023     OTHER:   Lab Results   Component Value Date    A1C 6.4 (H) 11/06/2023    CONSUELO 10.3 (H) 11/06/2023    TSH 2.36 01/27/2023       Anesthesia Plan    ASA Status:  2    NPO Status:  NPO Appropriate    Anesthesia Type: MAC.              Consents    Anesthesia Plan(s) and associated risks, benefits, and realistic alternatives discussed. Questions answered and patient/representative(s) expressed understanding.     - Discussed: Risks, Benefits and Alternatives for BOTH SEDATION and the PROCEDURE were discussed     - Discussed with:  Patient            Postoperative Care            Comments:               Rebekah More MD      "

## 2024-02-22 NOTE — ANESTHESIA POSTPROCEDURE EVALUATION
Patient: Taylor Aburto    Procedure: Procedure(s):  Endoscopic ultrasound upper gastrointestinal tract (GI)       Anesthesia Type:  MAC    Note:  Disposition: Outpatient   Postop Pain Control: Uneventful            Sign Out: Well controlled pain   PONV: No   Neuro/Psych: Uneventful            Sign Out: Acceptable/Baseline neuro status   Airway/Respiratory: Uneventful            Sign Out: Acceptable/Baseline resp. status   CV/Hemodynamics: Uneventful            Sign Out: Acceptable CV status; No obvious hypovolemia; No obvious fluid overload   Other NRE:    DID A NON-ROUTINE EVENT OCCUR?            Last vitals:  Vitals Value Taken Time   /86 02/22/24 0920   Temp     Pulse 69 02/22/24 0926   Resp 11 02/22/24 0926   SpO2 96 % 02/22/24 0926       Electronically Signed By: Rebekah More MD  February 22, 2024  10:32 AM

## 2024-02-22 NOTE — H&P
I have seen and evaluated the patient today.  There are no significant changes in the patient's history or physical exam from the prior date of service.  The patient is stable and appropriate to undergo the proposed endoscopic procedure today.  Jake Thomas MD

## 2024-02-22 NOTE — ANESTHESIA CARE TRANSFER NOTE
Patient: Taylor Aburto    Procedure: Procedure(s):  Endoscopic ultrasound upper gastrointestinal tract (GI)       Diagnosis: Unintentional weight loss [R63.4]  Dilated pancreatic duct [K86.89]  Diagnosis Additional Information: No value filed.    Anesthesia Type:   MAC     Note:    Oropharynx: oropharynx clear of all foreign objects and spontaneously breathing  Level of Consciousness: awake  Oxygen Supplementation: room air    Independent Airway: airway patency satisfactory and stable  Dentition: dentition unchanged  Vital Signs Stable: post-procedure vital signs reviewed and stable  Report to RN Given: handoff report given  Patient transferred to: Phase II    Handoff Report: Identifed the Patient, Identified the Reponsible Provider, Reviewed the pertinent medical history, Discussed the surgical course, Reviewed Intra-OP anesthesia mangement and issues during anesthesia, Set expectations for post-procedure period and Allowed opportunity for questions and acknowledgement of understanding      Vitals:  Vitals Value Taken Time   /79 02/22/24 0855   Temp     Pulse 87 02/22/24 0856   Resp 13 02/22/24 0857   SpO2 97 % 02/22/24 0857   Vitals shown include unfiled device data.    MDA aware of intra-procedural hypertensive episode. Resolved upon cessation of procedure and removal of scope.     Electronically Signed By: RIMA Garcia CRNA  February 22, 2024  8:58 AM

## 2024-02-23 PROCEDURE — 88305 TISSUE EXAM BY PATHOLOGIST: CPT | Mod: 26 | Performed by: PATHOLOGY

## 2024-02-28 NOTE — RESULT ENCOUNTER NOTE
Covering for PCP while they are out of office. Noted no change in management.     GAGANDEEP LONG, DO

## 2024-03-22 ENCOUNTER — VIRTUAL VISIT (OUTPATIENT)
Dept: FAMILY MEDICINE | Facility: CLINIC | Age: 69
End: 2024-03-22
Payer: COMMERCIAL

## 2024-03-22 DIAGNOSIS — K86.89 PANCREATIC DUCT DILATED: ICD-10-CM

## 2024-03-22 DIAGNOSIS — F41.9 ANXIETY: ICD-10-CM

## 2024-03-22 DIAGNOSIS — E11.9 TYPE 2 DIABETES MELLITUS WITHOUT COMPLICATION, WITHOUT LONG-TERM CURRENT USE OF INSULIN (H): ICD-10-CM

## 2024-03-22 DIAGNOSIS — N39.46 MIXED INCONTINENCE URGE AND STRESS (MALE)(FEMALE): ICD-10-CM

## 2024-03-22 DIAGNOSIS — Z87.891 PERSONAL HISTORY OF TOBACCO USE: ICD-10-CM

## 2024-03-22 DIAGNOSIS — E78.5 HYPERLIPIDEMIA LDL GOAL <100: ICD-10-CM

## 2024-03-22 DIAGNOSIS — Z72.0 TOBACCO ABUSE: ICD-10-CM

## 2024-03-22 DIAGNOSIS — F17.200 NICOTINE DEPENDENCE, UNCOMPLICATED, UNSPECIFIED NICOTINE PRODUCT TYPE: ICD-10-CM

## 2024-03-22 DIAGNOSIS — R63.4 UNINTENTIONAL WEIGHT LOSS: Primary | ICD-10-CM

## 2024-03-22 DIAGNOSIS — J34.3 NASAL TURBINATE HYPERTROPHY: ICD-10-CM

## 2024-03-22 PROCEDURE — 99406 BEHAV CHNG SMOKING 3-10 MIN: CPT | Mod: 95 | Performed by: STUDENT IN AN ORGANIZED HEALTH CARE EDUCATION/TRAINING PROGRAM

## 2024-03-22 PROCEDURE — G2211 COMPLEX E/M VISIT ADD ON: HCPCS | Mod: 95 | Performed by: STUDENT IN AN ORGANIZED HEALTH CARE EDUCATION/TRAINING PROGRAM

## 2024-03-22 PROCEDURE — 99214 OFFICE O/P EST MOD 30 MIN: CPT | Mod: 95 | Performed by: STUDENT IN AN ORGANIZED HEALTH CARE EDUCATION/TRAINING PROGRAM

## 2024-03-22 RX ORDER — LISINOPRIL 5 MG/1
5 TABLET ORAL DAILY
Qty: 90 TABLET | Refills: 3 | Status: SHIPPED | OUTPATIENT
Start: 2024-03-22 | End: 2024-07-30

## 2024-03-22 RX ORDER — OXYBUTYNIN CHLORIDE 10 MG/1
10 TABLET, EXTENDED RELEASE ORAL DAILY
Qty: 90 TABLET | Refills: 3 | Status: SHIPPED | OUTPATIENT
Start: 2024-03-22

## 2024-03-22 RX ORDER — ROSUVASTATIN CALCIUM 40 MG/1
40 TABLET, COATED ORAL DAILY
Qty: 90 TABLET | Refills: 3 | Status: SHIPPED | OUTPATIENT
Start: 2024-03-22 | End: 2024-07-26

## 2024-03-22 RX ORDER — FLUTICASONE PROPIONATE 50 MCG
1 SPRAY, SUSPENSION (ML) NASAL DAILY
Qty: 16 G | Refills: 4 | Status: SHIPPED | OUTPATIENT
Start: 2024-03-22

## 2024-03-22 RX ORDER — METFORMIN HCL 500 MG
1000 TABLET, EXTENDED RELEASE 24 HR ORAL 2 TIMES DAILY
Qty: 360 TABLET | Refills: 0 | Status: SHIPPED | OUTPATIENT
Start: 2024-03-22 | End: 2024-07-22

## 2024-03-22 NOTE — PROGRESS NOTES
"Taylor is a 68 year old who is being evaluated via a billable video visit.    How would you like to obtain your AVS? MyChart  If the video visit is dropped, the invitation should be resent by: Text to cell phone: [phone]  Will anyone else be joining your video visit? No      Assessment & Plan         ICD-10-CM    1. Unintentional weight loss  R63.4       2. Hyperlipidemia LDL goal <100  E78.5 rosuvastatin (CRESTOR) 40 MG tablet      3. Mixed incontinence urge and stress (male)(female)  N39.46 oxyBUTYnin ER (DITROPAN XL) 10 MG 24 hr tablet      4. Tobacco abuse  Z72.0       5. Type 2 diabetes mellitus without complication, without long-term current use of insulin (H)  E11.9 metFORMIN (GLUCOPHAGE XR) 500 MG 24 hr tablet     lisinopril (ZESTRIL) 5 MG tablet      6. Nasal turbinate hypertrophy  J34.3 fluticasone (FLONASE) 50 MCG/ACT nasal spray      7. Personal history of tobacco use  Z87.891       8. Anxiety  F41.9       9. Pancreatic duct dilated  K86.89       10. Nicotine dependence, uncomplicated, unspecified nicotine product type  F17.200 SMOKING CESSATION COUNSELING 3-10 MIN            Unintentional weight loss:  Today, were doing an appointment at her home but she does not have a scale at home to let me know whether or not she is actually continued to lose weight or gain weight  She notes that her pants are fitting \"tighter\"  She is trying to eat meals consistently  Plan:  - Despite extensive workup, no obvious cause of the weight loss has been identified  - As noted above, we will start mirtazapine to help with the weight gain  - I would like her to see me in person next time for      Tobacco abuse:  Anxiety  Last time we spoke, patient was smoking about 5 cigarettes a day  She is continuing to smoke 5 cigarettes.  She feels like she is \"white knuckling\" in between the smokes.  She does feel slightly more irritable and has been difficult to continue to cut down  Historically was trialed on Wellbutrin but " did not tolerate it well  Has been advised against Chantix due to her vivid dreams  Discussed potentially addressing underlying mood symptoms-anxiety/depression and seeing if that will help with calming down on the smoking.  Patient shared that when she stopped drinking, she did start antidepressants.  She was on Lexapro and then escitalopram.  Both of them were extremely helpful but coming off the escitalopram was very difficult and he is a bit hesitant about retrying any medications.  Discussed potentially trialing something other than SSRIs.  We could trial Remeron as we have been trying to have her gain weight and increased appetite.  Patient is amenable  Plan:  - We will start with low-dose mirtazapine at 15 mg and have her follow-up with me in 2 to 3 months  - 5 to 10 minutes spent on tobacco cessation  - Patient is currently doing nicotine gum.  Would continue that for now.    Type 2 diabetes:  Reports stable sugars ans asx    Cirrhosis:  In the past, imaging has shown potential cirrhosis on her imaging.  On her EGD report, they recommended potential FibroScan.  Reviewed with patient that the scarring is most likely due to history of drinking.  Currently, her liver function is intact.  If she does not feel strongly about getting the FibroScan (as this would not necessarily  at this point), we can continue to monitor.  Patient is opting to monitor.    Colon polyps:  2 polyps were identified on her colonoscopy.  They advised repeat colonoscopy in 7 years.  No other findings.    Enlarged pancreatic duct:  Noted on ultrasound and during her upper endoscopy, report noted that her pancreatic duct is at the upper limit of normal measuring 2 to 3 mm throughout the pancreas with no signs of pancreatitis or mass.  They do not think that this is related to her weight loss..  Patient relieved to hear this news.  We can just continue to monitor for now    Requesting med fills, provided    32 minutes spent  by me on the date of the encounter doing chart review, history and exam, documentation and further activities per the note        Eb Vazquez is a 68 year old, presenting for the following health issues:  Follow Up (3 months follow up diabetes, tabacco cessation and review endoscopy results. )      3/22/2024     9:18 AM   Additional Questions   Roomed by Christopher CUNNINGHAM MA   Accompanied by Self           Review of Systems  As per HPI       Objective           Vitals:  No vitals were obtained today due to virtual visit.    Physical Exam   GENERAL: alert and no distress  EYES: Eyes grossly normal to inspection.  No discharge or erythema, or obvious scleral/conjunctival abnormalities.  RESP: No audible wheeze, cough, or visible cyanosis.    SKIN: Visible skin clear. No significant rash, abnormal pigmentation or lesions.  NEURO: Cranial nerves grossly intact.  Mentation and speech appropriate for age.  PSYCH: Appropriate affect, tone, and pace of words        Video-Visit Details    Type of service:  Video Visit     Video start time: 9:35 AM    Video end time 9:54 AM    Distant Location (provider location):  On-site  Platform used for Video Visit: Laila  Signed Electronically by: Soraya Cherry DO

## 2024-03-23 ENCOUNTER — HEALTH MAINTENANCE LETTER (OUTPATIENT)
Age: 69
End: 2024-03-23

## 2024-03-25 ENCOUNTER — TELEPHONE (OUTPATIENT)
Dept: FAMILY MEDICINE | Facility: CLINIC | Age: 69
End: 2024-03-25
Payer: COMMERCIAL

## 2024-04-24 ENCOUNTER — TELEPHONE (OUTPATIENT)
Dept: OPHTHALMOLOGY | Facility: CLINIC | Age: 69
End: 2024-04-24
Payer: COMMERCIAL

## 2024-04-27 ENCOUNTER — OFFICE VISIT (OUTPATIENT)
Dept: OPHTHALMOLOGY | Facility: CLINIC | Age: 69
End: 2024-04-27
Payer: COMMERCIAL

## 2024-04-27 DIAGNOSIS — H52.4 HYPEROPIA OF BOTH EYES WITH ASTIGMATISM AND PRESBYOPIA: ICD-10-CM

## 2024-04-27 DIAGNOSIS — H52.03 HYPEROPIA OF BOTH EYES WITH ASTIGMATISM AND PRESBYOPIA: ICD-10-CM

## 2024-04-27 DIAGNOSIS — Z79.4 TYPE 2 DIABETES MELLITUS WITHOUT COMPLICATION, WITH LONG-TERM CURRENT USE OF INSULIN (H): Primary | ICD-10-CM

## 2024-04-27 DIAGNOSIS — H25.13 AGE-RELATED NUCLEAR CATARACT OF BOTH EYES: ICD-10-CM

## 2024-04-27 DIAGNOSIS — H52.203 HYPEROPIA OF BOTH EYES WITH ASTIGMATISM AND PRESBYOPIA: ICD-10-CM

## 2024-04-27 DIAGNOSIS — E11.9 TYPE 2 DIABETES MELLITUS WITHOUT COMPLICATION, WITH LONG-TERM CURRENT USE OF INSULIN (H): Primary | ICD-10-CM

## 2024-04-27 PROCEDURE — 92014 COMPRE OPH EXAM EST PT 1/>: CPT | Performed by: OPTOMETRIST

## 2024-04-27 PROCEDURE — 92015 DETERMINE REFRACTIVE STATE: CPT | Performed by: OPTOMETRIST

## 2024-04-27 ASSESSMENT — REFRACTION_MANIFEST
OD_ADD: +2.50
OD_SPHERE: +2.25
OS_CYLINDER: SPHERE
OD_CYLINDER: +0.25
OS_ADD: +2.50
OS_SPHERE: +2.75
OD_AXIS: 005

## 2024-04-27 ASSESSMENT — VISUAL ACUITY
METHOD: SNELLEN - LINEAR
OD_CC+: -2
OS_CC: 20/40
CORRECTION_TYPE: GLASSES
OD_CC: 20/25
OS_CC+: -1

## 2024-04-27 ASSESSMENT — CUP TO DISC RATIO
OS_RATIO: 0.1
OD_RATIO: 0.1

## 2024-04-27 ASSESSMENT — CONF VISUAL FIELD
OD_INFERIOR_TEMPORAL_RESTRICTION: 0
OS_SUPERIOR_TEMPORAL_RESTRICTION: 0
OD_SUPERIOR_NASAL_RESTRICTION: 0
OS_INFERIOR_NASAL_RESTRICTION: 0
OD_SUPERIOR_TEMPORAL_RESTRICTION: 0
OD_NORMAL: 1
OS_SUPERIOR_NASAL_RESTRICTION: 0
OS_INFERIOR_TEMPORAL_RESTRICTION: 0
OD_INFERIOR_NASAL_RESTRICTION: 0
OS_NORMAL: 1
METHOD: COUNTING FINGERS

## 2024-04-27 ASSESSMENT — REFRACTION_WEARINGRX
OS_CYLINDER: SPHERE
OD_ADD: +2.50
OD_CYLINDER: +0.25
OD_SPHERE: +1.75
SPECS_TYPE: BIFOCAL
OS_ADD: +2.50
OS_SPHERE: +1.75
OD_AXIS: 006

## 2024-04-27 ASSESSMENT — TONOMETRY
IOP_METHOD: ICARE
OS_IOP_MMHG: 16
OD_IOP_MMHG: 16

## 2024-04-27 ASSESSMENT — SLIT LAMP EXAM - LIDS
COMMENTS: NORMAL
COMMENTS: NORMAL

## 2024-04-27 NOTE — PATIENT INSTRUCTIONS
NO DB R each eye  reinforced control BS \  New prescription for glasses   Cataracts each eye follow pt will contact us when wanting consult  Yearly exam

## 2024-04-27 NOTE — PROGRESS NOTES
Assessment & Plan       Taylor Aburto is a 69 year old female with the following diagnoses:   1. Type 2 diabetes mellitus without complication, with long-term current use of insulin (H) - Both Eyes    2. Hyperopia of both eyes with astigmatism and presbyopia - Both Eyes    3. Age-related nuclear cataract of both eyes - Both Eyes        NO DB R each eye  reinforced control BS \  New prescription for glasses   Cataracts each eye follow pt will contact us when wanting consult  Yearly exam     Patient disposition:   No follow-ups on file.          Complete documentation of historical and exam elements from today's encounter can be found in the full encounter summary report (not reduplicated in this progress note). I personally obtained the chief complaint(s) and history of present illness.  I confirmed and edited as necessary the review of systems, past medical/surgical history, family history, social history, and examination findings as documented by others; and I examined the patient myself. I personally reviewed the relevant tests, images, and reports as documented above. I formulated and edited as necessary the assessment and plan and discussed the findings and management plan with the patient and family.  Dr. Bull Miles

## 2024-04-27 NOTE — NURSING NOTE
Chief Complaints and History of Present Illnesses   Patient presents with    COMPREHENSIVE EYE EXAM     Chief Complaint(s) and History of Present Illness(es)       COMPREHENSIVE EYE EXAM              Laterality: both eyes    Course: stable    Associated symptoms: tearing, itching and foreign body sensation.  Negative for eye pain, flashes and floaters    Treatments tried: no treatments              Comments    Taylor Aburto is a(n) 69 year old female who presents for a comprehensive exam. Last eye exam was several year(s) ago. Since exam, vision has decreased. No lubricating drops. No flashes and floaters. No eye pain.     Lab Results       Component                Value               Date                       A1C                      6.4                 11/06/2023                 A1C                      6.3                 07/10/2023                 A1C                      6.5                 03/31/2023                 A1C                      6.8                 01/27/2023                 A1C                      5.8                 09/06/2022              Nick Angulo COT 9:33 AM April 27, 2024

## 2024-06-07 ENCOUNTER — HOSPITAL ENCOUNTER (INPATIENT)
Facility: CLINIC | Age: 69
LOS: 6 days | Discharge: HOME | DRG: 321 | End: 2024-06-13
Attending: EMERGENCY MEDICINE | Admitting: STUDENT IN AN ORGANIZED HEALTH CARE EDUCATION/TRAINING PROGRAM
Payer: COMMERCIAL

## 2024-06-07 ENCOUNTER — APPOINTMENT (OUTPATIENT)
Dept: CARDIOLOGY | Facility: CLINIC | Age: 69
DRG: 321 | End: 2024-06-07
Attending: EMERGENCY MEDICINE
Payer: COMMERCIAL

## 2024-06-07 ENCOUNTER — APPOINTMENT (OUTPATIENT)
Dept: CT IMAGING | Facility: CLINIC | Age: 69
DRG: 321 | End: 2024-06-07
Attending: EMERGENCY MEDICINE
Payer: COMMERCIAL

## 2024-06-07 DIAGNOSIS — I21.4 NSTEMI (NON-ST ELEVATED MYOCARDIAL INFARCTION) (H): ICD-10-CM

## 2024-06-07 DIAGNOSIS — I25.5 ISCHEMIC CARDIOMYOPATHY: ICD-10-CM

## 2024-06-07 DIAGNOSIS — R05.3 CHRONIC COUGH: Primary | ICD-10-CM

## 2024-06-07 DIAGNOSIS — I31.39 PERICARDIAL EFFUSION: ICD-10-CM

## 2024-06-07 DIAGNOSIS — I25.10 CORONARY ARTERY DISEASE INVOLVING NATIVE CORONARY ARTERY OF NATIVE HEART WITHOUT ANGINA PECTORIS: ICD-10-CM

## 2024-06-07 DIAGNOSIS — I77.3 FIBROMUSCULAR DYSPLASIA (H): ICD-10-CM

## 2024-06-07 DIAGNOSIS — G71.02 FMD (FACIOSCAPULOHUMERAL MUSCULAR DYSTROPHY) (H): ICD-10-CM

## 2024-06-07 LAB
ALBUMIN SERPL BCG-MCNC: 4.4 G/DL (ref 3.5–5.2)
ALBUMIN UR-MCNC: NEGATIVE MG/DL
ALP SERPL-CCNC: 67 U/L (ref 40–150)
ALT SERPL W P-5'-P-CCNC: 23 U/L (ref 0–50)
ANION GAP SERPL CALCULATED.3IONS-SCNC: 10 MMOL/L (ref 7–15)
APPEARANCE UR: CLEAR
AST SERPL W P-5'-P-CCNC: 30 U/L (ref 0–45)
ATRIAL RATE - MUSE: 95 BPM
BASOPHILS # BLD AUTO: 0 10E3/UL (ref 0–0.2)
BASOPHILS NFR BLD AUTO: 0 %
BILIRUB SERPL-MCNC: 0.6 MG/DL
BILIRUB UR QL STRIP: NEGATIVE
BUN SERPL-MCNC: 11.1 MG/DL (ref 8–23)
CALCIUM SERPL-MCNC: 10.1 MG/DL (ref 8.8–10.2)
CHLORIDE SERPL-SCNC: 101 MMOL/L (ref 98–107)
COLOR UR AUTO: NORMAL
CREAT SERPL-MCNC: 0.72 MG/DL (ref 0.51–0.95)
CRP SERPL-MCNC: <3 MG/L
D DIMER PPP FEU-MCNC: 0.47 UG/ML FEU (ref 0–0.5)
DEPRECATED HCO3 PLAS-SCNC: 25 MMOL/L (ref 22–29)
DIASTOLIC BLOOD PRESSURE - MUSE: NORMAL MMHG
EGFRCR SERPLBLD CKD-EPI 2021: >90 ML/MIN/1.73M2
EOSINOPHIL # BLD AUTO: 0.1 10E3/UL (ref 0–0.7)
EOSINOPHIL NFR BLD AUTO: 1 %
ERYTHROCYTE [DISTWIDTH] IN BLOOD BY AUTOMATED COUNT: 12.5 % (ref 10–15)
ERYTHROCYTE [SEDIMENTATION RATE] IN BLOOD BY WESTERGREN METHOD: 2 MM/HR (ref 0–30)
GLUCOSE SERPL-MCNC: 159 MG/DL (ref 70–99)
GLUCOSE UR STRIP-MCNC: NEGATIVE MG/DL
HCT VFR BLD AUTO: 43.6 % (ref 35–47)
HGB BLD-MCNC: 14.5 G/DL (ref 11.7–15.7)
HGB UR QL STRIP: NEGATIVE
HOLD SPECIMEN: NORMAL
IMM GRANULOCYTES # BLD: 0 10E3/UL
IMM GRANULOCYTES NFR BLD: 0 %
INTERPRETATION ECG - MUSE: NORMAL
KETONES UR STRIP-MCNC: NEGATIVE MG/DL
LEUKOCYTE ESTERASE UR QL STRIP: NEGATIVE
LIPASE SERPL-CCNC: 26 U/L (ref 13–60)
LVEF ECHO: NORMAL
LYMPHOCYTES # BLD AUTO: 1.9 10E3/UL (ref 0.8–5.3)
LYMPHOCYTES NFR BLD AUTO: 19 %
MAGNESIUM SERPL-MCNC: 1.7 MG/DL (ref 1.7–2.3)
MCH RBC QN AUTO: 30.5 PG (ref 26.5–33)
MCHC RBC AUTO-ENTMCNC: 33.3 G/DL (ref 31.5–36.5)
MCV RBC AUTO: 92 FL (ref 78–100)
MONOCYTES # BLD AUTO: 0.8 10E3/UL (ref 0–1.3)
MONOCYTES NFR BLD AUTO: 8 %
NEUTROPHILS # BLD AUTO: 7 10E3/UL (ref 1.6–8.3)
NEUTROPHILS NFR BLD AUTO: 72 %
NITRATE UR QL: NEGATIVE
NRBC # BLD AUTO: 0 10E3/UL
NRBC BLD AUTO-RTO: 0 /100
NT-PROBNP SERPL-MCNC: 5335 PG/ML (ref 0–900)
P AXIS - MUSE: 84 DEGREES
PH UR STRIP: 5.5 [PH] (ref 5–7)
PHOSPHATE SERPL-MCNC: 3.5 MG/DL (ref 2.5–4.5)
PLATELET # BLD AUTO: 151 10E3/UL (ref 150–450)
POTASSIUM SERPL-SCNC: 4.7 MMOL/L (ref 3.4–5.3)
PR INTERVAL - MUSE: 116 MS
PROT SERPL-MCNC: 7.1 G/DL (ref 6.4–8.3)
QRS DURATION - MUSE: 80 MS
QT - MUSE: 352 MS
QTC - MUSE: 442 MS
R AXIS - MUSE: 87 DEGREES
RBC # BLD AUTO: 4.75 10E6/UL (ref 3.8–5.2)
SODIUM SERPL-SCNC: 136 MMOL/L (ref 135–145)
SP GR UR STRIP: 1.01 (ref 1–1.03)
SYSTOLIC BLOOD PRESSURE - MUSE: NORMAL MMHG
T AXIS - MUSE: 92 DEGREES
TROPONIN T SERPL HS-MCNC: 280 NG/L
TROPONIN T SERPL HS-MCNC: 308 NG/L
TROPONIN T SERPL HS-MCNC: 369 NG/L
TSH SERPL DL<=0.005 MIU/L-ACNC: 1.35 UIU/ML (ref 0.3–4.2)
UROBILINOGEN UR STRIP-MCNC: NORMAL MG/DL
VENTRICULAR RATE- MUSE: 95 BPM
WBC # BLD AUTO: 9.9 10E3/UL (ref 4–11)

## 2024-06-07 PROCEDURE — 210N000002 HC R&B HEART CARE

## 2024-06-07 PROCEDURE — 82374 ASSAY BLOOD CARBON DIOXIDE: CPT | Performed by: EMERGENCY MEDICINE

## 2024-06-07 PROCEDURE — 84484 ASSAY OF TROPONIN QUANT: CPT

## 2024-06-07 PROCEDURE — 250N000011 HC RX IP 250 OP 636: Mod: JZ | Performed by: EMERGENCY MEDICINE

## 2024-06-07 PROCEDURE — 85379 FIBRIN DEGRADATION QUANT: CPT | Performed by: EMERGENCY MEDICINE

## 2024-06-07 PROCEDURE — 84100 ASSAY OF PHOSPHORUS: CPT

## 2024-06-07 PROCEDURE — 85652 RBC SED RATE AUTOMATED: CPT

## 2024-06-07 PROCEDURE — 93306 TTE W/DOPPLER COMPLETE: CPT

## 2024-06-07 PROCEDURE — 36415 COLL VENOUS BLD VENIPUNCTURE: CPT

## 2024-06-07 PROCEDURE — 93005 ELECTROCARDIOGRAM TRACING: CPT

## 2024-06-07 PROCEDURE — 250N000013 HC RX MED GY IP 250 OP 250 PS 637: Performed by: EMERGENCY MEDICINE

## 2024-06-07 PROCEDURE — 96375 TX/PRO/DX INJ NEW DRUG ADDON: CPT

## 2024-06-07 PROCEDURE — 83880 ASSAY OF NATRIURETIC PEPTIDE: CPT

## 2024-06-07 PROCEDURE — 96374 THER/PROPH/DIAG INJ IV PUSH: CPT

## 2024-06-07 PROCEDURE — 84484 ASSAY OF TROPONIN QUANT: CPT | Performed by: EMERGENCY MEDICINE

## 2024-06-07 PROCEDURE — 84443 ASSAY THYROID STIM HORMONE: CPT

## 2024-06-07 PROCEDURE — 258N000003 HC RX IP 258 OP 636: Mod: JZ | Performed by: EMERGENCY MEDICINE

## 2024-06-07 PROCEDURE — 99223 1ST HOSP IP/OBS HIGH 75: CPT

## 2024-06-07 PROCEDURE — 93306 TTE W/DOPPLER COMPLETE: CPT | Mod: 26 | Performed by: INTERNAL MEDICINE

## 2024-06-07 PROCEDURE — 83735 ASSAY OF MAGNESIUM: CPT

## 2024-06-07 PROCEDURE — 81003 URINALYSIS AUTO W/O SCOPE: CPT

## 2024-06-07 PROCEDURE — 36415 COLL VENOUS BLD VENIPUNCTURE: CPT | Performed by: EMERGENCY MEDICINE

## 2024-06-07 PROCEDURE — 86140 C-REACTIVE PROTEIN: CPT

## 2024-06-07 PROCEDURE — 99285 EMERGENCY DEPT VISIT HI MDM: CPT | Mod: 25

## 2024-06-07 PROCEDURE — 83690 ASSAY OF LIPASE: CPT | Performed by: EMERGENCY MEDICINE

## 2024-06-07 PROCEDURE — 85025 COMPLETE CBC W/AUTO DIFF WBC: CPT | Performed by: EMERGENCY MEDICINE

## 2024-06-07 PROCEDURE — 71250 CT THORAX DX C-: CPT

## 2024-06-07 PROCEDURE — 96361 HYDRATE IV INFUSION ADD-ON: CPT

## 2024-06-07 RX ORDER — ACETAMINOPHEN 325 MG/1
650 TABLET ORAL EVERY 4 HOURS PRN
Status: DISCONTINUED | OUTPATIENT
Start: 2024-06-07 | End: 2024-06-13 | Stop reason: HOSPADM

## 2024-06-07 RX ORDER — HYDROMORPHONE HYDROCHLORIDE 2 MG/1
2 TABLET ORAL EVERY 4 HOURS PRN
Status: DISCONTINUED | OUTPATIENT
Start: 2024-06-07 | End: 2024-06-13 | Stop reason: HOSPADM

## 2024-06-07 RX ORDER — PROCHLORPERAZINE 25 MG
12.5 SUPPOSITORY, RECTAL RECTAL EVERY 12 HOURS PRN
Status: DISCONTINUED | OUTPATIENT
Start: 2024-06-07 | End: 2024-06-13 | Stop reason: HOSPADM

## 2024-06-07 RX ORDER — HEPARIN SODIUM 10000 [USP'U]/100ML
0-5000 INJECTION, SOLUTION INTRAVENOUS CONTINUOUS
Status: DISCONTINUED | OUTPATIENT
Start: 2024-06-07 | End: 2024-06-08

## 2024-06-07 RX ORDER — ASPIRIN 81 MG/1
324 TABLET, CHEWABLE ORAL ONCE
Status: COMPLETED | OUTPATIENT
Start: 2024-06-07 | End: 2024-06-07

## 2024-06-07 RX ORDER — AMOXICILLIN 250 MG
1 CAPSULE ORAL 2 TIMES DAILY PRN
Status: DISCONTINUED | OUTPATIENT
Start: 2024-06-07 | End: 2024-06-13 | Stop reason: HOSPADM

## 2024-06-07 RX ORDER — NALOXONE HYDROCHLORIDE 0.4 MG/ML
0.2 INJECTION, SOLUTION INTRAMUSCULAR; INTRAVENOUS; SUBCUTANEOUS
Status: DISCONTINUED | OUTPATIENT
Start: 2024-06-07 | End: 2024-06-13 | Stop reason: HOSPADM

## 2024-06-07 RX ORDER — ONDANSETRON 4 MG/1
4 TABLET, ORALLY DISINTEGRATING ORAL EVERY 6 HOURS PRN
Status: DISCONTINUED | OUTPATIENT
Start: 2024-06-07 | End: 2024-06-13 | Stop reason: HOSPADM

## 2024-06-07 RX ORDER — LISINOPRIL 5 MG/1
5 TABLET ORAL DAILY
Status: DISCONTINUED | OUTPATIENT
Start: 2024-06-08 | End: 2024-06-13 | Stop reason: HOSPADM

## 2024-06-07 RX ORDER — HYDROMORPHONE HCL IN WATER/PF 6 MG/30 ML
0.4 PATIENT CONTROLLED ANALGESIA SYRINGE INTRAVENOUS
Status: DISCONTINUED | OUTPATIENT
Start: 2024-06-07 | End: 2024-06-13 | Stop reason: HOSPADM

## 2024-06-07 RX ORDER — ASPIRIN 81 MG/1
81 TABLET ORAL DAILY
Status: DISCONTINUED | OUTPATIENT
Start: 2024-06-08 | End: 2024-06-13 | Stop reason: HOSPADM

## 2024-06-07 RX ORDER — ROSUVASTATIN CALCIUM 20 MG/1
40 TABLET, COATED ORAL DAILY
Status: DISCONTINUED | OUTPATIENT
Start: 2024-06-08 | End: 2024-06-13 | Stop reason: HOSPADM

## 2024-06-07 RX ORDER — NICOTINE POLACRILEX 4 MG
15-30 LOZENGE BUCCAL
Status: DISCONTINUED | OUTPATIENT
Start: 2024-06-07 | End: 2024-06-13 | Stop reason: HOSPADM

## 2024-06-07 RX ORDER — PROCHLORPERAZINE MALEATE 5 MG
5 TABLET ORAL EVERY 6 HOURS PRN
Status: DISCONTINUED | OUTPATIENT
Start: 2024-06-07 | End: 2024-06-13 | Stop reason: HOSPADM

## 2024-06-07 RX ORDER — ONDANSETRON 2 MG/ML
4 INJECTION INTRAMUSCULAR; INTRAVENOUS EVERY 30 MIN PRN
Status: DISCONTINUED | OUTPATIENT
Start: 2024-06-07 | End: 2024-06-07

## 2024-06-07 RX ORDER — NALOXONE HYDROCHLORIDE 0.4 MG/ML
0.4 INJECTION, SOLUTION INTRAMUSCULAR; INTRAVENOUS; SUBCUTANEOUS
Status: DISCONTINUED | OUTPATIENT
Start: 2024-06-07 | End: 2024-06-13 | Stop reason: HOSPADM

## 2024-06-07 RX ORDER — OXYBUTYNIN CHLORIDE 10 MG/1
10 TABLET, EXTENDED RELEASE ORAL AT BEDTIME
Status: DISCONTINUED | OUTPATIENT
Start: 2024-06-08 | End: 2024-06-13 | Stop reason: HOSPADM

## 2024-06-07 RX ORDER — ONDANSETRON 2 MG/ML
4 INJECTION INTRAMUSCULAR; INTRAVENOUS EVERY 6 HOURS PRN
Status: DISCONTINUED | OUTPATIENT
Start: 2024-06-07 | End: 2024-06-13 | Stop reason: HOSPADM

## 2024-06-07 RX ORDER — HYDROMORPHONE HCL IN WATER/PF 6 MG/30 ML
0.2 PATIENT CONTROLLED ANALGESIA SYRINGE INTRAVENOUS
Status: DISCONTINUED | OUTPATIENT
Start: 2024-06-07 | End: 2024-06-13 | Stop reason: HOSPADM

## 2024-06-07 RX ORDER — LIDOCAINE 40 MG/G
CREAM TOPICAL
Status: DISCONTINUED | OUTPATIENT
Start: 2024-06-07 | End: 2024-06-13 | Stop reason: HOSPADM

## 2024-06-07 RX ORDER — ACETAMINOPHEN 650 MG/1
650 SUPPOSITORY RECTAL EVERY 4 HOURS PRN
Status: DISCONTINUED | OUTPATIENT
Start: 2024-06-07 | End: 2024-06-13 | Stop reason: HOSPADM

## 2024-06-07 RX ORDER — DEXTROSE MONOHYDRATE 25 G/50ML
25-50 INJECTION, SOLUTION INTRAVENOUS
Status: DISCONTINUED | OUTPATIENT
Start: 2024-06-07 | End: 2024-06-13 | Stop reason: HOSPADM

## 2024-06-07 RX ORDER — CALCIUM CARBONATE 500 MG/1
1000 TABLET, CHEWABLE ORAL 4 TIMES DAILY PRN
Status: DISCONTINUED | OUTPATIENT
Start: 2024-06-07 | End: 2024-06-13 | Stop reason: HOSPADM

## 2024-06-07 RX ORDER — AMOXICILLIN 250 MG
2 CAPSULE ORAL 2 TIMES DAILY PRN
Status: DISCONTINUED | OUTPATIENT
Start: 2024-06-07 | End: 2024-06-13 | Stop reason: HOSPADM

## 2024-06-07 RX ADMIN — ASPIRIN 81 MG CHEWABLE TABLET 324 MG: 81 TABLET CHEWABLE at 18:55

## 2024-06-07 RX ADMIN — HEPARIN SODIUM 600 UNITS/HR: 10000 INJECTION, SOLUTION INTRAVENOUS at 19:19

## 2024-06-07 RX ADMIN — SODIUM CHLORIDE 1000 ML: 9 INJECTION, SOLUTION INTRAVENOUS at 15:48

## 2024-06-07 RX ADMIN — ONDANSETRON 4 MG: 2 INJECTION INTRAMUSCULAR; INTRAVENOUS at 16:02

## 2024-06-07 ASSESSMENT — ACTIVITIES OF DAILY LIVING (ADL)
ADLS_ACUITY_SCORE: 35

## 2024-06-07 ASSESSMENT — COLUMBIA-SUICIDE SEVERITY RATING SCALE - C-SSRS
6. HAVE YOU EVER DONE ANYTHING, STARTED TO DO ANYTHING, OR PREPARED TO DO ANYTHING TO END YOUR LIFE?: NO
1. IN THE PAST MONTH, HAVE YOU WISHED YOU WERE DEAD OR WISHED YOU COULD GO TO SLEEP AND NOT WAKE UP?: NO
2. HAVE YOU ACTUALLY HAD ANY THOUGHTS OF KILLING YOURSELF IN THE PAST MONTH?: NO

## 2024-06-07 NOTE — Clinical Note
Stent deployed in the left anterior descending diagonal. Max pressure = 8 marycruz. Total duration = 25 seconds. Balloon reinflated a second time: Max pressure = 12 marycruz. Total duration = 11 seconds.

## 2024-06-07 NOTE — Clinical Note
The first balloon was inserted into the left anterior descending and left anterior descending diagonal.Max pressure = 16 marycruz. Total duration = 13 seconds.     Max pressure = 16 marycruz. Total duration = 8 seconds.    Balloon reinflated a second time: Max pressure = 16 marycruz. Total duration = 8 seconds.  Balloon reinflated a third time: Max pressure = 16 marycruz. Total duration = 68 seconds.  Balloon reinflated a fourth time: Max pressure = 20 marycruz . Total duration = 8 seconds.

## 2024-06-07 NOTE — Clinical Note
Ultrasound catheter inserted for high resolution imaging into left anterior descending and left anterior descending diagonal.

## 2024-06-07 NOTE — ED PROVIDER NOTES
Emergency Department Note      History of Present Illness     Chief Complaint  Chest pain    HPI  Taylor Aburto is a 69 year old female with a history of liver disease and tuberculosis, who presents to the ED for chest pain. At 0730 today, the patient took her diabetes medication and multivitamin at the same time, which the patient states has made her nauseous in the past. The patient immediately grew nauseous and vomited. Right after vomiting, she developed a crushing pain in her chest that radiated to her arms, back, and jaw. The chest pain had not happened previous times, so she called her son in law. The pain subsided after 1.5 hours. She continued to feel nauseous all day, and rest and water provided minimal relief. The patient also endorses symptoms of a headache, shortness of breath, foggy vision, slight numbness, and lightheadedness. She now feels lingering and constant chest pressure. The patient denies diarrhea or blood in her vomit or stool. Of note, the patient states she has a history of a broken neck.     Independent Historian  None    Review of External Notes  None  Past Medical History   Medical History and Problem List  Arthritis  Alcohol abuse  Moderate smoker  Vitamin D deficiency  Liver disease   STD   Depression  Tuberculosis    Medications  Lisinopril  Metformin   Oxybutynin  Rosuvastatin   Citalopram  Oxycodone  Tizanidine     Surgical History   Colonoscopy  EGD    Physical Exam   Patient Vitals for the past 24 hrs:   BP Temp Temp src Pulse Resp SpO2 Height Weight   06/07/24 2106 -- 97.9  F (36.6  C) Oral -- -- -- -- --   06/07/24 1944 -- -- -- -- -- 97 % -- --   06/07/24 1917 -- -- -- -- -- 97 % -- --   06/07/24 1915 134/68 -- -- 91 -- -- -- --   06/07/24 1909 -- -- -- -- -- -- -- 49.4 kg (108 lb 12.8 oz)   06/07/24 1908 -- -- -- -- -- -- -- 49.4 kg (108 lb 12.8 oz)   06/07/24 1805 122/75 -- -- 83 15 96 % -- --   06/07/24 1700 113/68 -- -- 90 -- 96 % -- --   06/07/24 1638 108/63 -- -- 85  "17 98 % -- --   06/07/24 1540 120/71 -- Oral 98 16 97 % 1.651 m (5' 5\") 49 kg (108 lb)     Physical Exam  Nursing note and vitals reviewed.  Constitutional:  Oriented to person, place, and time. Cooperative.   HENT:   Nose:    Nose normal.   Mouth/Throat:   Mucous membranes are normal.   Eyes:    Conjunctivae normal and EOM are normal.      Pupils are equal, round, and reactive to light.   Neck:    Trachea normal.   Cardiovascular:  Normal rate, regular rhythm, normal heart sounds and normal pulses. No murmur heard.  Pulmonary/Chest:  Effort normal and breath sounds normal.   Abdominal:   Soft. Normal appearance and bowel sounds are normal.      There is no tenderness.      There is no rebound and no CVA tenderness.   Musculoskeletal:  Extremities atraumatic x 4.   Lymphadenopathy:  No cervical adenopathy.   Neurological:   Alert and oriented to person, place, and time. Normal strength.      No cranial nerve deficit or sensory deficit. GCS eye subscore is 4. GCS verbal subscore is 5. GCS motor subscore is 6.   Skin:    Skin is intact. No rash noted.   Psychiatric:   Normal mood and affect.    Diagnostics   Lab Results   Labs Ordered and Resulted from Time of ED Arrival to Time of ED Departure   COMPREHENSIVE METABOLIC PANEL - Abnormal       Result Value    Sodium 136      Potassium 4.7      Carbon Dioxide (CO2) 25      Anion Gap 10      Urea Nitrogen 11.1      Creatinine 0.72      GFR Estimate >90      Calcium 10.1      Chloride 101      Glucose 159 (*)     Alkaline Phosphatase 67      AST 30      ALT 23      Protein Total 7.1      Albumin 4.4      Bilirubin Total 0.6     TROPONIN T, HIGH SENSITIVITY - Abnormal    Troponin T, High Sensitivity 369 (*)    TROPONIN T, HIGH SENSITIVITY - Abnormal    Troponin T, High Sensitivity 308 (*)    LIPASE - Normal    Lipase 26     D DIMER QUANTITATIVE - Normal    D-Dimer Quantitative 0.47     CRP INFLAMMATION - Normal    CRP Inflammation <3.00     ERYTHROCYTE SEDIMENTATION RATE " AUTO - Normal    Erythrocyte Sedimentation Rate 2     TSH WITH FREE T4 REFLEX - Normal    TSH 1.35     MAGNESIUM - Normal    Magnesium 1.7     PHOSPHORUS - Normal    Phosphorus 3.5     CBC WITH PLATELETS AND DIFFERENTIAL    WBC Count 9.9      RBC Count 4.75      Hemoglobin 14.5      Hematocrit 43.6      MCV 92      MCH 30.5      MCHC 33.3      RDW 12.5      Platelet Count 151      % Neutrophils 72      % Lymphocytes 19      % Monocytes 8      % Eosinophils 1      % Basophils 0      % Immature Granulocytes 0      NRBCs per 100 WBC 0      Absolute Neutrophils 7.0      Absolute Lymphocytes 1.9      Absolute Monocytes 0.8      Absolute Eosinophils 0.1      Absolute Basophils 0.0      Absolute Immature Granulocytes 0.0      Absolute NRBCs 0.0     TROPONIN T, HIGH SENSITIVITY       Imaging  CT Chest w/o Contrast   Final Result   IMPRESSION:    1.  Oral contrast only CT of the chest demonstrates no evidence of esophageal perforation.   2.  Patulous esophagus consistent with dysmotility and/or reflux.   3.  Increasing, small to moderate pericardial effusion.         Echocardiogram Complete    (Results Pending)       EKG   ECG results from 06/07/24   EKG 12-lead, tracing only     Value    Systolic Blood Pressure     Diastolic Blood Pressure     Ventricular Rate 95    Atrial Rate 95    NV Interval 116    QRS Duration 80        QTc 442    P Axis 84    R AXIS 87    T Axis 92    Interpretation ECG      Sinus rhythm with Premature atrial complexes  Cannot rule out Anterior infarct , age undetermined  Abnormal ECG  Interpreted by me at 1558           Independent Interpretation  None  ED Course    Medications Administered  Medications   ondansetron (ZOFRAN) injection 4 mg (4 mg Intravenous $Given 6/7/24 1602)   heparin 25,000 units in 0.45% NaCl 250 mL ANTICOAGULANT infusion (600 Units/hr Intravenous $New Bag 6/7/24 1919)   sodium chloride 0.9% BOLUS 1,000 mL (0 mLs Intravenous Stopped 6/7/24 1847)   iohexol (OMNIPAQUE) 140  MG/ML solution for oral use 50 mL (50 mLs Oral $Given 6/7/24 1724)   aspirin (ASA) chewable tablet 324 mg (324 mg Oral $Given 6/7/24 1855)   heparin loading dose for LOW INTENSITY TREATMENT * Give BEFORE starting heparin infusion (2,950 Units Intravenous $Given 6/7/24 1912)       Procedures  Procedures     Discussion of Management  None    Social Determinants of Health adding to complexity of care  None    ED Course  ED Course as of 06/07/24 2110 Fri Jun 07, 2024 1536 I obtained the history and examined the patient as above.    1639 I rechecked and updated the patient as above    1844 I spoke with  from Cardiology regarding the patient's presentation and plan of care.      1847 I rechecked and updated the patient as above    1913 I spoke with Becky Stallworth for Dr. Combs from the hospitalist service regarding the patient's presentation, findings here in the ED, and plan of care.     2105 I rechecked and updated the patient as above      Medical Decision Making / Diagnosis   CMS Diagnoses: None    MIPS     None    MDM  Taylor Aburto is a 69 year old female who came in for further evaluation of chest pain which started shortly after vomiting.  Based on that history, I was concerned that she could have a ruptured esophagus.  However I did consider other causes such as cardiac ischemia, pulmonary embolism, and aortic dissection.  I proceeded with the above workup including the EKG and blood work.  When her D-dimer came back negative, she then had the CT scan of her chest to further evaluate an esophageal injury.  That showed a small to moderate sized pericardial effusion.  Her troponin also came back quite elevated, consistent with an NSTEMI.  I then spoke with the on-call cardiologist, Dr. Araya, who felt it was still reasonable and appropriate to provide her oral aspirin and start her on IV heparin.  He also felt it was necessary to obtain a stat echocardiogram, which I ordered.  Clearly she  needs to come into the hospital for further evaluation and management.  I subsequently spoke with Becky Stallworth, who will be admitting the patient.    Disposition  The patient was admitted to the hospital.     ICD-10 Codes:    ICD-10-CM    1. NSTEMI (non-ST elevated myocardial infarction) (H)  I21.4       2. Pericardial effusion  I31.39                  Scribe Disclosure:  I, Amy Daniel, am serving as a scribe at 6:37 PM on 6/7/2024 to document services personally performed by Gomez Watkins MD based on my observations and the provider's statements to me.        Gomez Watkins MD  06/07/24 [id number]

## 2024-06-07 NOTE — Clinical Note
The first balloon was inserted into the left anterior descending and left anterior descending diagonal.Max pressure = 9 marycruz. Total duration = 16 seconds.     Max pressure = 16 marycruz. Total duration = 9 seconds.    Balloon reinflated a second time: Max pressure = 16 marycruz. Total duration = 9 seconds.

## 2024-06-07 NOTE — Clinical Note
The first balloon was inserted into the left anterior descending.Max pressure = 20 marycruz. Total duration = 18 seconds.

## 2024-06-07 NOTE — Clinical Note
The first balloon was inserted into the left anterior descending and left anterior descending diagonal.Max pressure = 6 marycruz. Total duration = 12 seconds.

## 2024-06-07 NOTE — Clinical Note
The first balloon was inserted into the left anterior descending and left anterior descending diagonal.Max pressure = 14 marycruz. Total duration = 35 seconds.     Max pressure = 12 marycruz. Total duration = 15 seconds.    Balloon reinflated a second time: Max pressure = 12 marycruz. Total duration = 15 seconds.

## 2024-06-07 NOTE — Clinical Note
The first balloon was inserted into the left anterior descending.Max pressure = 12 marycruz. Total duration = 90 seconds.

## 2024-06-07 NOTE — Clinical Note
Balloon inserted to left anterior descending. I was present in the ED during this patient's evaluation and management by the Advance Practice Provider and was available to address any concerns about their medical management.     Galina Barnett MD  Attending, Emergency Department      Gary Frias MD  10/16/17 [id number]

## 2024-06-07 NOTE — Clinical Note
The first balloon was inserted into the left anterior descending.Max pressure = 4 marycruz. Total duration = 17 seconds.     Max pressure = 14 marycruz. Total duration = 17 seconds.    Balloon reinflated a second time: Max pressure = 14 marycruz. Total duration = 17 seconds.  Balloon reinflated a third time: Max pressure = 10 marycruz. Total duration = 8 seconds.

## 2024-06-07 NOTE — Clinical Note
Stent deployed in the left anterior descending. Max pressure = 12 marycruz. Total duration = 17 seconds.

## 2024-06-07 NOTE — Clinical Note
The first balloon was inserted into the left anterior descending.Max pressure = 12 marycruz. Total duration = 85 seconds.

## 2024-06-07 NOTE — Clinical Note
The first balloon was inserted into the left anterior descending.Max pressure = 12 marycruz. Total duration = 8 seconds.

## 2024-06-07 NOTE — Clinical Note
The first balloon was inserted into the left anterior descending.Max pressure = 16 marycruz. Total duration = 20 seconds.     Max pressure = 16 marycruz. Total duration = 23 seconds.    Balloon reinflated a second time: Max pressure = 16 marycruz. Total duration = 23 seconds. Max pressure = 9 marycruz. Total duration = 10 seconds.

## 2024-06-07 NOTE — Clinical Note
The first balloon was inserted into the left anterior descending.Max pressure = 14 marycruz. Total duration = 20 seconds.     Max pressure = 14 marycruz. Total duration = 15 seconds.    Balloon reinflated a second time: Max pressure = 14 marycruz. Total duration = 15 seconds.  Balloon reinflated a third time: Max pressure = 18 marycruz. Total duration = 16 seconds.  Balloon reinflated a fourth time: Max pressure = 20 marycruz. Total duration = 12 seconds.

## 2024-06-07 NOTE — Clinical Note
The first balloon was inserted into the left anterior descending.Max pressure = 12 marycruz. Total duration = 28 seconds.     Max pressure = 12 marycruz. Total duration = 16 seconds.    Balloon reinflated a second time: Max pressure = 12 marycruz. Total duration = 16 seconds.

## 2024-06-07 NOTE — Clinical Note
The first balloon was inserted into the left anterior descending and left anterior descending diagonal.Max pressure = 18 marycruz. Total duration = 14 seconds.

## 2024-06-07 NOTE — Clinical Note
The first balloon was inserted into the left anterior descending.Max pressure = 10 marycruz. Total duration = 10 seconds.     Max pressure = 12 marycruz. Total duration = 12 seconds.    Balloon reinflated a second time: Max pressure = 12 marycruz. Total duration = 12 seconds.  Balloon reinflated a third time: Max pressure = 12 marycruz. Total duration = 15 seconds.

## 2024-06-07 NOTE — H&P
"[facility]    History and Physical - Hospitalist Service       Date of Admission:  6/7/2024    Assessment & Plan      Taylor Aburto is a 69 year old female with a past medical history significant for small pericardial effusion, DM II, hyperlipidemia, pulmonary emphysema, tobacco use disorder, among others, who was admitted on 6/7/2024 for further evaluation of chest pain.     Patient presented to the ED hemodynamically stable. HR noted to be 98. /71. SpO2 97% ORA. She is afebrile. She reports she experienced one episode of vomiting prior to developing the acute onset of \"crushing\" chest pain that radiated to her arms (first left and then right), back and jaw after taking her diabetes medication (metformin) and multivitamin at 0730 06/07/2024. Pain improved after 1.5 hours, however, she continues to have nausea. Has had nausea in the past as a result of her multivitamin but has never experienced chest pain, which is why she presented to [facility] ED for further evaluation. Chest pain has since returned and she describes the pain as a constant, central pressure (1/10 in severity). Pain worse with laying flat. Improves with sitting up. Associated lightheadedness. She denies recent history of fever, viral illnesses, cough, SOB, palpitations, dizziness, syncope or pleuritic component to her pain. She is smoking 0.5 PPD. Denies excessive alcohol intake or recreational drug use. Labs in the ED notable for no leukocytosis. Initial troponin 369. Repeat 308. D-Dimer negative. Lipase WNL (26). EKG demonstrating no ST elevations or overt signs of ischemia. No electrical alternans. CT chest w/o contrast notable for increasing, small to moderate pericardial effusion. No evidence of esophageal perforation. Patulous esophagus consistent with dysmotility.  She was given 1 L NS and a full dose ASA. Patient started on low intensity heparin infusion with bolus dose. Cardiology consulted in ED. " Recommended stat echocardiogram, which is pending.      NSTEMI   Chest pain  Small-to-moderate pericardial effusion   *Patient received full dose ASA in the ED. CT chest 02/2023 demonstrated trace pericardial effusion. CT chest abdomen and pelvis from 11/2023 demonstrated a small, partially imaged pericardial effusion at that time. CT chest w/o 06/07 demonstrated increasing, small to moderate pericardial effusion.   - Cardiology consulted, appreciate the cares.   - Monitor on telemetry.   - Trend troponin (369 --> 308 --> 280).   - BNP obtained and 5,335.   - Continue heparin low intensity infusion.   - Check lipid panel in the morning.    - STAT echocardiogram ordered and pending.  - Evaluate inflammatory markers. CRP and ESR obtained and WNL.   - Mag and phos WNL.   - TSH WNL.   - Resumed ASA 81 mg/d.    - Start PPI.   - Hold PTA lisinopril. Defer to cardiology for further medication management.   - Continue to monitor for new or worsening symptoms.   - CBC and BMP AM.     DM II w/ hyperosmolarity without coma   Recent Labs   Lab Test 11/06/23  0929   A1C 6.4*      PTA regimen includes:  - Hold PTA metformin. Resume at discharge.    - Medium insulin sliding scale ordered.  - Glucose checks QID.    - Hypoglycemic protocol in place.      HLD   - Continue PTA statin.   - Lipid panel with morning labs.     Pulmonary emphysema   Tobacco use disorder   *CT chest 02/2023 demonstrated biapical fibrosis. Mild centrilobular emphysematous changes. Calcified granuloma in the right lower lobe measuring up to 9.0 mm (series 4 image 139). Additional smaller adjacent calcified granuloma.  *CT chest w/o 06/07/2024 demonstrated moderate centrilobular emphysema. Mild dependent atelectasis. No lobar consolidation, pleural effusions, or pneumothorax.   *Recently attempting cessation with nicotine patch. Down to 0.5 PPD. 50 pack-year history. Tried bupropion in past.   - Continuous pulse oximetry. Oxygen available as needed.   - PFTs  have been ordered by PCP, pending outpatient. Recommend completion upon discharge.   - Smoking cessation discussed.   - Nicotine patch requested, per patient.     Hx of hepatic steatosis/fibrosis on imaging   Hx of pancreatic duct dilation   *CT chest, abdomen and pelvis 11/2023 demonstrated borderline dilated pancreatic duct at 0.3 cm, but no obstructing stone or lesion. Suspected physiologic/senescent, but if there is high clinical concern, suggest further evaluation with MRCP. Morphologic changes of cirrhosis. Moderate multifocal atherosclerotic calcification of the abdominal aorta and femoral arteries. No aneurysm.   *US abdomen complete 06/07 demonstrated sonographic features suggestive of hepatic steatosis/fibrosis. No suspicious hepatic mass. Mild prominence of the main pancreatic and extrahepatic common bile ducts of unclear etiology or significance.  - Noted. Continue to monitor for new or worsening symptoms.     Generalized fatigue   Morning joint and muscle stiffness   - CRP and ESR negative.  - MARK negative 02/2023.   - Noted. Continue to monitor for new or worsening symptoms.     Hx of hypercalcemia   Hx of unintentional weight loss   Cachexia   *06/2023 work-up for unintentional weight loss and hypercalcemia negative except incidental finding of pancreatic duct dilation, an unremarkable renal cyst and a reactive lymph node on submandibular region. Recent colonoscopy and upper endoscopy notable only for polyps. Repeat recommended in 7 years.   *CT chest, abdomen and pelvis 11/2023 demonstrated borderline dilated pancreatic duct at 0.3 cm, but no obstructing stone or lesion. Suspected physiologic/senescent, but if there is high clinical concern, suggest further evaluation with MRCP.  Moderate multifocal atherosclerotic calcification of the abdominal aorta and femoral arteries. No aneurysm.   *Weight 108 lb upon arrival. 113 lb in 06/2023.   *Ca noted to only be very mildly elevated at 10.3. Ionized Ca  "WNL 07/190/2023.   - CA 19-9 negative 11/2023.   - Nutrition consulted, appreciate the cares.   - Ensure clear for supplementation between meals.     Nocturia  Malodorous urine   - Hold PTA oxybutynin.   - UA obtained and pending.     Code Status   - Discussed with patient and daughter who elects to be FULL CODE.         Diet: Snacks/Supplements Adult: Ensure Clear; With Meals    DVT Prophylaxis: Heparin   Phillips Catheter: Not present  Lines: None     Cardiac Monitoring: None  Code Status: FULL CODE     Disposition Plan     Medically Ready for Discharge: Anticipated in 2-4 Days     The patient's care was discussed with the Attending Physician, Dr. Geovanny Combs .    Alicia Stallworth PA-C  Hospitalist Service    Securely message with [employer] (more info)  Text page via [employer] Paging/Directory     ______________________________________________________________________    Chief Complaint   Chest pain     History is obtained from the patient and chart review    History of Present Illness   Taylor Aburto is a 69 year old female with a past medical history significant for small pericardial effusion, DM II, hyperlipidemia, pulmonary emphysema, tobacco use disorder, among others, who was admitted on 6/7/2024 for further evaluation of chest pain.     Patient presented to the ED hemodynamically stable. HR noted to be 98. /71. SpO2 97% ORA. She is afebrile. She reports she experienced one episode of vomiting prior to developing the acute onset of \"crushing\" chest pain that radiated to her arms (first left and then right), back and jaw after taking her diabetes medication (metformin) and multivitamin at 0730 06/07/2024. Pain improved after 1.5 hours, however, she continues to have nausea. Has had nausea in the past as a result of her multivitamin but has never experienced chest pain, which is why she presented to [facility] ED for further evaluation. Chest pain has since returned " "and she describes the pain as a constant, central pressure. Pain worse with laying flat. Improves with sitting up. She denies recent history of fever, viral illnesses, cough, SOB, palpitations, dizziness, syncope or pleuritic component to her pain. No recent dysphagia. She reports a year history of unintentional weight loss despite adequate PO intake. She has noted generalized fatigue that usually presents in the afternoons and morning joint and muscle stiffness that improves throughout the day. No night sweats. Endorses a sharp abdominal pain over her left kidney several days ago, which she states made her want to \"cripple over in pain.\" This pain has since resolved. She otherwise denies recent abdominal pain or bowel symptoms. Reports dark urine recently and reports it has been malodorous. She is smoking 0.5 PPD. Denies excessive alcohol intake or recreational drug use. Labs in the ED notable for no leukocytosis. Initial troponin 369. Repeat 308. D-Dimer negative. Lipase WNL (26). EKG demonstrating no ST elevations or overt signs of ischemia. No electrical alternans. CT chest w/o contrast notable for increasing, small to moderate pericardial effusion. No evidence of esophageal perforation. Patulous esophagus consistent with dysmotility.  She was given 1 L NS and a full dose ASA. Patient started on low intensity heparin infusion with bolus dose. Cardiology consulted in ED. Recommended stat echocardiogram, which is pending.        Past Medical History    Past Medical History:   Diagnosis Date    Arthritis 6/27/2013    H/O ETOH abuse 2/6/2018    Moderate smoker (20 or less per day) 6/27/2013    Vitamin D deficiency 2/6/2018       Past Surgical History   Past Surgical History:   Procedure Laterality Date    COLONOSCOPY N/A 12/1/2023    Procedure: COLONOSCOPY, FLEXIBLE, WITH LESION REMOVAL USING SNARE;  Surgeon: Jake Thomas MD;  Location: [facility]    ENDOSCOPIC ULTRASOUND UPPER GASTROINTESTINAL TRACT (GI) " N/A 2/22/2024    Procedure: Endoscopic ultrasound upper gastrointestinal tract (GI);  Surgeon: Jake Thomas MD;  Location:  GI    ESOPHAGOSCOPY, GASTROSCOPY, DUODENOSCOPY (EGD), COMBINED N/A 2/22/2024    Procedure: ESOPHAGOGASTRODUODENOSCOPY, WITH BIOPSY;  Surgeon: Jake Thomas MD;  Location:  GI       Prior to Admission Medications   Prior to Admission Medications   Prescriptions Last Dose Informant Patient Reported? Taking?   PYRIDOXINE HCL PO 6/7/2024 at am but vomitted 5 min after  Yes Yes   Sig: Take 1 tablet by mouth daily Strength unknown   aspirin 81 MG EC tablet  at took on 6/7 but does not routinely take  Yes No   Sig: [ASPIRIN 81 MG EC TABLET] Take 81 mg by mouth.   Patient not taking: Reported on 3/22/2024   blood glucose (ACCU-CHEK GUIDE) test strip   No No   Sig: [ACCU-CHEK GUIDE TEST STRIPS STRIPS] TEST BLOOD GLUCOSE LEVELS TWICE A DAY   blood glucose (NO BRAND SPECIFIED) lancets standard   No No   Sig: Use to test blood sugar two times daily or as directed.   blood glucose meter (GLUCOMETER)   No No   Sig: [BLOOD GLUCOSE METER (GLUCOMETER)] Use 1 each As Directed as needed. Dispense glucometer brand per patient's insurance at pharmacy discretion. DX: E11.9   fluticasone (FLONASE) 50 MCG/ACT nasal spray Unknown  No Yes   Sig: Spray 1 spray into both nostrils daily   Patient taking differently: Spray 1 spray into both nostrils daily as needed for allergies   lisinopril (ZESTRIL) 5 MG tablet 6/7/2024 at am but vomitted 5 min after  No Yes   Sig: Take 1 tablet (5 mg) by mouth daily   metFORMIN (GLUCOPHAGE XR) 500 MG 24 hr tablet 6/7/2024 at am but vomitted 5 min after  No Yes   Sig: Take 2 tablets (1,000 mg) by mouth 2 times daily   nicotine polacrilex (NICORETTE) 4 MG gum Unknown  No Yes   Sig: Chew one piece every 1- 2 hours for cravings as needed. Try to limit using the gum for strong craving only since you are taking the patch, and previous history of nausea and  vomiting.   oxyBUTYnin ER (DITROPAN XL) 10 MG 24 hr tablet 6/6/2024 at hs  No Yes   Sig: Take 1 tablet (10 mg) by mouth daily   rosuvastatin (CRESTOR) 40 MG tablet 6/6/2024 at hs  No Yes   Sig: Take 1 tablet (40 mg) by mouth daily      Facility-Administered Medications: None      Allergies   Allergies   Allergen Reactions    Prednisone Dizziness        Physical Exam   Vital Signs: Temp: 97.9  F (36.6  C) Temp src: Oral BP: 109/71 Pulse: 93   Resp: 15 SpO2: 97 % O2 Device: None (Room air)    Weight: 108 lbs 12.8 oz    GENERAL:  Pleasant, cooperative, alert. Patient sitting in bed comfortably with daughter at bedside.   HEENT: Normocephalic, atraumatic. Trachea midline. No JVD.   PULMONOLOGY: Clear to auscultation bilaterally.  CARDIAC: Regular rate and rhythm.  No appreciated murmur or rub.   ABDOMEN: Soft, nontender non distended.   MUSCULOSKELETAL:  Moving x 4 spontaneously.  Normal bulk and tone.  No LE edema.  Radial pulses 2+ bilaterally.    NEURO: Alert and oriented x3. Nonfocal exam.    Medical Decision Making       70 MINUTES SPENT BY ME on the date of service doing chart review, history, exam, documentation & further activities per the note.      Data   ------------------------- PAST 24 HR DATA REVIEWED -----------------------------------------------    I have personally reviewed the following data over the past 24 hrs:    9.9  \   14.5   / 151     136 101 11.1 /  159 (H)   4.7 25 0.72 \     ALT: 23 AST: 30 AP: 67 TBILI: 0.6   ALB: 4.4 TOT PROTEIN: 7.1 LIPASE: 26     Trop: 280 (HH) BNP: 5,335 (H)     TSH: 1.35 T4: N/A A1C: N/A     Procal: N/A CRP: <3.00 Lactic Acid: N/A       INR:  N/A PTT:  N/A   D-dimer:  0.47 Fibrinogen:  N/A       Imaging results reviewed over the past 24 hrs:   Recent Results (from the past 24 hour(s))   CT Chest w/o Contrast    Narrative    EXAM: CT CHEST W/O CONTRAST  LOCATION: [facility]  DATE: 6/7/2024    INDICATION: cp, ? esophageal perf, please give oral  contrast just prior to scan; vomiting crushing chest pain after vomiting radiating to arms back and jaw.  COMPARISON: 2/3/2023  TECHNIQUE: CT chest without IV contrast. Multiplanar reformats were obtained. Dose reduction techniques were used.  CONTRAST: None.    FINDINGS:   LUNGS AND PLEURA: Moderate centrilobular emphysema. Mild dependent atelectasis. No lobar consolidation, pleural effusions, or pneumothorax.    MEDIASTINUM/AXILLAE: Small amount of intravascular air is compatible with recent IV access. Esophagus is diffusely distended and filled with contrast material. No extraluminal contrast material. Paraesophageal dense calcification/granuloma is unchanged.   There is an increasing, small to moderate pericardial effusion. No pneumomediastinum.    CORONARY ARTERY CALCIFICATION: Moderate.    UPPER ABDOMEN: Oral contrast material is present within the visualized portions of the stomach. 2 to 3 mm left renal calculus.    MUSCULOSKELETAL: Multilevel spondylosis, most advanced in the      Impression    IMPRESSION:   1.  Oral contrast only CT of the chest demonstrates no evidence of esophageal perforation.  2.  Patulous esophagus consistent with dysmotility and/or reflux.  3.  Increasing, small to moderate pericardial effusion.     Echocardiogram Complete   Result Value    LVEF  30-35%    Narrative    [id number]  [id number]  [id number]  [id number]^PEBBLES^HUAN^WILLIE     [facility]  Echocardiography Laboratory  [address] [place] [address]     Name: WILLY FUENTES  MRN: [id number]  : 1955  Study Date: 2024 07:42 PM  Age: 69 yrs  Gender: Female  Patient Location: [facility]  Reason For Study: Chest Pressure, Pericardial Effusion  Ordering Physician: UHAN ROGEL  Referring Physician: Soraya Cherry  Performed By: Reji Rg RDCS     BSA: 1.5 m2  Height: 65 in  Weight: 108 lb  HR: 84  BP: 122/75  mmHg  ______________________________________________________________________________  Procedure  Complete Portable Echo Adult.  ______________________________________________________________________________  Interpretation Summary     1. Moderate-severely reduced LV systolic function, with LVEF estimated at 30-  35%. Grade I (early) diastolic dysfunction.  2. There is severe hypokinesis/akinesis of the mid-apical segments of the LV.  Contractility at the base is roughly normal. This pattern is suggestive of  Takotsubo (stress-induced) cardiomyopathy, though large territory ischemia is  also a consideration  3. Normal RV size and systolic function.  4. Normal bi-atrial size.  5. No significant valvular stenosis or regurgitation.  6. There is a small-moderate pericardial effusion, primarily over the RV free  wall. No echocardiographic evidence of tampnonade. IVC is not dilated.     No prior study available for comparison.     Results were discussed with Dr. Watkins over the phone.  ______________________________________________________________________________  Left Ventricle  The left ventricle is normal in size. There is borderline concentric left  ventricular hypertrophy. The visual ejection fraction is 30-35%. Left  ventricular systolic function is moderate to severely reduced. Grade I or  early diastolic dysfunction. Severe hypokinesis/akinesis of the mid-apical  segments of the LV. Contractility at the base is roughly normal. This pattern  is suggestive of Takotsubo (stress-induced) cardiomyopathy, though large  territory ischemia is also a consideration. No definite LV thrombus is seen,  but this cannot be entirely excluded given lack of contrast.     Right Ventricle  The right ventricle is normal size. The right ventricular systolic function is  normal.     Atria  Normal left atrial size. Right atrial size is normal.     Mitral Valve  The mitral valve leaflets are moderately thickened. There is  physiologic  mitral regurgitation. There is no mitral valve stenosis.     Tricuspid Valve  The tricuspid valve is normal in structure and function. There is trace to  mild tricuspid regurgitation. The right ventricular systolic pressure is  approximated at 21.3 mmHg plus the right atrial pressure. IVC diameter <2.1 cm  collapsing >50% with sniff suggests a normal RA pressure of 3 mmHg.     Aortic Valve  There is trivial trileaflet aortic sclerosis. No aortic regurgitation is  present. No aortic stenosis is present.     Pulmonic Valve  The pulmonic valve is not well visualized.     Vessels  The aortic root is normal size.     Pericardium  Small-moderate pericardial effusion, primarily over the RV free wall. No  echocardiographic evidence of tampnonade.     ______________________________________________________________________________  MMode/2D Measurements & Calculations  IVSd: 1.1 cm  LVIDd: 3.9 cm  LVIDs: 3.2 cm  LVPWd: 1.0 cm  FS: 18.4 %  LV mass(C)d: 128.0 grams  LV mass(C)dI: 84.1 grams/m2     Ao root diam: 3.1 cm  LA dimension: 3.2 cm  LA/Ao: 1.0  Ao root diam index Ht(cm/m): 1.9  Ao root diam index BSA (cm/m2): 2.0  EF Biplane: 33.9 %  LA Volume (BP): 42.0 ml  LA Volume Index (BP): 27.6 ml/m2  RV Base: 1.9 cm  RWT: 0.52  TAPSE: 1.5 cm     Doppler Measurements & Calculations  MV E max carter: 62.9 cm/sec  MV A max carter: 121.0 cm/sec  MV E/A: 0.52  MV dec time: 0.13 sec  PA acc time: 0.11 sec  TR max carter: 231.0 cm/sec  TR max P.3 mmHg  E/E' avg: 15.3  Lateral E/e': 11.3  Medial E/e': 19.3  RV S Carter: 12.3 cm/sec     ______________________________________________________________________________  Report approved by: MD Rick Escobar 2024 09:38 PM

## 2024-06-07 NOTE — Clinical Note
The first balloon was inserted into the left anterior descending and left anterior descending diagonal.Max pressure = 12 marycruz. Total duration = 8 seconds.

## 2024-06-07 NOTE — Clinical Note
The first balloon was inserted into the left anterior descending.Max pressure = 12 marycruz. Total duration = 12 seconds.

## 2024-06-07 NOTE — Clinical Note
Potential access sites were evaluated for patency using ultrasound.   The right radial artery was selected. Access was obtained under with Sonosite guidance using a micropuncture 21 gauge needle with direct visualization of needle entry.    Render Post-Care Instructions In Note?: yes Post-Care Instructions: I reviewed with the patient in detail post-care instructions. Patient is to wear sunprotection, and avoid picking at any of the treated lesions. Pt may apply Vaseline to crusted or scabbing areas. Duration Of Freeze Thaw-Cycle (Seconds): 2 Consent: The patient's consent was obtained including but not limited to risks of crusting, scabbing, blistering, scarring, darker or lighter pigmentary change, recurrence, incomplete removal and infection. Detail Level: Detailed Number Of Freeze-Thaw Cycles: 3 freeze-thaw cycles Render Note In Bullet Format When Appropriate: No

## 2024-06-07 NOTE — Clinical Note
The first balloon was inserted into the left anterior descending.Max pressure = 18 marycruz. Total duration = 60 seconds.

## 2024-06-07 NOTE — Clinical Note
The first balloon was inserted into the left anterior descending and left anterior descending diagonal.Max pressure = 8 marycruz. Total duration = 10 seconds.

## 2024-06-07 NOTE — Clinical Note
The first balloon was inserted into the left anterior descending.Max pressure = 12 marycruz. Total duration = 20 seconds.     Max pressure = 13 marycruz. Total duration = 13 seconds.    Balloon reinflated a second time: Max pressure = 13 marycruz. Total duration = 13 seconds.  Balloon reinflated a third time: Max pressure = 16 marycruz. Total duration = 15 seconds.

## 2024-06-07 NOTE — Clinical Note
The first balloon was inserted into the left anterior descending and left anterior descending diagonal.Max pressure = 12 marycruz. Total duration = 12 seconds.

## 2024-06-07 NOTE — Clinical Note
The first balloon was inserted into the left anterior descending.Max pressure = 12 marycruz. Total duration = 24 seconds.     Max pressure = 6 marycruz. Total duration = 10 seconds.    Balloon reinflated a second time: Max pressure = 6 marycruz. Total duration = 10 seconds.

## 2024-06-07 NOTE — ED TRIAGE NOTES
Patient states took a MV with a Centrum.  Forgot it makes her nauseous.  Dry heaves, vomiting.  Then had chest pain.  Feels like something slammed into her chest.  Pain radiated into her arm.  Still has chest pain; pain goes into back.  Denies abdominal pain.  Lightheaded.

## 2024-06-07 NOTE — PHARMACY-ADMISSION MEDICATION HISTORY
Pharmacist Admission Medication History    Admission medication history is complete. The information provided in this note is only as accurate as the sources available at the time of the update.    Information Source(s): Patient and CareEverywhere/SureScripts via in-person    Pertinent Information: Patient reports she does not routinely take aspirin but did take one today.    Changes made to PTA medication list:  Added: Vit B6  Deleted: None  Changed: flonase scheduled to prn    Allergies reviewed with patient and updates made in EHR: yes    Medication History Completed By: Zuly [facility] 6/7/2024 4:45 PM    PTA Med List   Medication Sig Last Dose    fluticasone (FLONASE) 50 MCG/ACT nasal spray Spray 1 spray into both nostrils daily (Patient taking differently: Spray 1 spray into both nostrils daily as needed for allergies) Unknown    lisinopril (ZESTRIL) 5 MG tablet Take 1 tablet (5 mg) by mouth daily 6/7/2024 at am but vomitted 5 min after    metFORMIN (GLUCOPHAGE XR) 500 MG 24 hr tablet Take 2 tablets (1,000 mg) by mouth 2 times daily 6/7/2024 at am but vomitted 5 min after    nicotine polacrilex (NICORETTE) 4 MG gum Chew one piece every 1- 2 hours for cravings as needed. Try to limit using the gum for strong craving only since you are taking the patch, and previous history of nausea and vomiting. Unknown    oxyBUTYnin ER (DITROPAN XL) 10 MG 24 hr tablet Take 1 tablet (10 mg) by mouth daily 6/6/2024 at hs    PYRIDOXINE HCL PO Take 1 tablet by mouth daily Strength unknown 6/7/2024 at am but vomitted 5 min after    rosuvastatin (CRESTOR) 40 MG tablet Take 1 tablet (40 mg) by mouth daily 6/6/2024 at hs

## 2024-06-07 NOTE — Clinical Note
The first balloon was inserted into the left anterior descending.Max pressure = 6 marycruz. Total duration = 12 seconds.

## 2024-06-07 NOTE — Clinical Note
The first balloon was inserted into the left anterior descending.Max pressure = 12 marycruz. Total duration = 20 seconds.

## 2024-06-07 NOTE — Clinical Note
The first balloon was inserted into the left anterior descending and left anterior descending diagonal.Max pressure = 8 marycruz. Total duration = 11 seconds.

## 2024-06-07 NOTE — Clinical Note
The first balloon was inserted into the left anterior descending.Max pressure = 9 marycruz. Total duration = 15 seconds.     Max pressure = 9 marycruz. Total duration = 22 seconds.    Balloon reinflated a second time: Max pressure = 9 marycruz. Total duration = 22 seconds.

## 2024-06-07 NOTE — Clinical Note
The first balloon was inserted into the left anterior descending.Max pressure = 20 marycruz. Total duration = 14 seconds.     Max pressure = 20 marycruz. Total duration = 8 seconds.    Balloon reinflated a second time: Max pressure = 20 marycruz. Total duration = 8 seconds.

## 2024-06-07 NOTE — PROGRESS NOTES
"[facility]  ED Nurse Handoff Report    ED Chief complaint: Chest Pain      ED Diagnosis:   Final diagnoses:   None       Code Status: to be discussed with provider    Allergies:   Allergies   Allergen Reactions    Prednisone Dizziness       Patient Story: Patient had nausea, vomiting, and chest pain today. Her family brought her in to the ED.  Focused Assessment:  Patient endorses chest pain of 1/10 currently. She said the chest pain was much worse at home and it radiated to her left arm, back, and neck. Patient's Troponin is elevated.    Treatments and/or interventions provided: Labs drawn, imaging done, and medications given.  Patient's response to treatments and/or interventions: Patient tolerated interventions well.    To be done/followed up on inpatient unit:  Cardiac work up. Follow plan of care.    Does this patient have any cognitive concerns?:  n/a    Activity level - Baseline/Home:  Independent  Activity Level - Current:   Stand with Assist    Patient's Preferred language: English   Needed?: No    Isolation: None  Infection: Not Applicable  Patient tested for COVID 19 prior to admission: NO  Bariatric?: No    Vital Signs:   Vitals:    06/07/24 1540 06/07/24 1638   BP: 120/71 108/63   BP Location:  Right arm   Pulse: 98 85   Resp: 16 17   TempSrc: Oral    SpO2: 97% 98%   Weight: 49 kg (108 lb)    Height: 1.651 m (5' 5\")        Cardiac Rhythm:     Was the PSS-3 completed:   Yes  What interventions are required if any?  None needed             Family Comments: Spouse and family members at bedside and supportive in cares.  OBS brochure/video discussed/provided to patient/family: No              Name of person given brochure if not patient: n/a              Relationship to patient:     For the majority of the shift this patient's behavior was Green.   Behavioral interventions performed were none needed.    ED NURSE PHONE NUMBER: [phone]        "

## 2024-06-07 NOTE — Clinical Note
The first balloon was inserted into the left anterior descending and left anterior descending diagonal.Max pressure = 20 marycruz. Total duration = 20 seconds.     Max pressure = 16 marycruz. Total duration = 19 seconds.    Balloon reinflated a second time: Max pressure = 16 marycruz. Total duration = 19 seconds.  Balloon reinflated a third time: Max pressure = 20 marycruz. Total duration = 17 seconds.

## 2024-06-07 NOTE — Clinical Note
Potential access sites were evaluated for patency using ultrasound.   The central chest, subxiphoid was selected. Access was obtained under with ECHO and Fluoroscopic guidance using a micropuncture 21 gauge needle with direct visualization of needle entry.

## 2024-06-07 NOTE — Clinical Note
The first balloon was inserted into the left anterior descending.Max pressure = 12 marycruz. Total duration = 90 seconds.      Balloon reinflated a second time:

## 2024-06-07 NOTE — Clinical Note
Stent deployed in the proximal left anterior descending. Max pressure = 12 marycruz. Total duration = 14 seconds.

## 2024-06-07 NOTE — Clinical Note
The first balloon was inserted into the left anterior descending.Max pressure = 12 marycruz. Total duration = 40 seconds.

## 2024-06-07 NOTE — Clinical Note
The first balloon was inserted into the left anterior descending and left anterior descending diagonal.Max pressure = 16 marycruz. Total duration = 19 seconds.     Max pressure = 20 marycruz. Total duration = 30 seconds.    Balloon reinflated a second time: Max pressure = 20 marycruz. Total duration = 30 seconds.  Balloon reinflated a third time: Max pressure = 18 marycruz. Total duration = 13 seconds.

## 2024-06-07 NOTE — Clinical Note
Hemodynamic equipment used: 5 lead ECG, [employer] With 3 Leads, Machine BP Cuff and pulse oximeter probe.

## 2024-06-07 NOTE — Clinical Note
Stent deployed in the left anterior descending diagonal. Max pressure = 9 marycruz. Total duration = 16 seconds. Balloon reinflated a second time: Max pressure = 16 marycruz. Total duration = 9 seconds.

## 2024-06-08 ENCOUNTER — APPOINTMENT (OUTPATIENT)
Dept: CARDIOLOGY | Facility: CLINIC | Age: 69
DRG: 321 | End: 2024-06-08
Attending: INTERNAL MEDICINE
Payer: COMMERCIAL

## 2024-06-08 LAB
ACT BLD: 282 SECONDS (ref 74–150)
ACT BLD: 282 SECONDS (ref 74–150)
ACT BLD: 290 SECONDS (ref 74–150)
ACT BLD: 305 SECONDS (ref 74–150)
ACT BLD: 313 SECONDS (ref 74–150)
ACT BLD: 317 SECONDS (ref 74–150)
ACT BLD: 321 SECONDS (ref 74–150)
ACT BLD: 348 SECONDS (ref 74–150)
ACT BLD: 375 SECONDS (ref 74–150)
ALBUMIN SERPL BCG-MCNC: 3.7 G/DL (ref 3.5–5.2)
ALP SERPL-CCNC: 54 U/L (ref 40–150)
ALT SERPL W P-5'-P-CCNC: 18 U/L (ref 0–50)
ANION GAP SERPL CALCULATED.3IONS-SCNC: 9 MMOL/L (ref 7–15)
AST SERPL W P-5'-P-CCNC: 24 U/L (ref 0–45)
BILIRUB SERPL-MCNC: 0.5 MG/DL
BUN SERPL-MCNC: 12.3 MG/DL (ref 8–23)
CALCIUM SERPL-MCNC: 9.4 MG/DL (ref 8.8–10.2)
CHLORIDE SERPL-SCNC: 106 MMOL/L (ref 98–107)
CHOLEST SERPL-MCNC: 120 MG/DL
CREAT SERPL-MCNC: 0.72 MG/DL (ref 0.51–0.95)
DEPRECATED HCO3 PLAS-SCNC: 24 MMOL/L (ref 22–29)
EGFRCR SERPLBLD CKD-EPI 2021: >90 ML/MIN/1.73M2
ERYTHROCYTE [DISTWIDTH] IN BLOOD BY AUTOMATED COUNT: 12.6 % (ref 10–15)
GLUCOSE BLDC GLUCOMTR-MCNC: 167 MG/DL (ref 70–99)
GLUCOSE BLDC GLUCOMTR-MCNC: 183 MG/DL (ref 70–99)
GLUCOSE BLDC GLUCOMTR-MCNC: 193 MG/DL (ref 70–99)
GLUCOSE BLDC GLUCOMTR-MCNC: 221 MG/DL (ref 70–99)
GLUCOSE SERPL-MCNC: 170 MG/DL (ref 70–99)
HCT VFR BLD AUTO: 40.7 % (ref 35–47)
HDLC SERPL-MCNC: 66 MG/DL
HGB BLD-MCNC: 13.4 G/DL (ref 11.7–15.7)
LDLC SERPL CALC-MCNC: 39 MG/DL
LVEF ECHO: NORMAL
MCH RBC QN AUTO: 30.3 PG (ref 26.5–33)
MCHC RBC AUTO-ENTMCNC: 32.9 G/DL (ref 31.5–36.5)
MCV RBC AUTO: 92 FL (ref 78–100)
NONHDLC SERPL-MCNC: 54 MG/DL
PLATELET # BLD AUTO: 126 10E3/UL (ref 150–450)
POTASSIUM SERPL-SCNC: 4.6 MMOL/L (ref 3.4–5.3)
PROT SERPL-MCNC: 6.3 G/DL (ref 6.4–8.3)
RBC # BLD AUTO: 4.42 10E6/UL (ref 3.8–5.2)
SODIUM SERPL-SCNC: 139 MMOL/L (ref 135–145)
TRIGL SERPL-MCNC: 74 MG/DL
UFH PPP CHRO-ACNC: 0.22 IU/ML
UFH PPP CHRO-ACNC: 0.27 IU/ML
WBC # BLD AUTO: 5.5 10E3/UL (ref 4–11)

## 2024-06-08 PROCEDURE — 93325 DOPPLER ECHO COLOR FLOW MAPG: CPT | Mod: 26 | Performed by: INTERNAL MEDICINE

## 2024-06-08 PROCEDURE — C1874 STENT, COATED/COV W/DEL SYS: HCPCS | Performed by: INTERNAL MEDICINE

## 2024-06-08 PROCEDURE — 85014 HEMATOCRIT: CPT

## 2024-06-08 PROCEDURE — 250N000012 HC RX MED GY IP 250 OP 636 PS 637

## 2024-06-08 PROCEDURE — 85520 HEPARIN ASSAY: CPT

## 2024-06-08 PROCEDURE — 82040 ASSAY OF SERUM ALBUMIN: CPT

## 2024-06-08 PROCEDURE — 93454 CORONARY ARTERY ANGIO S&I: CPT | Performed by: INTERNAL MEDICINE

## 2024-06-08 PROCEDURE — 210N000001 HC R&B IMCU HEART CARE

## 2024-06-08 PROCEDURE — 85520 HEPARIN ASSAY: CPT | Performed by: STUDENT IN AN ORGANIZED HEALTH CARE EDUCATION/TRAINING PROGRAM

## 2024-06-08 PROCEDURE — 99223 1ST HOSP IP/OBS HIGH 75: CPT | Mod: 25 | Performed by: INTERNAL MEDICINE

## 2024-06-08 PROCEDURE — 250N000011 HC RX IP 250 OP 636: Performed by: INTERNAL MEDICINE

## 2024-06-08 PROCEDURE — 93325 DOPPLER ECHO COLOR FLOW MAPG: CPT

## 2024-06-08 PROCEDURE — 272N000001 HC OR GENERAL SUPPLY STERILE: Performed by: INTERNAL MEDICINE

## 2024-06-08 PROCEDURE — 92979 ENDOLUMINL IVUS OCT C EA: CPT | Performed by: INTERNAL MEDICINE

## 2024-06-08 PROCEDURE — 250N000011 HC RX IP 250 OP 636: Mod: JZ

## 2024-06-08 PROCEDURE — 93010 ELECTROCARDIOGRAM REPORT: CPT | Mod: XU | Performed by: INTERNAL MEDICINE

## 2024-06-08 PROCEDURE — 92978 ENDOLUMINL IVUS OCT C 1ST: CPT | Mod: 26 | Performed by: INTERNAL MEDICINE

## 2024-06-08 PROCEDURE — 250N000009 HC RX 250: Performed by: INTERNAL MEDICINE

## 2024-06-08 PROCEDURE — 250N000013 HC RX MED GY IP 250 OP 250 PS 637: Performed by: INTERNAL MEDICINE

## 2024-06-08 PROCEDURE — 93454 CORONARY ARTERY ANGIO S&I: CPT | Mod: 26 | Performed by: INTERNAL MEDICINE

## 2024-06-08 PROCEDURE — C9601 PERC DRUG-EL COR STENT BRAN: HCPCS | Performed by: INTERNAL MEDICINE

## 2024-06-08 PROCEDURE — C9113 INJ PANTOPRAZOLE SODIUM, VIA: HCPCS | Mod: JZ

## 2024-06-08 PROCEDURE — 250N000013 HC RX MED GY IP 250 OP 250 PS 637: Performed by: STUDENT IN AN ORGANIZED HEALTH CARE EDUCATION/TRAINING PROGRAM

## 2024-06-08 PROCEDURE — C1887 CATHETER, GUIDING: HCPCS | Performed by: INTERNAL MEDICINE

## 2024-06-08 PROCEDURE — 85347 COAGULATION TIME ACTIVATED: CPT

## 2024-06-08 PROCEDURE — 92978 ENDOLUMINL IVUS OCT C 1ST: CPT | Mod: LD | Performed by: INTERNAL MEDICINE

## 2024-06-08 PROCEDURE — 250N000013 HC RX MED GY IP 250 OP 250 PS 637

## 2024-06-08 PROCEDURE — 92928 PRQ TCAT PLMT NTRAC ST 1 LES: CPT | Mod: LD | Performed by: INTERNAL MEDICINE

## 2024-06-08 PROCEDURE — 99152 MOD SED SAME PHYS/QHP 5/>YRS: CPT | Mod: GC | Performed by: INTERNAL MEDICINE

## 2024-06-08 PROCEDURE — C1753 CATH, INTRAVAS ULTRASOUND: HCPCS | Performed by: INTERNAL MEDICINE

## 2024-06-08 PROCEDURE — 92929 PR PRQ TRLUML CORONARY BM STENT W/ANGIO ADDL ART/BRNCH: CPT | Mod: LD | Performed by: INTERNAL MEDICINE

## 2024-06-08 PROCEDURE — 99153 MOD SED SAME PHYS/QHP EA: CPT | Performed by: INTERNAL MEDICINE

## 2024-06-08 PROCEDURE — 93321 DOPPLER ECHO F-UP/LMTD STD: CPT | Mod: 26 | Performed by: INTERNAL MEDICINE

## 2024-06-08 PROCEDURE — 36415 COLL VENOUS BLD VENIPUNCTURE: CPT

## 2024-06-08 PROCEDURE — 999N000147 HC STATISTIC PT IP EVAL DEFER

## 2024-06-08 PROCEDURE — C1894 INTRO/SHEATH, NON-LASER: HCPCS | Performed by: INTERNAL MEDICINE

## 2024-06-08 PROCEDURE — 36415 COLL VENOUS BLD VENIPUNCTURE: CPT | Performed by: STUDENT IN AN ORGANIZED HEALTH CARE EDUCATION/TRAINING PROGRAM

## 2024-06-08 PROCEDURE — 93005 ELECTROCARDIOGRAM TRACING: CPT

## 2024-06-08 PROCEDURE — C1769 GUIDE WIRE: HCPCS | Performed by: INTERNAL MEDICINE

## 2024-06-08 PROCEDURE — 93308 TTE F-UP OR LMTD: CPT | Mod: 26 | Performed by: INTERNAL MEDICINE

## 2024-06-08 PROCEDURE — 99152 MOD SED SAME PHYS/QHP 5/>YRS: CPT | Performed by: INTERNAL MEDICINE

## 2024-06-08 PROCEDURE — 258N000003 HC RX IP 258 OP 636: Mod: JZ | Performed by: INTERNAL MEDICINE

## 2024-06-08 PROCEDURE — C1757 CATH, THROMBECTOMY/EMBOLECT: HCPCS | Performed by: INTERNAL MEDICINE

## 2024-06-08 PROCEDURE — 80061 LIPID PANEL: CPT

## 2024-06-08 PROCEDURE — C1725 CATH, TRANSLUMIN NON-LASER: HCPCS | Performed by: INTERNAL MEDICINE

## 2024-06-08 PROCEDURE — C9600 PERC DRUG-EL COR STENT SING: HCPCS | Mod: LD | Performed by: INTERNAL MEDICINE

## 2024-06-08 DEVICE — STENT CORONARY DES SYNERGY XD MR US 3.00X32MM H7493941832300: Type: IMPLANTABLE DEVICE | Status: FUNCTIONAL

## 2024-06-08 DEVICE — STENT COR ONYX FRONTIER 30X2MM ONYXNG20030UX: Type: IMPLANTABLE DEVICE | Status: FUNCTIONAL

## 2024-06-08 DEVICE — STENT COR ONYX FRONTIER 15X2MM ONYXNG20015UX: Type: IMPLANTABLE DEVICE | Status: FUNCTIONAL

## 2024-06-08 DEVICE — IMPLANTABLE DEVICE: Type: IMPLANTABLE DEVICE | Status: FUNCTIONAL

## 2024-06-08 RX ORDER — ASPIRIN 81 MG/1
81 TABLET ORAL DAILY
Status: DISCONTINUED | OUTPATIENT
Start: 2024-06-09 | End: 2024-06-08

## 2024-06-08 RX ORDER — FLUMAZENIL 0.1 MG/ML
0.2 INJECTION, SOLUTION INTRAVENOUS
Status: ACTIVE | OUTPATIENT
Start: 2024-06-08 | End: 2024-06-09

## 2024-06-08 RX ORDER — LORAZEPAM 2 MG/ML
0.5 INJECTION INTRAMUSCULAR
Status: DISCONTINUED | OUTPATIENT
Start: 2024-06-08 | End: 2024-06-08 | Stop reason: HOSPADM

## 2024-06-08 RX ORDER — SODIUM CHLORIDE 9 MG/ML
INJECTION, SOLUTION INTRAVENOUS CONTINUOUS
Status: DISCONTINUED | OUTPATIENT
Start: 2024-06-08 | End: 2024-06-08 | Stop reason: HOSPADM

## 2024-06-08 RX ORDER — ONDANSETRON 2 MG/ML
INJECTION INTRAMUSCULAR; INTRAVENOUS
Status: DISCONTINUED | OUTPATIENT
Start: 2024-06-08 | End: 2024-06-08 | Stop reason: HOSPADM

## 2024-06-08 RX ORDER — PRASUGREL 10 MG/1
10 TABLET, FILM COATED ORAL DAILY
Status: DISCONTINUED | OUTPATIENT
Start: 2024-06-09 | End: 2024-06-09

## 2024-06-08 RX ORDER — NALOXONE HYDROCHLORIDE 0.4 MG/ML
0.2 INJECTION, SOLUTION INTRAMUSCULAR; INTRAVENOUS; SUBCUTANEOUS
Status: DISCONTINUED | OUTPATIENT
Start: 2024-06-08 | End: 2024-06-08

## 2024-06-08 RX ORDER — NALOXONE HYDROCHLORIDE 0.4 MG/ML
0.4 INJECTION, SOLUTION INTRAMUSCULAR; INTRAVENOUS; SUBCUTANEOUS
Status: DISCONTINUED | OUTPATIENT
Start: 2024-06-08 | End: 2024-06-08

## 2024-06-08 RX ORDER — OXYCODONE HYDROCHLORIDE 5 MG/1
10 TABLET ORAL EVERY 4 HOURS PRN
Status: DISCONTINUED | OUTPATIENT
Start: 2024-06-08 | End: 2024-06-08

## 2024-06-08 RX ORDER — ATROPINE SULFATE 0.1 MG/ML
0.5 INJECTION INTRAVENOUS
Status: ACTIVE | OUTPATIENT
Start: 2024-06-08 | End: 2024-06-09

## 2024-06-08 RX ORDER — FENTANYL CITRATE 50 UG/ML
INJECTION, SOLUTION INTRAMUSCULAR; INTRAVENOUS
Status: DISCONTINUED | OUTPATIENT
Start: 2024-06-08 | End: 2024-06-08 | Stop reason: HOSPADM

## 2024-06-08 RX ORDER — NITROGLYCERIN 0.4 MG/1
0.4 TABLET SUBLINGUAL EVERY 5 MIN PRN
Status: DISCONTINUED | OUTPATIENT
Start: 2024-06-08 | End: 2024-06-13 | Stop reason: HOSPADM

## 2024-06-08 RX ORDER — PRASUGREL 10 MG/1
TABLET, FILM COATED ORAL
Status: DISCONTINUED | OUTPATIENT
Start: 2024-06-08 | End: 2024-06-08 | Stop reason: HOSPADM

## 2024-06-08 RX ORDER — HYDRALAZINE HYDROCHLORIDE 20 MG/ML
10 INJECTION INTRAMUSCULAR; INTRAVENOUS EVERY 4 HOURS PRN
Status: DISCONTINUED | OUTPATIENT
Start: 2024-06-08 | End: 2024-06-13 | Stop reason: HOSPADM

## 2024-06-08 RX ORDER — NITROGLYCERIN 5 MG/ML
VIAL (ML) INTRAVENOUS
Status: DISCONTINUED | OUTPATIENT
Start: 2024-06-08 | End: 2024-06-08 | Stop reason: HOSPADM

## 2024-06-08 RX ORDER — ONDANSETRON 2 MG/ML
4 INJECTION INTRAMUSCULAR; INTRAVENOUS EVERY 6 HOURS PRN
Status: DISCONTINUED | OUTPATIENT
Start: 2024-06-08 | End: 2024-06-08

## 2024-06-08 RX ORDER — HYDRALAZINE HYDROCHLORIDE 20 MG/ML
INJECTION INTRAMUSCULAR; INTRAVENOUS
Status: DISCONTINUED | OUTPATIENT
Start: 2024-06-08 | End: 2024-06-08 | Stop reason: HOSPADM

## 2024-06-08 RX ORDER — LORAZEPAM 0.5 MG/1
0.5 TABLET ORAL
Status: DISCONTINUED | OUTPATIENT
Start: 2024-06-08 | End: 2024-06-08 | Stop reason: HOSPADM

## 2024-06-08 RX ORDER — HEPARIN SODIUM 1000 [USP'U]/ML
INJECTION, SOLUTION INTRAVENOUS; SUBCUTANEOUS
Status: DISCONTINUED | OUTPATIENT
Start: 2024-06-08 | End: 2024-06-08 | Stop reason: HOSPADM

## 2024-06-08 RX ORDER — ASPIRIN 325 MG
325 TABLET ORAL ONCE
Status: COMPLETED | OUTPATIENT
Start: 2024-06-08 | End: 2024-06-08

## 2024-06-08 RX ORDER — VERAPAMIL HYDROCHLORIDE 2.5 MG/ML
INJECTION, SOLUTION INTRAVENOUS
Status: DISCONTINUED | OUTPATIENT
Start: 2024-06-08 | End: 2024-06-08 | Stop reason: HOSPADM

## 2024-06-08 RX ORDER — LIDOCAINE 40 MG/G
CREAM TOPICAL
Status: DISCONTINUED | OUTPATIENT
Start: 2024-06-08 | End: 2024-06-08

## 2024-06-08 RX ORDER — METOPROLOL TARTRATE 1 MG/ML
5 INJECTION, SOLUTION INTRAVENOUS
Status: DISCONTINUED | OUTPATIENT
Start: 2024-06-08 | End: 2024-06-13 | Stop reason: HOSPADM

## 2024-06-08 RX ORDER — ASPIRIN 81 MG/1
81 TABLET, CHEWABLE ORAL ONCE
Status: DISCONTINUED | OUTPATIENT
Start: 2024-06-08 | End: 2024-06-08

## 2024-06-08 RX ORDER — ROSUVASTATIN CALCIUM 40 MG/1
40 TABLET, COATED ORAL DAILY
Qty: 90 TABLET | Refills: 3 | Status: SHIPPED | OUTPATIENT
Start: 2024-06-08 | End: 2024-06-13

## 2024-06-08 RX ORDER — METOPROLOL TARTRATE 1 MG/ML
INJECTION, SOLUTION INTRAVENOUS
Status: DISCONTINUED | OUTPATIENT
Start: 2024-06-08 | End: 2024-06-08 | Stop reason: HOSPADM

## 2024-06-08 RX ORDER — ASPIRIN 81 MG/1
81 TABLET ORAL DAILY
Qty: 90 TABLET | Refills: 3 | Status: SHIPPED | OUTPATIENT
Start: 2024-06-09 | End: 2024-06-13

## 2024-06-08 RX ORDER — NICOTINE 21 MG/24HR
1 PATCH, TRANSDERMAL 24 HOURS TRANSDERMAL DAILY
Status: DISCONTINUED | OUTPATIENT
Start: 2024-06-08 | End: 2024-06-13 | Stop reason: HOSPADM

## 2024-06-08 RX ORDER — OXYCODONE HYDROCHLORIDE 5 MG/1
5 TABLET ORAL EVERY 4 HOURS PRN
Status: DISCONTINUED | OUTPATIENT
Start: 2024-06-08 | End: 2024-06-08

## 2024-06-08 RX ORDER — EPTIFIBATIDE 2 MG/ML
INJECTION, SOLUTION INTRAVENOUS
Status: DISCONTINUED | OUTPATIENT
Start: 2024-06-08 | End: 2024-06-08 | Stop reason: HOSPADM

## 2024-06-08 RX ORDER — POTASSIUM CHLORIDE 1500 MG/1
20 TABLET, EXTENDED RELEASE ORAL
Status: DISCONTINUED | OUTPATIENT
Start: 2024-06-08 | End: 2024-06-08 | Stop reason: HOSPADM

## 2024-06-08 RX ORDER — FENTANYL CITRATE 50 UG/ML
25 INJECTION, SOLUTION INTRAMUSCULAR; INTRAVENOUS
Status: DISCONTINUED | OUTPATIENT
Start: 2024-06-08 | End: 2024-06-13 | Stop reason: HOSPADM

## 2024-06-08 RX ORDER — PRASUGREL 10 MG/1
10 TABLET, FILM COATED ORAL DAILY
Qty: 90 TABLET | Refills: 3 | Status: SHIPPED | OUTPATIENT
Start: 2024-06-09 | End: 2024-06-13

## 2024-06-08 RX ORDER — ASPIRIN 81 MG/1
243 TABLET, CHEWABLE ORAL ONCE
Status: COMPLETED | OUTPATIENT
Start: 2024-06-08 | End: 2024-06-08

## 2024-06-08 RX ORDER — ACETAMINOPHEN 325 MG/1
650 TABLET ORAL EVERY 4 HOURS PRN
Status: DISCONTINUED | OUTPATIENT
Start: 2024-06-08 | End: 2024-06-08

## 2024-06-08 RX ORDER — SODIUM CHLORIDE 9 MG/ML
INJECTION, SOLUTION INTRAVENOUS CONTINUOUS
Status: ACTIVE | OUTPATIENT
Start: 2024-06-08 | End: 2024-06-08

## 2024-06-08 RX ORDER — IOPAMIDOL 755 MG/ML
INJECTION, SOLUTION INTRAVASCULAR
Status: DISCONTINUED | OUTPATIENT
Start: 2024-06-08 | End: 2024-06-08 | Stop reason: HOSPADM

## 2024-06-08 RX ORDER — ONDANSETRON 4 MG/1
4 TABLET, ORALLY DISINTEGRATING ORAL EVERY 6 HOURS PRN
Status: DISCONTINUED | OUTPATIENT
Start: 2024-06-08 | End: 2024-06-08

## 2024-06-08 RX ADMIN — OXYBUTYNIN CHLORIDE 10 MG: 10 TABLET, EXTENDED RELEASE ORAL at 21:37

## 2024-06-08 RX ADMIN — ROSUVASTATIN CALCIUM 40 MG: 20 TABLET, FILM COATED ORAL at 09:11

## 2024-06-08 RX ADMIN — ASPIRIN 81 MG: 81 TABLET, COATED ORAL at 09:11

## 2024-06-08 RX ADMIN — ASPIRIN 81 MG CHEWABLE TABLET 243 MG: 81 TABLET CHEWABLE at 10:37

## 2024-06-08 RX ADMIN — PANTOPRAZOLE SODIUM 40 MG: 40 INJECTION, POWDER, FOR SOLUTION INTRAVENOUS at 10:36

## 2024-06-08 RX ADMIN — ONDANSETRON 4 MG: 2 INJECTION INTRAMUSCULAR; INTRAVENOUS at 16:47

## 2024-06-08 RX ADMIN — INSULIN ASPART 1 UNITS: 100 INJECTION, SOLUTION INTRAVENOUS; SUBCUTANEOUS at 22:23

## 2024-06-08 RX ADMIN — OXYBUTYNIN CHLORIDE 10 MG: 10 TABLET, EXTENDED RELEASE ORAL at 01:05

## 2024-06-08 RX ADMIN — HEPARIN SODIUM 750 UNITS/HR: 10000 INJECTION, SOLUTION INTRAVENOUS at 10:42

## 2024-06-08 RX ADMIN — NICOTINE 1 PATCH: 21 PATCH, EXTENDED RELEASE TRANSDERMAL at 20:58

## 2024-06-08 RX ADMIN — SODIUM CHLORIDE: 9 INJECTION, SOLUTION INTRAVENOUS at 10:56

## 2024-06-08 RX ADMIN — ACETAMINOPHEN 650 MG: 325 TABLET, FILM COATED ORAL at 21:37

## 2024-06-08 RX ADMIN — NICOTINE POLACRILEX 2 MG: 2 GUM, CHEWING BUCCAL at 09:44

## 2024-06-08 RX ADMIN — LISINOPRIL 5 MG: 5 TABLET ORAL at 09:11

## 2024-06-08 RX ADMIN — ACETAMINOPHEN 650 MG: 325 TABLET, FILM COATED ORAL at 17:18

## 2024-06-08 ASSESSMENT — ACTIVITIES OF DAILY LIVING (ADL)
ADLS_ACUITY_SCORE: 36
ADLS_ACUITY_SCORE: 41
ADLS_ACUITY_SCORE: 41
ADLS_ACUITY_SCORE: 36
ADLS_ACUITY_SCORE: 41
ADLS_ACUITY_SCORE: 36
ADLS_ACUITY_SCORE: 41
ADLS_ACUITY_SCORE: 36
ADLS_ACUITY_SCORE: 41

## 2024-06-08 NOTE — CONSULTS
"NUTRITION ASSESSMENT      REASON FOR NUTRITION CONSULT:  Provider Order  -  \"Weight loss\"    ASSESSMENT:  Unable to complete nutrition assessment due to patient out of room most of the day.     FOLLOW UP:   Will follow up when patient available.     Etelvina Almazan RD, LD  Pager: [phone]          "

## 2024-06-08 NOTE — CONSULTS
Cardiology Consultation      Taylor Aburto MRN# [id number]   YOB: 1955 Age: 69 year old   Date of Admission: 6/7/2024     Reason for consult: Non-ST elevation myocardial infarction, moderate pericardial effusion           Assessment and Plan:     69-year-old female with a past medical history significant for tobacco use and a previously noted small pericardial effusion as well as diabetes who was admitted on 6/7/2024 for chest discomfort.    Subsequent evaluation including an echocardiogram have demonstrated elevated troponins and moderate to severe left ventricular dysfunction in a pattern suggestive of a stress-induced cardiomyopathy.  A moderate pericardial effusion was also noted with no evidence of cardiac tamponade.  Inflammatory markers are negative.    IMPRESSION:      Non-ST elevation myocardial infarction with moderate to severe left ventricular dysfunction on echocardiography.  The differential would include LAD/multivessel coronary artery disease versus a stress-induced cardiomyopathy.  Previously noted small pericardial effusion which appears to have increased in size on her echocardiogram and CT from this admission.  There is no evidence of cardiac tamponade and inflammatory markers are negative.  History of ongoing tobacco use.  Emphysema  History of pancreatic duct dilatation      PLAN    -Given her elevated troponins and moderate to severe left ventricular dysfunction, coronary angiography would be appropriate for delineation of her coronary anatomy and revascularization if clinically appropriate.    -I discussed the issue of her concurrent pericardial effusion with Dr. Agustin from interventional cardiology.  We have agreed to perform diagnostic coronary angiography first, before making a decision regarding pericardial effusion drainage.  If significant coronary artery disease is identified and revascularization is indicated, we will plan on drainage of the pericardial effusion if  feasible given the need for dual antiplatelet therapy post revascularization.    -If, however, coronary angiography is within normal limits and we will defer pericardial effusion drainage for now and plan on close echocardiographic follow-up.    -I have discussed the indications, risks and benefits of both coronary angiography +/- PCI as well as pericardiocentesis with the patient in detail who is agreeable with proceeding.    >80 minutes of time spent today pre and post charting, reviewing pertinent investigations personally as well as extensive review of previous records and coordinating plans for further evaluation.               Chief Complaint:   Chest Pain           History of Present Illness:   This patient is a 69 year old female with no known cardiac history who was admitted to [facility] on 6/7/2024 with chest discomfort of sudden onset yesterday.  She stated that the symptoms began after she took her morning medications and describes a sensation as a crushing discomfort that radiated to both arms.    Her chest discomfort waxed and waned during the day until she ultimately presented to the emergency department.  By that time it had resolved completely.  She denies any pleuritic component to this.    Her risk factors include ongoing tobacco use, although she stated that she would stop completely going forward.  Cardiac troponins are mildly elevated but flat in trajectory.  Her echocardiogram demonstrated moderate to severe left ventricular dysfunction and a multivessel/LAD versus stress cardiomyopathy pattern.    She has a known small pericardial effusion documented as far back on a chest CT in 2023.  Her recent chest CT and echocardiogram, however, demonstrated an interval increase in effusion size which now appears small to moderate.  There was no evidence of cardiac tamponade on echocardiography.    Today she feels better overall from a cardiovascular standpoint.  She was not experiencing any  "chest discomfort at the time of our conversation.         Physical Exam:   Vitals were reviewed  Blood pressure 104/58, pulse 86, temperature 98  F (36.7  C), temperature source Oral, resp. rate 16, height 1.651 m (5' 5\"), weight 49.1 kg (108 lb 3.2 oz), SpO2 95%.  Temperatures:  Current - Temp: 98  F (36.7  C); Max - Temp  Av.9  F (36.6  C)  Min: 97.9  F (36.6  C)  Max: 98  F (36.7  C)  Respiration range: Resp  Av  Min: 15  Max: 17  Pulse range: Pulse  Av.8  Min: 83  Max: 98  Blood pressure range: Systolic (24hrs), Av , Min:104 , Max:134   ; Diastolic (24hrs), Av, Min:58, Max:75    Pulse oximetry range: SpO2  Av.1 %  Min: 92 %  Max: 98 %  No intake or output data in the 24 hours ending 24 0928  Constitutional:   awake, alert, cooperative, no apparent distress, and appears stated age     Eyes:   Lids and lashes normal, pupils equal, round and reactive to light, extra ocular muscles intact, sclera clear, conjunctiva normal     Neck:   supple, symmetrical, trachea midline, no JVD     Back:   symmetric     Lungs:   No increased work of breathing, good air exchange, clear to auscultation bilaterally, no crackles or wheezing       Cardiovascular:   Normal apical impulse, regular rate and rhythm, normal S1 and S2, no S3 or S4, and no murmur noted.      Abdomen:   non-tender     Musculoskeletal:   motor strength is 5 out of 5 all extremities bilaterally     Neurologic:   Grossly nonfocal     Skin:   no bruising or bleeding     Additional findings:     No edema                 Past Medical History:   I have reviewed this patient's past medical history  Past Medical History:   Diagnosis Date    Arthritis 2013    H/O ETOH abuse 2018    Moderate smoker (20 or less per day) 2013    Vitamin D deficiency 2018             Past Surgical History:   I have reviewed this patient's past surgical history  Past Surgical History:   Procedure Laterality Date    COLONOSCOPY N/A 2023 "    Procedure: COLONOSCOPY, FLEXIBLE, WITH LESION REMOVAL USING SNARE;  Surgeon: Jake Thomas MD;  Location:  GI    ENDOSCOPIC ULTRASOUND UPPER GASTROINTESTINAL TRACT (GI) N/A 2/22/2024    Procedure: Endoscopic ultrasound upper gastrointestinal tract (GI);  Surgeon: Jake Thomas MD;  Location:  GI    ESOPHAGOSCOPY, GASTROSCOPY, DUODENOSCOPY (EGD), COMBINED N/A 2/22/2024    Procedure: ESOPHAGOGASTRODUODENOSCOPY, WITH BIOPSY;  Surgeon: Jake Thomas MD;  Location:  GI               Social History:   I have reviewed this patient's social history  Social History     Tobacco Use    Smoking status: Every Day     Current packs/day: 0.25     Average packs/day: 0.3 packs/day for 40.0 years (10.0 ttl pk-yrs)     Types: Cigarettes    Smokeless tobacco: Never   Substance Use Topics    Alcohol use: Not Currently     Comment: Sober             Family History:   I have reviewed this patient's family history  No family history on file.          Allergies:     Allergies   Allergen Reactions    Prednisone Dizziness             Medications:   I have reviewed this patient's current medications  Medications Prior to Admission   Medication Sig Dispense Refill Last Dose    fluticasone (FLONASE) 50 MCG/ACT nasal spray Spray 1 spray into both nostrils daily (Patient taking differently: Spray 1 spray into both nostrils daily as needed for allergies) 16 g 4 Unknown    lisinopril (ZESTRIL) 5 MG tablet Take 1 tablet (5 mg) by mouth daily 90 tablet 3 6/7/2024 at am but vomitted 5 min after    metFORMIN (GLUCOPHAGE XR) 500 MG 24 hr tablet Take 2 tablets (1,000 mg) by mouth 2 times daily 360 tablet 0 6/7/2024 at am but vomitted 5 min after    nicotine polacrilex (NICORETTE) 4 MG gum Chew one piece every 1- 2 hours for cravings as needed. Try to limit using the gum for strong craving only since you are taking the patch, and previous history of nausea and vomiting. 50 each 3 Unknown    oxyBUTYnin ER  (DITROPAN XL) 10 MG 24 hr tablet Take 1 tablet (10 mg) by mouth daily 90 tablet 3 6/6/2024 at hs    PYRIDOXINE HCL PO Take 1 tablet by mouth daily Strength unknown   6/7/2024 at am but vomitted 5 min after    rosuvastatin (CRESTOR) 40 MG tablet Take 1 tablet (40 mg) by mouth daily 90 tablet 3 6/6/2024 at hs    aspirin 81 MG EC tablet [ASPIRIN 81 MG EC TABLET] Take 81 mg by mouth. (Patient not taking: Reported on 3/22/2024)    at took on 6/7 but does not routinely take    blood glucose (ACCU-CHEK GUIDE) test strip [ACCU-CHEK GUIDE TEST STRIPS STRIPS] TEST BLOOD GLUCOSE LEVELS TWICE A  strip 3     blood glucose (NO BRAND SPECIFIED) lancets standard Use to test blood sugar two times daily or as directed. 200 lancet 0     blood glucose meter (GLUCOMETER) [BLOOD GLUCOSE METER (GLUCOMETER)] Use 1 each As Directed as needed. Dispense glucometer brand per patient's insurance at pharmacy discretion. DX: E11.9 1 each 0      Current Facility-Administered Medications   Medication Dose Route Frequency Provider Last Rate Last Admin    acetaminophen (TYLENOL) tablet 650 mg  650 mg Oral Q4H PRN Stoenberg, Alicia G, PA-C        Or    acetaminophen (TYLENOL) Suppository 650 mg  650 mg Rectal Q4H PRN Stoenberg, Alicia G, PA-C        aspirin EC tablet 81 mg  81 mg Oral Daily Federica Alicia G, PA-C   81 mg at 06/08/24 0911    calcium carbonate (TUMS) chewable tablet 1,000 mg  1,000 mg Oral 4x Daily PRN Federica, Alicia G, PA-C        glucose gel 15-30 g  15-30 g Oral Q15 Min PRN Stoenberg, Alicia G, PA-C        Or    dextrose 50 % injection 25-50 mL  25-50 mL Intravenous Q15 Min PRN Debienberg, Alicia G, PA-C        Or    glucagon injection 1 mg  1 mg Subcutaneous Q15 Min PRN Stoltenberg, Alicia G, PA-C        heparin 25,000 units in 0.45% NaCl 250 mL ANTICOAGULANT infusion  0-5,000 Units/hr Intravenous Continuous Stoltkartik, Alicia G, PA-C 6 mL/hr at 06/07/24 2314 600 Units/hr at 06/07/24  2314    HYDROmorphone (DILAUDID) half-tab 1 mg  1 mg Oral Q4H PRN Alicia Stallworth PA-C        HYDROmorphone (DILAUDID) injection 0.2 mg  0.2 mg Intravenous Q2H PRN Alicia Stallworth PA-SARINA        HYDROmorphone (DILAUDID) injection 0.4 mg  0.4 mg Intravenous Q2H PRN Alicia Stallworth PA-C        HYDROmorphone (DILAUDID) tablet 2 mg  2 mg Oral Q4H PRN Alicia Stallworth PA-C        insulin aspart (NovoLOG) injection (RAPID ACTING)  1-7 Units Subcutaneous TID AC Alicia Stallworth PA-C        insulin aspart (NovoLOG) injection (RAPID ACTING)  1-5 Units Subcutaneous At Bedtime Alicia Stallworth PA-C        lidocaine (LMX4) cream   Topical Q1H PRN Alicia Stallworth PA-C        lidocaine 1 % 0.1-1 mL  0.1-1 mL Other Q1H PRN Alicia Stallworth PA-C        lisinopril (ZESTRIL) tablet 5 mg  5 mg Oral Daily Alicia Stallworth PA-C   5 mg at 06/08/24 0911    naloxone (NARCAN) injection 0.2 mg  0.2 mg Intravenous Q2 Min PRN Geovanny Combs MD        Or    naloxone (NARCAN) injection 0.4 mg  0.4 mg Intravenous Q2 Min PRN Geovanny Combs MD        Or    naloxone (NARCAN) injection 0.2 mg  0.2 mg Intramuscular Q2 Min PRN Geovanny Combs MD        Or    naloxone (NARCAN) injection 0.4 mg  0.4 mg Intramuscular Q2 Min PRN Geovanny Combs MD        nicotine (NICORETTE) gum 2 mg  2 mg Buccal Q1H PRN Alicia Stallworth PA-C        ondansetron (ZOFRAN ODT) ODT tab 4 mg  4 mg Oral Q6H PRN Alicia Stallworth PA-C        Or    ondansetron (ZOFRAN) injection 4 mg  4 mg Intravenous Q6H PRN Alicia Stallworth PA-C        oxyBUTYnin ER (DITROPAN XL) 24 hr tablet 10 mg  10 mg Oral At Bedtime Alicia Stallworth PA-C   10 mg at 06/08/24 0105    pantoprazole (PROTONIX) IV push injection 40 mg  40 mg Intravenous QAM AC Alicia Stallworth PA-C        Patient is already receiving anticoagulation with heparin, enoxaparin  (LOVENOX), warfarin (COUMADIN)  or other anticoagulant medication   Does not apply Continuous PRN Stoenberg, Alicia G, PA-C        prochlorperazine (COMPAZINE) injection 5 mg  5 mg Intravenous Q6H PRN Stoltenberg, Alicia G, PA-C        Or    prochlorperazine (COMPAZINE) tablet 5 mg  5 mg Oral Q6H PRN Stoltenberg, Alicia G, PA-C        Or    prochlorperazine (COMPAZINE) suppository 12.5 mg  12.5 mg Rectal Q12H PRN Stoltenberg, Alicia G, PA-C        rosuvastatin (CRESTOR) tablet 40 mg  40 mg Oral Daily Federica, Alicia G, PA-C   40 mg at 06/08/24 0911    senna-docusate (SENOKOT-S/PERICOLACE) 8.6-50 MG per tablet 1 tablet  1 tablet Oral BID PRN Stoltenberg, Alicia G, PA-C        Or    senna-docusate (SENOKOT-S/PERICOLACE) 8.6-50 MG per tablet 2 tablet  2 tablet Oral BID PRN Stoltenberg, Alicia G, PA-C        sodium chloride (PF) 0.9% PF flush 3 mL  3 mL Intracatheter Q8H Stoltenberg, Alicia G, PA-C        sodium chloride (PF) 0.9% PF flush 3 mL  3 mL Intracatheter q1 min prn Stoinessa, Alicia G, PA-C                 Review of Systems:     The 10 point Review of Systems is negative other than noted in the HPI            Data:   All laboratory data reviewed  Results for orders placed or performed during the hospital encounter of 06/07/24 (from the past 24 hour(s))   CBC with platelets differential    Narrative    The following orders were created for panel order CBC with platelets differential.  Procedure                               Abnormality         Status                     ---------                               -----------         ------                     CBC with platelets and d...[[id number]]                      Final result                 Please view results for these tests on the individual orders.   Comprehensive metabolic panel   Result Value Ref Range    Sodium 136 135 - 145 mmol/L    Potassium 4.7 3.4 - 5.3 mmol/L    Carbon Dioxide (CO2) 25 22 - 29 mmol/L    Anion Gap  10 7 - 15 mmol/L    Urea Nitrogen 11.1 8.0 - 23.0 mg/dL    Creatinine 0.72 0.51 - 0.95 mg/dL    GFR Estimate >90 >60 mL/min/1.73m2    Calcium 10.1 8.8 - 10.2 mg/dL    Chloride 101 98 - 107 mmol/L    Glucose 159 (H) 70 - 99 mg/dL    Alkaline Phosphatase 67 40 - 150 U/L    AST 30 0 - 45 U/L    ALT 23 0 - 50 U/L    Protein Total 7.1 6.4 - 8.3 g/dL    Albumin 4.4 3.5 - 5.2 g/dL    Bilirubin Total 0.6 <=1.2 mg/dL   Lipase   Result Value Ref Range    Lipase 26 13 - 60 U/L   Troponin T, High Sensitivity   Result Value Ref Range    Troponin T, High Sensitivity 369 (HH) <=14 ng/L   CBC with platelets and differential   Result Value Ref Range    WBC Count 9.9 4.0 - 11.0 10e3/uL    RBC Count 4.75 3.80 - 5.20 10e6/uL    Hemoglobin 14.5 11.7 - 15.7 g/dL    Hematocrit 43.6 35.0 - 47.0 %    MCV 92 78 - 100 fL    MCH 30.5 26.5 - 33.0 pg    MCHC 33.3 31.5 - 36.5 g/dL    RDW 12.5 10.0 - 15.0 %    Platelet Count 151 150 - 450 10e3/uL    % Neutrophils 72 %    % Lymphocytes 19 %    % Monocytes 8 %    % Eosinophils 1 %    % Basophils 0 %    % Immature Granulocytes 0 %    NRBCs per 100 WBC 0 <1 /100    Absolute Neutrophils 7.0 1.6 - 8.3 10e3/uL    Absolute Lymphocytes 1.9 0.8 - 5.3 10e3/uL    Absolute Monocytes 0.8 0.0 - 1.3 10e3/uL    Absolute Eosinophils 0.1 0.0 - 0.7 10e3/uL    Absolute Basophils 0.0 0.0 - 0.2 10e3/uL    Absolute Immature Granulocytes 0.0 <=0.4 10e3/uL    Absolute NRBCs 0.0 10e3/uL   CRP inflammation   Result Value Ref Range    CRP Inflammation <3.00 <5.00 mg/L   Erythrocyte sedimentation rate auto   Result Value Ref Range    Erythrocyte Sedimentation Rate 2 0 - 30 mm/hr   TSH with free T4 reflex   Result Value Ref Range    TSH 1.35 0.30 - 4.20 uIU/mL   Magnesium   Result Value Ref Range    Magnesium 1.7 1.7 - 2.3 mg/dL   Phosphorus   Result Value Ref Range    Phosphorus 3.5 2.5 - 4.5 mg/dL   [place] Draw    Narrative    The following orders were created for panel order [place] Draw.  Procedure                                Abnormality         Status                     ---------                               -----------         ------                     Extra Blue Top Tube[[id number]]                              Final result                 Please view results for these tests on the individual orders.   Extra Blue Top Tube   Result Value Ref Range    Hold Specimen JIC    D dimer quantitative   Result Value Ref Range    D-Dimer Quantitative 0.47 0.00 - 0.50 ug/mL FEU    Narrative    This D-dimer assay is intended for use in conjunction with a clinical pretest probability assessment model to exclude pulmonary embolism (PE) and deep venous thrombosis (DVT) in outpatients suspected of PE or DVT. The cut-off value is 0.50 ug/mL FEU.    For patients 50 years of age or older, the application of age-adjusted cut-off values for D-Dimer may increase the specificity without significant effect on sensitivity. The literature suggested calculation age adjusted cut-off in ug/L = age in years x 10 ug/L. The results in this laboratory are reported as ug/mL rather than ug/L. The calculation for age adjusted cut off in ug/mL= age in years x 0.01 ug/mL. For example, the cut off for a 76 year old male is 77 x 0.01 ug/mL = 0.76 ug/mL (760 ug/L).    M Olayinka et al. Age adjusted D-dimer cut-off levels to rule out pulmonary embolism: The ADJUST-PE Study. TK 2014;311:8009-5401.; HJ Mai et al. Diagnostic accuracy of conventional or age adjusted D-dimer cutoff values in older patients with suspected venous thromboembolism. Systemic review and meta-analysis. BMJ 2013:346:f2492.   EKG 12-lead, tracing only   Result Value Ref Range    Systolic Blood Pressure  mmHg    Diastolic Blood Pressure  mmHg    Ventricular Rate 95 BPM    Atrial Rate 95 BPM    GA Interval 116 ms    QRS Duration 80 ms     ms    QTc 442 ms    P Axis 84 degrees    R AXIS 87 degrees    T Axis 92 degrees    Interpretation ECG       Sinus rhythm with Premature atrial  complexes  Cannot rule out Anterior infarct , age undetermined  Abnormal ECG  When compared with ECG of 26-OCT-2019 12:54,  Premature atrial complexes are now Present  T wave inversion now evident in Anterior leads  Confirmed by GENERATED REPORT, COMPUTER ([id number]),  Marcy Linares ([id number]) on 6/7/2024 5:16:22 PM     CT Chest w/o Contrast    Narrative    EXAM: CT CHEST W/O CONTRAST  LOCATION: [facility]  DATE: 6/7/2024    INDICATION: cp, ? esophageal perf, please give oral contrast just prior to scan; vomiting crushing chest pain after vomiting radiating to arms back and jaw.  COMPARISON: 2/3/2023  TECHNIQUE: CT chest without IV contrast. Multiplanar reformats were obtained. Dose reduction techniques were used.  CONTRAST: None.    FINDINGS:   LUNGS AND PLEURA: Moderate centrilobular emphysema. Mild dependent atelectasis. No lobar consolidation, pleural effusions, or pneumothorax.    MEDIASTINUM/AXILLAE: Small amount of intravascular air is compatible with recent IV access. Esophagus is diffusely distended and filled with contrast material. No extraluminal contrast material. Paraesophageal dense calcification/granuloma is unchanged.   There is an increasing, small to moderate pericardial effusion. No pneumomediastinum.    CORONARY ARTERY CALCIFICATION: Moderate.    UPPER ABDOMEN: Oral contrast material is present within the visualized portions of the stomach. 2 to 3 mm left renal calculus.    MUSCULOSKELETAL: Multilevel spondylosis, most advanced in the      Impression    IMPRESSION:   1.  Oral contrast only CT of the chest demonstrates no evidence of esophageal perforation.  2.  Patulous esophagus consistent with dysmotility and/or reflux.  3.  Increasing, small to moderate pericardial effusion.     Troponin T, High Sensitivity   Result Value Ref Range    Troponin T, High Sensitivity 308 (HH) <=14 ng/L   Echocardiogram Complete   Result Value Ref Range    LVEF  30-35%     Narrative     [id number]  [id number]  [id number]  [id number]^[email or web address]^HUAN^WILLIE     [facility]  Echocardiography Laboratory  [address] [place] [address]     Name: WILLY FUENTES  MRN: [id number]  : 1955  Study Date: 2024 07:42 PM  Age: 69 yrs  Gender: Female  Patient Location: [facility]  Reason For Study: Chest Pressure, Pericardial Effusion  Ordering Physician: HUAN WATKINS  Referring Physician: Soraya Cherry  Performed By: Reji Rg RDCS     BSA: 1.5 m2  Height: 65 in  Weight: 108 lb  HR: 84  BP: 122/75 mmHg  ______________________________________________________________________________  Procedure  Complete Portable Echo Adult.  ______________________________________________________________________________  Interpretation Summary     1. Moderate-severely reduced LV systolic function, with LVEF estimated at 30-  35%. Grade I (early) diastolic dysfunction.  2. There is severe hypokinesis/akinesis of the mid-apical segments of the LV.  Contractility at the base is roughly normal. This pattern is suggestive of  Takotsubo (stress-induced) cardiomyopathy, though large territory ischemia is  also a consideration  3. Normal RV size and systolic function.  4. Normal bi-atrial size.  5. No significant valvular stenosis or regurgitation.  6. There is a small-moderate pericardial effusion, primarily over the RV free  wall. No echocardiographic evidence of tampnonade. IVC is not dilated.     No prior study available for comparison.     Results were discussed with Dr. Watkins over the phone.  ______________________________________________________________________________  Left Ventricle  The left ventricle is normal in size. There is borderline concentric left  ventricular hypertrophy. The visual ejection fraction is 30-35%. Left  ventricular systolic function is moderate to severely reduced. Grade I or  early diastolic dysfunction. Severe hypokinesis/akinesis of the mid-apical  segments of  the LV. Contractility at the base is roughly normal. This pattern  is suggestive of Takotsubo (stress-induced) cardiomyopathy, though large  territory ischemia is also a consideration. No definite LV thrombus is seen,  but this cannot be entirely excluded given lack of contrast.     Right Ventricle  The right ventricle is normal size. The right ventricular systolic function is  normal.     Atria  Normal left atrial size. Right atrial size is normal.     Mitral Valve  The mitral valve leaflets are moderately thickened. There is physiologic  mitral regurgitation. There is no mitral valve stenosis.     Tricuspid Valve  The tricuspid valve is normal in structure and function. There is trace to  mild tricuspid regurgitation. The right ventricular systolic pressure is  approximated at 21.3 mmHg plus the right atrial pressure. IVC diameter <2.1 cm  collapsing >50% with sniff suggests a normal RA pressure of 3 mmHg.     Aortic Valve  There is trivial trileaflet aortic sclerosis. No aortic regurgitation is  present. No aortic stenosis is present.     Pulmonic Valve  The pulmonic valve is not well visualized.     Vessels  The aortic root is normal size.     Pericardium  Small-moderate pericardial effusion, primarily over the RV free wall. No  echocardiographic evidence of tampnonade.     ______________________________________________________________________________  MMode/2D Measurements & Calculations  IVSd: 1.1 cm  LVIDd: 3.9 cm  LVIDs: 3.2 cm  LVPWd: 1.0 cm  FS: 18.4 %  LV mass(C)d: 128.0 grams  LV mass(C)dI: 84.1 grams/m2     Ao root diam: 3.1 cm  LA dimension: 3.2 cm  LA/Ao: 1.0  Ao root diam index Ht(cm/m): 1.9  Ao root diam index BSA (cm/m2): 2.0  EF Biplane: 33.9 %  LA Volume (BP): 42.0 ml  LA Volume Index (BP): 27.6 ml/m2  RV Base: 1.9 cm  RWT: 0.52  TAPSE: 1.5 cm     Doppler Measurements & Calculations  MV E max alesia: 62.9 cm/sec  MV A max alesia: 121.0 cm/sec  MV E/A: 0.52  MV dec time: 0.13 sec  PA acc time: 0.11  sec  TR max carter: 231.0 cm/sec  TR max P.3 mmHg  E/E' avg: 15.3  Lateral E/e': 11.3  Medial E/e': 19.3  RV S Carter: 12.3 cm/sec     ______________________________________________________________________________  Report approved by: MD Rick Escobar 2024 09:38 PM         Troponin T, High Sensitivity   Result Value Ref Range    Troponin T, High Sensitivity 280 (HH) <=14 ng/L   Nt probnp inpatient   Result Value Ref Range    N terminal Pro BNP Inpatient 5,335 (H) 0 - 900 pg/mL   UA Macroscopic with reflex to Microscopic and Culture    Specimen: Urine, Midstream   Result Value Ref Range    Color Urine Light Yellow Colorless, Straw, Light Yellow, Yellow    Appearance Urine Clear Clear    Glucose Urine Negative Negative mg/dL    Bilirubin Urine Negative Negative    Ketones Urine Negative Negative mg/dL    Specific Gravity Urine 1.013 1.003 - 1.035    Blood Urine Negative Negative    pH Urine 5.5 5.0 - 7.0    Protein Albumin Urine Negative Negative mg/dL    Urobilinogen Urine Normal Normal, 2.0 mg/dL    Nitrite Urine Negative Negative    Leukocyte Esterase Urine Negative Negative    Narrative    Microscopic not indicated   Glucose by meter   Result Value Ref Range    GLUCOSE BY METER POCT 167 (H) 70 - 99 mg/dL   Heparin Unfractionated Anti Xa Level   Result Value Ref Range    Anti Xa Unfractionated Heparin 0.27 For Reference Range, See Comment IU/mL    Narrative    Therapeutic Range: UFH: 0.25-0.50 IU/mL for low intensity dosing,  0.30-0.70 IU/mL for high intensity dosing DVT and PE.  This test is not validated for other direct factor X inhibitors (e.g. rivaroxaban, apixaban, edoxaban, betrixaban, fondaparinux) and should not be used for monitoring of other medications.   Glucose by meter   Result Value Ref Range    GLUCOSE BY METER POCT 183 (H) 70 - 99 mg/dL   Heparin Unfractionated Anti Xa Level   Result Value Ref Range    Anti Xa Unfractionated Heparin 0.22 For Reference Range, See Comment IU/mL    Narrative     Therapeutic Range: UFH: 0.25-0.50 IU/mL for low intensity dosing,  0.30-0.70 IU/mL for high intensity dosing DVT and PE.  This test is not validated for other direct factor X inhibitors (e.g. rivaroxaban, apixaban, edoxaban, betrixaban, fondaparinux) and should not be used for monitoring of other medications.   CBC with platelets   Result Value Ref Range    WBC Count 5.5 4.0 - 11.0 10e3/uL    RBC Count 4.42 3.80 - 5.20 10e6/uL    Hemoglobin 13.4 11.7 - 15.7 g/dL    Hematocrit 40.7 35.0 - 47.0 %    MCV 92 78 - 100 fL    MCH 30.3 26.5 - 33.0 pg    MCHC 32.9 31.5 - 36.5 g/dL    RDW 12.6 10.0 - 15.0 %    Platelet Count 126 (L) 150 - 450 10e3/uL     EKG results: Normal sinus rhythm with PACs    Clinically Significant Risk Factors Present on Admission                # Drug Induced Platelet Defect: home medication list includes an antiplatelet medication   # Hypertension: Home medication list includes antihypertensive(s)  # Chronic heart failure with reduced ejection fraction: last echo with EF <40%

## 2024-06-08 NOTE — ED NOTES
Pt resting in bed, family at bedside.     Echocardiogram staff at bedside.     Pt A&Ox4. No complaints at this time.

## 2024-06-08 NOTE — PLAN OF CARE
Physical Therapy: Orders received. Chart reviewed and discussed with care team.? Physical Therapy not indicated due to pt is independent in room, currently admitted for chest pain with cardiac work up pending. Will complete current PT orders as pt is independent with mobility. If cardiac work up is completed and inpatient cardiac rehab is indicated please place cardiac rehab referral.? Defer discharge recommendations to medical team at this time, potentially cardiac rehab once work up completed.? Will complete current PT orders.

## 2024-06-08 NOTE — PROGRESS NOTES
A&O x 4, VSS on RA, denied pain. Up an independent. Daughter at bedside. Heparin infusing at 6000 unit/hr. Therapeutic x 1. Xa scheduled at 07:55 am. Plan of Cardiogist consultation. Continue plan of care.

## 2024-06-08 NOTE — PROGRESS NOTES
[facility]    Medicine Progress Note - Hospitalist Service    Date of Admission:  6/7/2024    Assessment & Plan      Taylor Aburto is a 69 year old female with a past medical history significant for small pericardial effusion, DM II, hyperlipidemia, pulmonary emphysema, tobacco use disorder, among others, who was admitted on 6/7/2024 for further evaluation of chest pain, found to have NSTEMI. Started on heparin. Plan for coronary angiogram 6/8.     NSTEMI   Chest pain  Small-to-moderate pericardial effusion   *Patient received full dose ASA in the ED. CT chest 02/2023 demonstrated trace pericardial effusion. CT chest abdomen and pelvis from 11/2023 demonstrated a small, partially imaged pericardial effusion at that time. CT chest w/o 06/07 demonstrated increasing, small to moderate pericardial effusion. Stat EKG with LVEF estimated at 30-35%. Grade I (early) diastolic dysfunction. There is severe hypokinesis/akinesis of the mid-apical segments of the LV. Differential includes ischemic cardiomyopathy verus stress cardiomyopathy.   - Cardiology consulted, appreciate the cares, coronary angiogram planned for 6/8  - Monitor on telemetry.   - Anti-platelet/anti-coagulation per cardiology, on heparin gtt prior to angiogram    - goal K >4, Mag > 2  - On daily aspirin   - GDMT per cadiology  - AM CBC and BMP on 6/9 after coronary angiogram with contrast      DM II w/ hyperosmolarity without coma   PTA regimen includes:  - Hold PTA metformin. Resume at discharge.    - Medium insulin sliding scale ordered.  - Glucose checks QID.    - Hypoglycemic protocol in place.       HLD   - Continue PTA statin.   - Lipid panel in process      Pulmonary emphysema   Tobacco use disorder   CT chest 02/2023 demonstrated biapical fibrosis. Mild centrilobular emphysematous changes. Calcified granuloma in the right lower lobe measuring up to 9.0 mm (series 4 image 139). Additional smaller adjacent calcified  granuloma. CT chest w/o 06/07/2024 demonstrated moderate centrilobular emphysema. Mild dependent atelectasis. No lobar consolidation, pleural effusions, or pneumothorax. Recently attempting cessation with nicotine patch. Down to 0.5 PPD. 50 pack-year history. Tried bupropion in past.   - Continuous pulse oximetry. Oxygen available as needed.   - PFTs have been ordered by PCP, pending outpatient. Recommend completion upon discharge.   - Smoking cessation discussed by admitting team    - Nicotine patch requested, per patient.      Hx of hepatic steatosis/fibrosis on imaging   Hx of pancreatic duct dilation   *CT chest, abdomen and pelvis 11/2023 demonstrated borderline dilated pancreatic duct at 0.3 cm, but no obstructing stone or lesion. Suspected physiologic/senescent, but if there is high clinical concern, suggest further evaluation with MRCP. Morphologic changes of cirrhosis. Moderate multifocal atherosclerotic calcification of the abdominal aorta and femoral arteries. No aneurysm.   *US abdomen complete 06/07 demonstrated sonographic features suggestive of hepatic steatosis/fibrosis. No suspicious hepatic mass. Mild prominence of the main pancreatic and extrahepatic common bile ducts of unclear etiology or significance.  - Noted. Continue to monitor for new or worsening symptoms.      Generalized fatigue   Morning joint and muscle stiffness   - CRP and ESR negative.  - MARK negative 02/2023.   - Noted. Continue to monitor for new or worsening symptoms.      Hx of hypercalcemia   Hx of unintentional weight loss   Cachexia   06/2023 work-up for unintentional weight loss and hypercalcemia negative except incidental finding of pancreatic duct dilation, an unremarkable renal cyst and a reactive lymph node on submandibular region. Recent colonoscopy and upper endoscopy notable only for polyps. Repeat recommended in 7 years. CT chest, abdomen and pelvis 11/2023 demonstrated borderline dilated pancreatic duct at 0.3 cm,  but no obstructing stone or lesion. Suspected physiologic/senescent, but if there is high clinical concern, suggest further evaluation with MRCP.  Moderate multifocal atherosclerotic calcification of the abdominal aorta and femoral arteries. No aneurysm. Ca noted to only be very mildly elevated at 10.3. Ionized Ca WNL 07/19/2023. CA 19-9 negative 11/2023.   - Nutrition consulted, appreciate the cares.   - Ensure clear for supplementation between meals.      Nocturia  Malodorous urine   - Hold PTA oxybutynin.   - UA obtained and pending.            Diet: Combination Diet Low Saturated Fat Na <2400mg Diet, No Caffeine Diet  Snacks/Supplements Adult: Ensure Clear; With Meals    DVT Prophylaxis: On heparin gtt prior to coronary angiogram   Phillips Catheter: Not present  Lines: None     Cardiac Monitoring: ACTIVE order. Indication: AMI (NSTEMI/ STEMI) (48 hours)  Code Status: Full Code      Clinically Significant Risk Factors Present on Admission                # Drug Induced Platelet Defect: home medication list includes an antiplatelet medication   # Hypertension: Home medication list includes antihypertensive(s)  # Chronic heart failure with reduced ejection fraction: last echo with EF <40%                       Disposition Plan     Medically Ready for Discharge: Anticipated Tomorrow         Landon Yin DO  Hospitalist Service  [facility]  Securely message with [employer] (more info)  Text page via [employer] Paging/Directory   ______________________________________________________________________    Interval History     - No acute events overnight  - Met daughter at bedside while patient at angiogram. We discuss her history  - Patient still not back in room on multiple rechecks throughout the day due to procedure  - Will not bill for formal visit today  - Plan to see patient first thing tomorrow AM     Physical Exam   Vital Signs: Temp: 97.9  F (36.6  C) Temp src: Oral BP: 113/69 Pulse: 86    Resp: 16 SpO2: 98 % O2 Device: None (Room air)    Weight: 108 lbs 3.2 oz    Did not examine due to prolonged time away from room for procedure     Medical Decision Making       Not billable      Data   ------------------------- PAST 24 HR DATA REVIEWED -----------------------------------------------    I have personally reviewed the following data over the past 24 hrs:    9.9  \   14.5   / 151     136 101 11.1 /  167 (H)   4.7 25 0.72 \     ALT: 23 AST: 30 AP: 67 TBILI: 0.6   ALB: 4.4 TOT PROTEIN: 7.1 LIPASE: 26     Trop: 280 (HH) BNP: 5,335 (H)     TSH: 1.35 T4: N/A A1C: N/A     Procal: N/A CRP: <3.00 Lactic Acid: N/A       INR:  N/A PTT:  N/A   D-dimer:  0.47 Fibrinogen:  N/A       Imaging results reviewed over the past 24 hrs:   Recent Results (from the past 24 hour(s))   CT Chest w/o Contrast    Narrative    EXAM: CT CHEST W/O CONTRAST  LOCATION: [facility]  DATE: 6/7/2024    INDICATION: cp, ? esophageal perf, please give oral contrast just prior to scan; vomiting crushing chest pain after vomiting radiating to arms back and jaw.  COMPARISON: 2/3/2023  TECHNIQUE: CT chest without IV contrast. Multiplanar reformats were obtained. Dose reduction techniques were used.  CONTRAST: None.    FINDINGS:   LUNGS AND PLEURA: Moderate centrilobular emphysema. Mild dependent atelectasis. No lobar consolidation, pleural effusions, or pneumothorax.    MEDIASTINUM/AXILLAE: Small amount of intravascular air is compatible with recent IV access. Esophagus is diffusely distended and filled with contrast material. No extraluminal contrast material. Paraesophageal dense calcification/granuloma is unchanged.   There is an increasing, small to moderate pericardial effusion. No pneumomediastinum.    CORONARY ARTERY CALCIFICATION: Moderate.    UPPER ABDOMEN: Oral contrast material is present within the visualized portions of the stomach. 2 to 3 mm left renal calculus.    MUSCULOSKELETAL: Multilevel spondylosis,  most advanced in the      Impression    IMPRESSION:   1.  Oral contrast only CT of the chest demonstrates no evidence of esophageal perforation.  2.  Patulous esophagus consistent with dysmotility and/or reflux.  3.  Increasing, small to moderate pericardial effusion.     Echocardiogram Complete   Result Value    LVEF  30-35%    [facility]    [id number]  [id number]  [id number]  [id number]^WILLIE     [facility]  Echocardiography Laboratory  [facility] [place] [address]     Name: WILLY FUENTES  MRN: [id number]  : 1955  Study Date: 2024 07:42 PM  Age: 69 yrs  Gender: Female  Patient Location: [facility]  Reason For Study: Chest Pressure, Pericardial Effusion  Ordering Physician: HUAN WATKINS  Referring Physician: Soraya Cherry  Performed By: Reji Rg RDCS     BSA: 1.5 m2  Height: 65 in  Weight: 108 lb  HR: 84  BP: 122/75 mmHg  ______________________________________________________________________________  Procedure  Complete Portable Echo Adult.  ______________________________________________________________________________  Interpretation Summary     1. Moderate-severely reduced LV systolic function, with LVEF estimated at 30-  35%. Grade I (early) diastolic dysfunction.  2. There is severe hypokinesis/akinesis of the mid-apical segments of the LV.  Contractility at the base is roughly normal. This pattern is suggestive of  Takotsubo (stress-induced) cardiomyopathy, though large territory ischemia is  also a consideration  3. Normal RV size and systolic function.  4. Normal bi-atrial size.  5. No significant valvular stenosis or regurgitation.  6. There is a small-moderate pericardial effusion, primarily over the RV free  wall. No echocardiographic evidence of tampnonade. IVC is not dilated.     No prior study available for comparison.     Results were discussed with Dr. Watkins over the  phone.  ______________________________________________________________________________  Left Ventricle  The left ventricle is normal in size. There is borderline concentric left  ventricular hypertrophy. The visual ejection fraction is 30-35%. Left  ventricular systolic function is moderate to severely reduced. Grade I or  early diastolic dysfunction. Severe hypokinesis/akinesis of the mid-apical  segments of the LV. Contractility at the base is roughly normal. This pattern  is suggestive of Takotsubo (stress-induced) cardiomyopathy, though large  territory ischemia is also a consideration. No definite LV thrombus is seen,  but this cannot be entirely excluded given lack of contrast.     Right Ventricle  The right ventricle is normal size. The right ventricular systolic function is  normal.     Atria  Normal left atrial size. Right atrial size is normal.     Mitral Valve  The mitral valve leaflets are moderately thickened. There is physiologic  mitral regurgitation. There is no mitral valve stenosis.     Tricuspid Valve  The tricuspid valve is normal in structure and function. There is trace to  mild tricuspid regurgitation. The right ventricular systolic pressure is  approximated at 21.3 mmHg plus the right atrial pressure. IVC diameter <2.1 cm  collapsing >50% with sniff suggests a normal RA pressure of 3 mmHg.     Aortic Valve  There is trivial trileaflet aortic sclerosis. No aortic regurgitation is  present. No aortic stenosis is present.     Pulmonic Valve  The pulmonic valve is not well visualized.     Vessels  The aortic root is normal size.     Pericardium  Small-moderate pericardial effusion, primarily over the RV free wall. No  echocardiographic evidence of tampnonade.     ______________________________________________________________________________  MMode/2D Measurements & Calculations  IVSd: 1.1 cm  LVIDd: 3.9 cm  LVIDs: 3.2 cm  LVPWd: 1.0 cm  FS: 18.4 %  LV mass(C)d: 128.0 grams  LV mass(C)dI: 84.1  grams/m2     Ao root diam: 3.1 cm  LA dimension: 3.2 cm  LA/Ao: 1.0  Ao root diam index Ht(cm/m): 1.9  Ao root diam index BSA (cm/m2): 2.0  EF Biplane: 33.9 %  LA Volume (BP): 42.0 ml  LA Volume Index (BP): 27.6 ml/m2  RV Base: 1.9 cm  RWT: 0.52  TAPSE: 1.5 cm     Doppler Measurements & Calculations  MV E max carter: 62.9 cm/sec  MV A max carter: 121.0 cm/sec  MV E/A: 0.52  MV dec time: 0.13 sec  PA acc time: 0.11 sec  TR max carter: 231.0 cm/sec  TR max P.3 mmHg  E/E' avg: 15.3  Lateral E/e': 11.3  Medial E/e': 19.3  RV S Carter: 12.3 cm/sec     ______________________________________________________________________________  Report approved by: MD Rick Escobar 2024 09:38 PM

## 2024-06-08 NOTE — PROGRESS NOTES
RECEIVING UNIT ED HANDOFF REVIEW    ED Nurse Handoff Report was reviewed by: Paul Beaulieu RN on June 7, 2024 at 11:00 PM

## 2024-06-09 ENCOUNTER — APPOINTMENT (OUTPATIENT)
Dept: CARDIOLOGY | Facility: CLINIC | Age: 69
DRG: 321 | End: 2024-06-09
Attending: INTERNAL MEDICINE
Payer: COMMERCIAL

## 2024-06-09 ENCOUNTER — APPOINTMENT (OUTPATIENT)
Dept: OCCUPATIONAL THERAPY | Facility: CLINIC | Age: 69
DRG: 321 | End: 2024-06-09
Attending: STUDENT IN AN ORGANIZED HEALTH CARE EDUCATION/TRAINING PROGRAM
Payer: COMMERCIAL

## 2024-06-09 LAB
ANION GAP SERPL CALCULATED.3IONS-SCNC: 11 MMOL/L (ref 7–15)
ATRIAL RATE - MUSE: 96 BPM
ATRIAL RATE - MUSE: 96 BPM
BUN SERPL-MCNC: 9.6 MG/DL (ref 8–23)
CALCIUM SERPL-MCNC: 9.2 MG/DL (ref 8.8–10.2)
CHLORIDE SERPL-SCNC: 98 MMOL/L (ref 98–107)
CREAT SERPL-MCNC: 0.71 MG/DL (ref 0.51–0.95)
DEPRECATED HCO3 PLAS-SCNC: 23 MMOL/L (ref 22–29)
DIASTOLIC BLOOD PRESSURE - MUSE: NORMAL MMHG
DIASTOLIC BLOOD PRESSURE - MUSE: NORMAL MMHG
EGFRCR SERPLBLD CKD-EPI 2021: >90 ML/MIN/1.73M2
ERYTHROCYTE [DISTWIDTH] IN BLOOD BY AUTOMATED COUNT: 12.5 % (ref 10–15)
GLUCOSE BLDC GLUCOMTR-MCNC: 163 MG/DL (ref 70–99)
GLUCOSE BLDC GLUCOMTR-MCNC: 188 MG/DL (ref 70–99)
GLUCOSE BLDC GLUCOMTR-MCNC: 219 MG/DL (ref 70–99)
GLUCOSE BLDC GLUCOMTR-MCNC: 229 MG/DL (ref 70–99)
GLUCOSE BLDC GLUCOMTR-MCNC: 288 MG/DL (ref 70–99)
GLUCOSE SERPL-MCNC: 266 MG/DL (ref 70–99)
HCT VFR BLD AUTO: 37 % (ref 35–47)
HGB BLD-MCNC: 12.3 G/DL (ref 11.7–15.7)
INTERPRETATION ECG - MUSE: NORMAL
INTERPRETATION ECG - MUSE: NORMAL
LVEF ECHO: NORMAL
MCH RBC QN AUTO: 30.8 PG (ref 26.5–33)
MCHC RBC AUTO-ENTMCNC: 33.2 G/DL (ref 31.5–36.5)
MCV RBC AUTO: 93 FL (ref 78–100)
P AXIS - MUSE: 77 DEGREES
P AXIS - MUSE: 81 DEGREES
PLATELET # BLD AUTO: 130 10E3/UL (ref 150–450)
POTASSIUM SERPL-SCNC: 4 MMOL/L (ref 3.4–5.3)
PR INTERVAL - MUSE: 122 MS
PR INTERVAL - MUSE: 138 MS
QRS DURATION - MUSE: 80 MS
QRS DURATION - MUSE: 82 MS
QT - MUSE: 404 MS
QT - MUSE: 432 MS
QTC - MUSE: 510 MS
QTC - MUSE: 545 MS
R AXIS - MUSE: 81 DEGREES
R AXIS - MUSE: 82 DEGREES
RBC # BLD AUTO: 3.99 10E6/UL (ref 3.8–5.2)
SODIUM SERPL-SCNC: 132 MMOL/L (ref 135–145)
SYSTOLIC BLOOD PRESSURE - MUSE: NORMAL MMHG
SYSTOLIC BLOOD PRESSURE - MUSE: NORMAL MMHG
T AXIS - MUSE: 201 DEGREES
T AXIS - MUSE: 251 DEGREES
VENTRICULAR RATE- MUSE: 96 BPM
VENTRICULAR RATE- MUSE: 96 BPM
WBC # BLD AUTO: 9.6 10E3/UL (ref 4–11)

## 2024-06-09 PROCEDURE — 93005 ELECTROCARDIOGRAM TRACING: CPT

## 2024-06-09 PROCEDURE — B2111ZZ FLUOROSCOPY OF MULTIPLE CORONARY ARTERIES USING LOW OSMOLAR CONTRAST: ICD-10-PCS | Performed by: INTERNAL MEDICINE

## 2024-06-09 PROCEDURE — 93308 TTE F-UP OR LMTD: CPT | Mod: 26 | Performed by: INTERNAL MEDICINE

## 2024-06-09 PROCEDURE — 93325 DOPPLER ECHO COLOR FLOW MAPG: CPT

## 2024-06-09 PROCEDURE — 3E07317 INTRODUCTION OF OTHER THROMBOLYTIC INTO CORONARY ARTERY, PERCUTANEOUS APPROACH: ICD-10-PCS | Performed by: INTERNAL MEDICINE

## 2024-06-09 PROCEDURE — 93325 DOPPLER ECHO COLOR FLOW MAPG: CPT | Mod: 26 | Performed by: INTERNAL MEDICINE

## 2024-06-09 PROCEDURE — 85027 COMPLETE CBC AUTOMATED: CPT | Performed by: STUDENT IN AN ORGANIZED HEALTH CARE EDUCATION/TRAINING PROGRAM

## 2024-06-09 PROCEDURE — 97530 THERAPEUTIC ACTIVITIES: CPT | Mod: GO | Performed by: OCCUPATIONAL THERAPIST

## 2024-06-09 PROCEDURE — 97165 OT EVAL LOW COMPLEX 30 MIN: CPT | Mod: GO | Performed by: OCCUPATIONAL THERAPIST

## 2024-06-09 PROCEDURE — 210N000001 HC R&B IMCU HEART CARE

## 2024-06-09 PROCEDURE — 93010 ELECTROCARDIOGRAM REPORT: CPT | Performed by: INTERNAL MEDICINE

## 2024-06-09 PROCEDURE — 80048 BASIC METABOLIC PNL TOTAL CA: CPT | Performed by: STUDENT IN AN ORGANIZED HEALTH CARE EDUCATION/TRAINING PROGRAM

## 2024-06-09 PROCEDURE — 93321 DOPPLER ECHO F-UP/LMTD STD: CPT | Mod: 26 | Performed by: INTERNAL MEDICINE

## 2024-06-09 PROCEDURE — 027134Z DILATION OF CORONARY ARTERY, TWO ARTERIES WITH DRUG-ELUTING INTRALUMINAL DEVICE, PERCUTANEOUS APPROACH: ICD-10-PCS | Performed by: INTERNAL MEDICINE

## 2024-06-09 PROCEDURE — 250N000013 HC RX MED GY IP 250 OP 250 PS 637

## 2024-06-09 PROCEDURE — B241ZZ3 ULTRASONOGRAPHY OF MULTIPLE CORONARY ARTERIES, INTRAVASCULAR: ICD-10-PCS | Performed by: INTERNAL MEDICINE

## 2024-06-09 PROCEDURE — 250N000013 HC RX MED GY IP 250 OP 250 PS 637: Performed by: STUDENT IN AN ORGANIZED HEALTH CARE EDUCATION/TRAINING PROGRAM

## 2024-06-09 PROCEDURE — 99233 SBSQ HOSP IP/OBS HIGH 50: CPT | Performed by: INTERNAL MEDICINE

## 2024-06-09 PROCEDURE — 99233 SBSQ HOSP IP/OBS HIGH 50: CPT | Performed by: STUDENT IN AN ORGANIZED HEALTH CARE EDUCATION/TRAINING PROGRAM

## 2024-06-09 PROCEDURE — 250N000013 HC RX MED GY IP 250 OP 250 PS 637: Performed by: INTERNAL MEDICINE

## 2024-06-09 PROCEDURE — 36415 COLL VENOUS BLD VENIPUNCTURE: CPT | Performed by: STUDENT IN AN ORGANIZED HEALTH CARE EDUCATION/TRAINING PROGRAM

## 2024-06-09 RX ORDER — PRASUGREL 5 MG/1
5 TABLET, FILM COATED ORAL DAILY
Status: DISCONTINUED | OUTPATIENT
Start: 2024-06-09 | End: 2024-06-13 | Stop reason: HOSPADM

## 2024-06-09 RX ORDER — CARVEDILOL 3.12 MG/1
3.12 TABLET ORAL 2 TIMES DAILY WITH MEALS
Status: DISCONTINUED | OUTPATIENT
Start: 2024-06-09 | End: 2024-06-13 | Stop reason: HOSPADM

## 2024-06-09 RX ORDER — PANTOPRAZOLE SODIUM 40 MG/1
40 TABLET, DELAYED RELEASE ORAL
Status: DISCONTINUED | OUTPATIENT
Start: 2024-06-09 | End: 2024-06-13 | Stop reason: HOSPADM

## 2024-06-09 RX ADMIN — HYDROMORPHONE HYDROCHLORIDE 2 MG: 2 TABLET ORAL at 01:08

## 2024-06-09 RX ADMIN — OXYBUTYNIN CHLORIDE 10 MG: 10 TABLET, EXTENDED RELEASE ORAL at 21:19

## 2024-06-09 RX ADMIN — ROSUVASTATIN CALCIUM 40 MG: 20 TABLET, FILM COATED ORAL at 09:30

## 2024-06-09 RX ADMIN — ACETAMINOPHEN 650 MG: 325 TABLET, FILM COATED ORAL at 12:24

## 2024-06-09 RX ADMIN — LISINOPRIL 5 MG: 5 TABLET ORAL at 09:31

## 2024-06-09 RX ADMIN — PRASUGREL 5 MG: 5 TABLET, FILM COATED ORAL at 10:38

## 2024-06-09 RX ADMIN — NICOTINE 1 PATCH: 21 PATCH, EXTENDED RELEASE TRANSDERMAL at 09:30

## 2024-06-09 RX ADMIN — CARVEDILOL 3.12 MG: 3.12 TABLET, FILM COATED ORAL at 18:19

## 2024-06-09 RX ADMIN — INSULIN ASPART 2 UNITS: 100 INJECTION, SOLUTION INTRAVENOUS; SUBCUTANEOUS at 21:19

## 2024-06-09 RX ADMIN — CARVEDILOL 3.12 MG: 3.12 TABLET, FILM COATED ORAL at 09:31

## 2024-06-09 RX ADMIN — PANTOPRAZOLE SODIUM 40 MG: 40 TABLET, DELAYED RELEASE ORAL at 09:31

## 2024-06-09 RX ADMIN — ASPIRIN 81 MG: 81 TABLET, COATED ORAL at 09:31

## 2024-06-09 ASSESSMENT — ACTIVITIES OF DAILY LIVING (ADL)
ADLS_ACUITY_SCORE: 23
ADLS_ACUITY_SCORE: 24
ADLS_ACUITY_SCORE: 27
ADLS_ACUITY_SCORE: 27
ADLS_ACUITY_SCORE: 24
ADLS_ACUITY_SCORE: 27
ADLS_ACUITY_SCORE: 23
ADLS_ACUITY_SCORE: 24
ADLS_ACUITY_SCORE: 23
ADLS_ACUITY_SCORE: 27
ADLS_ACUITY_SCORE: 19
ADLS_ACUITY_SCORE: 23
ADLS_ACUITY_SCORE: 27
ADLS_ACUITY_SCORE: 23
ADLS_ACUITY_SCORE: 27
ADLS_ACUITY_SCORE: 24
ADLS_ACUITY_SCORE: 24
PREVIOUS_RESPONSIBILITIES: MEAL PREP;HOUSEKEEPING;LAUNDRY;SHOPPING;MEDICATION MANAGEMENT;DRIVING;FINANCES;WORK
ADLS_ACUITY_SCORE: 27
ADLS_ACUITY_SCORE: 23
ADLS_ACUITY_SCORE: 19
ADLS_ACUITY_SCORE: 23

## 2024-06-09 NOTE — CONSULTS
"CLINICAL NUTRITION SERVICES  -  ASSESSMENT NOTE    Recommendations Ordered by Registered Dietitian (RD):   - Stop Ensure Clear (too high in sugar).   - Start Glucerna instead  - Entered suggested assessed needs for repletion to AVS. Family to help patient tally up how much she is actually eating at home.   - Changed diet to Regular in setting of normal blood lipids and malnutrition. Emphasized use of healthy fats, lean proteins, plants..   Malnutrition:   % Weight Loss:  Weight loss does not meet criteria for malnutrition   % Intake:  Hard to assess - pt feels she is eating well  Subcutaneous Fat Loss:  Orbital region moderate depletion, Upper arm region moderate depletion, and Thoracic region moderate depletion  Muscle Loss:  Temporal region moderate depletion, Clavicle bone region moderate-severe depletion, Acromion bone region moderate-severe depletion, and Dorsal hand region moderate-severe depletion  Fluid Retention:  None noted    Malnutrition Diagnosis: Moderate malnutrition  In Context of:  Chronic illness or disease     REASON FOR ASSESSMENT  Taylor Aburto is a 69 year old female seen by Registered Dietitian for Provider Order - \"weight loss\" & provider Order \"Dietitian to see for Heart Healthy Diet education\"  Nutrition Admission Risk Screen Received -   Have you recently lost weight without trying ? - \"yes 14-23#\"  Have you been eating poorly due to a decreased appetite ?- \"yes\"    NUTRITION HISTORY  - Information obtained from chart, patient, and her family in room.   - H/o liver disease, TB, small PE, DM2, HLD, pulmonary emphysema, tobacco use disorder. Presented for chest pain.   - H/o unintended weight loss and inability to keep wt on / gain desired wt.     - Pt seen in room with her family. General diet recall as follows:    Larger breakfast: Togolese toast, avila, eggs   Lunch: sometimes brings to work, sometimes doesn't   Dinner: something at home after work (they eat a lot of Italian). She likes " July 28, 2022     Patient: Romi Thomas  YOB: 1964  Date of Visit: 7/28/2022      To Whom it May Concern:    Romi Thomas is under my professional care  Darron Cueva was seen in my office on 7/28/2022  If you have any questions or concerns, please don't hesitate to call           Sincerely,          Luzmaria Camarena MD        CC: No Recipients "most plants/veggies, chicken/fish.     Snacks: nuts, avocado  - Avoids dairy, prior lactose intolerance that has seemingly gotten better in recent years. Has added in some cheese, but is still careful.     - Does not take high protein supplements at home but feels like she should. Would prefer a low-sugar option given DM.   - She seems to eat fairly well. Family have been wondering about an underlying autoimmune dysfunction, undergoing workup. They wonder too if despite her reports of eating well, if she is under-eating energy for weight gain.        CURRENT NUTRITION ORDERS  Diet Order:     Low Saturated Fat/2400 mg Sodium     Current Intake/Tolerance:  Appetite is fair.     NUTRITION FOCUSED PHYSICAL ASSESSMENT FOR DIAGNOSING MALNUTRITION)  Yes         Observed:    Muscle wasting (refer to documentation in Malnutrition section) and Subcutaneous fat loss (refer to documentation in Malnutrition section)    Obtained from Chart/Interdisciplinary Team:  6/8 - Coronary Angio with KIM x4 for single vessel disease     ANTHROPOMETRICS  Height: 5' 5\"  Weight: 108 lbs 6.4 oz (49.2 kg)   Body mass index is 18.04 kg/m .  Weight Status:  Underweight BMI <18.5  IBW: 56.8 kg   % IBW: 87%  Weight History: Recent wt changes not significant.   Wt Readings from Last 10 Encounters:   06/09/24 49.2 kg (108 lb 6.4 oz)   02/22/24 49 kg (108 lb)   02/14/24 49.3 kg (108 lb 9.6 oz)   11/06/23 47.9 kg (105 lb 9.6 oz)   06/30/23 51.1 kg (112 lb 9.6 oz)   03/31/23 51.8 kg (114 lb 3.2 oz)   01/27/23 50.8 kg (112 lb)   10/07/22 52.2 kg (115 lb)   09/06/22 50.4 kg (111 lb 3.2 oz)   04/27/22 54.3 kg (119 lb 9.6 oz)       LABS  Labs reviewed  Recent Labs   Lab 06/09/24  1412 06/09/24  0817 06/09/24  0208 06/08/24  2141 06/08/24  1654 06/08/24  0856   * 188* 163* 221* 193* 170*     Lab Results   Component Value Date    A1C 6.4 11/06/2023    A1C 6.3 07/10/2023    A1C 6.5 03/31/2023    A1C 6.8 01/27/2023    A1C 5.8 09/06/2022     LDL 39 - " NL  TAG 74 - NL  Non HDL 54 - NL  HDL 66 - NL    MEDICATIONS  Medications reviewed  Med sliding scale insulin     ASSESSED NUTRITION NEEDS PER APPROVED PRACTICE GUIDELINES:  Dosing Weight 49 kg   Estimated Energy Needs: 6784-5706 kcals (30-40 Kcal/Kg)  Justification: repletion  Estimated Protein Needs: 59-74+ grams protein (1.2-1.5+ g pro/Kg)  Justification: repletion and preservation of lean body mass    MALNUTRITION:  % Weight Loss:  Weight loss does not meet criteria for malnutrition   % Intake:  Hard to assess - pt feels she is eating well  Subcutaneous Fat Loss:  Orbital region moderate depletion, Upper arm region moderate depletion, and Thoracic region moderate depletion  Muscle Loss:  Temporal region moderate depletion, Clavicle bone region moderate-severe depletion, Acromion bone region moderate-severe depletion, and Dorsal hand region moderate-severe depletion  Fluid Retention:  None noted    Malnutrition Diagnosis: Moderate malnutrition  In Context of:  Chronic illness or disease    NUTRITION DIAGNOSIS:  Inadequate oral intake related to inability to gain weight as evidenced by low BMI.     NUTRITION INTERVENTIONS  Recommendations / Nutrition Prescription  - Stop Ensure Clear (too high in sugar).   - Start Glucerna instead  - Entered suggested assessed needs for repletion to AVS. Family to help patient tally up how much she is actually eating at home.   - Changed diet to Regular in setting of normal blood lipids and malnutrition.     Implementation  Nutrition education: Provided education on   - Emphasized use of healthy fats, lean proteins, plants..  - Provided suggested goal energy/protein needs in AVS  Medical Food Supplement: changed as above    Nutrition Goals  Intake of at least 75% adequate trays TID.     MONITORING AND EVALUATION:  Progress towards goals will be monitored and evaluated per protocol and Practice Guidelines    Etelvina Almazan RD, LD  Pager: [phone]

## 2024-06-09 NOTE — PROGRESS NOTES
06/09/24 1134   Appointment Info   Signing Clinician's Name / Credentials (OT) Sinai Garcia OTR/L   Rehab Comments (OT) initial eval   Living Environment   People in Home grandchild(miranda)   Current Living Arrangements house   Home Accessibility stairs to enter home   Number of Stairs, Main Entrance 4   Stair Railings, Main Entrance railings safe and in good condition   Living Environment Comments lives on the main level of the triplex, granddaughters live on 2nd and 3rd floor. tub shower   Self-Care   Usual Activity Tolerance good   Current Activity Tolerance moderate   Regular Exercise No   Equipment Currently Used at Home none   Fall history within last six months no   Instrumental Activities of Daily Living (IADL)   Previous Responsibilities meal prep;housekeeping;laundry;shopping;medication management;driving;finances;work   General Information   Onset of Illness/Injury or Date of Surgery 06/07/24   Referring Physician Ariel Medina MD   Patient/Family Therapy Goal Statement (OT) I will  have lots of help at home   Additional Occupational Profile Info/Pertinent History of Current Problem 69-year-old female with a past medical history significant for tobacco use and a previously noted small pericardial effusion as well as diabetes who was admitted on 6/7/2024 for chest discomfort.     Subsequent evaluation  demonstrated evidence of a non-ST elevation myocardial infarction as well as severe left ventricular dysfunction with wall motion abnormalities in the LAD distribution and a moderate pericardial effusion with borderline tamponade.  She underwent coronary angiography on 6/9/2024 and successful IVUS guided PCI of the LAD and first diagonal.   Existing Precautions/Restrictions cardiac   General Observations and Info pt supine in bed, agreeable to OT   Cognitive Status Examination   Orientation Status orientation to person, place and time   Visual Perception   Visual Impairment/Limitations WNL    Sensory   Sensory Quick Adds sensation intact   Pain Assessment   Patient Currently in Pain No   Posture   Posture not impaired   Range of Motion Comprehensive   General Range of Motion no range of motion deficits identified   Strength Comprehensive (MMT)   General Manual Muscle Testing (MMT) Assessment no strength deficits identified   Muscle Tone Assessment   Muscle Tone Quick Adds No deficits were identified   Coordination   Upper Extremity Coordination No deficits were identified   Bed Mobility   Bed Mobility No deficits identified   Transfers   Transfer Comments SBA   Balance   Balance Assessment no deficits identified   Balance Comments pt has not been out of bed much so feels a little weak   Activities of Daily Living   BADL Assessment/Intervention lower body dressing;grooming;toileting   Lower Body Dressing Assessment/Training   Position (Lower Body Dressing) edge of bed sitting   [place] Level (Lower Body Dressing) lower body dressing skills;don;socks;independent   Grooming Assessment/Training   [place] Level (Grooming) grooming skills;independent   Toileting   [place] Level (Toileting) toileting skills;modified independence   Clinical Impression   Criteria for Skilled Therapeutic Interventions Met (OT) Yes, treatment indicated   OT Diagnosis decreased activity tolerance   OT Problem List-Impairments impacting ADL problems related to;activity tolerance impaired;post-surgical precautions   Assessment of Occupational Performance 1-3 Performance Deficits   Planned Therapy Interventions (OT) home program guidelines;progressive activity/exercise;risk factor education   Clinical Decision Making Complexity (OT) problem focused assessment/low complexity   Risk & Benefits of therapy have been explained evaluation/treatment results reviewed;care plan/treatment goals reviewed;risks/benefits reviewed;current/potential barriers reviewed;participants voiced agreement with care plan;participants  included;patient  (granddaughter)   OT Total Evaluation Time   OT Eval, Low Complexity Minutes (22212) 10   OT Goals   Therapy Frequency (OT) Daily   OT Predicted Duration/Target Date for Goal Attainment 06/16/24   OT Goals Cardiac Phase 1   OT: Understanding of cardiac education to maximize quality of life, condition management, and health outcomes Patient;Verbalize   OT: Perform aerobic activity with stable cardiovascular response continuous;10 minutes;ambulation   OT: Functional/aerobic ambulation tolerance with stable cardiovascular response in order to return to home and community environment Independent;Greater than 300 feet   OT: Navigation of stairs simulating home set up with stable cardiovascular response in order to return to home and community environment Modified independent;4 stairs   Interventions   Interventions Quick Adds Cardiac Rehab;Therapeutic Activity   Therapeutic Activities   Therapeutic Activity Minutes (13685) 24   Symptoms noted during/after treatment none  (reports she has had ongoing chest heaviness)   Treatment Detail/Skilled Intervention pt seen for CR eval and treat. Provided with stent packet and all education. pt with verbal understanding of info. pt also reports that she is quiting smoking and last smoked before coming in. educated on quit plan line for additional support through quitting as a resource.   Ambulation   Duration (minutes) 3.1 minutes   Effort Scale 5   Symptoms Denies symptoms   Cardiovascular Response Normal   Exercise Details ambulation in luong 276' with no AE, slow pace   Vital Signs Details see vs flowsheet   Cardiac Education   Education Provided Diet;Risk factors;Signs and symptoms;Outpatient Cardiac Rehab;Home exercise program   Education Packet Given to Patient Yes   All Patient Education Handouts Reviewed with Patient and/or Family Yes   Cardiac Rehab Phase II Plan   Phase II Order Received Yes   Phase II Appointment Status Not scheduled   Not Scheduled  Reason Other (see comments)  (weekend)   OT Discharge Planning   OT Plan timed ambulation, 4 stairs   OT Discharge Recommendation (DC Rec) home with assist;home with outpatient cardiac rehab   OT Rationale for DC Rec pt seen for CR, doing well anticipate she will continue to progress to home with A for heavy lifting and IADLs as needed. pt has supportive family. pt would benefit from OP CR to continue to progress activity in monitored setting.   OT Brief overview of current status 3 min 10 sec ambulation in luong with SBA.   Total Session Time   Timed Code Treatment Minutes 24   Total Session Time (sum of timed and untimed services) 34

## 2024-06-09 NOTE — DISCHARGE INSTRUCTIONS
ASSESSED NUTRITION NEEDS PER APPROVED PRACTICE GUIDELINES:  Dosing Weight 49 kg   Estimated Energy Needs: 9900-0195 kcals (30-40 Kcal/Kg)  Justification: repletion  Estimated Protein Needs: 59-74+ grams protein (1.2-1.5+ g pro/Kg)  Justification: repletion and preservation of lean body mass

## 2024-06-09 NOTE — PROGRESS NOTES
I reviewed the echocardiogram performed today personally and compared it to the previous study.  She has a moderate pericardial effusion that is mostly localized anterior to the right ventricle.  Echocardiographic findings consistent with cardiac tamponade such as respiratory variation are not present, and clinically she does not appear to be experiencing hemodynamic compromise secondary to the effusion.    At this point I have discussed this with Dr. Agustin and we have agreed to recheck effusion size tomorrow with a limited echocardiogram.  At that point we can make a decision regarding close follow-up versus an attempt at pericardial drainage.  I have placed an n.p.o. order at midnight in case there is a significant increase in effusion size and/or clinical evidence of tamponade develops.

## 2024-06-09 NOTE — PLAN OF CARE
Goal Outcome Evaluation:    VSS. Monitor remains Sinus rhythm. Tylenol given for headache. Plan for limited echo tomorrow. NPO after Midnight.

## 2024-06-09 NOTE — PROGRESS NOTES
"Cardiology Progress Note          Assessment and Plan:         69-year-old female with a past medical history significant for tobacco use and a previously noted small pericardial effusion as well as diabetes who was admitted on 2024 for chest discomfort.     Subsequent evaluation  demonstrated evidence of a non-ST elevation myocardial infarction as well as severe left ventricular dysfunction with wall motion abnormalities in the LAD distribution and a moderate pericardial effusion with borderline tamponade.  She underwent coronary angiography on 2024 and successful IVUS guided PCI of the LAD and first diagonal.     IMPRESSION:        Non-ST elevation myocardial infarction status post successful PCI to the LAD and diagonal on 2024.  Moderate pericardial effusion with borderline tamponade on her echocardiogram from 2024.  Inflammatory markers are negative.  History of ongoing tobacco use.  Emphysema  History of pancreatic duct dilatation    PLAN    -Repeat limited echo today to reassess size of pericardial effusion.  -Continue DAPT, will add low-dose carvedilol in addition to lisinopril.  -We will continue to follow.          Interval History:     Seen this morning.  Was nauseated yesterday with a headache.  Symptoms have improved somewhat this morning.  She has occasional chest pressure but this is improving.             Review of Systems:   As per subjective, otherwise 5 systems reviewed and negative.           Physical Exam:   Blood pressure 115/62, pulse 96, temperature 98  F (36.7  C), temperature source Oral, resp. rate 14, height 1.651 m (5' 5\"), weight 49.2 kg (108 lb 6.4 oz), SpO2 95%.      Vital Sign Ranges  Temperature Temp  Av.9  F (36.6  C)  Min: 97.7  F (36.5  C)  Max: 98  F (36.7  C)   Blood pressure Systolic (24hrs), Av , Min:104 , Max:138        Diastolic (24hrs), Av, Min:58, Max:79      Pulse Pulse  Av.5  Min: 87  Max: 109   Respirations Resp  Av.9  Min: 10  " Max: 23   Pulse oximetry SpO2  Av.3 %  Min: 95 %  Max: 100 %         Intake/Output Summary (Last 24 hours) at 2024 0813  Last data filed at 2024 0500  Gross per 24 hour   Intake --   Output 900 ml   Net -900 ml       Constitutional: NAD  Skin: Warm and dry  Neck: No JVD  Lungs: CTA  Cardiovascular: RRR, no m/r/g  Abdomen: Soft, non tender.  Extremities and Back: No LE edema  Neurological: Nonfocal           Medications:     I have reviewed this patient's current medications.              Data:     Labs reviewed.             Mouna Greene MD, M.D.

## 2024-06-09 NOTE — PROGRESS NOTES
[facility]    Medicine Progress Note - Hospitalist Service    Date of Admission:  6/7/2024    Assessment & Plan      Taylor Aburto is a 69 year old female with a past medical history significant for small pericardial effusion, DM II, hyperlipidemia, pulmonary emphysema, tobacco use disorder, among others, who was admitted on 6/7/2024 for further evaluation of chest pain, found to have NSTEMI.       NSTEMI, type I  Chest pain  Small-to-moderate pericardial effusion with imaging features of tamponade  Status post successful PCI to the LAD and diagonal on 6/9/2024  Patient received full dose ASA in the ED. CT chest 02/2023 demonstrated trace pericardial effusion. CT chest abdomen and pelvis from 11/2023 demonstrated a small, partially imaged pericardial effusion at that time. CT chest w/o 06/07 demonstrated increasing, small to moderate pericardial effusion. Stat EKG with LVEF estimated at 30-35%. Grade I (early) diastolic dysfunction. There is severe hypokinesis/akinesis of the mid-apical segments of the LV. Underwent coronary angiogram on 6/9 with KIM placement. Repeat echo shows reduced EF, stable effusion, and borderline tamponade. Cardiology is currently considering pericardiocentesis on 6/10. She is tachycardic but otherwise hemodynamically stable.   - Cardiology consulted, appreciate the cares   - Monitor on telemetry.    - goal K >4, Mag > 2  - On DAPT  - GDMT per cadiology, currently on beta-blocker and ACE  - AM CBC and BMP pending      DM II w/ hyperosmolarity without coma   PTA regimen includes:  - Hold PTA metformin. Resume at discharge.    - Medium insulin sliding scale ordered.  - Glucose checks QID.    - Hypoglycemic protocol in place.       HLD   - Continue Statin     Pulmonary emphysema   Tobacco use disorder   CT chest 02/2023 demonstrated biapical fibrosis. Mild centrilobular emphysematous changes. Calcified granuloma in the right lower lobe measuring up to 9.0 mm (series  4 image 139). Additional smaller adjacent calcified granuloma. CT chest w/o 06/07/2024 demonstrated moderate centrilobular emphysema. Mild dependent atelectasis. No lobar consolidation, pleural effusions, or pneumothorax. Recently attempting cessation with nicotine patch. Down to 0.5 PPD. 50 pack-year history. Tried bupropion in past.   - Continuous pulse oximetry. Oxygen available as needed.   - PFTs have been ordered by PCP, pending outpatient. Recommend completion upon discharge.   - Smoking cessation discussed by admitting team    - Nicotine patch requested, per patient.      Hx of hepatic steatosis/fibrosis on imaging   Hx of pancreatic duct dilation   *CT chest, abdomen and pelvis 11/2023 demonstrated borderline dilated pancreatic duct at 0.3 cm, but no obstructing stone or lesion. Suspected physiologic/senescent, but if there is high clinical concern, suggest further evaluation with MRCP. Morphologic changes of cirrhosis. Moderate multifocal atherosclerotic calcification of the abdominal aorta and femoral arteries. No aneurysm.   *US abdomen complete 06/07 demonstrated sonographic features suggestive of hepatic steatosis/fibrosis. No suspicious hepatic mass. Mild prominence of the main pancreatic and extrahepatic common bile ducts of unclear etiology or significance.  - Noted. Continue to monitor for new or worsening symptoms.      Generalized fatigue   Morning joint and muscle stiffness   - CRP and ESR negative.  - MARK negative 02/2023.   - Noted. Continue to monitor for new or worsening symptoms.      Hx of hypercalcemia   Hx of unintentional weight loss   Cachexia   06/2023 work-up for unintentional weight loss and hypercalcemia negative except incidental finding of pancreatic duct dilation, an unremarkable renal cyst and a reactive lymph node on submandibular region. Recent colonoscopy and upper endoscopy notable only for polyps. Repeat recommended in 7 years. CT chest, abdomen and pelvis 11/2023  demonstrated borderline dilated pancreatic duct at 0.3 cm, but no obstructing stone or lesion. Suspected physiologic/senescent, but if there is high clinical concern, suggest further evaluation with MRCP.  Moderate multifocal atherosclerotic calcification of the abdominal aorta and femoral arteries. No aneurysm. Ca noted to only be very mildly elevated at 10.3. Ionized Ca WNL 07/19/2023. CA 19-9 negative 11/2023.   - Nutrition consulted, appreciate the cares.   - Ensure clear for supplementation between meals.      Nocturia  Malodorous urine   UA not consistent with infection   - Hold PTA oxybutynin.           Diet: Snacks/Supplements Adult: Ensure Clear; With Meals  Low Saturated Fat Na <2400 mg    DVT Prophylaxis: Pneumatic Compression Devices and DAPT  Phillips Catheter: Not present  Lines: None     Cardiac Monitoring: ACTIVE order. Indication: Post- PCI/Angiogram (24 hours)  Code Status: Full Code      Clinically Significant Risk Factors                # Thrombocytopenia: Lowest platelets = 126 in last 2 days, will monitor for bleeding    # Chronic heart failure with reduced ejection fraction: last echo with EF <40%                        Disposition Plan     Medically Ready for Discharge: Anticipated in 2-4 Days             Landon Yin, DO  Hospitalist Service  [facility]  Securely message with [employer] (more info)  Text page via [employer] Paging/Directory   ______________________________________________________________________    Interval History     - Patient was feeling nauseous after angiogram on 6/8  - This morning she reports to be doing fair  - Denies any chest pain  - Still feeling tired  - Has no other acute concerns  - I updated her daughter over the phone    Physical Exam   Vital Signs: Temp: 98  F (36.7  C) Temp src: Oral BP: 124/80 Pulse: 112   Resp: 18 SpO2: 97 % O2 Device: None (Room air) Oxygen Delivery: 4 LPM  Weight: 108 lbs 6.4 oz    General Appearance: No acute  distress  Respiratory: Clear to auscultation, no wheezes, prolonged expiratory phase  Cardiovascular: Tachycardic, no murmurs, rubs, or gallops  GI: Soft nontender, bowel sounds present  Skin: Left radial access site without hematoma, distal strength, sensation, and pulses intact  Other: Alert and oriented, moving all extremities, normal mood and affect     Medical Decision Making       54 MINUTES SPENT BY ME on the date of service doing chart review, history, exam, documentation & further activities per the note.      Data   ------------------------- PAST 24 HR DATA REVIEWED -----------------------------------------------        Imaging results reviewed over the past 24 hrs:   Recent Results (from the past 24 hour(s))   Cardiac Catheterization    Narrative      Prox LAD to Mid LAD lesion is 75% stenosed.    Mid LAD lesion is 45% stenosed.    1st Diag-1 lesion is 85% stenosed.    Single-vessel obstructive CAD.  Culprit lesion is a bifurcation  Successful IVUS guided PCI of the proximal to mid-LAD and D1 using a   provisional strategy, with placement of a Megatron 3.5 x 12 mm and Synergy   XD 3.0 x 32 mm KIM in the LAD and a Edouard Bourbon 2.0 x 30 mm and 2.0 x 15   mm KIM in the D1. The stented area of the LAD was proximally post-dilated   with a 4.0 mm balloon. Kissing inflation at the bifurcation of the LAD and   D1 was performed with a 2.0 mm NC balloon in the D1 and a 3.0 mm NC   balloon in the LAD.    Echocardiogram Limited   Result Value    LVEF  25-30%    Narrative    [id number]  [id number]  [id number]  [id number]^DANYELL^ANUJ^DAISY     [facility]  Echocardiography Laboratory  [address] [place] [address]     Name: WILLY FUENTES  MRN: [id number]  : 1955  Study Date: 2024 05:07 PM  Age: 69 yrs  Gender: Female  Patient Location: [facility]  Reason For Study: Pericardial Effusion  Ordering Physician: ANUJ CHILD  Referring Physician: Soraya Cherry  Performed By:  Reji Marsarmando, [facility]     BSA: 1.5 m2  Height: 65 in  Weight: 108 lb  HR: 90  BP: 104/58 mmHg  ______________________________________________________________________________  Procedure  Limited Portable Echo Adult.  ______________________________________________________________________________  Interpretation Summary     Moderate pericardial effusion. The pericardial effusion is largest by the  right ventricular free wall. The pericardial effusion appears to be marginally  larger than previously and there does appear to be more compression of the  right ventricle compared to previously although the Doppler signal is not  suggestive of tamponade. Would repeat the echo tomorrow as there does appear  to be borderline tamponade beginning to develop.  The visual ejection fraction is 25-30%.  Only the basal portions of the left ventricle appears to contract normally.  The remaining segments are severely hypokinetic to akinetic. This pattern can  typically be found in stress cardiomyopathy although severe three-vessel  disease and left main disease can give a similar picture.  Left ventricular systolic function is moderate to severely reduced.  ______________________________________________________________________________  Left Ventricle  Only the basal portions of the left ventricle appears to contract normally.  The remaining segments are severely hypokinetic to akinetic. This pattern can  typically be found in stress cardiomyopathy although severe three-vessel  disease and left main disease can give a similar picture. The visual ejection  fraction is 25-30%. Left ventricular systolic function is moderate to severely  reduced.     Right Ventricle  The right ventricle is normal in size and function.     Mitral Valve  There is trace mitral regurgitation.     Aortic Valve  No aortic regurgitation is present.     Vessels  Nondilated IVC but does not appear to change with respiration.     Pericardium  Moderate pericardial  effusion. The pericardial effusion is largest by the  right ventricular free wall. The pericardial effusion appears to be marginally  larger than previously and there does appear to be more compression of the  right ventricle compared to previously although the Doppler signal is not  suggestive of tamponade.     Rhythm  Sinus rhythm was noted.  ______________________________________________________________________________  Report approved by: Jenifer Saucedo 2024 05:47 PM     ______________________________________________________________________________      Echocardiogram Limited   Result Value    LVEF  25-30%    Narrative    [id number]  [id number]  [id number]  [id number]^JUNIOR^YUKI^TROY     [facility]  Echocardiography Laboratory  [address] [place] [address]     Name: WILLY FUENTES  MRN: [id number]  : 1955  Study Date: 2024 08:23 AM  Age: 69 yrs  Gender: Female  Patient Location: [facility]  Reason For Study: Pericardial Effusion  Ordering Physician: YUKI PACHECO  Referring Physician: Soraya Cherry  Performed By: Lloyd Garcia     BSA: 1.5 m2  Height: 65 in  Weight: 108 lb  HR: 102  BP: 124/80 mmHg  ______________________________________________________________________________  Procedure  Limited Portable Echo Adult.  ______________________________________________________________________________  Interpretation Summary     Moderate anterior pericardial effusion  The echo/Doppler findings are inconclusive for cardiac tamponade.  Severely decreased left ventricular systolic function  There is severe anterior, septal, and apical wall hypokinesis.  The left ventricle is mildly dilated.  The inferior vena cava was normal in size with preserved respiratory  variability.  The patient exhibited occasional PACs.     The findings are similar to the study from 2024. There is a moderate sixed  pericardial effusion with some features suggestive of RV diastolic  collapse,  but definiite echocardiographic findings for tamponade are not present. There  is preserved IVC repsiratory variability  ______________________________________________________________________________  Left Ventricle  The left ventricle is mildly dilated. There is normal left ventricular wall  thickness. The visual ejection fraction is 25-30%. Severely decreased left  ventricular systolic function. There is severe anterior, septal, and apical  wall hypokinesis. There is no thrombus seen in the left ventricle.     Right Ventricle  The right ventricle is normal size. The right ventricular systolic function is  normal.     Atria  Normal left atrial size. Right atrial size is normal.     Mitral Valve  The mitral valve leaflets appear normal. There is no evidence of stenosis,  fluttering, or prolapse.     Tricuspid Valve  Normal tricuspid valve.     Aortic Valve  The aortic valve is trileaflet.     Pulmonic Valve  The pulmonic valve is not well visualized.     Vessels  The aortic root is normal size. The inferior vena cava was normal in size with  preserved respiratory variability.     Pericardium  Moderate anterior pericardial effusion. The echo/Doppler findings are  inconclusive for cardiac tamponade. There is no pleural effusion.     Rhythm  The patient exhibited occasional PACs.  ______________________________________________________________________________  Report approved by: Dr. Landon Huffman 06/09/2024 11:28 AM     ______________________________________________________________________________

## 2024-06-09 NOTE — PROGRESS NOTES
A&O pleasant and cooperative. Accompanied bedside by family. Continues to have constant 1-2/10 chest pressure at rest. Headache up to 5/10, relief with tylenol and cold washcloth to forehead. Rt TR band deflated per protocol, pt educated about post-op restrictions for overnight. Purewick. RA. No increased SOB, VSS. Denies need at evening change of shift.

## 2024-06-09 NOTE — PLAN OF CARE
A&Ox4. VSS. RA. No SOB/CP overnight. Purewick - oob this morning, SBA. R radial site CDI and CMS intact. 7/10 headache relieved with 2mg dilaudid. Family at bedside.

## 2024-06-10 ENCOUNTER — APPOINTMENT (OUTPATIENT)
Dept: CARDIOLOGY | Facility: CLINIC | Age: 69
DRG: 321 | End: 2024-06-10
Attending: INTERNAL MEDICINE
Payer: COMMERCIAL

## 2024-06-10 LAB
ALBUMIN BODY FLUID SOURCE: NORMAL
ALBUMIN FLD-MCNC: 3.3 G/DL
ANION GAP SERPL CALCULATED.3IONS-SCNC: 10 MMOL/L (ref 7–15)
APPEARANCE FLD: ABNORMAL
BI-PLANE LVEF ECHO: NORMAL
BUN SERPL-MCNC: 11 MG/DL (ref 8–23)
CALCIUM SERPL-MCNC: 9 MG/DL (ref 8.8–10.2)
CELL COUNT BODY FLUID SOURCE: ABNORMAL
CHLORIDE SERPL-SCNC: 103 MMOL/L (ref 98–107)
COLOR FLD: ABNORMAL
CREAT SERPL-MCNC: 0.79 MG/DL (ref 0.51–0.95)
DEPRECATED HCO3 PLAS-SCNC: 25 MMOL/L (ref 22–29)
EGFRCR SERPLBLD CKD-EPI 2021: 81 ML/MIN/1.73M2
ERYTHROCYTE [DISTWIDTH] IN BLOOD BY AUTOMATED COUNT: 12.6 % (ref 10–15)
GLUCOSE BLDC GLUCOMTR-MCNC: 187 MG/DL (ref 70–99)
GLUCOSE BLDC GLUCOMTR-MCNC: 224 MG/DL (ref 70–99)
GLUCOSE BLDC GLUCOMTR-MCNC: 313 MG/DL (ref 70–99)
GLUCOSE BODY FLUID SOURCE: NORMAL
GLUCOSE FLD-MCNC: 202 MG/DL
GLUCOSE SERPL-MCNC: 208 MG/DL (ref 70–99)
HCT VFR BLD AUTO: 36.1 % (ref 35–47)
HGB BLD-MCNC: 11.8 G/DL (ref 11.7–15.7)
LD BODY BODY FLUID SOURCE: NORMAL
LDH FLD L TO P-CCNC: 605 U/L
LYMPHOCYTES NFR FLD MANUAL: 86 %
MCH RBC QN AUTO: 29.8 PG (ref 26.5–33)
MCHC RBC AUTO-ENTMCNC: 32.7 G/DL (ref 31.5–36.5)
MCV RBC AUTO: 91 FL (ref 78–100)
MONOS+MACROS NFR FLD MANUAL: 3 %
NEUTS BAND NFR FLD MANUAL: 11 %
PLATELET # BLD AUTO: 132 10E3/UL (ref 150–450)
POTASSIUM SERPL-SCNC: 4 MMOL/L (ref 3.4–5.3)
PROT FLD-MCNC: 4.4 G/DL
PROTEIN BODY FLUID SOURCE: NORMAL
RBC # BLD AUTO: 3.96 10E6/UL (ref 3.8–5.2)
SODIUM SERPL-SCNC: 138 MMOL/L (ref 135–145)
WBC # BLD AUTO: 8 10E3/UL (ref 4–11)
WBC # FLD AUTO: 68 /UL

## 2024-06-10 PROCEDURE — 83615 LACTATE (LD) (LDH) ENZYME: CPT | Performed by: INTERNAL MEDICINE

## 2024-06-10 PROCEDURE — 99233 SBSQ HOSP IP/OBS HIGH 50: CPT | Mod: 25 | Performed by: NURSE PRACTITIONER

## 2024-06-10 PROCEDURE — 87205 SMEAR GRAM STAIN: CPT | Performed by: INTERNAL MEDICINE

## 2024-06-10 PROCEDURE — 272N000001 HC OR GENERAL SUPPLY STERILE: Performed by: INTERNAL MEDICINE

## 2024-06-10 PROCEDURE — 84311 SPECTROPHOTOMETRY: CPT | Performed by: STUDENT IN AN ORGANIZED HEALTH CARE EDUCATION/TRAINING PROGRAM

## 2024-06-10 PROCEDURE — 250N000013 HC RX MED GY IP 250 OP 250 PS 637: Performed by: STUDENT IN AN ORGANIZED HEALTH CARE EDUCATION/TRAINING PROGRAM

## 2024-06-10 PROCEDURE — 250N000013 HC RX MED GY IP 250 OP 250 PS 637: Performed by: INTERNAL MEDICINE

## 2024-06-10 PROCEDURE — 99232 SBSQ HOSP IP/OBS MODERATE 35: CPT | Performed by: STUDENT IN AN ORGANIZED HEALTH CARE EDUCATION/TRAINING PROGRAM

## 2024-06-10 PROCEDURE — 82042 OTHER SOURCE ALBUMIN QUAN EA: CPT | Performed by: INTERNAL MEDICINE

## 2024-06-10 PROCEDURE — 210N000001 HC R&B IMCU HEART CARE

## 2024-06-10 PROCEDURE — 250N000011 HC RX IP 250 OP 636

## 2024-06-10 PROCEDURE — 93010 ELECTROCARDIOGRAM REPORT: CPT | Mod: XU | Performed by: INTERNAL MEDICINE

## 2024-06-10 PROCEDURE — 85014 HEMATOCRIT: CPT | Performed by: STUDENT IN AN ORGANIZED HEALTH CARE EDUCATION/TRAINING PROGRAM

## 2024-06-10 PROCEDURE — 93308 TTE F-UP OR LMTD: CPT | Mod: 26 | Performed by: INTERNAL MEDICINE

## 2024-06-10 PROCEDURE — 93325 DOPPLER ECHO COLOR FLOW MAPG: CPT | Mod: 26 | Performed by: INTERNAL MEDICINE

## 2024-06-10 PROCEDURE — 999N000064 ECHOCARDIOGRAM LIMITED

## 2024-06-10 PROCEDURE — C1894 INTRO/SHEATH, NON-LASER: HCPCS | Performed by: INTERNAL MEDICINE

## 2024-06-10 PROCEDURE — 250N000011 HC RX IP 250 OP 636: Mod: JZ | Performed by: INTERNAL MEDICINE

## 2024-06-10 PROCEDURE — 82945 GLUCOSE OTHER FLUID: CPT | Performed by: INTERNAL MEDICINE

## 2024-06-10 PROCEDURE — 99153 MOD SED SAME PHYS/QHP EA: CPT | Performed by: INTERNAL MEDICINE

## 2024-06-10 PROCEDURE — 33017 PRCRD DRG 6YR+ W/O CGEN CAR: CPT | Performed by: INTERNAL MEDICINE

## 2024-06-10 PROCEDURE — 93005 ELECTROCARDIOGRAM TRACING: CPT

## 2024-06-10 PROCEDURE — 89050 BODY FLUID CELL COUNT: CPT | Performed by: INTERNAL MEDICINE

## 2024-06-10 PROCEDURE — 84157 ASSAY OF PROTEIN OTHER: CPT | Performed by: INTERNAL MEDICINE

## 2024-06-10 PROCEDURE — 88305 TISSUE EXAM BY PATHOLOGIST: CPT | Mod: TC | Performed by: INTERNAL MEDICINE

## 2024-06-10 PROCEDURE — 33016 PERICARDIOCENTESIS W/IMAGING: CPT | Performed by: INTERNAL MEDICINE

## 2024-06-10 PROCEDURE — 93325 DOPPLER ECHO COLOR FLOW MAPG: CPT

## 2024-06-10 PROCEDURE — 93321 DOPPLER ECHO F-UP/LMTD STD: CPT | Mod: 26 | Performed by: INTERNAL MEDICINE

## 2024-06-10 PROCEDURE — 87070 CULTURE OTHR SPECIMN AEROBIC: CPT | Performed by: INTERNAL MEDICINE

## 2024-06-10 PROCEDURE — 99152 MOD SED SAME PHYS/QHP 5/>YRS: CPT | Performed by: INTERNAL MEDICINE

## 2024-06-10 PROCEDURE — 250N000009 HC RX 250: Performed by: INTERNAL MEDICINE

## 2024-06-10 PROCEDURE — 80048 BASIC METABOLIC PNL TOTAL CA: CPT | Performed by: STUDENT IN AN ORGANIZED HEALTH CARE EDUCATION/TRAINING PROGRAM

## 2024-06-10 PROCEDURE — 250N000013 HC RX MED GY IP 250 OP 250 PS 637

## 2024-06-10 PROCEDURE — 36415 COLL VENOUS BLD VENIPUNCTURE: CPT | Performed by: STUDENT IN AN ORGANIZED HEALTH CARE EDUCATION/TRAINING PROGRAM

## 2024-06-10 RX ORDER — FENTANYL CITRATE 50 UG/ML
INJECTION, SOLUTION INTRAMUSCULAR; INTRAVENOUS
Status: DISCONTINUED | OUTPATIENT
Start: 2024-06-10 | End: 2024-06-10 | Stop reason: HOSPADM

## 2024-06-10 RX ORDER — KETOROLAC TROMETHAMINE 15 MG/ML
15 INJECTION, SOLUTION INTRAMUSCULAR; INTRAVENOUS EVERY 6 HOURS PRN
Status: CANCELLED | OUTPATIENT
Start: 2024-06-10 | End: 2024-06-15

## 2024-06-10 RX ORDER — KETOROLAC TROMETHAMINE 15 MG/ML
15 INJECTION, SOLUTION INTRAMUSCULAR; INTRAVENOUS EVERY 6 HOURS PRN
Status: DISCONTINUED | OUTPATIENT
Start: 2024-06-10 | End: 2024-06-12

## 2024-06-10 RX ADMIN — PANTOPRAZOLE SODIUM 40 MG: 40 TABLET, DELAYED RELEASE ORAL at 10:17

## 2024-06-10 RX ADMIN — ACETAMINOPHEN 650 MG: 325 TABLET, FILM COATED ORAL at 15:44

## 2024-06-10 RX ADMIN — OXYBUTYNIN CHLORIDE 10 MG: 10 TABLET, EXTENDED RELEASE ORAL at 22:39

## 2024-06-10 RX ADMIN — HYDROMORPHONE HYDROCHLORIDE 0.2 MG: 0.2 INJECTION, SOLUTION INTRAMUSCULAR; INTRAVENOUS; SUBCUTANEOUS at 16:57

## 2024-06-10 RX ADMIN — CARVEDILOL 3.12 MG: 3.12 TABLET, FILM COATED ORAL at 10:17

## 2024-06-10 RX ADMIN — ROSUVASTATIN CALCIUM 40 MG: 20 TABLET, FILM COATED ORAL at 10:17

## 2024-06-10 RX ADMIN — CARVEDILOL 3.12 MG: 3.12 TABLET, FILM COATED ORAL at 17:00

## 2024-06-10 RX ADMIN — ASPIRIN 81 MG: 81 TABLET, COATED ORAL at 10:17

## 2024-06-10 RX ADMIN — NICOTINE 1 PATCH: 21 PATCH, EXTENDED RELEASE TRANSDERMAL at 10:17

## 2024-06-10 RX ADMIN — INSULIN ASPART 3 UNITS: 100 INJECTION, SOLUTION INTRAVENOUS; SUBCUTANEOUS at 22:39

## 2024-06-10 RX ADMIN — PROCHLORPERAZINE EDISYLATE 5 MG: 5 INJECTION INTRAMUSCULAR; INTRAVENOUS at 18:11

## 2024-06-10 RX ADMIN — PRASUGREL 5 MG: 5 TABLET, FILM COATED ORAL at 10:17

## 2024-06-10 RX ADMIN — KETOROLAC TROMETHAMINE 15 MG: 15 INJECTION, SOLUTION INTRAMUSCULAR; INTRAVENOUS at 19:03

## 2024-06-10 ASSESSMENT — ACTIVITIES OF DAILY LIVING (ADL)
ADLS_ACUITY_SCORE: 18
ADLS_ACUITY_SCORE: 19
ADLS_ACUITY_SCORE: 18
ADLS_ACUITY_SCORE: 18
DEPENDENT_IADLS:: INDEPENDENT
ADLS_ACUITY_SCORE: 18
ADLS_ACUITY_SCORE: 19
ADLS_ACUITY_SCORE: 18
ADLS_ACUITY_SCORE: 18
ADLS_ACUITY_SCORE: 19
ADLS_ACUITY_SCORE: 18
ADLS_ACUITY_SCORE: 19
ADLS_ACUITY_SCORE: 18
ADLS_ACUITY_SCORE: 19
ADLS_ACUITY_SCORE: 19

## 2024-06-10 NOTE — CONSULTS
Care Management Initial Consult    General Information  Assessment completed with: Patient,    Type of CM/SW Visit: Initial Assessment    Primary Care Provider verified and updated as needed: Yes   Readmission within the last 30 days: no previous admission in last 30 days      Reason for Consult: discharge planning  Advance Care Planning: Advance Care Planning Reviewed: no concerns identified          Communication Assessment  Patient's communication style: spoken language (English or Bilingual)    Hearing Difficulty or Deaf: no   Wear Glasses or Blind: no    Cognitive  Cognitive/Neuro/Behavioral: WDL                      Living Environment:   People in home: significant other  Landon  Current living Arrangements: house      Able to return to prior arrangements: yes       Family/Social Support:  Care provided by: self  Provides care for: no one  Marital Status: Lives with Significant Other  Children, Significant Other          Description of Support System: Supportive, Involved         Current Resources:   Patient receiving home care services: No     Community Resources: None  Equipment currently used at home: glucometer  Supplies currently used at home: Nebulizer tubing, Diabetic Supplies    Employment/Financial:  Employment Status: employed full-time        Financial Concerns: none   Referral to Financial Worker: No       Does the patient's insurance plan have a 3 day qualifying hospital stay waiver?  No    Lifestyle & Psychosocial Needs:  Social Determinants of Health     Food Insecurity: Low Risk  (12/4/2023)    Food Insecurity     Within the past 12 months, did you worry that your food would run out before you got money to buy more?: No     Within the past 12 months, did the food you bought just not last and you didn t have money to get more?: No   Depression: Not at risk (3/22/2024)    PHQ-2     PHQ-2 Score: 0   Housing Stability: Low Risk  (12/4/2023)    Housing Stability     Do you have housing? : Yes      Are you worried about losing your housing?: No   Tobacco Use: High Risk (4/27/2024)    Patient History     Smoking Tobacco Use: Every Day     Smokeless Tobacco Use: Never     Passive Exposure: Not on file   Financial Resource Strain: Low Risk  (12/4/2023)    Financial Resource Strain     Within the past 12 months, have you or your family members you live with been unable to get utilities (heat, electricity) when it was really needed?: No   Alcohol Use: Not on file   Transportation Needs: Low Risk  (12/4/2023)    Transportation Needs     Within the past 12 months, has lack of transportation kept you from medical appointments, getting your medicines, non-medical meetings or appointments, work, or from getting things that you need?: No   Physical Activity: Not on file   Interpersonal Safety: Low Risk  (11/6/2023)    Interpersonal Safety     Do you feel physically and emotionally safe where you currently live?: Yes     Within the past 12 months, have you been hit, slapped, kicked or otherwise physically hurt by someone?: No     Within the past 12 months, have you been humiliated or emotionally abused in other ways by your partner or ex-partner?: No   Stress: Not on file   Social Connections: Not on file   Health Literacy: Not on file       Functional Status:  Prior to admission patient needed assistance:   Dependent ADLs:: Independent  Dependent IADLs:: Independent       Values/Beliefs:  Spiritual, Cultural Beliefs, Oriental orthodox Practices, Values that affect care: no               Additional Information:  Writer met with patient and introduced self and role.  Patient states that she lives in a duplex with her SO Landon and her 2 grand daughters live above them.  There are 5 stairs with railing to enter home and patient is independent with all A/IADLs.  She works full time and is eager to get back to work.     Care coordination team will continue to follow and assist with a safe discharge plan.    Mikki Khan RN Care  Coordinator  [facility]  [phone]

## 2024-06-10 NOTE — PRE-PROCEDURE
GENERAL PRE-PROCEDURE:   Procedure:  Pericardiocentesis  Date/Time:  6/10/2024 1:46 PM    Written consent obtained?: Yes    Risks and benefits: Risks, benefits and alternatives were discussed    Consent given by:  Patient  Patient states understanding of procedure being performed: Yes    Patient's understanding of procedure matches consent: Yes    Procedure consent matches procedure scheduled: Yes    Expected level of sedation:  Moderate  Appropriately NPO:  Yes  ASA Class:  4  Mallampati  :  Grade 1- soft palate, uvula, tonsillar pillars, and posterior pharyngeal wall visible  Lungs:  Lungs clear with good breath sounds bilaterally  Heart:  Normal heart sounds and rate  History & Physical reviewed:  History and physical reviewed and no updates needed  Statement of review:  I have reviewed the lab findings, diagnostic data, medications, and the plan for sedation

## 2024-06-10 NOTE — PROGRESS NOTES
[facility]    Medicine Progress Note - Hospitalist Service    Date of Admission:  6/7/2024    Assessment & Plan      Taylor Aburto is a 69 year old female with a past medical history significant for small pericardial effusion, DM II, hyperlipidemia, pulmonary emphysema, tobacco use disorder, among others, who was admitted on 6/7/2024 for further evaluation of chest pain, found to have NSTEMI.       NSTEMI, type I  Chest pain  Small-to-moderate pericardial effusion with imaging features of tamponade  Status post successful PCI to the LAD and diagonal on 6/9/2024  Patient received full dose ASA in the ED. CT chest 02/2023 demonstrated trace pericardial effusion. CT chest abdomen and pelvis from 11/2023 demonstrated a small, partially imaged pericardial effusion at that time. CT chest w/o 06/07 demonstrated increasing, small to moderate pericardial effusion. Stat EKG with LVEF estimated at 30-35%. Grade I (early) diastolic dysfunction. There is severe hypokinesis/akinesis of the mid-apical segments of the LV. Underwent coronary angiogram on 6/9 with KIM placement. Repeat echo shows reduced EF, stable effusion, and borderline tamponade. Cardiology is currently considering pericardiocentesis on 6/10. She is tachycardic but otherwise hemodynamically stable.   - Repeat limited echo 6/10 to determine if pericardiocentesis needed   - Cardiology consulted, appreciate the cares   - Monitor on telemetry.    - goal K >4, Mag > 2  - On DAPT  - GDMT per cadiology, currently on beta-blocker and ACE     DM II w/ hyperosmolarity without coma   PTA regimen includes:  - Hold PTA metformin. Resume at discharge.    - Medium insulin sliding scale ordered.  - Glucose checks QID.    - Hypoglycemic protocol in place.       HLD   - Continue Statin     Pulmonary emphysema   Tobacco use disorder   CT chest 02/2023 demonstrated biapical fibrosis. Mild centrilobular emphysematous changes. Calcified granuloma in the  right lower lobe measuring up to 9.0 mm (series 4 image 139). Additional smaller adjacent calcified granuloma. CT chest w/o 06/07/2024 demonstrated moderate centrilobular emphysema. Mild dependent atelectasis. No lobar consolidation, pleural effusions, or pneumothorax. Recently attempting cessation with nicotine patch. Down to 0.5 PPD. 50 pack-year history. Tried bupropion in past.   - Continuous pulse oximetry. Oxygen available as needed.   - PFTs have been ordered by PCP, pending outpatient. Recommend completion upon discharge.   - Smoking cessation discussed by admitting team    - Nicotine patch, highest dose ordered      Hx of hepatic steatosis/fibrosis on imaging   Hx of pancreatic duct dilation   *CT chest, abdomen and pelvis 11/2023 demonstrated borderline dilated pancreatic duct at 0.3 cm, but no obstructing stone or lesion. Suspected physiologic/senescent, but if there is high clinical concern, suggest further evaluation with MRCP. Morphologic changes of cirrhosis. Moderate multifocal atherosclerotic calcification of the abdominal aorta and femoral arteries. No aneurysm.   *US abdomen complete 06/07 demonstrated sonographic features suggestive of hepatic steatosis/fibrosis. No suspicious hepatic mass. Mild prominence of the main pancreatic and extrahepatic common bile ducts of unclear etiology or significance.  - Noted. Continue to monitor for new or worsening symptoms.      Generalized fatigue   Morning joint and muscle stiffness   - CRP and ESR negative.  - MARK negative 02/2023.   - Noted. Continue to monitor for new or worsening symptoms.      Hx of hypercalcemia   Hx of unintentional weight loss   Cachexia   06/2023 work-up for unintentional weight loss and hypercalcemia negative except incidental finding of pancreatic duct dilation, an unremarkable renal cyst and a reactive lymph node on submandibular region. Recent colonoscopy and upper endoscopy notable only for polyps. Repeat recommended in 7 years.  "CT chest, abdomen and pelvis 11/2023 demonstrated borderline dilated pancreatic duct at 0.3 cm, but no obstructing stone or lesion. Suspected physiologic/senescent, but if there is high clinical concern, suggest further evaluation with MRCP.  Moderate multifocal atherosclerotic calcification of the abdominal aorta and femoral arteries. No aneurysm. Ca noted to only be very mildly elevated at 10.3. Ionized Ca WNL 07/19/2023. CA 19-9 negative 11/2023.   - Nutrition consulted, appreciate the cares.   - Ensure clear for supplementation between meals.      Nocturia  Malodorous urine   UA not consistent with infection   - Hold PTA oxybutynin.           Diet: NPO per Anesthesia Guidelines for Procedure/Surgery Except for: Meds  Snacks/Supplements Adult: Glucerna; With Meals    DVT Prophylaxis: Pneumatic Compression Devices and DAPT  Phillips Catheter: Not present  Lines: None     Cardiac Monitoring: ACTIVE order. Indication: Post- PCI/Angiogram (24 hours)  Code Status: Full Code      Clinically Significant Risk Factors                   # Chronic heart failure with reduced ejection fraction: last echo with EF <40%             # Cachexia: Estimated body mass index is 17.62 kg/m  as calculated from the following:    Height as of this encounter: 1.651 m (5' 5\").    Weight as of this encounter: 48 kg (105 lb 14.4 oz)., PRESENT ON ADMISSION  # Moderate Malnutrition: based on nutrition assessment, PRESENT ON ADMISSION   # Financial/Environmental Concerns: none         Disposition Plan     Medically Ready for Discharge: Anticipated Tomorrow         Landon Yin DO  Hospitalist Service  [facility]  Securely message with [place] (more info)  Text page via [employer] Paging/Directory   ______________________________________________________________________    Interval History     - No acute events overnight  - Patient currently NPO  - Cardiology to review limited echo and decide on pericardiocentesis   - Patient " doing well  - Has no chest pain  - Had trouble sleeping overnight  - No other acute concerns  - Son-in-law updated at bedside     Physical Exam   Vital Signs: Temp: 97.9  F (36.6  C) Temp src: Oral BP: 101/54 Pulse: 97   Resp: 16 SpO2: 98 % O2 Device: None (Room air)    Weight: 105 lbs 14.4 oz    General Appearance: No acute distress  Respiratory: Clear to auscultation, no wheezes, prolonged expiratory phase  Cardiovascular: Tachycardic, no murmurs, rubs, or gallops  GI: Soft nontender, bowel sounds present  Skin: Left radial access site without hematoma, distal strength, sensation, and pulses intact  Other: Alert and oriented, moving all extremities, normal mood and affect     Medical Decision Making       44 MINUTES SPENT BY ME on the date of service doing chart review, history, exam, documentation & further activities per the note.      Data   ------------------------- PAST 24 HR DATA REVIEWED -----------------------------------------------    I have personally reviewed the following data over the past 24 hrs:    8.0  \   11.8   / 132 (L)     138 103 11.0 /  187 (H)   4.0 25 0.79 \       Imaging results reviewed over the past 24 hrs:   Recent Results (from the past 24 hour(s))   Echocardiogram Limited   Result Value    Biplane LVEF 45%    Narrative    [id number]  [id number]  [id number]  [id number]^JUNIOR^YUKI^TROY     [facility]  Echocardiography Laboratory  [address] [place] [address]     Name: WILLY FUENTES  MRN: [id number]  : 1955  Study Date: 06/10/2024 07:33 AM  Age: 69 yrs  Gender: Female  Patient Location: [facility]  Reason For Study: Pericardial Effusion  Ordering Physician: YUKI PACHECO  Referring Physician: Soraya Cherry  Performed By: Dorys Lawson     BSA: 1.5 m2  Height: 65 in  Weight: 108 lb  HR: 98  BP: 105/67 mmHg  ______________________________________________________________________________  Procedure  Limited Portable Echo  Adult.  ______________________________________________________________________________  Interpretation Summary     Biplane LVEF is 45%.  There is borderline-mild global hypokinesia of the left ventricle.  There is mild septal hypokinesis.  The right ventricular cavity is small.  The inferior vena cava was normal in size with preserved respiratory  variability.  Moderate anterior pericardial effusion  Effusion similar to eco 6/9/24. Effusion up to 2.2cm over RV The RV is small  but the IVC is not dilated and does not have expiratory reversals and the MV E  wave does not have significant respiratory change so no clear indications for  tamponade on this study  ______________________________________________________________________________  Left Ventricle  The left ventricle is normal in size. Proximal septal thickening is noted.  Biplane LVEF is 45%. There is borderline-mild global hypokinesia of the left  ventricle. There is mild septal hypokinesis.     Right Ventricle  The right ventricular cavity is small.     Atria  The left atrium is borderline dilated. Right atrial size is normal.     Mitral Valve  The mitral valve leaflets appear normal. There is no evidence of stenosis,  fluttering, or prolapse.     Tricuspid Valve  Normal tricuspid valve.     Aortic Valve  There is trivial trileaflet aortic sclerosis.     Vessels  Normal size aorta. The inferior vena cava was normal in size with preserved  respiratory variability.     Pericardium  Moderate anterior pericardial effusion.     Rhythm  Sinus rhythm was noted.     ______________________________________________________________________________  MMode/2D Measurements & Calculations  IVSd: 1.1 cm  LVIDd: 4.0 cm  LVIDs: 3.5 cm  LVPWd: 1.1 cm  FS: 12.3 %  LV mass(C)d: 145.6 grams  LV mass(C)dI: 95.7 grams/m2     asc Aorta Diam: 3.0 cm  Asc Ao diam index BSA (cm/m2): 2.0  Asc Ao diam index Ht(cm/m): 1.8  EF Biplane: 53.4 %  RWT: 0.55     Doppler Measurements &  Calculations  Ao V2 max: 93.4 cm/sec  Ao max PG: 3.0 mmHg  Ao V2 mean: 65.4 cm/sec  Ao mean P.0 mmHg  Ao V2 VTI: 16.2 cm  LV V1 max P.3 mmHg  LV V1 max: 76.5 cm/sec  LV V1 VTI: 13.3 cm  AV Carter Ratio (DI): 0.82  RV S Carter: 22.2 cm/sec     ______________________________________________________________________________  Report approved by: Jenifer Elizabeth 06/10/2024 11:47 AM

## 2024-06-10 NOTE — PLAN OF CARE
A&Ox4. VSS. RA. No CP/SOB. Tele: SR/ST at times. R radial CDI and CMS intact. Able to walk hallways x1 w/ little exertion. NPO since 0000. No reported headaches. Family stopped by at 2100 and updated.   Plan: echo and dependent on results, possible pericardiocentesis

## 2024-06-10 NOTE — PROVIDER NOTIFICATION
MD Notification    Notified Person: MD    Notified Person Name: Pancho Squires     Notification Date/Time: 6/10 1756    Notification Interaction: in person conversation     Purpose of Notification: Pt c/o of 4/10 chest pain that's worse with breathing, only pain med orders I have are dilaudid. Do you recommend dilaudid or does she need anti-inflammatory?    Orders Received: okay to trial IV dilaudid    Re-paged [phone]:  Dilaudid did not help pt chest pain and now she's nauseous. Is there anything else you recommend for pain control? Pain is now 8/10.     Orders Received: Trial iv toradol 30 mg q6 hours. Take verbal order    1900: Pharmacy recommending 15 mg so order switched per pharm

## 2024-06-10 NOTE — CONSULTS
Discharge Pharmacy Test Claim    Entresto, farxiga, and jardiance are covered with monthly copays of $40 through patient's commercial Clear[employer] plan.    Test Claim Copay Notes   entresto 40.00 eligible for copay card   farxiga 40.00 eligible for copay card   jardiance 40.00 eligible for copay card     Copay Cards  Patient is eligible to use copay cards for additional savings. Visit the links below to print a copay card.    entresto [email or web address]   farxiga [email or web address]   [email or web address] [email or web address]     Amy Boggs 6/10  [facility] Pharmacy LiaisonERIKA  Ph: [phone]  Fax: [phone]  Available on Vocera (learn more here)

## 2024-06-10 NOTE — PROGRESS NOTES
[facility]    Cardiology Progress Note    Primary Cardiologist: Dr. Greene    Date of Admission: 6/7/2024  Service Date: 06/10/24    Summary:  Ms. Taylor Aburto is a very pleasant 69 year old female with a past medical history of tobacco abuse who was admitted on 6/7/2024 for chest discomfort. Cardiology was consulted for NSTEMI.    Interval History   Denies any chest pain or significant shortness of breath.  She has been ambulating minimally, however has not had dizziness lightheadedness.  She remains slightly tachycardic with heart rates in the low 100s.    Telemetry: Sinus rhythm, rate 80's    Assessment & Plan   1. NSTEMI  - Underwent coronary angiography in 6/20/2024 with IVUS guided PCI of the LAD with KIM x 2 and first diagonal branch with KIM x 2, resulting in NISHANT-3 flow post procedurally, recommendation for return to Cath Lab for IVUS guided/OCT assessment of LAD and D1 and further stent post dilation as indicated  -No recurrent anginal symptoms    2. Moderate pericardial effusion   - 11/2023 chest CT noted small size  - 2/2023 chest CT noting trace pericardial effusion  - 6/2024 TTE on admission showing a small to moderate pericardial effusion, primarily over the LV RV free wall without evidence of tamponade, repeat TTE done yesterday showing again a moderate effusion with inconclusive findings for tamponade  -Negative ESR and CRP    3. Ongoing tobacco abuse    4. Emphysema    5. Hx of pancreatic duct dilatation     6.  Cardiomyopathy, possibly ischemic versus Takotsubo  -Initial transthoracic echocardiogram on admission showing EF of 30 to 35%  -Initiated on lisinopril and carvedilol      Plan:   1.  Continue dual antiplatelet therapy with aspirin and Effient  2.  Repeat limited echo today pending effusion increasing in size or evidence of tamponade with plan for pericardiocentesis  3.  Continue carvedilol, rosuvastatin, and lisinopril  4.  Further escalation of guideline  directed medical therapy prohibited by soft blood pressures  5.  Recommend complete tobacco cessation  6.  Will place pharmacy liaison consultation for ARNI/SGLT2  7.  Cardiology will continue to follow    Thank you for the opportunity to participate in this pleasant patient's care.     RIMA Zavala, CNP   Nurse Practitioner  [facility]  Pager: [phone]  (8am - 5pm, M-F)    Patient Active Problem List   Diagnosis    H/O ETOH abuse    Arthritis    Moderate smoker (20 or less per day)    Vitamin D deficiency    Diabetes mellitus, type 2 (H)    Cigarette nicotine dependence with other nicotine-induced disorder    Lipoma of skin and subcutaneous tissue    Post-menopausal    Chronic cough    Unintentional weight loss    Osteopenia of multiple sites    NSTEMI (non-ST elevated myocardial infarction) (H)       Physical Exam   Temp: 97.9  F (36.6  C) Temp src: Oral BP: 101/54 Pulse: 97   Resp: 16 SpO2: 98 % O2 Device: None (Room air)    Vitals:    06/08/24 0500 06/09/24 0543 06/10/24 0400   Weight: 49.1 kg (108 lb 3.2 oz) 49.2 kg (108 lb 6.4 oz) 48 kg (105 lb 14.4 oz)     Vital Signs with Ranges  Temp:  [97.9  F (36.6  C)-98.3  F (36.8  C)] 97.9  F (36.6  C)  Pulse:  [91-98] 97  Resp:  [16] 16  BP: ()/(50-67) 101/54  SpO2:  [97 %-99 %] 98 %  I/O last 3 completed shifts:  In: 240 [P.O.:240]  Out: -     Constitutional:  Appears her stated age, well nourished, and in no acute distress.  Eyes: Pupils equal, round. Sclerae anicteric.   HEENT: Normocephalic, atraumatic.   Neck: Supple. No JVD appreciated.  Respiratory: Breathing non-labored. Lungs clear to auscultation bilaterally. No crackles, wheezes, rhonchi, or rales.  Cardiovascular: Regular rate and rhythm, normal S1 and S2. No murmur, rub, or gallop.  GI: Soft, non-distended, non-tender, bowel sounds present in all four quadrants.  Skin: Warm, dry.   Musculoskeletal/Extremities: Moves all extremities well and symmetrically. No  edema.  Neurologic: No gross focal deficits. Alert, awake, and oriented to person, place and time.  Psychiatric: Affect appropriate. Mentation normal.    Medications   Current Facility-Administered Medications   Medication Dose Route Frequency Provider Last Rate Last Admin    Continuing beta blocker from home medication list OR beta blocker order already placed during this visit   Does not apply DOES NOT GO TO Ariel Cadena MD        Continuing statin from home medication list OR statin order already placed during this visit   Does not apply DOES NOT GO TO Ariel Cadena MD        Patient is already receiving anticoagulation with heparin, enoxaparin (LOVENOX), warfarin (COUMADIN)  or other anticoagulant medication   Does not apply Continuous PRN Alicia Stallworth PA-C        Percutaneous Coronary Intervention orders placed (this is information for BPA alerting)   Does not apply DOES NOT GO TO Ariel Cadena MD         Current Facility-Administered Medications   Medication Dose Route Frequency Provider Last Rate Last Admin    aspirin EC tablet 81 mg  81 mg Oral Daily Alicia Stallworth PA-C   81 mg at 06/10/24 1017    carvedilol (COREG) tablet 3.125 mg  3.125 mg Oral BID w/meals Mouna Greene MD   3.125 mg at 06/10/24 1017    insulin aspart (NovoLOG) injection (RAPID ACTING)  1-7 Units Subcutaneous TID AC Alicia Stallworth PA-C   1 Units at 06/10/24 1144    insulin aspart (NovoLOG) injection (RAPID ACTING)  1-5 Units Subcutaneous At Bedtime Alicia Stallworth PA-C   2 Units at 06/09/24 2119    lisinopril (ZESTRIL) tablet 5 mg  5 mg Oral Daily Alicia Stallworth PA-C   5 mg at 06/09/24 0931    nicotine (NICODERM CQ) 21 MG/24HR 24 hr patch 1 patch  1 patch Transdermal Daily Landon Yin DO   1 patch at 06/10/24 1017    oxyBUTYnin ER (DITROPAN XL) 24 hr tablet 10 mg  10 mg Oral At Bedtime Alicia Stallworth PA-C   10 mg at 06/09/24      pantoprazole (PROTONIX) EC tablet 40 mg  40 mg Oral M  Geovanny Combs MD   40 mg at 06/10/24 1017    prasugrel (EFFIENT) tablet 5 mg  5 mg Oral Daily Yuki Greene MD   5 mg at 06/10/24 1017    rosuvastatin (CRESTOR) tablet 40 mg  40 mg Oral Daily Alicia Stallworth PA-C   40 mg at 06/10/24 1017    sodium chloride (PF) 0.9% PF flush 3 mL  3 mL Intracatheter Q8H Alicia Stallworth, PA-C   3 mL at 06/10/24 1145       Data   Recent Results (from the past 24 hour(s))   Echocardiogram Limited   Result Value    Biplane LVEF 45%    Narrative    [id number]  [id number]  [id number]  [id number]^JUNIOR^YUKI^TROY     [facility]  Echocardiography Laboratory  [address] [place] [id number]     Name: WILLY FUENTES  MRN: [id number]  : 1955  Study Date: 06/10/2024 07:33 AM  Age: 69 yrs  Gender: Female  Patient Location: [facility]  Reason For Study: Pericardial Effusion  Ordering Physician: YUKI GREENE  Referring Physician: Soraya Cherry  Performed By: Dorys Lawson     BSA: 1.5 m2  Height: 65 in  Weight: 108 lb  HR: 98  BP: 105/67 mmHg  ______________________________________________________________________________  Procedure  Limited Portable Echo Adult.  ______________________________________________________________________________  Interpretation Summary     Biplane LVEF is 45%.  There is borderline-mild global hypokinesia of the left ventricle.  There is mild septal hypokinesis.  The right ventricular cavity is small.  The inferior vena cava was normal in size with preserved respiratory  variability.  Moderate anterior pericardial effusion  Effusion similar to eco 24. Effusion up to 2.2cm over RV The RV is small  but the IVC is not dilated and does not have expiratory reversals and the MV E  wave does not have significant respiratory change so no clear indications for  tamponade on this  study  ______________________________________________________________________________  Left Ventricle  The left ventricle is normal in size. Proximal septal thickening is noted.  Biplane LVEF is 45%. There is borderline-mild global hypokinesia of the left  ventricle. There is mild septal hypokinesis.     Right Ventricle  The right ventricular cavity is small.     Atria  The left atrium is borderline dilated. Right atrial size is normal.     Mitral Valve  The mitral valve leaflets appear normal. There is no evidence of stenosis,  fluttering, or prolapse.     Tricuspid Valve  Normal tricuspid valve.     Aortic Valve  There is trivial trileaflet aortic sclerosis.     Vessels  Normal size aorta. The inferior vena cava was normal in size with preserved  respiratory variability.     Pericardium  Moderate anterior pericardial effusion.     Rhythm  Sinus rhythm was noted.     ______________________________________________________________________________  MMode/2D Measurements & Calculations  IVSd: 1.1 cm  LVIDd: 4.0 cm  LVIDs: 3.5 cm  LVPWd: 1.1 cm  FS: 12.3 %  LV mass(C)d: 145.6 grams  LV mass(C)dI: 95.7 grams/m2     asc Aorta Diam: 3.0 cm  Asc Ao diam index BSA (cm/m2): 2.0  Asc Ao diam index Ht(cm/m): 1.8  EF Biplane: 53.4 %  RWT: 0.55     Doppler Measurements & Calculations  Ao V2 max: 93.4 cm/sec  Ao max PG: 3.0 mmHg  Ao V2 mean: 65.4 cm/sec  Ao mean P.0 mmHg  Ao V2 VTI: 16.2 cm  LV V1 max P.3 mmHg  LV V1 max: 76.5 cm/sec  LV V1 VTI: 13.3 cm  AV Carter Ratio (DI): 0.82  RV S Carter: 22.2 cm/sec     ______________________________________________________________________________  Report approved by: Jenifer Elizabeth 06/10/2024 11:47 AM             Recent Labs   Lab 06/10/24  0654 24  1607 24  0856   WBC 8.0 9.6 5.5   HGB 11.8 12.3 13.4   HCT 36.1 37.0 40.7   MCV 91 93 92   * 130* 126*     Recent Labs   Lab 06/10/24  1025 06/10/24  0654 06/10/24  0210 24  2104 24  1607 24  1654  06/08/24  0856   NA  --  138  --   --  132*  --  139   POTASSIUM  --  4.0  --   --  4.0  --  4.6   CHLORIDE  --  103  --   --  98  --  106   CO2  --  25  --   --  23  --  24   ANIONGAP  --  10  --   --  11  --  9   * 208* 224*   < > 266*   < > 170*   BUN  --  11.0  --   --  9.6  --  12.3   CR  --  0.79  --   --  0.71  --  0.72   GFRESTIMATED  --  81  --   --  >90  --  >90   CONSUELO  --  9.0  --   --  9.2  --  9.4    < > = values in this interval not displayed.     Recent Labs   Lab 06/07/24 2035   NTBNPI 5,335*     Recent Labs   Lab 06/07/24  1547   SED 2        This note was completed in part using Dragon voice recognition software. Although reviewed after completion, some word and grammatical errors may occur.

## 2024-06-11 ENCOUNTER — APPOINTMENT (OUTPATIENT)
Dept: CARDIOLOGY | Facility: CLINIC | Age: 69
DRG: 321 | End: 2024-06-11
Attending: INTERNAL MEDICINE
Payer: COMMERCIAL

## 2024-06-11 ENCOUNTER — APPOINTMENT (OUTPATIENT)
Dept: OCCUPATIONAL THERAPY | Facility: CLINIC | Age: 69
DRG: 321 | End: 2024-06-11
Payer: COMMERCIAL

## 2024-06-11 LAB
ADENOSINE DEAMINASE PCAR-CCNC: 6 U/L
ANION GAP SERPL CALCULATED.3IONS-SCNC: 9 MMOL/L (ref 7–15)
ATRIAL RATE - MUSE: 102 BPM
BUN SERPL-MCNC: 13.9 MG/DL (ref 8–23)
CALCIUM SERPL-MCNC: 9 MG/DL (ref 8.8–10.2)
CHLORIDE SERPL-SCNC: 99 MMOL/L (ref 98–107)
CREAT SERPL-MCNC: 0.69 MG/DL (ref 0.51–0.95)
DEPRECATED HCO3 PLAS-SCNC: 25 MMOL/L (ref 22–29)
DIASTOLIC BLOOD PRESSURE - MUSE: NORMAL MMHG
EGFRCR SERPLBLD CKD-EPI 2021: >90 ML/MIN/1.73M2
ERYTHROCYTE [DISTWIDTH] IN BLOOD BY AUTOMATED COUNT: 12.6 % (ref 10–15)
GLUCOSE BLDC GLUCOMTR-MCNC: 173 MG/DL (ref 70–99)
GLUCOSE BLDC GLUCOMTR-MCNC: 192 MG/DL (ref 70–99)
GLUCOSE BLDC GLUCOMTR-MCNC: 212 MG/DL (ref 70–99)
GLUCOSE BLDC GLUCOMTR-MCNC: 238 MG/DL (ref 70–99)
GLUCOSE BLDC GLUCOMTR-MCNC: 383 MG/DL (ref 70–99)
GLUCOSE BLDC GLUCOMTR-MCNC: 452 MG/DL (ref 70–99)
GLUCOSE SERPL-MCNC: 210 MG/DL (ref 70–99)
HCT VFR BLD AUTO: 35.7 % (ref 35–47)
HGB BLD-MCNC: 12 G/DL (ref 11.7–15.7)
INTERPRETATION ECG - MUSE: NORMAL
LVEF ECHO: NORMAL
MCH RBC QN AUTO: 30.8 PG (ref 26.5–33)
MCHC RBC AUTO-ENTMCNC: 33.6 G/DL (ref 31.5–36.5)
MCV RBC AUTO: 92 FL (ref 78–100)
P AXIS - MUSE: 84 DEGREES
PLATELET # BLD AUTO: 96 10E3/UL (ref 150–450)
POTASSIUM SERPL-SCNC: 3.9 MMOL/L (ref 3.4–5.3)
PR INTERVAL - MUSE: 140 MS
QRS DURATION - MUSE: 82 MS
QT - MUSE: 358 MS
QTC - MUSE: 466 MS
R AXIS - MUSE: 84 DEGREES
RBC # BLD AUTO: 3.9 10E6/UL (ref 3.8–5.2)
SODIUM SERPL-SCNC: 133 MMOL/L (ref 135–145)
SYSTOLIC BLOOD PRESSURE - MUSE: NORMAL MMHG
T AXIS - MUSE: 85 DEGREES
VENTRICULAR RATE- MUSE: 102 BPM
WBC # BLD AUTO: 8.4 10E3/UL (ref 4–11)

## 2024-06-11 PROCEDURE — 99233 SBSQ HOSP IP/OBS HIGH 50: CPT | Mod: FS | Performed by: NURSE PRACTITIONER

## 2024-06-11 PROCEDURE — 250N000013 HC RX MED GY IP 250 OP 250 PS 637

## 2024-06-11 PROCEDURE — 93325 DOPPLER ECHO COLOR FLOW MAPG: CPT | Mod: 26 | Performed by: INTERNAL MEDICINE

## 2024-06-11 PROCEDURE — 97535 SELF CARE MNGMENT TRAINING: CPT | Mod: GO | Performed by: OCCUPATIONAL THERAPIST

## 2024-06-11 PROCEDURE — 250N000011 HC RX IP 250 OP 636: Mod: JZ | Performed by: INTERNAL MEDICINE

## 2024-06-11 PROCEDURE — 97110 THERAPEUTIC EXERCISES: CPT | Mod: GO | Performed by: OCCUPATIONAL THERAPIST

## 2024-06-11 PROCEDURE — 210N000001 HC R&B IMCU HEART CARE

## 2024-06-11 PROCEDURE — 250N000013 HC RX MED GY IP 250 OP 250 PS 637: Performed by: STUDENT IN AN ORGANIZED HEALTH CARE EDUCATION/TRAINING PROGRAM

## 2024-06-11 PROCEDURE — 250N000013 HC RX MED GY IP 250 OP 250 PS 637: Performed by: INTERNAL MEDICINE

## 2024-06-11 PROCEDURE — 36415 COLL VENOUS BLD VENIPUNCTURE: CPT | Performed by: STUDENT IN AN ORGANIZED HEALTH CARE EDUCATION/TRAINING PROGRAM

## 2024-06-11 PROCEDURE — 80048 BASIC METABOLIC PNL TOTAL CA: CPT | Performed by: STUDENT IN AN ORGANIZED HEALTH CARE EDUCATION/TRAINING PROGRAM

## 2024-06-11 PROCEDURE — 93321 DOPPLER ECHO F-UP/LMTD STD: CPT | Mod: 26 | Performed by: INTERNAL MEDICINE

## 2024-06-11 PROCEDURE — 93325 DOPPLER ECHO COLOR FLOW MAPG: CPT

## 2024-06-11 PROCEDURE — 99233 SBSQ HOSP IP/OBS HIGH 50: CPT | Performed by: INTERNAL MEDICINE

## 2024-06-11 PROCEDURE — 250N000012 HC RX MED GY IP 250 OP 636 PS 637: Performed by: INTERNAL MEDICINE

## 2024-06-11 PROCEDURE — 93308 TTE F-UP OR LMTD: CPT | Mod: 26 | Performed by: INTERNAL MEDICINE

## 2024-06-11 PROCEDURE — 85027 COMPLETE CBC AUTOMATED: CPT | Performed by: STUDENT IN AN ORGANIZED HEALTH CARE EDUCATION/TRAINING PROGRAM

## 2024-06-11 RX ORDER — DEXTROSE MONOHYDRATE 25 G/50ML
25-50 INJECTION, SOLUTION INTRAVENOUS
Status: DISCONTINUED | OUTPATIENT
Start: 2024-06-11 | End: 2024-06-11

## 2024-06-11 RX ORDER — NICOTINE POLACRILEX 4 MG
15-30 LOZENGE BUCCAL
Status: DISCONTINUED | OUTPATIENT
Start: 2024-06-11 | End: 2024-06-11

## 2024-06-11 RX ADMIN — KETOROLAC TROMETHAMINE 15 MG: 15 INJECTION, SOLUTION INTRAMUSCULAR; INTRAVENOUS at 01:41

## 2024-06-11 RX ADMIN — NICOTINE 1 PATCH: 21 PATCH, EXTENDED RELEASE TRANSDERMAL at 09:26

## 2024-06-11 RX ADMIN — PRASUGREL 5 MG: 5 TABLET, FILM COATED ORAL at 09:27

## 2024-06-11 RX ADMIN — ROSUVASTATIN CALCIUM 40 MG: 20 TABLET, FILM COATED ORAL at 09:27

## 2024-06-11 RX ADMIN — CARVEDILOL 3.12 MG: 3.12 TABLET, FILM COATED ORAL at 08:00

## 2024-06-11 RX ADMIN — CARVEDILOL 3.12 MG: 3.12 TABLET, FILM COATED ORAL at 18:27

## 2024-06-11 RX ADMIN — KETOROLAC TROMETHAMINE 15 MG: 15 INJECTION, SOLUTION INTRAMUSCULAR; INTRAVENOUS at 08:00

## 2024-06-11 RX ADMIN — ACETAMINOPHEN 650 MG: 325 TABLET, FILM COATED ORAL at 12:56

## 2024-06-11 RX ADMIN — KETOROLAC TROMETHAMINE 15 MG: 15 INJECTION, SOLUTION INTRAMUSCULAR; INTRAVENOUS at 21:05

## 2024-06-11 RX ADMIN — INSULIN GLARGINE 5 UNITS: 100 INJECTION, SOLUTION SUBCUTANEOUS at 22:29

## 2024-06-11 RX ADMIN — LISINOPRIL 5 MG: 5 TABLET ORAL at 09:27

## 2024-06-11 RX ADMIN — ASPIRIN 81 MG: 81 TABLET, COATED ORAL at 09:27

## 2024-06-11 RX ADMIN — OXYBUTYNIN CHLORIDE 10 MG: 10 TABLET, EXTENDED RELEASE ORAL at 21:05

## 2024-06-11 RX ADMIN — PANTOPRAZOLE SODIUM 40 MG: 40 TABLET, DELAYED RELEASE ORAL at 08:00

## 2024-06-11 RX ADMIN — SENNOSIDES AND DOCUSATE SODIUM 1 TABLET: 50; 8.6 TABLET ORAL at 18:32

## 2024-06-11 ASSESSMENT — ACTIVITIES OF DAILY LIVING (ADL)
ADLS_ACUITY_SCORE: 20
ADLS_ACUITY_SCORE: 19
ADLS_ACUITY_SCORE: 19
ADLS_ACUITY_SCORE: 20
ADLS_ACUITY_SCORE: 19
ADLS_ACUITY_SCORE: 20
ADLS_ACUITY_SCORE: 19
ADLS_ACUITY_SCORE: 20
ADLS_ACUITY_SCORE: 20
ADLS_ACUITY_SCORE: 19
ADLS_ACUITY_SCORE: 19
ADLS_ACUITY_SCORE: 20
ADLS_ACUITY_SCORE: 19

## 2024-06-11 NOTE — PLAN OF CARE
Care plan note:      Recent Vitals:  Temp: 97.5  F (36.4  C) Temp src: Oral BP: 109/56 Pulse: 95   Resp: 17 SpO2: 94 % O2 Device: None (Room air)      Orientation/Neuro: Alert and Oriented x 4  Pain: Pain is controlled with current analgesics.  Medication(s) being used: C/o of chest pressure/pain relieved/decreased with IV toradol and tylenol.   Tele: Sinus rhythm    IV medications: None   Mobility: St. by assist   Skin: Drains: Pericardial drain intact   Resp: LS clear on RA  GI: WDL  : WDL     Diet: Tolerating diet:   Well  Orders Placed This Encounter      Regular Diet Adult      Safety/Concerns:  None  Aggression Color: Green    Plan: Pt states pain is much improved since yesterday.  Drain intact, no additional output this shift.  BG elevated at 385, given sliding scale insulin and Carb count added.  Limited echo done today.    Continue to monitor.      Deysi Ruiz RN

## 2024-06-11 NOTE — PROGRESS NOTES
[facility]    Medicine Progress Note - Hospitalist Service        Date of Admission:  6/7/2024  3:34 PM    Assessment & Plan:   Taylor Aburto is a 69 year old female with a past medical history significant for small pericardial effusion, DM II, hyperlipidemia, pulmonary emphysema, tobacco use disorder, among others, who was admitted on 6/7/2024 for further evaluation of chest pain, found to have NSTEMI.       NSTEMI s/p PCI to LAD/D1 bifurcation on 6/8/2024  Coronary dissections involving the LAD and D1 during PCI  Moderate pericardial effusion  -Presented with chest discomfort on 6/7/2024  -Echocardiogram on presentation showed moderately severe reduced LV systolic function with a EF of 30-35% with wall motion abnormality.  Please see echo report for details initial concern was for stress induced cardiomyopathy  -She had known pericardial effusion(~6 months) which appeared to have increased in size on the echo done on 6/7  -Cardiology following, underwent coronary angiogram on 6/8 PCI with KIM x 2 in the LAD and KIM x 2 in D1.  Coronary dissections occurred during PCI involving both the LAD and D1  -As pericardial effusion had not improved, patient underwent pericardiocentesis on 6/10/2024  -Continues on pericardial effusion  -Continue aspirin, Effient, lisinopril, Coreg, Crestor    Thrombocytopenia  -Presented with normal platelet count of 151, platelet slightly lower at 96 today.  -Reviewed past results, she has had low platelets in the 120s-130s in 2023  -Recheck platelet counts tomorrow morning    DM II w/ hyperosmolarity without coma   -Hold PTA metformin. Resume at discharge.    -Change to high intensity sliding scale  -Lantus 5 units at bedtime while in the hospital for basal coverage  -Hypoglycemic protocol in place.       HLD   -Continue Statin     Pulmonary emphysema   Tobacco use disorder   -CT chest 02/2023 demonstrated biapical fibrosis. Mild centrilobular emphysematous changes. Down  to 0.5 PPD. 50 pack-year history. Tried bupropion in past.   -Continuous pulse oximetry. Oxygen available as needed.   -PFTs have been ordered by PCP, pending outpatient. Recommend completion upon discharge.   -Smoking cessation discussed by admitting team    -Nicotine patch, highest dose ordered      Hx of hepatic steatosis/fibrosis on imaging   Hx of pancreatic duct dilation   *US abdomen complete 06/07 demonstrated sonographic features suggestive of hepatic steatosis/fibrosis. No suspicious hepatic mass. Mild prominence of the main pancreatic and extrahepatic common bile ducts of unclear etiology or significance.  -Noted.  Follow-up outpatient with PCP     Generalized fatigue   Morning joint and muscle stiffness   -CRP and ESR negative.  -MARK negative 02/2023.   -Follow-up outpatient with PCP.      Hx of hypercalcemia   Hx of unintentional weight loss   Cachexia   -06/2023 work-up for unintentional weight loss and hypercalcemia negative except incidental finding of pancreatic duct dilation, an unremarkable renal cyst and a reactive lymph node on submandibular region. Recent colonoscopy and upper endoscopy notable only for polyps. Repeat recommended in 7 years. CT chest, abdomen and pelvis 11/2023 demonstrated borderline dilated pancreatic duct at 0.3 cm, but no obstructing stone or lesion. Suspected physiologic/senescent, but if there is high clinical concern, suggest further evaluation with MRCP.  Moderate multifocal atherosclerotic calcification of the abdominal aorta and femoral arteries. No aneurysm. Ca noted to only be very mildly elevated at 10.3. Ionized Ca WNL 07/19/2023. CA 19-9 negative 11/2023.   -Nutrition consulted, appreciate the cares.   -Ensure clear for supplementation between meals.      Nocturia  Malodorous urine   UA not consistent with infection   -Hold PTA oxybutynin.     Diet: Snacks/Supplements Adult: Glucerna; With Meals  Regular Diet Adult     DVT Prophylaxis: Pneumatic Compression  "Devices   Phillips Catheter: Not present  Code Status: Full Code     Disposition Plan       Expected Discharge Date: 06/13/2024      Destination: home        Entered: Ion Melo MD 06/11/2024, 8:40 AM        Medically Ready for Discharge: Anticipated in 2-4 Days       Clinically Significant Risk Factors                # Thrombocytopenia: Lowest platelets = 96 in last 2 days, will monitor for bleeding    # Chronic heart failure with reduced ejection fraction: last echo with EF <40%             # Cachexia: Estimated body mass index is 17.47 kg/m  as calculated from the following:    Height as of this encounter: 1.651 m (5' 5\").    Weight as of this encounter: 47.6 kg (105 lb)., PRESENT ON ADMISSION  # Moderate Malnutrition: based on nutrition assessment, PRESENT ON ADMISSION   # Financial/Environmental Concerns: none            The patient's care was discussed with the Bedside Nurse and Patient.    Medical Decision Making       **CLEAR ALL SELECTIONS**      Labs/Imaging Reviewed:  See Information above and Data section below  Time SPENT BY ME on the date of service doing chart review, history, exam, documentation & further activities per the note:  52 MINUTES    Chart documentation was completed, in part, with [employer] voice-recognition software. Even though reviewed, some grammatical, spelling, and word errors may remain.    Ion Melo MD  Hospitalist Service  [facility]  Text Page 7AM-6PM  Securely message with the Vocera Web Console (learn more here)  Text page via "[employer] Paging/Directory    ______________________________________________________________________    Interval History   Patient endorses chest pain of around 2-3.  She had pericardiocentesis yesterday with placement of pericardial drain.  No nausea or vomiting.  Afebrile.    Data reviewed today: I reviewed all medications, new labs and imaging results over the last 24 hours. I personally reviewed no images or EKG's " "today.    Physical Exam   Vital signs:  Temp: 97.5  F (36.4  C) Temp src: Oral BP: 127/71 Pulse: 99   Resp: 17 SpO2: 97 % O2 Device: None (Room air) Oxygen Delivery: 4 LPM Height: 165.1 cm (5' 5\") Weight: 47.6 kg (105 lb)  Estimated body mass index is 17.47 kg/m  as calculated from the following:    Height as of this encounter: 1.651 m (5' 5\").    Weight as of this encounter: 47.6 kg (105 lb).      Wt Readings from Last 2 Encounters:   06/11/24 47.6 kg (105 lb)   02/22/24 49 kg (108 lb)       Gen: AAOX3, NAD  HEENT: Supple neck, moist oral mucosa, no pallor  Resp: CTA B/L, normal WOB, no crackles, no wheezes  CVS: RRR, pericardial drain in place  Abd/GI: Soft, non-tender. BS- normoactive.    Skin: Warm, dry no rashes  MSK: no pedal edema  Neuro- CN- intact. No focal deficits.  Spontaneously moving all 4 extremities      Data   Recent Labs   Lab 06/11/24  0747 06/11/24  0543 06/11/24  0212 06/10/24  1025 06/10/24  0654 06/09/24  2104 06/09/24  1607 06/08/24  1654 06/08/24  0856 06/07/24  2350 06/07/24  1547   WBC  --  8.4  --   --  8.0  --  9.6  --  5.5  --  9.9   HGB  --  12.0  --   --  11.8  --  12.3  --  13.4  --  14.5   MCV  --  92  --   --  91  --  93  --  92  --  92   PLT  --  96*  --   --  132*  --  130*  --  126*  --  151   NA  --  133*  --   --  138  --  132*  --  139  --  136   POTASSIUM  --  3.9  --   --  4.0  --  4.0  --  4.6  --  4.7   CHLORIDE  --  99  --   --  103  --  98  --  106  --  101   CO2  --  25  --   --  25  --  23  --  24  --  25   BUN  --  13.9  --   --  11.0  --  9.6  --  12.3  --  11.1   CR  --  0.69  --   --  0.79  --  0.71  --  0.72  --  0.72   ANIONGAP  --  9  --   --  10  --  11  --  9  --  10   CONSUELO  --  9.0  --   --  9.0  --  9.2  --  9.4  --  10.1   * 210* 212*   < > 208*   < > 266*   < > 170*   < > 159*   ALBUMIN  --   --   --   --   --   --   --   --  3.7  --  4.4   PROTTOTAL  --   --   --   --   --   --   --   --  6.3*  --  7.1   BILITOTAL  --   --   --   --   --   --   --  "  --  0.5  --  0.6   ALKPHOS  --   --   --   --   --   --   --   --  54  --  67   ALT  --   --   --   --   --   --   --   --  18  --  23   AST  --   --   --   --   --   --   --   --  24  --  30   LIPASE  --   --   --   --   --   --   --   --   --   --  26    < > = values in this interval not displayed.       Recent Results (from the past 24 hour(s))   Cardiac Catheterization    Narrative    Successful pericardiocentesis via subxiphoid approach with removal of ~160   ml of straw colored fluid.      Echocardiogram Limited    Narrative    [id number]  [facility]  [id number]  [id number]^BELINDA^CRISTINA     [facility]  Echocardiography Laboratory  [address] [place] [address]     Name: WILLY FUENTES  MRN: [id number]  : 1955  Study Date: 06/10/2024 01:34 PM  Age: 69 yrs  Gender: Female  Patient Location: [facility]  Reason For Study: Pericardial Effusion  Ordering Physician: CRISTINA TREVINO  Referring Physician: Soraya Cherry,  Performed By: Gwendolyn Rviera     BSA: 1.5 m2  Height: 65 in  Weight: 105 lb  ______________________________________________________________________________  Procedure  Echo Guided Centesis Adult.  ______________________________________________________________________________  Interpretation Summary     Compared to echo earlier today the pericardial effusion is larger and now  there is near total RV collapse.  Following tap the RV is reexpanded and no significant fluid is seen  NOTE- the LV has global distal wall motion abnormality-consider delanoo aníbalo  ______________________________________________________________________________  ______________________________________________________________________________  Report approved by: Jenifer Elizabeth 06/10/2024 05:11 PM     ______________________________________________________________________________        Medications   Current Facility-Administered Medications   Medication Dose Route Frequency Provider Last Rate Last  Admin    Continuing beta blocker from home medication list OR beta blocker order already placed during this visit   Does not apply DOES NOT GO TO Ariel Cadena MD        Continuing statin from home medication list OR statin order already placed during this visit   Does not apply DOES NOT GO TO Ariel Cadena MD        Patient is already receiving anticoagulation with heparin, enoxaparin (LOVENOX), warfarin (COUMADIN)  or other anticoagulant medication   Does not apply Continuous PRN Alicia Stallworth PA-C        Percutaneous Coronary Intervention orders placed (this is information for BPA alerting)   Does not apply DOES NOT GO TO Ariel Cadena MD         Current Facility-Administered Medications   Medication Dose Route Frequency Provider Last Rate Last Admin    aspirin EC tablet 81 mg  81 mg Oral Daily Alicia Stallwroth PA-C   81 mg at 06/10/24 1017    carvedilol (COREG) tablet 3.125 mg  3.125 mg Oral BID w/meals Mouna Greene MD   3.125 mg at 06/11/24 0800    insulin aspart (NovoLOG) injection (RAPID ACTING)  1-7 Units Subcutaneous TID  Alicia Stallworth PA-C   1 Units at 06/10/24 1144    insulin aspart (NovoLOG) injection (RAPID ACTING)  1-5 Units Subcutaneous At Bedtime Alicia Stallworth PA-C   3 Units at 06/10/24 2239    lisinopril (ZESTRIL) tablet 5 mg  5 mg Oral Daily Alicia Stallworth PA-C   5 mg at 06/09/24 0931    nicotine (NICODERM CQ) 21 MG/24HR 24 hr patch 1 patch  1 patch Transdermal Daily Landon Yin DO   1 patch at 06/10/24 1017    oxyBUTYnin ER (DITROPAN XL) 24 hr tablet 10 mg  10 mg Oral At Bedtime Alicia Stallworth PA-C   10 mg at 06/10/24 2239    pantoprazole (PROTONIX) EC tablet 40 mg  40 mg Oral QAM AC Geovanny Combs MD   40 mg at 06/11/24 0800    prasugrel (EFFIENT) tablet 5 mg  5 mg Oral Daily Mouna Greene MD   5 mg at 06/10/24 1017    rosuvastatin (CRESTOR) tablet 40 mg  40 mg Oral  Daily Alicia Stallworth PA-C   40 mg at 06/10/24 1017    sodium chloride (PF) 0.9% PF flush 3 mL  3 mL Intracatheter Q8H Aliica Stallworth PA-C   3 mL at 06/10/24 3796

## 2024-06-11 NOTE — CARE PLAN
[id number]: Alert and oriented x4. VSS. Denies pain, SOB, CP. Zero output from drain, dressing CDI. Up with one. Blood glucose 173. Likely to discharge to home when able.

## 2024-06-11 NOTE — PROGRESS NOTES
SPIRITUAL HEALTH SERVICES  SPIRITUAL ASSESSMENT Progress Note  [facility]     REFERRAL SOURCE: Consult for Tenriism/spiritual support    Brief visit to introduce spiritual health services/role to Taylor. Her son-in-law, Edmundo, was at the bedside and others were on their way to visit. Taylor requested a follow up visit tomorrow tomorrow for spiritual reflection and support.     She also requested a visit from Fr Ramírez for sacrament of anointing.    PLAN: Triaged for  visit tomorrow. Will follow up for visit per patient request.    Tory Hoffman  Associate      [facility] available 24/7 for emergent requests/referrals, either by paging the on-call  or by entering an ASAP/STAT consult in Epic (this will also page the on-call ).

## 2024-06-11 NOTE — PROGRESS NOTES
A&O x4. VSS on room air. Tele SR. C/o chest, neck, and should pain post pericardial drain placement with difficulties taking a deep breath. Cards notified. Up independently. Plan for repeat Echo tomorrow.

## 2024-06-11 NOTE — PROGRESS NOTES
[facility]    Cardiology Progress Note    Primary Cardiologist: Dr. Greene    Date of Admission: 6/7/2024  Service Date: 06/11/24    Summary:  Ms. Taylor Aburto is a very pleasant 69 year old female with a past medical history of tobacco abuse who was admitted on 6/7/2024 for chest discomfort. Cardiology was consulted for NSTEMI.    Interval History   Patient underwent pericardiocentesis with removal of 150 mL of fluid yesterday.  Drain was left in place with additional 200 mL out. Since drain placement, she has had increase pleuritic and positional chest pain. Worse with lying down flat. Improves with toradol. Shortness of breath has resolved.     Telemetry: Sinus rhythm, rate 80-90's    Assessment & Plan   1. NSTEMI  - Underwent coronary angiography in 6/20/2024 with IVUS guided PCI of the LAD with KIM x 2 and first diagonal branch with KIM x 2, resulting in NISHANT-3 flow post procedurally, recommendation for return to Cath Lab for IVUS guided/OCT assessment of LAD and D1 and further stent post dilation as indicated  -No recurrent anginal symptoms    2. Moderate pericardial effusion   - 11/2023 chest CT noted small size  - 2/2023 chest CT noting trace pericardial effusion  - 6/2024 TTE on admission showing a small to moderate pericardial effusion, primarily over the LV RV free wall without evidence of tamponade, repeat TTE done yesterday showing again a moderate effusion with inconclusive findings for tamponade  -Negative ESR and CRP  -6/10/2024 underwent pericardiocentesis with drain left in place, echo done during procedure showed near total RV collapse with interval growth in effusion     3. Ongoing tobacco abuse  -Nicotine patch in place    4. Emphysema    5. Hx of pancreatic duct dilatation     6.  Cardiomyopathy, possibly ischemic versus Takotsubo  -Initial transthoracic echocardiogram on admission showing EF of 30 to 35%  -Initiated on lisinopril and carvedilol  -Entresto and SGLT2  at cost of $40 co-pay, patient is eligible for co-pay card      Plan:   1.  Continue dual antiplatelet therapy with aspirin and Effient  2.  Repeat limited echo today, pending results, consider drain removal  3.  Continue carvedilol, rosuvastatin, and lisinopril  4.  Further escalation of guideline directed medical therapy prohibited by soft blood pressures  5.  Recommend complete tobacco cessation  6.  Cardiology will continue to follow    Thank you for the opportunity to participate in this pleasant patient's care.     RIMA Zavala, CNP   Nurse Practitioner  [facility] [place]  Pager: [phone]  (8am - 5pm, M-F)    Patient Active Problem List   Diagnosis    H/O ETOH abuse    Arthritis    Moderate smoker (20 or less per day)    Vitamin D deficiency    Diabetes mellitus, type 2 (H)    Cigarette nicotine dependence with other nicotine-induced disorder    Lipoma of skin and subcutaneous tissue    Post-menopausal    Chronic cough    Unintentional weight loss    Osteopenia of multiple sites    NSTEMI (non-ST elevated myocardial infarction) (H)       Physical Exam   Temp: 97.5  F (36.4  C) Temp src: Oral BP: 127/71 Pulse: 99   Resp: 17 SpO2: 97 % O2 Device: None (Room air)    Vitals:    06/09/24 0543 06/10/24 0400 06/11/24 0546   Weight: 49.2 kg (108 lb 6.4 oz) 48 kg (105 lb 14.4 oz) 47.6 kg (105 lb)     Vital Signs with Ranges  Temp:  [97.5  F (36.4  C)-98.5  F (36.9  C)] 97.5  F (36.4  C)  Pulse:  [] 99  Resp:  [16-18] 17  BP: ()/(46-92) 127/71  SpO2:  [91 %-98 %] 97 %  I/O last 3 completed shifts:  In: 690 [P.O.:690]  Out: 200 [Drains:200]    Constitutional:  Appears her stated age, well nourished, and in no acute distress.  Eyes: Pupils equal, round. Sclerae anicteric.   HEENT: Normocephalic, atraumatic.   Neck: Supple. No JVD appreciated.  Respiratory: Breathing non-labored. Lungs clear to auscultation bilaterally. No crackles, wheezes, rhonchi, or rales.  Cardiovascular: Regular  rate and rhythm, normal S1 and S2. No murmur, rub, or gallop.  GI: Soft, non-distended, non-tender, bowel sounds present in all four quadrants.  Skin: Warm, dry. Subcostal pericardial drain in place, draining tan colored fluid  Musculoskeletal/Extremities: Moves all extremities well and symmetrically. No edema.  Neurologic: No gross focal deficits. Alert, awake, and oriented to person, place and time.  Psychiatric: Affect appropriate. Mentation normal.    Medications   Current Facility-Administered Medications   Medication Dose Route Frequency Provider Last Rate Last Admin    Continuing beta blocker from home medication list OR beta blocker order already placed during this visit   Does not apply DOES NOT GO TO Ariel Cadena MD        Continuing statin from home medication list OR statin order already placed during this visit   Does not apply DOES NOT GO TO Ariel Cadena MD        Patient is already receiving anticoagulation with heparin, enoxaparin (LOVENOX), warfarin (COUMADIN)  or other anticoagulant medication   Does not apply Continuous PRN Alicia Stallworth PA-C        Percutaneous Coronary Intervention orders placed (this is information for BPA alerting)   Does not apply DOES NOT GO TO Ariel Cadena MD         Current Facility-Administered Medications   Medication Dose Route Frequency Provider Last Rate Last Admin    aspirin EC tablet 81 mg  81 mg Oral Daily Alicia Stallworth PA-C   81 mg at 06/10/24 1017    carvedilol (COREG) tablet 3.125 mg  3.125 mg Oral BID w/meals Mouna Greene MD   3.125 mg at 06/11/24 0800    insulin aspart (NovoLOG) injection (RAPID ACTING)  1-7 Units Subcutaneous TID AC Alicia Stallworth PA-C   1 Units at 06/10/24 1144    insulin aspart (NovoLOG) injection (RAPID ACTING)  1-5 Units Subcutaneous At Bedtime Alicia Stallworth PA-C   3 Units at 06/10/24 2239    lisinopril (ZESTRIL) tablet 5 mg  5 mg Oral Daily  Alicia Stallworth PA-C   5 mg at 24 0931    nicotine (NICODERM CQ) 21 MG/24HR 24 hr patch 1 patch  1 patch Transdermal Daily Landon Yin, DO   1 patch at 06/10/24 1017    oxyBUTYnin ER (DITROPAN XL) 24 hr tablet 10 mg  10 mg Oral At Bedtime Alicia Stallworth PA-C   10 mg at 06/10/24 2239    pantoprazole (PROTONIX) EC tablet 40 mg  40 mg Oral QAM Geovanny Trejo MD   40 mg at 24 0800    prasugrel (EFFIENT) tablet 5 mg  5 mg Oral Daily Mouna Greene MD   5 mg at 06/10/24 1017    rosuvastatin (CRESTOR) tablet 40 mg  40 mg Oral Daily Alicia Stallworth PA-C   40 mg at 06/10/24 1017    sodium chloride (PF) 0.9% PF flush 3 mL  3 mL Intracatheter Q8H Alicia Stallworth PA-C   3 mL at 06/10/24 1657       Data   Recent Results (from the past 24 hour(s))   Cardiac Catheterization    Narrative    Successful pericardiocentesis via subxiphoid approach with removal of ~160   ml of straw colored fluid.      Echocardiogram Limited    Narrative    [id number]  [facility]  [id number]  [id number]^BELINDA^CRISTINA     [facility]  Echocardiography Laboratory  [address] [place] [address]     Name: WILLY FUENTES  MRN: [id number]  : 1955  Study Date: 06/10/2024 01:34 PM  Age: 69 yrs  Gender: Female  Patient Location: [facility]  Reason For Study: Pericardial Effusion  Ordering Physician: CRISTINA TREVINO  Referring Physician: Soraya Cherry,  Performed By: Gwendolyn Rivera     BSA: 1.5 m2  Height: 65 in  Weight: 105 lb  ______________________________________________________________________________  Procedure  Echo Guided Centesis Adult.  ______________________________________________________________________________  Interpretation Summary     Compared to echo earlier today the pericardial effusion is larger and now  there is near total RV collapse.  Following tap the RV is reexpanded and no significant fluid is seen  NOTE- the LV has global distal  wall motion abnormality-consider alla fallon  ______________________________________________________________________________  ______________________________________________________________________________  Report approved by: Jenifer Elizabeth 06/10/2024 05:11 PM     ______________________________________________________________________________          Recent Labs   Lab 06/11/24  0543 06/10/24  0654 06/09/24  1607   WBC 8.4 8.0 9.6   HGB 12.0 11.8 12.3   HCT 35.7 36.1 37.0   MCV 92 91 93   PLT 96* 132* 130*     Recent Labs   Lab 06/11/24  0747 06/11/24  0543 06/11/24  0212 06/10/24  1025 06/10/24  0654 06/09/24  2104 06/09/24  1607   NA  --  133*  --   --  138  --  132*   POTASSIUM  --  3.9  --   --  4.0  --  4.0   CHLORIDE  --  99  --   --  103  --  98   CO2  --  25  --   --  25  --  23   ANIONGAP  --  9  --   --  10  --  11   * 210* 212*   < > 208*   < > 266*   BUN  --  13.9  --   --  11.0  --  9.6   CR  --  0.69  --   --  0.79  --  0.71   GFRESTIMATED  --  >90  --   --  81  --  >90   CONSUELO  --  9.0  --   --  9.0  --  9.2    < > = values in this interval not displayed.     Recent Labs   Lab 06/07/24 2035   NTBNPI 5,335*     Recent Labs   Lab 06/07/24  1547   SED 2        This note was completed in part using Dragon voice recognition software. Although reviewed after completion, some word and grammatical errors may occur.

## 2024-06-11 NOTE — PLAN OF CARE
Neuro- A/Ox4  Most Recent Vitals- Temp: 98.5  F (36.9  C) Temp src: Oral BP: 121/68 Pulse: 96   Resp: 18 SpO2: 97 % O2 Device: None (Room air)   Tele/Cardiac- SR/ST  Resp- on RA, LS clear  Activity- SBA  Pain- PRN IV Toradol for chest pain r/t pericardial drain  Drips- n/a  Drains/Tubes- PIV  Skin- R radial site CDI, CMS intact  GI/- WDL, no BM this shift  Plan- repeat echo today, plan of care ongoing; possible drain removal today vs. tomorrow    Juliette Amezcua RN

## 2024-06-12 ENCOUNTER — APPOINTMENT (OUTPATIENT)
Dept: OCCUPATIONAL THERAPY | Facility: CLINIC | Age: 69
DRG: 321 | End: 2024-06-12
Payer: COMMERCIAL

## 2024-06-12 LAB
ERYTHROCYTE [DISTWIDTH] IN BLOOD BY AUTOMATED COUNT: 12.5 % (ref 10–15)
GLUCOSE BLDC GLUCOMTR-MCNC: 207 MG/DL (ref 70–99)
GLUCOSE BLDC GLUCOMTR-MCNC: 221 MG/DL (ref 70–99)
GLUCOSE BLDC GLUCOMTR-MCNC: 231 MG/DL (ref 70–99)
GLUCOSE BLDC GLUCOMTR-MCNC: 254 MG/DL (ref 70–99)
GLUCOSE BLDC GLUCOMTR-MCNC: 292 MG/DL (ref 70–99)
HCT VFR BLD AUTO: 33.1 % (ref 35–47)
HGB BLD-MCNC: 11.1 G/DL (ref 11.7–15.7)
MCH RBC QN AUTO: 30.7 PG (ref 26.5–33)
MCHC RBC AUTO-ENTMCNC: 33.5 G/DL (ref 31.5–36.5)
MCV RBC AUTO: 91 FL (ref 78–100)
PLATELET # BLD AUTO: 91 10E3/UL (ref 150–450)
RBC # BLD AUTO: 3.62 10E6/UL (ref 3.8–5.2)
WBC # BLD AUTO: 5.9 10E3/UL (ref 4–11)

## 2024-06-12 PROCEDURE — 250N000013 HC RX MED GY IP 250 OP 250 PS 637: Performed by: INTERNAL MEDICINE

## 2024-06-12 PROCEDURE — 250N000013 HC RX MED GY IP 250 OP 250 PS 637

## 2024-06-12 PROCEDURE — 210N000001 HC R&B IMCU HEART CARE

## 2024-06-12 PROCEDURE — 250N000013 HC RX MED GY IP 250 OP 250 PS 637: Performed by: STUDENT IN AN ORGANIZED HEALTH CARE EDUCATION/TRAINING PROGRAM

## 2024-06-12 PROCEDURE — 99232 SBSQ HOSP IP/OBS MODERATE 35: CPT | Performed by: INTERNAL MEDICINE

## 2024-06-12 PROCEDURE — 97110 THERAPEUTIC EXERCISES: CPT | Mod: GO | Performed by: OCCUPATIONAL THERAPIST

## 2024-06-12 PROCEDURE — 250N000011 HC RX IP 250 OP 636: Mod: JZ | Performed by: INTERNAL MEDICINE

## 2024-06-12 PROCEDURE — 85027 COMPLETE CBC AUTOMATED: CPT | Performed by: INTERNAL MEDICINE

## 2024-06-12 PROCEDURE — 99233 SBSQ HOSP IP/OBS HIGH 50: CPT | Mod: FS | Performed by: NURSE PRACTITIONER

## 2024-06-12 PROCEDURE — 36415 COLL VENOUS BLD VENIPUNCTURE: CPT | Performed by: INTERNAL MEDICINE

## 2024-06-12 RX ADMIN — ROSUVASTATIN CALCIUM 40 MG: 20 TABLET, FILM COATED ORAL at 09:29

## 2024-06-12 RX ADMIN — NICOTINE 1 PATCH: 21 PATCH, EXTENDED RELEASE TRANSDERMAL at 09:22

## 2024-06-12 RX ADMIN — SENNOSIDES AND DOCUSATE SODIUM 2 TABLET: 8.6; 5 TABLET ORAL at 12:17

## 2024-06-12 RX ADMIN — CARVEDILOL 3.12 MG: 3.12 TABLET, FILM COATED ORAL at 18:26

## 2024-06-12 RX ADMIN — LISINOPRIL 5 MG: 5 TABLET ORAL at 09:30

## 2024-06-12 RX ADMIN — OXYBUTYNIN CHLORIDE 10 MG: 10 TABLET, EXTENDED RELEASE ORAL at 22:01

## 2024-06-12 RX ADMIN — KETOROLAC TROMETHAMINE 15 MG: 15 INJECTION, SOLUTION INTRAMUSCULAR; INTRAVENOUS at 06:14

## 2024-06-12 RX ADMIN — CARVEDILOL 3.12 MG: 3.12 TABLET, FILM COATED ORAL at 09:30

## 2024-06-12 RX ADMIN — ASPIRIN 81 MG: 81 TABLET, COATED ORAL at 09:30

## 2024-06-12 RX ADMIN — INSULIN GLARGINE 5 UNITS: 100 INJECTION, SOLUTION SUBCUTANEOUS at 22:01

## 2024-06-12 RX ADMIN — PANTOPRAZOLE SODIUM 40 MG: 40 TABLET, DELAYED RELEASE ORAL at 06:14

## 2024-06-12 RX ADMIN — PRASUGREL 5 MG: 5 TABLET, FILM COATED ORAL at 09:30

## 2024-06-12 ASSESSMENT — ACTIVITIES OF DAILY LIVING (ADL)
ADLS_ACUITY_SCORE: 19
ADLS_ACUITY_SCORE: 18
ADLS_ACUITY_SCORE: 19
ADLS_ACUITY_SCORE: 18
ADLS_ACUITY_SCORE: 19
ADLS_ACUITY_SCORE: 19
ADLS_ACUITY_SCORE: 18
ADLS_ACUITY_SCORE: 18
ADLS_ACUITY_SCORE: 19
ADLS_ACUITY_SCORE: 18
ADLS_ACUITY_SCORE: 19
ADLS_ACUITY_SCORE: 20
ADLS_ACUITY_SCORE: 19

## 2024-06-12 NOTE — PLAN OF CARE
Neuro- A/Ox4  Most Recent Vitals- Temp: 98.2  F (36.8  C) Temp src: Oral BP: 139/66 Pulse: 88   Resp: 18 SpO2: 98 % O2 Device: None (Room air)   Tele/Cardiac- SR w/ T wave inversion  Resp- on RA, LS clear  Activity- independent  Pain- PRN Toradol given x2 for chest pain r/t drain  Drips- n/a  Drains/Tubes- PIV, pericardial drain  Skin- WDL  GI/- WDL, no BM this shift  Plan- likely drain removal today, possible discharge home today or tomorrow    Juliette Amezcua RN

## 2024-06-12 NOTE — PROGRESS NOTES
[facility]    Cardiology Progress Note    Primary Cardiologist: Dr. Greene    Date of Admission: 6/7/2024  Service Date: 06/12/24    Summary:  Ms. Taylor Aburto is a very pleasant 69 year old female with a past medical history of tobacco abuse who was admitted on 6/7/2024 for chest discomfort. Cardiology was consulted for NSTEMI.    Interval History   Repeat echo done yesterday showing resolution of effusion and no further pericardial drainage since yesterday. Pericardial drain removed at bedside by Dr. Squires this morning.     Patient feels less short of breath and pleuritic chest pain improved. She is able to take deeper breaths. Blood pressures less than 100 systolic overnight.     Pericardial fluid cytology pending.     Telemetry: Sinus rhythm, rate 80-90's    Assessment & Plan   1. NSTEMI  - Underwent coronary angiography in 6/20/2024 with IVUS guided PCI of the LAD with KIM x 2 and first diagonal branch with KIM x 2, resulting in NISHANT-3 flow post procedurally, recommendation for return to Cath Lab for IVUS guided/OCT assessment of LAD and D1 and further stent post dilation as indicated  -No recurrent anginal symptoms    2. Moderate pericardial effusion   - 11/2023 chest CT noted small size  - 2/2023 chest CT noting trace pericardial effusion  - 6/2024 TTE on admission showing a small to moderate pericardial effusion, primarily over the LV RV free wall without evidence of tamponade, repeat TTE done yesterday showing again a moderate effusion with inconclusive findings for tamponade  -Negative ESR and CRP  -6/10/2024 underwent pericardiocentesis with drain left in place, echo done during procedure showed near total RV collapse with interval growth in effusion   -6/11/24 repeat echo showing resolution of effusion, total fluid out 360ml  -6/12/24 drain removed     3. Ongoing tobacco abuse  -Nicotine patch in place    4. Emphysema    5. Hx of pancreatic duct dilatation     6.   Cardiomyopathy, possibly ischemic versus Takotsubo  -Initial transthoracic echocardiogram on admission showing EF of 30 to 35%  -Initiated on lisinopril and carvedilol  -Entresto and SGLT2 at cost of $40 co-pay, patient is eligible for co-pay card      Plan:   1.  Continue dual antiplatelet therapy with aspirin and Effient  2.  Repeat limited echo tomorrow to assess for recurrence of effusion   3.  Continue carvedilol, rosuvastatin, and lisinopril  4.  Further escalation of guideline directed medical therapy prohibited by soft blood pressures  5.  Recommend complete tobacco cessation  6.  Cardiology will continue to follow, anticipate discharge tomorrow pending repeat echo results     Thank you for the opportunity to participate in this pleasant patient's care.     RIMA Zavala, CNP   Nurse Practitioner  [facility] [place]  Pager: [phone]  (8am - 5pm, M-F)    Patient Active Problem List   Diagnosis    H/O ETOH abuse    Arthritis    Moderate smoker (20 or less per day)    Vitamin D deficiency    Diabetes mellitus, type 2 (H)    Cigarette nicotine dependence with other nicotine-induced disorder    Lipoma of skin and subcutaneous tissue    Post-menopausal    Chronic cough    Unintentional weight loss    Osteopenia of multiple sites    NSTEMI (non-ST elevated myocardial infarction) (H)       Physical Exam   Temp: 98  F (36.7  C) Temp src: Oral BP: 139/66 Pulse: 88   Resp: 18 SpO2: 98 % O2 Device: None (Room air)    Vitals:    06/10/24 0400 06/11/24 0546 06/12/24 0602   Weight: 48 kg (105 lb 14.4 oz) 47.6 kg (105 lb) 48.5 kg (107 lb)     Vital Signs with Ranges  Temp:  [97.9  F (36.6  C)-98.2  F (36.8  C)] 98  F (36.7  C)  Pulse:  [] 88  Resp:  [18] 18  BP: ()/(53-66) 139/66  SpO2:  [94 %-98 %] 98 %  I/O last 3 completed shifts:  In: 1530 [P.O.:1530]  Out: -     Constitutional:  Appears her stated age, well nourished, and in no acute distress.  Eyes: Pupils equal, round. Sclerae  anicteric.   HEENT: Normocephalic, atraumatic.   Neck: Supple. No JVD appreciated.  Respiratory: Breathing non-labored. Lungs clear to auscultation bilaterally. No crackles, wheezes, rhonchi, or rales.  Cardiovascular: Regular rate and rhythm, normal S1 and S2. No murmur, rub, or gallop.  GI: Soft, non-distended, non-tender, bowel sounds present in all four quadrants.  Skin: Warm, dry.   Musculoskeletal/Extremities: Moves all extremities well and symmetrically. No edema.  Neurologic: No gross focal deficits. Alert, awake, and oriented to person, place and time.  Psychiatric: Affect appropriate. Mentation normal.    Medications   Current Facility-Administered Medications   Medication Dose Route Frequency Provider Last Rate Last Admin    Continuing beta blocker from home medication list OR beta blocker order already placed during this visit   Does not apply DOES NOT GO TO Ariel Cadena MD        Continuing statin from home medication list OR statin order already placed during this visit   Does not apply DOES NOT GO TO Ariel Cadena MD        Patient is already receiving anticoagulation with heparin, enoxaparin (LOVENOX), warfarin (COUMADIN)  or other anticoagulant medication   Does not apply Continuous PRN Alicia Stallworth PA-C        Percutaneous Coronary Intervention orders placed (this is information for BPA alerting)   Does not apply DOES NOT GO TO Ariel Cadena MD         Current Facility-Administered Medications   Medication Dose Route Frequency Provider Last Rate Last Admin    aspirin EC tablet 81 mg  81 mg Oral Daily Alicia Stallworth PA-C   81 mg at 06/11/24 0927    carvedilol (COREG) tablet 3.125 mg  3.125 mg Oral BID w/meals Mouna Greene MD   3.125 mg at 06/11/24 1827    insulin aspart (NovoLOG) injection (RAPID ACTING)  1-10 Units Subcutaneous TID Ion Rajput MD   2 Units at 06/11/24 1828    insulin aspart (NovoLOG) injection (RAPID  ACTING)  1-7 Units Subcutaneous At Bedtime Ion Melo MD   2 Units at 24 223    insulin aspart (NovoLOG) injection (RAPID ACTING)   Subcutaneous TID  Ion Melo MD   3 Units at 24 182    insulin glargine (LANTUS PEN) injection 5 Units  5 Units Subcutaneous At Bedtime Ion Melo MD   5 Units at 24 222    lisinopril (ZESTRIL) tablet 5 mg  5 mg Oral Daily Alicia Stallworth PA-C   5 mg at 24 09    nicotine (NICODERM CQ) 21 MG/24HR 24 hr patch 1 patch  1 patch Transdermal Daily Landon Yin DO   1 patch at 24 09    oxyBUTYnin ER (DITROPAN XL) 24 hr tablet 10 mg  10 mg Oral At Bedtime Alicia Stallworth PA-C   10 mg at 24 210    pantoprazole (PROTONIX) EC tablet 40 mg  40 mg Oral QAM  Geovanny Combs MD   40 mg at 24 0614    prasugrel (EFFIENT) tablet 5 mg  5 mg Oral Daily Mouna Greene MD   5 mg at 24 0927    rosuvastatin (CRESTOR) tablet 40 mg  40 mg Oral Daily Alicia Stallworth PA-C   40 mg at 24 0927    sodium chloride (PF) 0.9% PF flush 3 mL  3 mL Intracatheter Q8H Alicia Stallworth PA-C   3 mL at 24 0930       Data   Recent Results (from the past 24 hour(s))   Echocardiogram Limited   Result Value    LVEF  30-35%    Narrative    [id number]  [id number]  [id number]  [id number]^BELINDA^CRISTINA     [facility]  Echocardiography Laboratory  [address] [place] [id number]     Name: WILLY FUENTES  MRN: [id number]  : 1955  Study Date: 2024 08:58 AM  Age: 69 yrs  Gender: Female  Patient Location: [facility]  Reason For Study: Pericardial Effusion  Ordering Physician: CRISTINA TREVINO  Referring Physician: CRISTINA TREVINO  Performed By: Tali Ramos     BSA: 1.5 m2  Height: 65 in  Weight: 105 lb  HR: 96  BP: 128/67 mmHg  ______________________________________________________________________________  Procedure  Limited Echo  Adult.  ______________________________________________________________________________  Interpretation Summary     The visual ejection fraction is 30-35%.  Left ventricular systolic function is moderately reduced.  There is no pericardial effusion.  The study was technically difficult. The study was technically limited.  ______________________________________________________________________________  Left Ventricle  The left ventricle is normal in size. The visual ejection fraction is 30-35%.  Left ventricular systolic function is moderately reduced. Only the basal  portions of the left ventricle appear to contract normally. There is moderate  global hypokinesia of the left ventricle.     Right Ventricle  The right ventricle is normal in size and function.     Mitral Valve  There is mild mitral annular calcification. There is trace mitral  regurgitation.     Tricuspid Valve  There is mild (1+) tricuspid regurgitation. The right ventricular systolic  pressure is approximated at 17.7 mmHg plus the right atrial pressure.     Aortic Valve  The aortic valve is trileaflet. No aortic regurgitation is present.     Pulmonic Valve  There is no pulmonic valvular regurgitation. Normal pulmonic valve velocity.     Pericardium  There is no pericardial effusion.     Rhythm  Sinus rhythm was noted.     ______________________________________________________________________________  MMode/2D Measurements & Calculations  IVSd: 0.91 cm  LVIDd: 4.3 cm  LVIDs: 3.7 cm  LVPWd: 0.82 cm  FS: 15.1 %  LV mass(C)d: 118.0 grams  LV mass(C)dI: 78.5 grams/m2  Ao root diam: 3.1 cm  LA dimension: 3.1 cm  LA/Ao: 0.98  LVOT diam: 2.1 cm  LVOT area: 3.4 cm2  Ao root diam index Ht(cm/m): 1.9     Ao root diam index BSA (cm/m2): 2.1  EF Biplane: 33.5 %  RWT: 0.38  TAPSE: 0.97 cm     Doppler Measurements & Calculations  PA acc time: 0.20 sec  TR max carter: 210.5 cm/sec  TR max P.7 mmHg  RV S Carter: 10.2 cm/sec      ______________________________________________________________________________  Report approved by: Jenifer Saucedo 06/11/2024 11:10 AM             [facility]   Lab 06/12/24  0532 06/11/24  0543 06/10/24  0654   WBC 5.9 8.4 8.0   HGB 11.1* 12.0 11.8   HCT 33.1* 35.7 36.1   MCV 91 92 91   PLT 91* 96* 132*     [facility] 06/12/24  0818 06/12/24  0146 06/11/24  2113 06/11/24  0747 06/11/24  0543 06/10/24  1025 06/10/24  0654 06/09/24 2104 06/09/24  1607   NA  --   --   --   --  133*  --  138  --  132*   POTASSIUM  --   --   --   --  3.9  --  4.0  --  4.0   CHLORIDE  --   --   --   --  99  --  103  --  98   CO2  --   --   --   --  25  --  25  --  23   ANIONGAP  --   --   --   --  9  --  10  --  11   * 292* 238*   < > 210*   < > 208*   < > 266*   BUN  --   --   --   --  13.9  --  11.0  --  9.6   CR  --   --   --   --  0.69  --  0.79  --  0.71   GFRESTIMATED  --   --   --   --  >90  --  81  --  >90   CONSUELO  --   --   --   --  9.0  --  9.0  --  9.2    < > = values in this interval not displayed.     [facility] [email or web address]   Lab 06/07/24 2035   NTBNPI 5,335*     [facility] [email or web address]   [facility] 06/07/24  1547   SED 2        This note was completed in part using Dragon voice recognition software. Although reviewed after completion, some word and grammatical errors may occur.

## 2024-06-12 NOTE — PROGRESS NOTES
[facility]    Medicine Progress Note - Hospitalist Service        Date of Admission:  6/7/2024  3:34 PM    Assessment & Plan:   Taylor Aburto is a 69 year old female with a past medical history significant for small pericardial effusion, DM II, hyperlipidemia, pulmonary emphysema, tobacco use disorder, among others, who was admitted on 6/7/2024 for further evaluation of chest pain, found to have NSTEMI.       NSTEMI s/p PCI to LAD/D1 bifurcation on 6/8/2024  Coronary dissections involving the LAD and D1 during PCI  Moderate pericardial effusion  -Presented with chest discomfort on 6/7/2024  -Echocardiogram on presentation showed moderately severe reduced LV systolic function with a EF of 30-35% with wall motion abnormality.  Please see echo report for details initial concern was for stress induced cardiomyopathy  -She had known pericardial effusion(~6 months) which appeared to have increased in size on the echo done on 6/7  -Cardiology following, underwent coronary angiogram on 6/8 PCI with KIM x 2 in the LAD and KIM x 2 in D1.  Coronary dissections occurred during PCI involving both the LAD and D1  -As pericardial effusion had not improved, patient underwent pericardiocentesis on 6/10/2024 with placement of pericardial drain  -Pericardial drain has been discontinued this morning  -Plan for repeat echocardiogram tomorrow, if stable anticipate home  -Continue aspirin, Effient, lisinopril, Coreg, Crestor    Thrombocytopenia  -Presented with normal platelet count of 151, platelet slightly lower at 91 today but largely stable  -Reviewed past results, she has had low platelets in the 120s-130s in 2023  -Recheck platelet counts tomorrow morning    DM II w/ hyperosmolarity without coma   -Hold PTA metformin. Resume at discharge.    -Change to high intensity sliding scale  -Lantus 5 units at bedtime while in the hospital for basal coverage  -Hypoglycemic protocol in place.       HLD   -Continue Statin      Pulmonary emphysema   Tobacco use disorder   -CT chest 02/2023 demonstrated biapical fibrosis. Mild centrilobular emphysematous changes. Down to 0.5 PPD. 50 pack-year history. Tried bupropion in past.   -Continuous pulse oximetry. Oxygen available as needed.   -PFTs have been ordered by PCP, pending outpatient. Recommend completion upon discharge.   -Smoking cessation discussed by admitting team    -Nicotine patch, highest dose ordered      Hx of hepatic steatosis/fibrosis on imaging   Hx of pancreatic duct dilation   *US abdomen complete 06/07 demonstrated sonographic features suggestive of hepatic steatosis/fibrosis. No suspicious hepatic mass. Mild prominence of the main pancreatic and extrahepatic common bile ducts of unclear etiology or significance.  -Noted.  Follow-up outpatient with PCP     Generalized fatigue   Morning joint and muscle stiffness   -CRP and ESR negative.  -MARK negative 02/2023.   -Follow-up outpatient with PCP.      Hx of hypercalcemia   Hx of unintentional weight loss   Cachexia   -06/2023 work-up for unintentional weight loss and hypercalcemia negative except incidental finding of pancreatic duct dilation, an unremarkable renal cyst and a reactive lymph node on submandibular region. Recent colonoscopy and upper endoscopy notable only for polyps. Repeat recommended in 7 years. CT chest, abdomen and pelvis 11/2023 demonstrated borderline dilated pancreatic duct at 0.3 cm, but no obstructing stone or lesion. Suspected physiologic/senescent, but if there is high clinical concern, suggest further evaluation with MRCP.  Moderate multifocal atherosclerotic calcification of the abdominal aorta and femoral arteries. No aneurysm. Ca noted to only be very mildly elevated at 10.3. Ionized Ca WNL 07/19/2023. CA 19-9 negative 11/2023.   -Nutrition consulted, appreciate the cares.   -Ensure clear for supplementation between meals.      Nocturia  Malodorous urine   UA not consistent with infection  "  -Hold PTA oxybutynin.     Diet: Snacks/Supplements Adult: Glucerna; With Meals  Regular Diet Adult     DVT Prophylaxis: Pneumatic Compression Devices   Phillips Catheter: Not present  Code Status: Full Code     Disposition Plan       Expected Discharge Date: 06/13/2024      Destination: home        Entered: Ion Melo MD 06/12/2024, 10:27 AM        Medically Ready for Discharge: Anticipated tomorrow       Clinically Significant Risk Factors                # Thrombocytopenia: Lowest platelets = 91 in last 2 days, will monitor for bleeding    # Chronic heart failure with reduced ejection fraction: last echo with EF <40%          #Precipitous drop in Hgb/Hct: Lowest Hgb this hospitalization: 11.1 g/dL. Will continue to monitor and treat/transfuse as appropriate.     # Cachexia: Estimated body mass index is 17.81 kg/m  as calculated from the following:    Height as of this encounter: 1.651 m (5' 5\").    Weight as of this encounter: 48.5 kg (107 lb).   # Moderate Malnutrition: based on nutrition assessment      # Financial/Environmental Concerns: none            The patient's care was discussed with the Bedside Nurse and Patient.    Medical Decision Making       **CLEAR ALL SELECTIONS**      Labs/Imaging Reviewed:  See Information above and Data section below  Time SPENT BY ME on the date of service doing chart review, history, exam, documentation & further activities per the note:  35 MINUTES    Chart documentation was completed, in part, with [employer] voice-recognition software. Even though reviewed, some grammatical, spelling, and word errors may remain.    Ion Melo MD  Hospitalist Service  [facility]  Text Page 7AM-6PM  Securely message with the Vocera Web Console (learn more here)  Text page via [employer] Paging/Directory    ______________________________________________________________________    Interval History   Pericardial drain was discontinued this morning.  Chest pain and " "dyspnea significantly better.  Overall feels much better and hoping to go home tomorrow.    Data reviewed today: I reviewed all medications, new labs and imaging results over the last 24 hours. I personally reviewed no images or EKG's today.    Physical Exam   Vital signs:  Temp: 98  F (36.7  C) Temp src: Oral BP: 122/57 Pulse: 93   Resp: 18 SpO2: 98 % O2 Device: None (Room air) Oxygen Delivery: 4 LPM Height: 165.1 cm (5' 5\") Weight: 48.5 kg (107 lb)  Estimated body mass index is 17.81 kg/m  as calculated from the following:    Height as of this encounter: 1.651 m (5' 5\").    Weight as of this encounter: 48.5 kg (107 lb).      Wt Readings from Last 2 Encounters:   06/12/24 48.5 kg (107 lb)   02/22/24 49 kg (108 lb)       Gen: AAOX3, NAD  Resp: CTA B/L, normal WOB  CVS: RRR, no murmur  Abd/GI: Soft, non-tender. BS- normoactive.    Skin: Warm, dry no rashes  MSK: no pedal edema  Neuro- CN- intact. No focal deficits.  Spontaneously moving all 4 extremities      Data   Recent Labs   Lab 06/12/24  0818 06/12/24  0532 06/12/24  0146 06/11/24  2113 06/11/24  0747 06/11/24  0543 06/10/24  1025 06/10/24  0654 06/09/24  2104 06/09/24  1607 06/08/24  1654 06/08/24  0856 06/07/24  2350 06/07/24  1547   WBC  --  5.9  --   --   --  8.4  --  8.0  --  9.6  --  5.5  --  9.9   HGB  --  11.1*  --   --   --  12.0  --  11.8  --  12.3  --  13.4  --  14.5   MCV  --  91  --   --   --  92  --  91  --  93  --  92  --  92   PLT  --  91*  --   --   --  96*  --  132*  --  130*  --  126*  --  151   NA  --   --   --   --   --  133*  --  138  --  132*  --  139  --  136   POTASSIUM  --   --   --   --   --  3.9  --  4.0  --  4.0  --  4.6  --  4.7   CHLORIDE  --   --   --   --   --  99  --  103  --  98  --  106  --  101   CO2  --   --   --   --   --  25  --  25  --  23  --  24  --  25   BUN  --   --   --   --   --  13.9  --  11.0  --  9.6  --  12.3  --  11.1   CR  --   --   --   --   --  0.69  --  0.79  --  0.71  --  0.72  --  0.72   ANIONGAP  --   " --   --   --   --  9  --  10  --  11  --  9  --  10   CONSUELO  --   --   --   --   --  9.0  --  9.0  --  9.2  --  9.4  --  10.1   *  --  292* 238*   < > 210*   < > 208*   < > 266*   < > 170*   < > 159*   ALBUMIN  --   --   --   --   --   --   --   --   --   --   --  3.7  --  4.4   PROTTOTAL  --   --   --   --   --   --   --   --   --   --   --  6.3*  --  7.1   BILITOTAL  --   --   --   --   --   --   --   --   --   --   --  0.5  --  0.6   ALKPHOS  --   --   --   --   --   --   --   --   --   --   --  54  --  67   ALT  --   --   --   --   --   --   --   --   --   --   --  18  --  23   AST  --   --   --   --   --   --   --   --   --   --   --  24  --  30   LIPASE  --   --   --   --   --   --   --   --   --   --   --   --   --  26    < > = values in this interval not displayed.       No results found for this or any previous visit (from the past 24 hour(s)).    Medications   Current Facility-Administered Medications   Medication Dose Route Frequency Provider Last Rate Last Admin    Continuing beta blocker from home medication list OR beta blocker order already placed during this visit   Does not apply DOES NOT GO TO Ariel Cadena MD        Continuing statin from home medication list OR statin order already placed during this visit   Does not apply DOES NOT GO TO Ariel Cadena MD        Patient is already receiving anticoagulation with heparin, enoxaparin (LOVENOX), warfarin (COUMADIN)  or other anticoagulant medication   Does not apply Continuous PRN Alicia Stallworth PA-C        Percutaneous Coronary Intervention orders placed (this is information for BPA alerting)   Does not apply DOES NOT GO TO Ariel Cadena MD         Current Facility-Administered Medications   Medication Dose Route Frequency Provider Last Rate Last Admin    aspirin EC tablet 81 mg  81 mg Oral Daily Alicia Stallworth PA-C   81 mg at 06/12/24 0930    carvedilol (COREG) tablet 3.125 mg  3.125 mg  Oral BID w/meals Mouna Greene MD   3.125 mg at 06/12/24 0930    insulin aspart (NovoLOG) injection (RAPID ACTING)  1-10 Units Subcutaneous TID Ion Rajput MD   3 Units at 06/12/24 0929    insulin aspart (NovoLOG) injection (RAPID ACTING)  1-7 Units Subcutaneous At Bedtime Ion Melo MD   2 Units at 06/11/24 2231    insulin aspart (NovoLOG) injection (RAPID ACTING)   Subcutaneous TID  Ion Melo MD   2 Units at 06/12/24 0927    insulin glargine (LANTUS PEN) injection 5 Units  5 Units Subcutaneous At Bedtime Ion Melo MD   5 Units at 06/11/24 2229    lisinopril (ZESTRIL) tablet 5 mg  5 mg Oral Daily Alicia Stallworth PA-C   5 mg at 06/12/24 0930    nicotine (NICODERM CQ) 21 MG/24HR 24 hr patch 1 patch  1 patch Transdermal Daily Landon Yin DO   1 patch at 06/11/24 0926    oxyBUTYnin ER (DITROPAN XL) 24 hr tablet 10 mg  10 mg Oral At Bedtime Alicia Stallworth PA-C   10 mg at 06/11/24 2105    pantoprazole (PROTONIX) EC tablet 40 mg  40 mg Oral QAM  Geovanny Combs MD   40 mg at 06/12/24 0614    prasugrel (EFFIENT) tablet 5 mg  5 mg Oral Daily Mouna Greene MD   5 mg at 06/12/24 0930    rosuvastatin (CRESTOR) tablet 40 mg  40 mg Oral Daily Alicia Stallworth PA-C   40 mg at 06/12/24 0929    sodium chloride (PF) 0.9% PF flush 3 mL  3 mL Intracatheter Q8H Alicia Stallworth PA-C   3 mL at 06/11/24 0930

## 2024-06-12 NOTE — CONSULTS
"SPIRITUAL HEALTH SERVICES - Consult Note  [facility]  Referral Source/Reason for Visit: Anabaptist/Spiritual support    Summary and Recommendations -  Geeta shared that this hospitalization \"has been a wake up call\" and she sees it as an opportunity to take better care of her own health.  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 She welcomed a prayer at the end of our conversation, which was provided.    Plan: Spiritual Health remains available for support. Please consult as needs arise.    Tory Hoffman M.Div.  Staff     [facility] available 24/7 for emergent requests/referrals, either by paging the on-call  or by entering an ASAP/STAT consult in [employer], which will also page the on-call .    Assessment    Saw pt Taylor \"Geeta\" MADI Aburto per consult for Jainism/spiritual support.    Patient/Family Understanding of Illness and Goals of Care - Geeta shared that she's feeling much better today after having the drain removed and her breathing has improved.     Distress and Loss   Geeta expressed her desire to \"connect to a [employer] community.\" She was raised in the [employer] and was briefly involved in a non-[employer] and \"Rastaf[employer]\" [employer] in her 20's. She hopes to find a Methodist where she can \"hear the word\" and be supported by an active [place] community.    Strengths, Coping, and Resources   Geeta named her loved ones (significant other, daughter, son-in-law, two granddaughters) as her primary sources of support.  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     Meaning, Beliefs, and Spirituality   Geeta shared that this hospitalization \"has been a wake up call\" and she sees it as an opportunity to take better care of her own health.  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 She welcomed a prayer at the end of our conversation, which was provided.

## 2024-06-13 ENCOUNTER — APPOINTMENT (OUTPATIENT)
Dept: CARDIOLOGY | Facility: CLINIC | Age: 69
DRG: 321 | End: 2024-06-13
Attending: NURSE PRACTITIONER
Payer: COMMERCIAL

## 2024-06-13 ENCOUNTER — HOSPITAL ENCOUNTER (OUTPATIENT)
Facility: CLINIC | Age: 69
End: 2024-06-13
Attending: INTERNAL MEDICINE | Admitting: INTERNAL MEDICINE
Payer: COMMERCIAL

## 2024-06-13 ENCOUNTER — APPOINTMENT (OUTPATIENT)
Dept: OCCUPATIONAL THERAPY | Facility: CLINIC | Age: 69
DRG: 321 | End: 2024-06-13
Payer: COMMERCIAL

## 2024-06-13 VITALS
SYSTOLIC BLOOD PRESSURE: 123 MMHG | HEART RATE: 90 BPM | TEMPERATURE: 98.1 F | DIASTOLIC BLOOD PRESSURE: 70 MMHG | BODY MASS INDEX: 17.71 KG/M2 | HEIGHT: 65 IN | WEIGHT: 106.3 LBS | OXYGEN SATURATION: 98 % | RESPIRATION RATE: 16 BRPM

## 2024-06-13 DIAGNOSIS — I21.4 NSTEMI (NON-ST ELEVATED MYOCARDIAL INFARCTION) (H): ICD-10-CM

## 2024-06-13 DIAGNOSIS — I25.10 CORONARY ARTERY DISEASE INVOLVING NATIVE CORONARY ARTERY OF NATIVE HEART WITHOUT ANGINA PECTORIS: ICD-10-CM

## 2024-06-13 LAB
ERYTHROCYTE [DISTWIDTH] IN BLOOD BY AUTOMATED COUNT: 12.8 % (ref 10–15)
GLUCOSE BLDC GLUCOMTR-MCNC: 174 MG/DL (ref 70–99)
GLUCOSE BLDC GLUCOMTR-MCNC: 232 MG/DL (ref 70–99)
GLUCOSE BLDC GLUCOMTR-MCNC: 281 MG/DL (ref 70–99)
HCT VFR BLD AUTO: 32 % (ref 35–47)
HGB BLD-MCNC: 10.4 G/DL (ref 11.7–15.7)
LVEF ECHO: NORMAL
MCH RBC QN AUTO: 30.3 PG (ref 26.5–33)
MCHC RBC AUTO-ENTMCNC: 32.5 G/DL (ref 31.5–36.5)
MCV RBC AUTO: 93 FL (ref 78–100)
PLATELET # BLD AUTO: 108 10E3/UL (ref 150–450)
RBC # BLD AUTO: 3.43 10E6/UL (ref 3.8–5.2)
WBC # BLD AUTO: 5.1 10E3/UL (ref 4–11)

## 2024-06-13 PROCEDURE — 250N000013 HC RX MED GY IP 250 OP 250 PS 637: Performed by: INTERNAL MEDICINE

## 2024-06-13 PROCEDURE — 93308 TTE F-UP OR LMTD: CPT | Mod: 26 | Performed by: INTERNAL MEDICINE

## 2024-06-13 PROCEDURE — 97110 THERAPEUTIC EXERCISES: CPT | Mod: GO

## 2024-06-13 PROCEDURE — 250N000013 HC RX MED GY IP 250 OP 250 PS 637: Performed by: STUDENT IN AN ORGANIZED HEALTH CARE EDUCATION/TRAINING PROGRAM

## 2024-06-13 PROCEDURE — 250N000013 HC RX MED GY IP 250 OP 250 PS 637

## 2024-06-13 PROCEDURE — 97530 THERAPEUTIC ACTIVITIES: CPT | Mod: GO

## 2024-06-13 PROCEDURE — 93325 DOPPLER ECHO COLOR FLOW MAPG: CPT

## 2024-06-13 PROCEDURE — 99239 HOSP IP/OBS DSCHRG MGMT >30: CPT | Performed by: INTERNAL MEDICINE

## 2024-06-13 PROCEDURE — 36415 COLL VENOUS BLD VENIPUNCTURE: CPT | Performed by: INTERNAL MEDICINE

## 2024-06-13 PROCEDURE — 93325 DOPPLER ECHO COLOR FLOW MAPG: CPT | Mod: 26 | Performed by: INTERNAL MEDICINE

## 2024-06-13 PROCEDURE — 85014 HEMATOCRIT: CPT | Performed by: INTERNAL MEDICINE

## 2024-06-13 PROCEDURE — 99233 SBSQ HOSP IP/OBS HIGH 50: CPT | Mod: FS | Performed by: NURSE PRACTITIONER

## 2024-06-13 PROCEDURE — 93321 DOPPLER ECHO F-UP/LMTD STD: CPT | Mod: 26 | Performed by: INTERNAL MEDICINE

## 2024-06-13 RX ORDER — CARVEDILOL 3.12 MG/1
3.12 TABLET ORAL 2 TIMES DAILY WITH MEALS
Qty: 180 TABLET | Refills: 0 | Status: SHIPPED | OUTPATIENT
Start: 2024-06-13 | End: 2024-07-30

## 2024-06-13 RX ORDER — ASPIRIN 81 MG/1
81 TABLET ORAL DAILY
Qty: 90 TABLET | Refills: 3 | Status: SHIPPED | OUTPATIENT
Start: 2024-06-13 | End: 2024-07-26

## 2024-06-13 RX ORDER — PRASUGREL 10 MG/1
10 TABLET, FILM COATED ORAL DAILY
Qty: 90 TABLET | Refills: 3 | Status: SHIPPED | OUTPATIENT
Start: 2024-06-13 | End: 2024-07-30

## 2024-06-13 RX ORDER — NITROGLYCERIN 0.4 MG/1
TABLET SUBLINGUAL
Qty: 30 TABLET | Refills: 0 | Status: SHIPPED | OUTPATIENT
Start: 2024-06-13

## 2024-06-13 RX ADMIN — ROSUVASTATIN CALCIUM 40 MG: 20 TABLET, FILM COATED ORAL at 08:10

## 2024-06-13 RX ADMIN — PANTOPRAZOLE SODIUM 40 MG: 40 TABLET, DELAYED RELEASE ORAL at 07:06

## 2024-06-13 RX ADMIN — PRASUGREL 5 MG: 5 TABLET, FILM COATED ORAL at 08:10

## 2024-06-13 RX ADMIN — CARVEDILOL 3.12 MG: 3.12 TABLET, FILM COATED ORAL at 08:10

## 2024-06-13 RX ADMIN — LISINOPRIL 5 MG: 5 TABLET ORAL at 08:09

## 2024-06-13 RX ADMIN — ASPIRIN 81 MG: 81 TABLET, COATED ORAL at 08:10

## 2024-06-13 RX ADMIN — NICOTINE 1 PATCH: 21 PATCH, EXTENDED RELEASE TRANSDERMAL at 08:19

## 2024-06-13 ASSESSMENT — ACTIVITIES OF DAILY LIVING (ADL)
ADLS_ACUITY_SCORE: 18

## 2024-06-13 NOTE — PLAN OF CARE
Goal Outcome Evaluation:-~Care plan-end of shift note:    -~Orientation/Mentation:  -~VS: stable   -~LS/Pulm:LS diminished clear  -~Tele/Cardiac:SR  -~GI:WDL  -~:WDL  -~Pain:Denies   -~Mobility:Indep/SBA  -~Skin:intact  -~Diet:regular  -~Drains/Lines/PIV: R arm SL  -~Safety/Concern:calls appropriately   -~Aggression color:green  -~Plan/Shift summary/Goals: Ambulated in hallways x2 with daughter, tolerated well ,denies SOB/CP. Plans for echo in am. Discharge today pending the results of repeat echo.

## 2024-06-13 NOTE — PLAN OF CARE
Goal Outcome Evaluation:      Plan of Care Reviewed With: patient  Patient alert, steady with ind ambulation. Amb in luong x 3 with cardiac rehab and nursing /daughter. Pericardial drain pulled by cards this am. Tolerated well. No pain meds needed this shift. Pt to have limited echo tomorrow to evaluate effusion. Right radial site wnl. VSS. SR. BG remain sl elevated >200 sliding scale + carb count insulin. Probable discharge tomorrow.

## 2024-06-13 NOTE — PLAN OF CARE
Occupational Therapy and Cardiac Rehab Discharge Summary    Reason for therapy discharge:    Discharged to home with outpatient therapy.    Progress towards therapy goal(s). See goals on Care Plan in [facility] electronic health record for goal details.  Goals met    Therapy recommendation(s):    Continued therapy is recommended.  Rationale/Recommendations:  Anticipate pt will discharge home with A for heavy lifting and IADLs as needed. pt has supportive family. pt would benefit from OP CR to continue to progress activity in monitored setting and promote a heart healthy lifestyle..

## 2024-06-13 NOTE — DISCHARGE SUMMARY
[facility]    Discharge Summary  Hospitalist    Date of Admission:  6/7/2024  Date of Discharge:  6/13/2024  Discharging Provider: Ion Melo MD    Discharge Diagnoses      NSTEMI s/p PCI to LAD/D1 bifurcation on 6/8/2024  Coronary dissections involving the LAD and D1 during PCI  Moderate pericardial effusion s/p pericardiocentesis  Thrombocytopenia  DM II w/ hyperosmolarity without coma   HLD   Pulmonary emphysema   Tobacco use disorder   Hx of hepatic steatosis/fibrosis on imaging   Hx of pancreatic duct dilation   Generalized fatigue   Morning joint and muscle stiffness   Hx of hypercalcemia   Hx of unintentional weight loss   Cachexia   Nocturia    Hospital Course:  Taylor Aburto is a 69 year old female with a past medical history significant for small pericardial effusion, DM II, hyperlipidemia, pulmonary emphysema, tobacco use disorder, among others, who was admitted on 6/7/2024 for further evaluation of chest pain, found to have NSTEMI.       NSTEMI s/p PCI to LAD/D1 bifurcation on 6/8/2024  Coronary dissections involving the LAD and D1 during PCI  Moderate pericardial effusion  -Presented with chest discomfort on 6/7/2024  -Echocardiogram on presentation showed moderately severe reduced LV systolic function with a EF of 30-35% with wall motion abnormality.  Please see echo report for details initial concern was for stress induced cardiomyopathy  -She had known pericardial effusion(~6 months) which appeared to have increased in size on the echo done on 6/7  -Cardiology following, underwent coronary angiogram on 6/8 PCI with KIM x 2 in the LAD and KIM x 2 in D1.  Coronary dissections occurred during PCI involving both the LAD and D1. All stents are currently well apposed throughout the vessel.  However, as subintimal hematomas are resorbed over the next few days/weeks, this may lead to increased luminal size, and accordingly has the potential to cause significant  mal-apposition. Cardiology therefore recommends return to the cath lab in 1-2 weeks for HD-IVUS/OCT assessment of both the LAD and D1, and further stent post-dilation as indicated.  -As pericardial effusion had not improved, patient underwent pericardiocentesis on 6/10/2024 with placement of pericardial drain.  This was subsequently removed on 6/12/2024.  Repeat echo today does not show any residual pericardial fluid.  -Continue aspirin, Effient, lisinopril, Coreg, Crestor     Thrombocytopenia  -Presented with normal platelet count of 151, platelet slightly lower at 91 today but largely stable  -Reviewed past results, she has had low platelets in the 120s-130s in 2023  -Platelets slightly improved this morning at 208, continue to follow intermittently as outpatient.     DM II w/ hyperosmolarity without coma   -Prior to admission metformin was held during hospitalization.    -Managed with sliding scale and 5 units Lantus at bedtime  -Discharge I recommend resuming prior to admission metformin 1000 mg twice a day.  If blood sugar is suboptimally controlled with this then she will need addition of another oral hypoglycemic agent or Lantus.  Defer that to PCP as outpatient.    HLD   -Continue Statin     Pulmonary emphysema   Tobacco use disorder   -CT chest 02/2023 demonstrated biapical fibrosis. Mild centrilobular emphysematous changes. Down to 0.5 PPD. 50 pack-year history. Tried bupropion in past.   -Continuous pulse oximetry. Oxygen available as needed.   -PFTs have been ordered by PCP, pending outpatient. Recommend completion upon discharge.   -Smoking cessation discussed by admitting team       Hx of hepatic steatosis/fibrosis on imaging   Hx of pancreatic duct dilation   *US abdomen complete 06/07 demonstrated sonographic features suggestive of hepatic steatosis/fibrosis. No suspicious hepatic mass. Mild prominence of the main pancreatic and extrahepatic common bile ducts of unclear etiology or  significance.  -Noted.  Follow-up outpatient with PCP     Generalized fatigue   Morning joint and muscle stiffness   -CRP and ESR negative.  -MARK negative 02/2023.   -Follow-up outpatient with PCP.      Hx of hypercalcemia   Hx of unintentional weight loss   Cachexia   -06/2023 work-up for unintentional weight loss and hypercalcemia negative except incidental finding of pancreatic duct dilation, an unremarkable renal cyst and a reactive lymph node on submandibular region. Recent colonoscopy and upper endoscopy notable only for polyps. Repeat recommended in 7 years. CT chest, abdomen and pelvis 11/2023 demonstrated borderline dilated pancreatic duct at 0.3 cm, but no obstructing stone or lesion. Suspected physiologic/senescent, but if there is high clinical concern, suggest further evaluation with MRCP.  Moderate multifocal atherosclerotic calcification of the abdominal aorta and femoral arteries. No aneurysm. Ca noted to only be very mildly elevated at 10.3. Ionized Ca WNL 07/19/2023. CA 19-9 negative 11/2023.   -Continue to follow-up closely with PCP.      Nocturia  Malodorous urine   UA not consistent with infection   -Resume prior to admission oxybutynin    Ion Melo MD, MD    Significant Results and Procedures   See below    Pending Results     Unresulted Labs Ordered in the Past 30 Days of this Admission       Date and Time Order Name Status Description    6/10/2024  1:29 PM Pericardial Fluid Aerobic Bacterial Culture Routine With Gram Stain Preliminary     6/10/2024  1:29 PM Cytology non gyn In process             Code Status   Full Code       Primary Care Physician   Soraya Cherry    Physical Exam   Temp: 98.1  F (36.7  C) Temp src: Oral BP: 123/70 Pulse: 90   Resp: 16 SpO2: 98 % O2 Device: None (Room air)      Constitutional: AAOX3, NAD  Respiratory: CTA B/L, Normal WOB  Cardiovascular: RRR, No murmur  GI: Soft, Non- tender, BS- normoactive  Neuro: CN- grossly intact, Moving all 4 extremities.      Discharge Disposition   Discharged to home  Condition at discharge: Stable    Consultations This Hospital Stay   PHARMACY IP CONSULT  CARDIOLOGY IP CONSULT  PHYSICAL THERAPY ADULT IP CONSULT  CARE MANAGEMENT / SOCIAL WORK IP CONSULT  NUTRITION SERVICES ADULT IP CONSULT  HOSPITALIST IP CONSULT  NUTRITION SERVICES ADULT IP CONSULT  CARDIAC REHAB IP CONSULT  PHARMACY IP CONSULT  PHARMACY LIAISON FOR MEDICATION COVERAGE CONSULT  SPIRITUAL HEALTH SERVICES IP CONSULT  SMOKING CESSATION PROGRAM IP CONSULT    Time Spent on this Encounter   Ion HEATH MD, personally saw the patient today and spent greater than 30 minutes discharging this patient.    Discharge Orders      CTA Head Neck with Contrast     CTA Chest Abdomen Pelvis w Contrast     Basic metabolic panel     Cardiac Rehab  Referral      Follow-Up with Cardiology TUSHAR      Medication Instructions - Anticoagulants    Do NOT stop your aspirin or platelet inhibitor unless directed by your Cardiologist.  These medications help to prevent platelets in your blood from sticking together and forming a clot.  Examples of these medications are:  Ticagrelor (Brilinta), Clopidigrel (Plavix), Prasugrel (Effient)     When to call - Contact the Heart Clinic    You may experience symptoms that require follow-up before your scheduled appointment. Contact the Heart Clinic if you develop: Fever over 100.4o Fahrenheit, that lasts more than one day; Redness, heat, or pus at the puncture site; Change in color or temperature in your hand or arm.     When to call - Reasons to Call 911    If your wrist puncture site starts bleeding after discharge, sit down and apply firm pressure with your thumb against the puncture site and fingers against the back of the wrist for 10 minutes. If the bleeding stops, continue to rest, keeping your wrist still for 2 hours. Notify your doctor as soon as possible.  IF BLEEDING DOES NOT STOP OR THERE IS A LARGER AMOUNT OF BLEEDING OR  SPURTING CALL 9-1-1 immediately.DO NOT drive yourself to the hospital.     Precautions - Lifting    DO NOT lift more than 5 pounds with affected arm for 48 hours     Precautions - Household Activities    Avoid excessive bending or movement of your wrist for 72 hours.  Do not subject hand/arm to any forceful movements for 24 hours, such as supporting weight when rising from a chair or bed.     Remove the band-aid on the puncture site after 24 hours and leave open to air. If minor oozing, you may apply a band-aid and remove after 12 hours.     Precautions - Active Sports Activities    DO NOT engage in vigorous exercise using your affected arm for 3 days after discharge.  This includes golf, tennis or swimming.     Precautions - Operating yard equipment or vehicles    Do not operate a chainsaw, lawnmower, motorcycle, or all-terrain vehicle for 48 hours after the procedure.     Precautions - Elective Dental Work    NO elective dental work for 6 weeks after receiving a stent.     Comfort and Pain Management - Bruising after Surgery    Expect mild tingling of hand and tenderness at the wrist puncture site for up to 3 days. You may take Tylenol or a pain medicine recommended by your doctor.     Activity - Cardiac Rehab    You are encouraged to enroll in an Outpatient Cardiac Rehab program after discharge from the hospital.  Our Cardiac Rehab staff may visit briefly with you while you're in the hospital.  If they miss you, someone will contact you after you are home.     Return to Driving    Driving is NOT permitted for 24 hours after surgery     Return to work    You may return to work after 72 hours if you are feeling well and your job does not involve heavy lifting.     Shower / Bathing    You may shower on the day after your procedure.  DO NOT soak of wrist with the puncture site in water for 3 days to prevent infection. DO NOT take a tub bath or wash dishes for 3 days after the procedure     Dressing Removal    Remove  the band-aid on the puncture site after 24 hours and leave open to air. If minor oozing, you may apply a band-aid and remove after 12 hours     Reason for your hospital stay    Non-ST elevation MI s/p PCI, pericardial effusion s/p pericardiocentesis and pericardial drain     Follow-up and recommended labs and tests     Follow up with primary care provider, Soraya Cherry, within 7 days for hospital follow- up.    Cardiology in 1 week     Activity    Your activity upon discharge: activity as tolerated     Diet    Follow this diet upon discharge: Orders Placed This Encounter      Snacks/Supplements Adult: Glucerna; With Meals      Regular Diet Adult     Discharge Medications   Current Discharge Medication List        START taking these medications    Details   carvedilol (COREG) 3.125 MG tablet Take 1 tablet (3.125 mg) by mouth 2 times daily (with meals)  Qty: 180 tablet, Refills: 0    Comments: Future refills by PCP Dr. Soraya Cherry with phone number [phone].  Associated Diagnoses: NSTEMI (non-ST elevated myocardial infarction) (H)      prasugrel (EFFIENT) 10 MG TABS tablet Take 1 tablet (10 mg) by mouth daily Do not crush or break tablet. Dose to start tomorrow.  Qty: 90 tablet, Refills: 3    Associated Diagnoses: NSTEMI (non-ST elevated myocardial infarction) (H)           CONTINUE these medications which have CHANGED    Details   aspirin 81 MG EC tablet Take 1 tablet (81 mg) by mouth daily Start tomorrow.  Qty: 90 tablet, Refills: 3    Associated Diagnoses: NSTEMI (non-ST elevated myocardial infarction) (H)           CONTINUE these medications which have NOT CHANGED    Details   fluticasone (FLONASE) 50 MCG/ACT nasal spray Spray 1 spray into both nostrils daily  Qty: 16 g, Refills: 4    Associated Diagnoses: Nasal turbinate hypertrophy      lisinopril (ZESTRIL) 5 MG tablet Take 1 tablet (5 mg) by mouth daily  Qty: 90 tablet, Refills: 3    Associated Diagnoses: Type 2 diabetes mellitus without  complication, without long-term current use of insulin (H)      metFORMIN (GLUCOPHAGE XR) 500 MG 24 hr tablet Take 2 tablets (1,000 mg) by mouth 2 times daily  Qty: 360 tablet, Refills: 0    Associated Diagnoses: Type 2 diabetes mellitus without complication, without long-term current use of insulin (H)      nicotine polacrilex (NICORETTE) 4 MG gum Chew one piece every 1- 2 hours for cravings as needed. Try to limit using the gum for strong craving only since you are taking the patch, and previous history of nausea and vomiting.  Qty: 50 each, Refills: 3    Comments: Okay to change to the one that is covered by insurance or cheaper OTC  Associated Diagnoses: Tobacco abuse      oxyBUTYnin ER (DITROPAN XL) 10 MG 24 hr tablet Take 1 tablet (10 mg) by mouth daily  Qty: 90 tablet, Refills: 3    Associated Diagnoses: Mixed incontinence urge and stress (male)(female)      PYRIDOXINE HCL PO Take 1 tablet by mouth daily Strength unknown      rosuvastatin (CRESTOR) 40 MG tablet Take 1 tablet (40 mg) by mouth daily  Qty: 90 tablet, Refills: 3    Associated Diagnoses: Hyperlipidemia LDL goal <100      blood glucose (ACCU-CHEK GUIDE) test strip [ACCU-CHEK GUIDE TEST STRIPS STRIPS] TEST BLOOD GLUCOSE LEVELS TWICE A DAY  Qty: 200 strip, Refills: 3    Associated Diagnoses: Type 2 diabetes mellitus without complication, without long-term current use of insulin (H)      blood glucose (NO BRAND SPECIFIED) lancets standard Use to test blood sugar two times daily or as directed.  Qty: 200 lancet, Refills: 0    Associated Diagnoses: Type 2 diabetes mellitus without complication, without long-term current use of insulin (H)      blood glucose meter (GLUCOMETER) [BLOOD GLUCOSE METER (GLUCOMETER)] Use 1 each As Directed as needed. Dispense glucometer brand per patient's insurance at pharmacy discretion. DX: E11.9  Qty: 1 each, Refills: 0    Associated Diagnoses: Type 2 diabetes mellitus without complication, without long-term current use of  insulin (H)           Allergies   Allergies   Allergen Reactions    Prednisone Dizziness     Data   Most Recent 3 CBC's:  Recent Labs   Lab Test 06/13/24  0544 06/12/24  0532 06/11/24  0543   WBC 5.1 5.9 8.4   HGB 10.4* 11.1* 12.0   MCV 93 91 92   * 91* 96*      Most Recent 3 BMP's:  Recent Labs   Lab Test 06/13/24  0744 06/13/24  0203 06/12/24  2127 06/11/24  0747 06/11/24  0543 06/10/24  1025 06/10/24  0654 06/09/24  2104 06/09/24  1607   NA  --   --   --   --  133*  --  138  --  132*   POTASSIUM  --   --   --   --  3.9  --  4.0  --  4.0   CHLORIDE  --   --   --   --  99  --  103  --  98   CO2  --   --   --   --  25  --  25  --  23   BUN  --   --   --   --  13.9  --  11.0  --  9.6   CR  --   --   --   --  0.69  --  0.79  --  0.71   ANIONGAP  --   --   --   --  9  --  10  --  11   CONSUELO  --   --   --   --  9.0  --  9.0  --  9.2   * 232* 221*   < > 210*   < > 208*   < > 266*    < > = values in this interval not displayed.     Most Recent 2 LFT's:  Recent Labs   Lab Test 06/08/24  0856 06/07/24  1547   AST 24 30   ALT 18 23   ALKPHOS 54 67   BILITOTAL 0.5 0.6     Most Recent INR's and Anticoagulation Dosing History:  Anticoagulation Dose History          Latest Ref Rng & Units 2/3/2023   Recent Dosing and Labs   INR 0.85 - 1.15 1.01      Most Recent 3 Troponin's:No lab results found.  Most Recent Cholesterol Panel:  Recent Labs   Lab Test 06/08/24  0856   CHOL 120   LDL 39   HDL 66   TRIG 74     Most Recent 6 Bacteria Isolates From Any Culture (See EPIC Reports for Culture Details):No lab results found.  Most Recent TSH, T4 and A1c Labs:  Recent Labs   Lab Test 06/07/24  1547 11/06/23  0929   TSH 1.35  --    A1C  --  6.4*       Results for orders placed or performed during the hospital encounter of 06/07/24   CT Chest w/o Contrast    Narrative    EXAM: CT CHEST W/O CONTRAST  LOCATION: [facility]  DATE: 6/7/2024    INDICATION: cp, ? esophageal perf, please give oral contrast just  prior to scan; vomiting crushing chest pain after vomiting radiating to arms back and jaw.  COMPARISON: 2/3/2023  TECHNIQUE: CT chest without IV contrast. Multiplanar reformats were obtained. Dose reduction techniques were used.  CONTRAST: None.    FINDINGS:   LUNGS AND PLEURA: Moderate centrilobular emphysema. Mild dependent atelectasis. No lobar consolidation, pleural effusions, or pneumothorax.    MEDIASTINUM/AXILLAE: Small amount of intravascular air is compatible with recent IV access. Esophagus is diffusely distended and filled with contrast material. No extraluminal contrast material. Paraesophageal dense calcification/granuloma is unchanged.   There is an increasing, small to moderate pericardial effusion. No pneumomediastinum.    CORONARY ARTERY CALCIFICATION: Moderate.    UPPER ABDOMEN: Oral contrast material is present within the visualized portions of the stomach. 2 to 3 mm left renal calculus.    MUSCULOSKELETAL: Multilevel spondylosis, most advanced in the      Impression    IMPRESSION:   1.  Oral contrast only CT of the chest demonstrates no evidence of esophageal perforation.  2.  Patulous esophagus consistent with dysmotility and/or reflux.  3.  Increasing, small to moderate pericardial effusion.     Echocardiogram Complete     Value    LVEF  30-35%    Narrative    [id number]  [id number]  [id number]  [id number]^PEBBLES^HUAN^WILLIE     [facility]  Echocardiography Laboratory  [address] [place] [address]     Name: WILLY FUENTES  MRN: [id number]  : 1955  Study Date: 2024 07:42 PM  Age: 69 yrs  Gender: Female  Patient Location: [facility]  Reason For Study: Chest Pressure, Pericardial Effusion  Ordering Physician: HUAN ROGEL  Referring Physician: Soraya Cherry  Performed By: Reji Rg RDCS     BSA: 1.5 m2  Height: 65 in  Weight: 108 lb  HR: 84  BP: 122/75  mmHg  ______________________________________________________________________________  Procedure  Complete Portable Echo Adult.  ______________________________________________________________________________  Interpretation Summary     1. Moderate-severely reduced LV systolic function, with LVEF estimated at 30-  35%. Grade I (early) diastolic dysfunction.  2. There is severe hypokinesis/akinesis of the mid-apical segments of the LV.  Contractility at the base is roughly normal. This pattern is suggestive of  Takotsubo (stress-induced) cardiomyopathy, though large territory ischemia is  also a consideration  3. Normal RV size and systolic function.  4. Normal bi-atrial size.  5. No significant valvular stenosis or regurgitation.  6. There is a small-moderate pericardial effusion, primarily over the RV free  wall. No echocardiographic evidence of tampnonade. IVC is not dilated.     No prior study available for comparison.     Results were discussed with Dr. Watkins over the phone.  ______________________________________________________________________________  Left Ventricle  The left ventricle is normal in size. There is borderline concentric left  ventricular hypertrophy. The visual ejection fraction is 30-35%. Left  ventricular systolic function is moderate to severely reduced. Grade I or  early diastolic dysfunction. Severe hypokinesis/akinesis of the mid-apical  segments of the LV. Contractility at the base is roughly normal. This pattern  is suggestive of Takotsubo (stress-induced) cardiomyopathy, though large  territory ischemia is also a consideration. No definite LV thrombus is seen,  but this cannot be entirely excluded given lack of contrast.     Right Ventricle  The right ventricle is normal size. The right ventricular systolic function is  normal.     Atria  Normal left atrial size. Right atrial size is normal.     Mitral Valve  The mitral valve leaflets are moderately thickened. There is  physiologic  mitral regurgitation. There is no mitral valve stenosis.     Tricuspid Valve  The tricuspid valve is normal in structure and function. There is trace to  mild tricuspid regurgitation. The right ventricular systolic pressure is  approximated at 21.3 mmHg plus the right atrial pressure. IVC diameter <2.1 cm  collapsing >50% with sniff suggests a normal RA pressure of 3 mmHg.     Aortic Valve  There is trivial trileaflet aortic sclerosis. No aortic regurgitation is  present. No aortic stenosis is present.     Pulmonic Valve  The pulmonic valve is not well visualized.     Vessels  The aortic root is normal size.     Pericardium  Small-moderate pericardial effusion, primarily over the RV free wall. No  echocardiographic evidence of tampnonade.     ______________________________________________________________________________  MMode/2D Measurements & Calculations  IVSd: 1.1 cm  LVIDd: 3.9 cm  LVIDs: 3.2 cm  LVPWd: 1.0 cm  FS: 18.4 %  LV mass(C)d: 128.0 grams  LV mass(C)dI: 84.1 grams/m2     Ao root diam: 3.1 cm  LA dimension: 3.2 cm  LA/Ao: 1.0  Ao root diam index Ht(cm/m): 1.9  Ao root diam index BSA (cm/m2): 2.0  EF Biplane: 33.9 %  LA Volume (BP): 42.0 ml  LA Volume Index (BP): 27.6 ml/m2  RV Base: 1.9 cm  RWT: 0.52  TAPSE: 1.5 cm     Doppler Measurements & Calculations  MV E max carter: 62.9 cm/sec  MV A max carter: 121.0 cm/sec  MV E/A: 0.52  MV dec time: 0.13 sec  PA acc time: 0.11 sec  TR max carter: 231.0 cm/sec  TR max P.3 mmHg  E/E' avg: 15.3  Lateral E/e': 11.3  Medial E/e': 19.3  RV S Carter: 12.3 cm/sec     ______________________________________________________________________________  Report approved by: MD Rick Escobar 2024 09:38 PM         Echocardiogram Limited     Value    LVEF  25-30%    Narrative    [id number]  [id number]  [id number]^DANYLEL^RICK^DAISY     [facility]  Echocardiography Laboratory  [address] [place] [id number]     Name: WILLY FUENTES  B  MRN: [id number]  : 1955  Study Date: 2024 05:07 PM  Age: 69 yrs  Gender: Female  Patient [facility]: [facility]  Reason For Study: Pericardial Effusion  Ordering Physician: ANUJ CHILD  Referring Physician: Soraya Cherry  Performed By: Reji Rg RDCS     BSA: 1.5 m2  Height: 65 in  Weight: 108 lb  HR: 90  BP: 104/58 mmHg  ______________________________________________________________________________  Procedure  Limited Portable Echo Adult.  ______________________________________________________________________________  Interpretation Summary     Moderate pericardial effusion. The pericardial effusion is largest by the  right ventricular free wall. The pericardial effusion appears to be marginally  larger than previously and there does appear to be more compression of the  right ventricle compared to previously although the Doppler signal is not  suggestive of tamponade. Would repeat the echo tomorrow as there does appear  to be borderline tamponade beginning to develop.  The visual ejection fraction is 25-30%.  Only the basal portions of the left ventricle appears to contract normally.  The remaining segments are severely hypokinetic to akinetic. This pattern can  typically be found in stress cardiomyopathy although severe three-vessel  disease and left main disease can give a similar picture.  Left ventricular systolic function is moderate to severely reduced.  ______________________________________________________________________________  Left Ventricle  Only the basal portions of the left ventricle appears to contract normally.  The remaining segments are severely hypokinetic to akinetic. This pattern can  typically be found in stress cardiomyopathy although severe three-vessel  disease and left main disease can give a similar picture. The visual ejection  fraction is 25-30%. Left ventricular systolic function is moderate to severely  reduced.     Right Ventricle  The right  ventricle is normal in size and function.     Mitral Valve  There is trace mitral regurgitation.     Aortic Valve  No aortic regurgitation is present.     Vessels  Nondilated IVC but does not appear to change with respiration.     Pericardium  Moderate pericardial effusion. The pericardial effusion is largest by the  right ventricular free wall. The pericardial effusion appears to be marginally  larger than previously and there does appear to be more compression of the  right ventricle compared to previously although the Doppler signal is not  suggestive of tamponade.     Rhythm  Sinus rhythm was noted.  ______________________________________________________________________________  Report approved by: Jenifer Saucedo 2024 05:47 PM     ______________________________________________________________________________      Echocardiogram Limited     Value    LVEF  25-30%    Narrative    [id number]  [id number]  [id number]^JUNIOR^YUKI^TROY     [facility]  Echocardiography Laboratory  [address] [place] [address]     Name: WILLY FUENTES  MRN: [id number]  : 1955  Study Date: 2024 08:23 AM  Age: 69 yrs  Gender: Female  Patient Location: [facility]  Reason For Study: Pericardial Effusion  Ordering Physician: YUKI PACHECO  Referring Physician: Soraya Cherry  Performed By: Lloyd Garcia     BSA: 1.5 m2  Height: 65 in  Weight: 108 lb  HR: 102  BP: 124/80 mmHg  ______________________________________________________________________________  Procedure  Limited Portable Echo Adult.  ______________________________________________________________________________  Interpretation Summary     Moderate anterior pericardial effusion  The echo/Doppler findings are inconclusive for cardiac tamponade.  Severely decreased left ventricular systolic function  There is severe anterior, septal, and apical wall hypokinesis.  The left ventricle is mildly dilated.  The inferior  vena cava was normal in size with preserved respiratory  variability.  The patient exhibited occasional PACs.     The findings are similar to the study from 6/8/2024. There is a moderate sixed  pericardial effusion with some features suggestive of RV diastolic collapse,  but definiite echocardiographic findings for tamponade are not present. There  is preserved IVC repsiratory variability  ______________________________________________________________________________  Left Ventricle  The left ventricle is mildly dilated. There is normal left ventricular wall  thickness. The visual ejection fraction is 25-30%. Severely decreased left  ventricular systolic function. There is severe anterior, septal, and apical  wall hypokinesis. There is no thrombus seen in the left ventricle.     Right Ventricle  The right ventricle is normal size. The right ventricular systolic function is  normal.     Atria  Normal left atrial size. Right atrial size is normal.     Mitral Valve  The mitral valve leaflets appear normal. There is no evidence of stenosis,  fluttering, or prolapse.     Tricuspid Valve  Normal tricuspid valve.     Aortic Valve  The aortic valve is trileaflet.     Pulmonic Valve  The pulmonic valve is not well visualized.     Vessels  The aortic root is normal size. The inferior vena cava was normal in size with  preserved respiratory variability.     Pericardium  Moderate anterior pericardial effusion. The echo/Doppler findings are  inconclusive for cardiac tamponade. There is no pleural effusion.     Rhythm  The patient exhibited occasional PACs.  ______________________________________________________________________________  Report approved by: Dr. Landon Huffman 06/09/2024 11:28 AM     ______________________________________________________________________________      Echocardiogram Limited     Value    Biplane LVEF 45%    Narrative    [id number]  [id number]  [id number]^JUNIOR^YUKI^TROY     [facility]  [facility]  Echocardiography Laboratory  [address] [place] [address]     Name: WILLY FUENTES  MRN: [id number]  : 1955  Study Date: 06/10/2024 07:33 AM  Age: 69 yrs  Gender: Female  Patient Location: [facility]  Reason For Study: Pericardial Effusion  Ordering Physician: YUKI PACHECO  Referring Physician: Soraya Cherry  Performed By: Dorys Lawson     BSA: 1.5 m2  Height: 65 in  Weight: 108 lb  HR: 98  BP: 105/67 mmHg  ______________________________________________________________________________  Procedure  Limited Portable Echo Adult.  ______________________________________________________________________________  Interpretation Summary     Biplane LVEF is 45%.  There is borderline-mild global hypokinesia of the left ventricle.  There is mild septal hypokinesis.  The right ventricular cavity is small.  The inferior vena cava was normal in size with preserved respiratory  variability.  Moderate anterior pericardial effusion  Effusion similar to eco 24. Effusion up to 2.2cm over RV The RV is small  but the IVC is not dilated and does not have expiratory reversals and the MV E  wave does not have significant respiratory change so no clear indications for  tamponade on this study  ______________________________________________________________________________  Left Ventricle  The left ventricle is normal in size. Proximal septal thickening is noted.  Biplane LVEF is 45%. There is borderline-mild global hypokinesia of the left  ventricle. There is mild septal hypokinesis.     Right Ventricle  The right ventricular cavity is small.     Atria  The left atrium is borderline dilated. Right atrial size is normal.     Mitral Valve  The mitral valve leaflets appear normal. There is no evidence of stenosis,  fluttering, or prolapse.     Tricuspid Valve  Normal tricuspid valve.     Aortic Valve  There is trivial trileaflet aortic sclerosis.     Vessels  Normal size aorta. The inferior vena cava was  normal in size with preserved  respiratory variability.     Pericardium  Moderate anterior pericardial effusion.     Rhythm  Sinus rhythm was noted.     ______________________________________________________________________________  MMode/2D Measurements & Calculations  IVSd: 1.1 cm  LVIDd: 4.0 cm  LVIDs: 3.5 cm  LVPWd: 1.1 cm  FS: 12.3 %  LV mass(C)d: 145.6 grams  LV mass(C)dI: 95.7 grams/m2     asc Aorta Diam: 3.0 cm  Asc Ao diam index BSA (cm/m2): 2.0  Asc Ao diam index Ht(cm/m): 1.8  EF Biplane: 53.4 %  RWT: 0.55     Doppler Measurements & Calculations  Ao V2 max: 93.4 cm/sec  Ao max PG: 3.0 mmHg  Ao V2 mean: 65.4 cm/sec  Ao mean P.0 mmHg  Ao V2 VTI: 16.2 cm  LV V1 max P.3 mmHg  LV V1 max: 76.5 cm/sec  LV V1 VTI: 13.3 cm  AV Carter Ratio (DI): 0.82  RV S Carter: 22.2 cm/sec     ______________________________________________________________________________  Report approved by: Jenifer Elizabeth 06/10/2024 11:47 AM         Echocardiogram Limited    Narrative    [id number]  [facility]  [id number]  [id number]^BELINDA[facility]  [facility]  [address] [place] [id number]     Name: WILLY FUENTES  MRN: [id number]  : 1955  Study Date: 06/10/2024 01:34 PM  Age: 69 yrs  Gender: Female  Patient Location: [facility]  Reason For Study: Pericardial Effusion  Ordering Physician: CRISTINA TREVINO  Referring Physician: Soraya Cherry,  Performed By: Gwendolyn Rivera     BSA: 1.5 m2  Height: 65 in  Weight: 105 lb  ______________________________________________________________________________  Procedure  Echo Guided Centesis Adult.  ______________________________________________________________________________  Interpretation Summary     Compared to echo earlier today the pericardial effusion is larger and now  there is near total RV collapse.  Following tap the RV is reexpanded and no significant fluid is seen  NOTE- the LV has global distal wall motion  abnormality-consider alla fallon  ______________________________________________________________________________  ______________________________________________________________________________  Report approved by: Jenifer Elizabeth 06/10/2024 05:11 PM     ______________________________________________________________________________      Echocardiogram Limited     Value    LVEF  30-35%    Narrative    [id number]  [id number]  [id number]  [id number]^BELINDA^CRISTINA     [facility]  Echocardiography Laboratory  [address] [place] [id number]     Name: WILLY FUENTES  MRN: [id number]  : 1955  Study Date: 2024 08:58 AM  Age: 69 yrs  Gender: Female  Patient Location: [facility]  Reason For Study: Pericardial Effusion  Ordering Physician: CRISTINA TREVINO  Referring Physician: CRISTINA TREVINO  Performed By: Tali Ramos     BSA: 1.5 m2  Height: 65 in  Weight: 105 lb  HR: 96  BP: 128/67 mmHg  ______________________________________________________________________________  Procedure  Limited Echo Adult.  ______________________________________________________________________________  Interpretation Summary     The visual ejection fraction is 30-35%.  Left ventricular systolic function is moderately reduced.  There is no pericardial effusion.  The study was technically difficult. The study was technically limited.  ______________________________________________________________________________  Left Ventricle  The left ventricle is normal in size. The visual ejection fraction is 30-35%.  Left ventricular systolic function is moderately reduced. Only the basal  portions of the left ventricle appear to contract normally. There is moderate  global hypokinesia of the left ventricle.     Right Ventricle  The right ventricle is normal in size and function.     Mitral Valve  There is mild mitral annular calcification. There is trace mitral  regurgitation.     Tricuspid Valve  There is mild (1+)  tricuspid regurgitation. The right ventricular systolic  pressure is approximated at 17.7 mmHg plus the right atrial pressure.     Aortic Valve  The aortic valve is trileaflet. No aortic regurgitation is present.     Pulmonic Valve  There is no pulmonic valvular regurgitation. Normal pulmonic valve velocity.     Pericardium  There is no pericardial effusion.     Rhythm  Sinus rhythm was noted.     ______________________________________________________________________________  MMode/2D Measurements & Calculations  IVSd: 0.91 cm  LVIDd: 4.3 cm  LVIDs: 3.7 cm  LVPWd: 0.82 cm  FS: 15.1 %  LV mass(C)d: 118.0 grams  LV mass(C)dI: 78.5 grams/m2  Ao root diam: 3.1 cm  LA dimension: 3.1 cm  LA/Ao: 0.98  LVOT diam: 2.1 cm  LVOT area: 3.4 cm2  Ao root diam index Ht(cm/m): 1.9     Ao root diam index BSA (cm/m2): 2.1  EF Biplane: 33.5 %  RWT: 0.38  TAPSE: 0.97 cm     Doppler Measurements & Calculations  PA acc time: 0.20 sec  TR max carter: 210.5 cm/sec  TR max P.7 mmHg  RV S Carter: 10.2 cm/sec     ______________________________________________________________________________  Report approved by: Jenifer Saucedo 2024 11:10 AM         Echocardiogram Limited     Value    LVEF  45-49%    Narrative    [id number]  [id number]  [id number]  [id number]^RAMU^JANUARY     [facility]  Echocardiography Laboratory  [address] [place] [id number]     Name: WILLY FUENTES  MRN: [id number]  : 1955  Study Date: 2024 09:09 AM  Age: 69 yrs  Gender: Female  Patient Location: [facility]  Reason For Study: Pericardial Effusion  Ordering Physician: JANUARY GUALLPA  Referring Physician: Soraya Cherry,  Performed By: Gwendolyn Rivera     BSA: 1.5 m2  Height: 65 in  Weight: 107 lb  HR: 81  BP: 122/57 mmHg  ______________________________________________________________________________  Procedure  Limited Portable Echo  Adult.  ______________________________________________________________________________  Interpretation Summary     There is mild concentric left ventricular hypertrophy.  The visual ejection fraction is estimated at 45-49%.  The distal septal/apex/inferior apical wall motion abnormality is still  present but improved motion vs prior echo  No pericardial thickening  There is no pericardial effusion.  ______________________________________________________________________________  Left Ventricle  The left ventricle is normal in size. There is mild concentric left  ventricular hypertrophy. The visual ejection fraction is estimated at 45-49%.  The distal septal/apex/inferior apical wall motion abnormality is still  present but improved motion vs prior echo.     Right Ventricle  The right ventricle is normal in size and function.     Atria  Normal left atrial size. Right atrial size is normal.     Mitral Valve  There is trace mitral regurgitation.     Tricuspid Valve  There is trace tricuspid regurgitation. Doppler findings do not suggest  pulmonary hypertension.     Pericardium  No pericardial thickening. There is no pericardial effusion.     Rhythm  Sinus rhythm was noted.  ______________________________________________________________________________  MMode/2D Measurements & Calculations  IVSd: 1.3 cm  LVIDd: 3.9 cm  LVIDs: 2.8 cm  LVPWd: 0.98 cm  FS: 28.1 %  LV mass(C)d: 147.4 grams  LV mass(C)dI: 97.2 grams/m2  RWT: 0.50     Doppler Measurements & Calculations  TR max alesia: 203.7 cm/sec  TR max P.6 mmHg     ______________________________________________________________________________  Report approved by: Jenifer Elizabeth 2024 10:54 AM         Cardiac Catheterization    Narrative    Single-vessel obstructive CAD.  Culprit lesion is a severe stenosis   involving the bifurcation of the proximal-mid LAD and a moderate-sized D1.  Pericardiocentesis of the pre-existing mild-moderate pericardial effusion    was not attempted due to lack of fluid accessible via apical approach   despite attempts at patient re-positioning.  Subcostal pericardiocentesis   can be performed at a later date if clinically indicated.    Successful HD-IVUS guided PCI of the LAD/D1 bifurcation, with placement of   KIM x 2 in the LAD and KIM x 2 in D1.  Provisional strategy was attempted   but required shift to reverse crush technique with kissing inflation.    LAD:  Megatron 3.5 x 12 proximally, Synergy 3.0 x 23 distally   (post-dilated up to 4.0 mm proximally).  D1:  Edouard Erwinna 2.0 x 30 mm proximally, 2.0 x 15 mm distally   (post-dilated up to 2.5 mm proximally).  Separate coronary dissections occurred during PCI involving both the LAD   and D1 (consider underlying connective tissue disorder).  These were   appropriately covered with excellent final result, including NISHANT III flow   in all vessels.        RECOMMENDATIONS:    - All stents are currently well apposed throughout the vessel.  However,   as subintimal hematomas are resorbed over the next few days/weeks, this   may lead to increased luminal size, and accordingly has the potential to   cause significant mal-apposition.  - Would recommend return to the cath lab in 1-2 weeks for HD-IVUS/OCT   assessment of both the LAD and D1, and further stent post-dilation as   indicated.    - Monitor overnight in CCU  - Repeat TTE now to reassess pericardial effusion  - DAPT for 1 year, then would consider indefinite P2Y12 inhibitor   monotherapy  - Optimal medical therapy for CAD  - Cardiac rehab referral      Case discussed with patient's daughter following procedure, as well as   rounding cardiologist Dr. Greene.     Cardiac Catheterization    Narrative    Successful pericardiocentesis via subxiphoid approach with removal of ~160   ml of straw colored fluid.

## 2024-06-13 NOTE — PROGRESS NOTES
[facility]    Cardiology Progress Note    Primary Cardiologist: Dr. Greene    Date of Admission: 6/7/2024  Service Date: 06/13/24    Summary:  Ms. Taylor Aburto is a very pleasant 69 year old female with a past medical history of tobacco abuse who was admitted on 6/7/2024 for chest discomfort. Cardiology was consulted for NSTEMI.    Interval History   Patient with no acute events overnight. Her chest pain and shortness of breath have resolved. She was able to walk with cardiac rehab and do the treadmill this morning without any symptoms.    Blood pressures overnight into the 90 systolic range.      Discussed timing and plan for repeat coronary angiogram and stent expansion via phone with Dr. Agustin who recommended proceeding with procedure tomorrow or next week, patient preference.     Telemetry: Sinus rhythm, rate 70's    Assessment & Plan   1. NSTEMI  - Underwent coronary angiography in 6/20/2024 with IVUS guided PCI of the LAD with KIM x 2 and first diagonal branch with KIM x 2, resulting in NISHANT-3 flow post procedurally, recommendation for return to Cath Lab in the next 1-2 weeks for IVUS guided/OCT assessment of LAD and D1 and further stent post dilation as indicated  -During procedure patient had separate coronary dissections, concerning for a possible underlying connective tissue disorder  -No recurrent anginal symptoms    2. Moderate pericardial effusion   - 11/2023 chest CT noted small size  - 2/2023 chest CT noting trace pericardial effusion  - 6/2024 TTE on admission showing a small to moderate pericardial effusion, primarily over the LV RV free wall without evidence of tamponade, repeat TTE done yesterday showing again a moderate effusion with inconclusive findings for tamponade  -Negative ESR and CRP  -6/10/2024 underwent pericardiocentesis with drain left in place, echo done during procedure showed near total RV collapse with interval growth in effusion   -6/11/24 repeat  echo showing resolution of effusion, total fluid out 360ml  -6/12/24 drain removed   -6/13/24 repeat echo showing no recurrence of effusion     3. Ongoing tobacco abuse  -Nicotine patch in place    4. Emphysema    5. Hx of pancreatic duct dilatation     6.  Cardiomyopathy, possibly ischemic versus Takotsubo  -Initial transthoracic echocardiogram on admission showing EF of 30 to 35%  -Initiated on lisinopril and carvedilol  -Entresto and SGLT2 at cost of $40 co-pay, patient is eligible for co-pay card  -6/13/24 repeat echocardiogram showing concentric LVH, EF estimated at 45 to 49%, distal septal/apex/inferior apical wall motion abnormality still present, improved versus prior echo    Plan:   1.  Continue dual antiplatelet therapy with aspirin and Effient  2.  Continue carvedilol, rosuvastatin, and lisinopril  3.  Further escalation of guideline directed medical therapy prohibited by soft blood pressures  4.  Recommend complete tobacco cessation  5.  Will arrange outpatient CTA head/neck/chest/abd/pelvis to assess for connective tissue disorder, this will also reassess for recurrent effusion  6. Will arrange outpatient repeat coronary angiogram for next week given recommendations from Dr. Agustin, patient prefers next Tuesday the 18th  7.  Cardiology will sign off, ok for patient to discharge from cardiology perspective  8.  Outpatient cardiology follow up arranged for 7/30/24    Thank you for the opportunity to participate in this pleasant patient's care.     RIMA Zavala, CNP   Nurse Practitioner  [facility]  Pager: [phone]  (8am - 5pm, M-F)    Patient Active Problem List   Diagnosis    H/O ETOH abuse    Arthritis    Moderate smoker (20 or less per day)    Vitamin D deficiency    Diabetes mellitus, type 2 (H)    Cigarette nicotine dependence with other nicotine-induced disorder    Lipoma of skin and subcutaneous tissue    Post-menopausal    Chronic cough    Unintentional weight  loss    Osteopenia of multiple sites    NSTEMI (non-ST elevated myocardial infarction) (H)       Physical Exam   Temp: 98.1  F (36.7  C) Temp src: Oral BP: 123/70 Pulse: 90   Resp: 16 SpO2: 98 % O2 Device: None (Room air)    Vitals:    06/11/24 0546 06/12/24 0602 06/13/24 0547   Weight: 47.6 kg (105 lb) 48.5 kg (107 lb) 48.2 kg (106 lb 4.8 oz)     Vital Signs with Ranges  Temp:  [97.8  F (36.6  C)-98.1  F (36.7  C)] 98.1  F (36.7  C)  Pulse:  [71-93] 90  Resp:  [16-20] 16  BP: ()/(51-81) 123/70  SpO2:  [96 %-99 %] 98 %  I/O last 3 completed shifts:  In: 90 [P.O.:90]  Out: -     Constitutional:  Appears her stated age, well nourished, and in no acute distress.  Eyes: Pupils equal, round. Sclerae anicteric.   HEENT: Normocephalic, atraumatic.   Neck: Supple. No JVD appreciated.  Respiratory: Breathing non-labored. Lungs clear to auscultation bilaterally. No crackles, wheezes, rhonchi, or rales.  Cardiovascular: Regular rate and rhythm, normal S1 and S2. No murmur, rub, or gallop.  GI: Soft, non-distended  Skin: Warm, dry.   Musculoskeletal/Extremities: Moves all extremities well and symmetrically. No edema.  Neurologic: No gross focal deficits. Alert, awake, and oriented to person, place and time.  Psychiatric: Affect appropriate. Mentation normal.    Medications   Current Facility-Administered Medications   Medication Dose Route Frequency Provider Last Rate Last Admin    Continuing beta blocker from home medication list OR beta blocker order already placed during this visit   Does not apply DOES NOT GO TO Ariel Cadena MD        Continuing statin from home medication list OR statin order already placed during this visit   Does not apply DOES NOT GO TO Ariel Cadena MD        Patient is already receiving anticoagulation with heparin, enoxaparin (LOVENOX), warfarin (COUMADIN)  or other anticoagulant medication   Does not apply Continuous PRN Alicia Stlalworth PA-C         Percutaneous Coronary Intervention orders placed (this is information for BPA alerting)   Does not apply DOES NOT GO TO Ariel Cadena MD         Current Facility-Administered Medications   Medication Dose Route Frequency Provider Last Rate Last Admin    aspirin EC tablet 81 mg  81 mg Oral Daily Alicia Stallworth PA-C   81 mg at 06/13/24 0810    carvedilol (COREG) tablet 3.125 mg  3.125 mg Oral BID w/meals Mouna Greene MD   3.125 mg at 06/13/24 0810    insulin aspart (NovoLOG) injection (RAPID ACTING)  1-10 Units Subcutaneous TID Ion Rajput MD   2 Units at 06/13/24 0813    insulin aspart (NovoLOG) injection (RAPID ACTING)  1-7 Units Subcutaneous At Bedtime Ion Melo MD   1 Units at 06/12/24 2201    insulin aspart (NovoLOG) injection (RAPID ACTING)   Subcutaneous TID  Ion Melo MD   3 Units at 06/13/24 0816    insulin glargine (LANTUS PEN) injection 5 Units  5 Units Subcutaneous At Bedtime Ion Melo MD   5 Units at 06/12/24 2201    lisinopril (ZESTRIL) tablet 5 mg  5 mg Oral Daily Alicia Stallworth PA-C   5 mg at 06/13/24 0809    nicotine (NICODERM CQ) 21 MG/24HR 24 hr patch 1 patch  1 patch Transdermal Daily Landon Yin DO   1 patch at 06/13/24 0819    oxyBUTYnin ER (DITROPAN XL) 24 hr tablet 10 mg  10 mg Oral At Bedtime Alicia Stallworth PA-C   10 mg at 06/12/24 2201    pantoprazole (PROTONIX) EC tablet 40 mg  40 mg Oral QAM  Geovanny Combs MD   40 mg at 06/13/24 0706    prasugrel (EFFIENT) tablet 5 mg  5 mg Oral Daily Mouna Greene MD   5 mg at 06/13/24 0810    rosuvastatin (CRESTOR) tablet 40 mg  40 mg Oral Daily Alicia Stallworth PA-C   40 mg at 06/13/24 0810    sodium chloride (PF) 0.9% PF flush 3 mL  3 mL Intracatheter Q8H Alicia Stallworth PA-C   3 mL at 06/13/24 0820       Data   No results found for this or any previous visit (from the past 24 hour(s)).      Recent Labs   Lab  06/13/24  0544 06/12/24  0532 06/11/24  0543   WBC 5.1 5.9 8.4   HGB 10.4* 11.1* 12.0   HCT 32.0* 33.1* 35.7   MCV 93 91 92   * 91* 96*     Recent Labs   Lab 06/13/24  0744 06/13/24  0203 06/12/24  2127 06/11/24  0747 06/11/24  0543 06/10/24  1025 06/10/24  0654 06/09/24  2104 06/09/24  1607   NA  --   --   --   --  133*  --  138  --  132*   POTASSIUM  --   --   --   --  3.9  --  4.0  --  4.0   CHLORIDE  --   --   --   --  99  --  103  --  98   CO2  --   --   --   --  25  --  25  --  23   ANIONGAP  --   --   --   --  9  --  10  --  11   * 232* 221*   < > 210*   < > 208*   < > 266*   BUN  --   --   --   --  13.9  --  11.0  --  9.6   CR  --   --   --   --  0.69  --  0.79  --  0.71   GFRESTIMATED  --   --   --   --  >90  --  81  --  >90   CONSUELO  --   --   --   --  9.0  --  9.0  --  9.2    < > = values in this interval not displayed.     Recent Labs   Lab 06/07/24 2035   NTBNPI 5,335*     Recent Labs   Lab 06/07/24  1547   SED 2        This note was completed in part using Dragon voice recognition software. Although reviewed after completion, some word and grammatical errors may occur.

## 2024-06-13 NOTE — PLAN OF CARE
Goal Outcome Evaluation:      Plan of Care Reviewed With: patient, spouse  Recd discharge order, review discharge instructions including new meds, when to take. Review follow up appointments, cards will contact pt for follow up repeat angio. Pt has number of cards. Pt instructed on IS. Discharge pt to home with son and SO.

## 2024-06-14 ENCOUNTER — PATIENT OUTREACH (OUTPATIENT)
Dept: FAMILY MEDICINE | Facility: CLINIC | Age: 69
End: 2024-06-14
Payer: COMMERCIAL

## 2024-06-14 LAB
PATH REPORT.COMMENTS IMP SPEC: NORMAL
PATH REPORT.FINAL DX SPEC: NORMAL
PATH REPORT.GROSS SPEC: NORMAL
PATH REPORT.MICROSCOPIC SPEC OTHER STN: NORMAL

## 2024-06-14 PROCEDURE — 88112 CYTOPATH CELL ENHANCE TECH: CPT | Mod: 26 | Performed by: PATHOLOGY

## 2024-06-14 PROCEDURE — 88305 TISSUE EXAM BY PATHOLOGIST: CPT | Mod: 26 | Performed by: PATHOLOGY

## 2024-06-14 PROCEDURE — 88342 IMHCHEM/IMCYTCHM 1ST ANTB: CPT | Mod: 26 | Performed by: PATHOLOGY

## 2024-06-14 PROCEDURE — 88341 IMHCHEM/IMCYTCHM EA ADD ANTB: CPT | Mod: 26 | Performed by: PATHOLOGY

## 2024-06-14 NOTE — TELEPHONE ENCOUNTER
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 (Red Flag symptoms escalate to triage hotline per guidelines): Improved  Do you know how to contact your clinic care team if you have future questions or changes to your health status? : Yes  Does the patient have their discharge instructions? : Yes  Does the patient have questions regarding their discharge instructions? : No  Were you started on any new medications or were there changes to any of your previous medications? : Yes  Does the patient have all of their medications?: Yes  Do you have questions regarding any of your medications? : No  Do you have all of your needed medical supplies or equipment (DME)?  (i.e. oxygen tank, CPAP, cane, etc.): Yes    Follow up Plan     Discharge Follow-Up  Discharge follow up appointment scheduled in alignment with recommended follow up timeframe or Transitions of Risk Category? (Low = within 30 days; Moderate= within 14 days; High= within 7 days): Yes  Discharge Follow Up Appointment Date: 06/19/24  Discharge Follow Up Appointment Scheduled with?: Primary Care Provider    Future Appointments   Date Time Provider Department Center   6/18/2024  3:15 PM 1, Ur Cardiac " Rehab [facility] [place]   6/19/2024 11:00 AM Ramona Schultz MD [facility]   6/20/2024  9:00 AM [facility]   6/27/2024 12:00 PM SHCT1 [facility]   7/30/2024  2:00 PM Jewel Bustillos NP [facility]       Outpatient Plan as outlined on AVS reviewed with patient.    For any urgent concerns, please contact our 24 hour nurse triage line: [phone] ([phone])       Rebekah John RN

## 2024-06-15 LAB
BACTERIA PCAR CULT: NO GROWTH
GRAM STAIN RESULT: NORMAL
GRAM STAIN RESULT: NORMAL

## 2024-06-17 ENCOUNTER — TELEPHONE (OUTPATIENT)
Dept: CARDIOLOGY | Facility: CLINIC | Age: 69
End: 2024-06-17
Payer: COMMERCIAL

## 2024-06-17 ENCOUNTER — TELEPHONE (OUTPATIENT)
Dept: MEDSURG UNIT | Facility: CLINIC | Age: 69
End: 2024-06-17
Payer: COMMERCIAL

## 2024-06-17 DIAGNOSIS — I25.10 CORONARY ARTERY DISEASE INVOLVING NATIVE CORONARY ARTERY OF NATIVE HEART WITHOUT ANGINA PECTORIS: ICD-10-CM

## 2024-06-17 DIAGNOSIS — I21.4 NSTEMI (NON-ST ELEVATED MYOCARDIAL INFARCTION) (H): Primary | ICD-10-CM

## 2024-06-17 RX ORDER — POTASSIUM CHLORIDE 1500 MG/1
20 TABLET, EXTENDED RELEASE ORAL
Status: CANCELLED | OUTPATIENT
Start: 2024-06-17

## 2024-06-17 RX ORDER — SODIUM CHLORIDE 9 MG/ML
INJECTION, SOLUTION INTRAVENOUS CONTINUOUS
Status: CANCELLED | OUTPATIENT
Start: 2024-06-17

## 2024-06-17 RX ORDER — LIDOCAINE 40 MG/G
CREAM TOPICAL
Status: CANCELLED | OUTPATIENT
Start: 2024-06-17

## 2024-06-17 RX ORDER — ASPIRIN 325 MG
325 TABLET ORAL ONCE
Status: CANCELLED | OUTPATIENT
Start: 2024-06-17 | End: 2024-06-17

## 2024-06-17 RX ORDER — ASPIRIN 81 MG/1
243 TABLET, CHEWABLE ORAL ONCE
Status: CANCELLED | OUTPATIENT
Start: 2024-06-17

## 2024-06-17 NOTE — PROGRESS NOTES
Chart reviewed for upcoming procedure. Order and pre-call completed by Heart Clinic RN and placed in chart.

## 2024-06-17 NOTE — TELEPHONE ENCOUNTER
Coronary angiogram/PCI/Right Heart Cath prep instructions.     Patient is scheduled for a Coronary Angiogram at [facility] - [address], [place] [address] - Main Entrance of the Hospital on 6/18/24.  Check in time is at 9:20 am and procedure to follow.    Patient instructed to remain NPO for solid foods 8 hours prior to arrival and may have clear liquids up to 2 hours prior to arrival.    Patient Patient does not require extra fluids prior to procedure.    Patient is on metformin and has been advised to hold Metformin the day of the procedure. They should continue to hold until after follow up BMP scheduled 6/20/24 is reviewed.  Pt will be called and advised when they can resume their metformin.    Patient is not on anticoagulation.    Patient is not on diuretics.     Patient is taking ASA 81mg daily and will take 4 tabs (324mg) the morning of the procedure.    Pt is not on a SGLT2 inhibitor.    Pt is not on a GLP-1 Agonist    Patient advised to take their other daily medications the morning of the procedure with small sips of water.     Verified patient does not have a contrast allergy.    Verified patient has someone available to drive them home from the hospital and can stay with them for 24 hours after the procedure.     Patient advised to notify care team with any new COVID like symptoms prior to procedure. Day of procedure phone number: Ida at [phone]    Patient is aware of visitor policy.    Patient expresses understanding of above instructions and denies further questions at this time.      Rafy Mallory RN  [facility]

## 2024-06-18 ENCOUNTER — HOSPITAL ENCOUNTER (OUTPATIENT)
Facility: CLINIC | Age: 69
Discharge: HOME | End: 2024-06-18
Admitting: INTERNAL MEDICINE
Payer: COMMERCIAL

## 2024-06-18 VITALS
HEART RATE: 73 BPM | TEMPERATURE: 98 F | BODY MASS INDEX: 18.33 KG/M2 | DIASTOLIC BLOOD PRESSURE: 58 MMHG | WEIGHT: 110 LBS | SYSTOLIC BLOOD PRESSURE: 116 MMHG | OXYGEN SATURATION: 98 % | HEIGHT: 65 IN | RESPIRATION RATE: 16 BRPM

## 2024-06-18 DIAGNOSIS — D69.6 THROMBOCYTOPENIA (H): ICD-10-CM

## 2024-06-18 DIAGNOSIS — I25.10 CORONARY ARTERY DISEASE INVOLVING NATIVE CORONARY ARTERY OF NATIVE HEART WITHOUT ANGINA PECTORIS: ICD-10-CM

## 2024-06-18 DIAGNOSIS — I21.4 NSTEMI (NON-ST ELEVATED MYOCARDIAL INFARCTION) (H): ICD-10-CM

## 2024-06-18 PROBLEM — Z98.890 STATUS POST CORONARY ANGIOGRAM: Status: ACTIVE | Noted: 2024-06-18

## 2024-06-18 LAB
ACT BLD: 232 SECONDS (ref 74–150)
ACT BLD: 287 SECONDS (ref 74–150)
ACT BLD: 291 SECONDS (ref 74–150)
ACT BLD: 304 SECONDS (ref 74–150)
ANION GAP SERPL CALCULATED.3IONS-SCNC: 10 MMOL/L (ref 7–15)
BUN SERPL-MCNC: 15.5 MG/DL (ref 8–23)
CALCIUM SERPL-MCNC: 9.4 MG/DL (ref 8.8–10.2)
CHLORIDE SERPL-SCNC: 104 MMOL/L (ref 98–107)
CREAT SERPL-MCNC: 0.87 MG/DL (ref 0.51–0.95)
DEPRECATED HCO3 PLAS-SCNC: 26 MMOL/L (ref 22–29)
EGFRCR SERPLBLD CKD-EPI 2021: 72 ML/MIN/1.73M2
ERYTHROCYTE [DISTWIDTH] IN BLOOD BY AUTOMATED COUNT: 12.5 % (ref 10–15)
GLUCOSE SERPL-MCNC: 195 MG/DL (ref 70–99)
HCT VFR BLD AUTO: 31.5 % (ref 35–47)
HGB BLD-MCNC: 10.6 G/DL (ref 11.7–15.7)
INR PPP: 1.12 (ref 0.85–1.15)
MCH RBC QN AUTO: 31.5 PG (ref 26.5–33)
MCHC RBC AUTO-ENTMCNC: 33.7 G/DL (ref 31.5–36.5)
MCV RBC AUTO: 94 FL (ref 78–100)
PLATELET # BLD AUTO: 189 10E3/UL (ref 150–450)
POTASSIUM SERPL-SCNC: 4.5 MMOL/L (ref 3.4–5.3)
RBC # BLD AUTO: 3.37 10E6/UL (ref 3.8–5.2)
SODIUM SERPL-SCNC: 140 MMOL/L (ref 135–145)
WBC # BLD AUTO: 4.3 10E3/UL (ref 4–11)

## 2024-06-18 PROCEDURE — C1874 STENT, COATED/COV W/DEL SYS: HCPCS | Performed by: INTERNAL MEDICINE

## 2024-06-18 PROCEDURE — 258N000003 HC RX IP 258 OP 636: Mod: JZ | Performed by: INTERNAL MEDICINE

## 2024-06-18 PROCEDURE — C1887 CATHETER, GUIDING: HCPCS | Performed by: INTERNAL MEDICINE

## 2024-06-18 PROCEDURE — 99153 MOD SED SAME PHYS/QHP EA: CPT | Performed by: INTERNAL MEDICINE

## 2024-06-18 PROCEDURE — C9601 PERC DRUG-EL COR STENT BRAN: HCPCS | Performed by: INTERNAL MEDICINE

## 2024-06-18 PROCEDURE — 36415 COLL VENOUS BLD VENIPUNCTURE: CPT | Performed by: INTERNAL MEDICINE

## 2024-06-18 PROCEDURE — C9600 PERC DRUG-EL COR STENT SING: HCPCS | Mod: LD | Performed by: INTERNAL MEDICINE

## 2024-06-18 PROCEDURE — 93010 ELECTROCARDIOGRAM REPORT: CPT | Mod: XU | Performed by: INTERNAL MEDICINE

## 2024-06-18 PROCEDURE — 250N000011 HC RX IP 250 OP 636: Performed by: INTERNAL MEDICINE

## 2024-06-18 PROCEDURE — 85610 PROTHROMBIN TIME: CPT | Performed by: INTERNAL MEDICINE

## 2024-06-18 PROCEDURE — C1894 INTRO/SHEATH, NON-LASER: HCPCS | Performed by: INTERNAL MEDICINE

## 2024-06-18 PROCEDURE — 92978 ENDOLUMINL IVUS OCT C 1ST: CPT | Performed by: INTERNAL MEDICINE

## 2024-06-18 PROCEDURE — 99152 MOD SED SAME PHYS/QHP 5/>YRS: CPT | Mod: GC | Performed by: INTERNAL MEDICINE

## 2024-06-18 PROCEDURE — 92929 PR PRQ TRLUML CORONARY BM STENT W/ANGIO ADDL ART/BRNCH: CPT | Mod: LD | Performed by: INTERNAL MEDICINE

## 2024-06-18 PROCEDURE — 92978 ENDOLUMINL IVUS OCT C 1ST: CPT | Mod: 26 | Performed by: INTERNAL MEDICINE

## 2024-06-18 PROCEDURE — 99152 MOD SED SAME PHYS/QHP 5/>YRS: CPT | Performed by: INTERNAL MEDICINE

## 2024-06-18 PROCEDURE — 83550 IRON BINDING TEST: CPT

## 2024-06-18 PROCEDURE — C1753 CATH, INTRAVAS ULTRASOUND: HCPCS | Performed by: INTERNAL MEDICINE

## 2024-06-18 PROCEDURE — 272N000001 HC OR GENERAL SUPPLY STERILE: Performed by: INTERNAL MEDICINE

## 2024-06-18 PROCEDURE — 93005 ELECTROCARDIOGRAM TRACING: CPT

## 2024-06-18 PROCEDURE — 999N000054 HC STATISTIC EKG NON-CHARGEABLE

## 2024-06-18 PROCEDURE — 85027 COMPLETE CBC AUTOMATED: CPT | Performed by: INTERNAL MEDICINE

## 2024-06-18 PROCEDURE — 80048 BASIC METABOLIC PNL TOTAL CA: CPT | Performed by: INTERNAL MEDICINE

## 2024-06-18 PROCEDURE — 92979 ENDOLUMINL IVUS OCT C EA: CPT | Performed by: INTERNAL MEDICINE

## 2024-06-18 PROCEDURE — 999N000071 HC STATISTIC HEART CATH LAB OR EP LAB

## 2024-06-18 PROCEDURE — C1769 GUIDE WIRE: HCPCS | Performed by: INTERNAL MEDICINE

## 2024-06-18 PROCEDURE — 85347 COAGULATION TIME ACTIVATED: CPT

## 2024-06-18 PROCEDURE — 999N000184 HC STATISTIC TELEMETRY

## 2024-06-18 PROCEDURE — 92928 PRQ TCAT PLMT NTRAC ST 1 LES: CPT | Mod: LD | Performed by: INTERNAL MEDICINE

## 2024-06-18 PROCEDURE — 83540 ASSAY OF IRON: CPT

## 2024-06-18 PROCEDURE — 250N000011 HC RX IP 250 OP 636: Mod: JZ | Performed by: INTERNAL MEDICINE

## 2024-06-18 PROCEDURE — C1725 CATH, TRANSLUMIN NON-LASER: HCPCS | Performed by: INTERNAL MEDICINE

## 2024-06-18 PROCEDURE — 250N000009 HC RX 250: Performed by: INTERNAL MEDICINE

## 2024-06-18 DEVICE — STENT MEGATRON US MR 3.50 X 32MM H7493942832350: Type: IMPLANTABLE DEVICE | Status: FUNCTIONAL

## 2024-06-18 DEVICE — STENT CORONARY DES SYNERGY XD MR US 2.25X20MM H7493941820220: Type: IMPLANTABLE DEVICE | Status: FUNCTIONAL

## 2024-06-18 RX ORDER — NALOXONE HYDROCHLORIDE 0.4 MG/ML
0.4 INJECTION, SOLUTION INTRAMUSCULAR; INTRAVENOUS; SUBCUTANEOUS
Status: DISCONTINUED | OUTPATIENT
Start: 2024-06-18 | End: 2024-06-18 | Stop reason: HOSPADM

## 2024-06-18 RX ORDER — PRASUGREL 5 MG/1
5 TABLET, FILM COATED ORAL DAILY
Status: DISCONTINUED | OUTPATIENT
Start: 2024-06-19 | End: 2024-06-18 | Stop reason: HOSPADM

## 2024-06-18 RX ORDER — ROSUVASTATIN CALCIUM 40 MG/1
40 TABLET, COATED ORAL DAILY
Qty: 90 TABLET | Refills: 3 | Status: SHIPPED | OUTPATIENT
Start: 2024-06-18 | End: 2024-07-30

## 2024-06-18 RX ORDER — OXYCODONE HYDROCHLORIDE 5 MG/1
5 TABLET ORAL EVERY 4 HOURS PRN
Status: DISCONTINUED | OUTPATIENT
Start: 2024-06-18 | End: 2024-06-18 | Stop reason: HOSPADM

## 2024-06-18 RX ORDER — OXYCODONE HYDROCHLORIDE 5 MG/1
10 TABLET ORAL EVERY 4 HOURS PRN
Status: DISCONTINUED | OUTPATIENT
Start: 2024-06-18 | End: 2024-06-18 | Stop reason: HOSPADM

## 2024-06-18 RX ORDER — FENTANYL CITRATE 50 UG/ML
INJECTION, SOLUTION INTRAMUSCULAR; INTRAVENOUS
Status: DISCONTINUED | OUTPATIENT
Start: 2024-06-18 | End: 2024-06-18 | Stop reason: HOSPADM

## 2024-06-18 RX ORDER — ASPIRIN 81 MG/1
81 TABLET, CHEWABLE ORAL ONCE
Status: COMPLETED | OUTPATIENT
Start: 2024-06-18 | End: 2024-06-18

## 2024-06-18 RX ORDER — POTASSIUM CHLORIDE 1500 MG/1
20 TABLET, EXTENDED RELEASE ORAL
Status: DISCONTINUED | OUTPATIENT
Start: 2024-06-18 | End: 2024-06-18 | Stop reason: HOSPADM

## 2024-06-18 RX ORDER — ACETAMINOPHEN 325 MG/1
650 TABLET ORAL EVERY 4 HOURS PRN
Status: DISCONTINUED | OUTPATIENT
Start: 2024-06-18 | End: 2024-06-18 | Stop reason: HOSPADM

## 2024-06-18 RX ORDER — ATROPINE SULFATE 0.1 MG/ML
0.5 INJECTION INTRAVENOUS
Status: DISCONTINUED | OUTPATIENT
Start: 2024-06-18 | End: 2024-06-18 | Stop reason: HOSPADM

## 2024-06-18 RX ORDER — SODIUM CHLORIDE 9 MG/ML
INJECTION, SOLUTION INTRAVENOUS CONTINUOUS
Status: ACTIVE | OUTPATIENT
Start: 2024-06-18 | End: 2024-06-18

## 2024-06-18 RX ORDER — NITROGLYCERIN 0.4 MG/1
0.4 TABLET SUBLINGUAL EVERY 5 MIN PRN
Status: DISCONTINUED | OUTPATIENT
Start: 2024-06-18 | End: 2024-06-18 | Stop reason: HOSPADM

## 2024-06-18 RX ORDER — FLUMAZENIL 0.1 MG/ML
0.2 INJECTION, SOLUTION INTRAVENOUS
Status: DISCONTINUED | OUTPATIENT
Start: 2024-06-18 | End: 2024-06-18 | Stop reason: HOSPADM

## 2024-06-18 RX ORDER — ASPIRIN 81 MG/1
243 TABLET, CHEWABLE ORAL ONCE
Status: COMPLETED | OUTPATIENT
Start: 2024-06-18 | End: 2024-06-18

## 2024-06-18 RX ORDER — ONDANSETRON 4 MG/1
4 TABLET, ORALLY DISINTEGRATING ORAL EVERY 6 HOURS PRN
Status: DISCONTINUED | OUTPATIENT
Start: 2024-06-18 | End: 2024-06-18 | Stop reason: HOSPADM

## 2024-06-18 RX ORDER — LIDOCAINE 40 MG/G
CREAM TOPICAL
Status: DISCONTINUED | OUTPATIENT
Start: 2024-06-18 | End: 2024-06-18 | Stop reason: HOSPADM

## 2024-06-18 RX ORDER — HEPARIN SODIUM 1000 [USP'U]/ML
INJECTION, SOLUTION INTRAVENOUS; SUBCUTANEOUS
Status: DISCONTINUED | OUTPATIENT
Start: 2024-06-18 | End: 2024-06-18 | Stop reason: HOSPADM

## 2024-06-18 RX ORDER — IOPAMIDOL 755 MG/ML
INJECTION, SOLUTION INTRAVASCULAR
Status: DISCONTINUED | OUTPATIENT
Start: 2024-06-18 | End: 2024-06-18 | Stop reason: HOSPADM

## 2024-06-18 RX ORDER — HYDRALAZINE HYDROCHLORIDE 20 MG/ML
10 INJECTION INTRAMUSCULAR; INTRAVENOUS EVERY 4 HOURS PRN
Status: DISCONTINUED | OUTPATIENT
Start: 2024-06-18 | End: 2024-06-18 | Stop reason: HOSPADM

## 2024-06-18 RX ORDER — FENTANYL CITRATE 50 UG/ML
25 INJECTION, SOLUTION INTRAMUSCULAR; INTRAVENOUS
Status: DISCONTINUED | OUTPATIENT
Start: 2024-06-18 | End: 2024-06-18 | Stop reason: HOSPADM

## 2024-06-18 RX ORDER — ROSUVASTATIN CALCIUM 20 MG/1
40 TABLET, COATED ORAL DAILY
Status: DISCONTINUED | OUTPATIENT
Start: 2024-06-18 | End: 2024-06-18 | Stop reason: HOSPADM

## 2024-06-18 RX ORDER — SODIUM CHLORIDE 9 MG/ML
INJECTION, SOLUTION INTRAVENOUS CONTINUOUS
Status: DISCONTINUED | OUTPATIENT
Start: 2024-06-18 | End: 2024-06-18 | Stop reason: HOSPADM

## 2024-06-18 RX ORDER — PRASUGREL 5 MG/1
5 TABLET, FILM COATED ORAL DAILY
Qty: 90 TABLET | Refills: 3 | Status: SHIPPED | OUTPATIENT
Start: 2024-06-19 | End: 2024-07-30

## 2024-06-18 RX ORDER — METOPROLOL TARTRATE 1 MG/ML
5 INJECTION, SOLUTION INTRAVENOUS
Status: DISCONTINUED | OUTPATIENT
Start: 2024-06-18 | End: 2024-06-18 | Stop reason: HOSPADM

## 2024-06-18 RX ORDER — VERAPAMIL HYDROCHLORIDE 2.5 MG/ML
INJECTION, SOLUTION INTRAVENOUS
Status: DISCONTINUED | OUTPATIENT
Start: 2024-06-18 | End: 2024-06-18 | Stop reason: HOSPADM

## 2024-06-18 RX ORDER — ASPIRIN 81 MG/1
81 TABLET ORAL DAILY
Status: DISCONTINUED | OUTPATIENT
Start: 2024-06-19 | End: 2024-06-18 | Stop reason: HOSPADM

## 2024-06-18 RX ORDER — NALOXONE HYDROCHLORIDE 0.4 MG/ML
0.2 INJECTION, SOLUTION INTRAMUSCULAR; INTRAVENOUS; SUBCUTANEOUS
Status: DISCONTINUED | OUTPATIENT
Start: 2024-06-18 | End: 2024-06-18 | Stop reason: HOSPADM

## 2024-06-18 RX ORDER — NITROGLYCERIN 5 MG/ML
VIAL (ML) INTRAVENOUS
Status: DISCONTINUED | OUTPATIENT
Start: 2024-06-18 | End: 2024-06-18 | Stop reason: HOSPADM

## 2024-06-18 RX ORDER — ONDANSETRON 2 MG/ML
4 INJECTION INTRAMUSCULAR; INTRAVENOUS EVERY 6 HOURS PRN
Status: DISCONTINUED | OUTPATIENT
Start: 2024-06-18 | End: 2024-06-18 | Stop reason: HOSPADM

## 2024-06-18 RX ORDER — ONDANSETRON 2 MG/ML
INJECTION INTRAMUSCULAR; INTRAVENOUS
Status: DISCONTINUED | OUTPATIENT
Start: 2024-06-18 | End: 2024-06-18 | Stop reason: HOSPADM

## 2024-06-18 RX ORDER — ASPIRIN 325 MG
325 TABLET ORAL ONCE
Status: COMPLETED | OUTPATIENT
Start: 2024-06-18 | End: 2024-06-18

## 2024-06-18 RX ORDER — ASPIRIN 81 MG/1
81 TABLET ORAL DAILY
Qty: 90 TABLET | Refills: 3 | Status: SHIPPED | OUTPATIENT
Start: 2024-06-19 | End: 2024-07-30

## 2024-06-18 RX ADMIN — SODIUM CHLORIDE: 9 INJECTION, SOLUTION INTRAVENOUS at 10:05

## 2024-06-18 ASSESSMENT — ACTIVITIES OF DAILY LIVING (ADL)
ADLS_ACUITY_SCORE: 35

## 2024-06-18 NOTE — Clinical Note
The first balloon was inserted into the left anterior descending.Max pressure = 20 marycruz. Total duration = 25 seconds.     Max pressure = 20 marycruz. Total duration = 7 seconds.    Balloon reinflated a second time: Max pressure = 20 marycruz. Total duration = 7 seconds.

## 2024-06-18 NOTE — DISCHARGE INSTRUCTIONS
Cardiac Angioplasty/Stent Discharge Instructions - Radial    After you go home:    Have an adult stay with you until tomorrow.  Drink extra fluids for 2 days.  You may resume your normal diet.  No smoking       For 24 hours - due to the sedation you received:  Relax and take it easy.  Do NOT make any important or legal decisions.  Do NOT drive or operate machines at home or at work.  Do NOT drink alcohol.    Care of Wrist Puncture Site:    For the first 24 hrs - check the puncture site every 1-2 hours while awake.  It is normal to have soreness at the puncture site and mild tingling in your hand for up to 3 days.  Remove the bandaid after 24 hours. If there is minor oozing, apply another bandaid and remove it after 12 hours.  You may shower tomorrow.  Do NOT take a bath, or use a hot tub or pool for at least 3 days. Do NOT scrub the site. Do not use lotion or powder near the puncture site.           Activity:          For 2 days:   do not use your hand or arm to support your weight (such as rising from a chair)   do not bend your wrist (such as lifting a garage door).  do not lift more than 5 pounds or exercise your arm (such as tennis, golf or bowling).  Do NOT do any heavy activity such as exercise, lifting, or straining.     Bleeding:    If you start bleeding from the site in your wrist, sit down and press firmly on/above the site for 10 minutes.   Once bleeding stops, keep arm still for 2 hours.   Call [facility] as soon as you can.       Call 911 right away if you have heavy bleeding or bleeding that does not stop.      Medicines:    If you are taking an antiplatelet medication such as Plavix, Brilinta or Effient, do not stop taking it until you talk to your cardiologist.      If you are on Metformin (Glucophage), do not restart it until you have blood tests (within 2 to 3 days after discharge).  After you have your blood drawn, you may restart the Metformin.   Take your medications, including blood thinners,  unless your provider tells you not to.    If you take Coumadin (Warfarin), have your INR checked by your provider in  3-5 days. Call your clinic to schedule this.  If you have stopped any medicines, check with your provider about when to restart them.    Follow Up Appointments:    Follow up with [facility] Nurse Practitioner at [facility] of patient preference in 7-10 days.  Cardiac Rehab will contact you for follow up care.    Call the clinic if:    You have a large or growing hard lump around the site.  The site is red, swollen, hot or tender.  Blood or fluid is draining from the site.  You have chills or a fever greater than 101 F (38 C).  Your arm feels numb, cool or changes color.  You have hives, a rash or unusual itching.  Any questions or concerns.    Other Instructions:    If you received a stent - carry your stent card with you at all times.      [facility] [place]:    [phone] [facility] (7 days a week)    Or you may contact your provider via My Chart

## 2024-06-18 NOTE — Clinical Note
The first balloon was inserted into the left anterior descending and left anterior descending diagonal.Max pressure = 14 marycruz. Total duration = 8 seconds.     Max pressure = 14 marycruz. Total duration = 5 seconds.    Balloon reinflated a second time: Max pressure = 14 marycruz. Total duration = 5 seconds.

## 2024-06-18 NOTE — Clinical Note
The first balloon was inserted into the left anterior descending and left anterior descending diagonal.Max pressure = 20 marycruz. Total duration = 30 seconds.     Max pressure = 16 marycruz. Total duration = 30 seconds.    Balloon reinflated a second time: Max pressure = 16 marycruz. Total duration = 30 seconds.  Balloon reinflated a third time: Max pressure = 20 marycruz. Total duration = 30 seconds.

## 2024-06-18 NOTE — Clinical Note
Single balloon inflation. The first balloon was inserted into the left anterior descending.Max pressure = 20 marycruz. Total duration = 20 seconds.

## 2024-06-18 NOTE — Clinical Note
Stent deployed in the left anterior descending diagonal. Max pressure = 18 marycruz. Total duration = 30 seconds.

## 2024-06-18 NOTE — Clinical Note
The first balloon was inserted into the left anterior descending.Max pressure = 16 marycruz. Total duration = 10 seconds.     Max pressure = 18 marycruz. Total duration = 5 seconds.    Balloon reinflated a second time: Max pressure = 18 marycruz. Total duration = 5 seconds.  Balloon reinflated a third time: Max pressure = 18 marycruz. Total duration = 7 seconds.

## 2024-06-18 NOTE — Clinical Note
Multiple balloon inflation. The first balloon was inserted into the left anterior descending.Max pressure = 14 marycruz. Total duration = 20 seconds.     The second balloon was inserted into the Left Anterior Descending and left anterior decending diagonal. Max pressure = 14 marycruz. Total duration = 20 seconds.   Max pressure = 14 marycruz. Total duration = 20 seconds.

## 2024-06-18 NOTE — PROGRESS NOTES
Care Suites Admission Nursing Note    Patient Information  Name: Taylor Aburto  Age: 69 year old  Reason for admission: heart cath  Care Suites arrival time: 0940    Visitor Information  Name: Kirk     Patient Admission/Assessment   Pre-procedure assessment complete: Yes  If abnormal assessment/labs, provider notified: N/A  NPO: Yes  Medications held per instructions/orders: Yes  Consent: obtained  If applicable, pregnancy test status: deferred  Patient oriented to room: Yes  Education/questions answered: Yes  Plan/other: proceed with procedure as planned    Discharge Planning  Discharge name/phone number: Rema [phone]  Overnight post sedation caregiver: Kirk  Discharge location: home    Siena Nicolas RN

## 2024-06-18 NOTE — Clinical Note
The first balloon was inserted into the left anterior descending and left anterior descending diagonal.Max pressure = 20 marycruz. Total duration = 30 seconds.     Max pressure = 10 marycruz. Total duration = 30 seconds.    Balloon reinflated a second time: Max pressure = 10 marycruz. Total duration = 30 seconds.  Balloon reinflated a third time: Max pressure = 8 marycruz. Total duration = 20 seconds.  Balloon reinflated a fourth time: Max pressure = 20 at m. Total duration = 6 seconds.  Balloon reinflated a fourth time: Max pressure = 20 marycruz. Total duration = 5 seconds.

## 2024-06-18 NOTE — PROGRESS NOTES
1715 TR band down to 9 ml's from 12 ml's and patient bled after about 5 minutes, 3 ml's of air was placed back into the TR band to stop bleeding  1745 TR band down to 11 ml's  1800 TR band down to 10 ml's  1815 went over discharge instructions and stent card with the patient and their family.  The patient and their family verbalized understanding.  1830 TR band down to 8 ml's  1835 called report to Short Stay bedside nurse  TR band is down to 8 ml's   1845 patient transferred to Short Stay via cart  1855 TR band site was assessed by the Sort Stay bedside nurse and the writer.   Patient's family at the bedside, all belongings brought up with the transfer

## 2024-06-18 NOTE — Clinical Note
Stent deployed in the left anterior descending. Max pressure = 14 marycruz. Total duration = 15 seconds. Balloon reinflated a second time: Max pressure = 14 marycruz. Total duration = 5 seconds.

## 2024-06-18 NOTE — PROGRESS NOTES
Care Suites Post Procedure Note    Patient Information  Name: Taylor Aburto  Age: 69 year old    Post Procedure  Time patient returned to Care Suites: 1500  Concerns/abnormal assessment: None  If abnormal assessment, provider notified: N/A  Plan/Other: proceed with recovery and discharge as planned    Siena Nicolas RN

## 2024-06-19 ENCOUNTER — VIRTUAL VISIT (OUTPATIENT)
Dept: FAMILY MEDICINE | Facility: CLINIC | Age: 69
End: 2024-06-19
Payer: COMMERCIAL

## 2024-06-19 ENCOUNTER — TELEPHONE (OUTPATIENT)
Dept: CARDIOLOGY | Facility: CLINIC | Age: 69
End: 2024-06-19

## 2024-06-19 DIAGNOSIS — Z98.890 STATUS POST CORONARY ANGIOGRAM: ICD-10-CM

## 2024-06-19 DIAGNOSIS — J43.9 PULMONARY EMPHYSEMA, UNSPECIFIED EMPHYSEMA TYPE (H): ICD-10-CM

## 2024-06-19 DIAGNOSIS — D69.6 THROMBOCYTOPENIA (H): ICD-10-CM

## 2024-06-19 DIAGNOSIS — Z87.891 FORMER SMOKER: ICD-10-CM

## 2024-06-19 DIAGNOSIS — D64.89 ANEMIA DUE TO OTHER CAUSE, NOT CLASSIFIED: ICD-10-CM

## 2024-06-19 DIAGNOSIS — I25.10 CORONARY ARTERY DISEASE INVOLVING NATIVE CORONARY ARTERY OF NATIVE HEART WITHOUT ANGINA PECTORIS: Primary | ICD-10-CM

## 2024-06-19 DIAGNOSIS — R63.4 UNINTENTIONAL WEIGHT LOSS: ICD-10-CM

## 2024-06-19 LAB
ATRIAL RATE - MUSE: 63 BPM
ATRIAL RATE - MUSE: 71 BPM
DIASTOLIC BLOOD PRESSURE - MUSE: NORMAL MMHG
DIASTOLIC BLOOD PRESSURE - MUSE: NORMAL MMHG
INTERPRETATION ECG - MUSE: NORMAL
INTERPRETATION ECG - MUSE: NORMAL
IRON BINDING CAPACITY (ROCHE): 249 UG/DL (ref 240–430)
IRON SATN MFR SERPL: 17 % (ref 15–46)
IRON SERPL-MCNC: 42 UG/DL (ref 37–145)
P AXIS - MUSE: 71 DEGREES
P AXIS - MUSE: 78 DEGREES
PR INTERVAL - MUSE: 128 MS
PR INTERVAL - MUSE: 134 MS
QRS DURATION - MUSE: 86 MS
QRS DURATION - MUSE: 90 MS
QT - MUSE: 470 MS
QT - MUSE: 504 MS
QTC - MUSE: 510 MS
QTC - MUSE: 515 MS
R AXIS - MUSE: 75 DEGREES
R AXIS - MUSE: 75 DEGREES
SYSTOLIC BLOOD PRESSURE - MUSE: NORMAL MMHG
SYSTOLIC BLOOD PRESSURE - MUSE: NORMAL MMHG
T AXIS - MUSE: 227 DEGREES
T AXIS - MUSE: 255 DEGREES
VENTRICULAR RATE- MUSE: 63 BPM
VENTRICULAR RATE- MUSE: 71 BPM

## 2024-06-19 PROCEDURE — G2211 COMPLEX E/M VISIT ADD ON: HCPCS | Mod: 95 | Performed by: STUDENT IN AN ORGANIZED HEALTH CARE EDUCATION/TRAINING PROGRAM

## 2024-06-19 PROCEDURE — 99214 OFFICE O/P EST MOD 30 MIN: CPT | Mod: 95 | Performed by: STUDENT IN AN ORGANIZED HEALTH CARE EDUCATION/TRAINING PROGRAM

## 2024-06-19 NOTE — PLAN OF CARE
Upon start of writers shift, TR band at 6ml.  1930: TR band down to 4ml. CMS intact. O2: 97%  1946: TR band down to 2ml.  2004: TR band completely deflated. CMS intact. O2: 98%  2015: TR band removed. No bleeding. CMS intact. Wrist board replaced and bandaid applied. VSS.    Tele: NSR w/ inverted T-wave.  Pt reports she went over AVS with RN in Post op. Pt in possession on AVS.       2100: IV removed. Tele removed. VSS. CMS intact. Pt discharged home with family.

## 2024-06-19 NOTE — PROGRESS NOTES
Taylor is a 69 year old who is being evaluated via a billable video visit.    How would you like to obtain your AVS? MyChart  If the video visit is dropped, the invitation should be resent by: Text to cell phone: [phone]  Will anyone else be joining your video visit? No      Assessment & Plan     Coronary artery disease involving native coronary artery of native heart without angina pectoris  Status post coronary angiogram  Managed by cardiology.   Pulmonary emphysema, unspecified emphysema type (H)  Thrombocytopenia (H24)  Resolved  Anemia due to other cause, not classified  Hb done yesterday   - Iron and iron binding capacity; Future  Unintentional weight loss  Patient had extensive work up done last year. Dilation, an unremarkable renal cyst, and a reactive lymph node in the submandibular region. A recent colonoscopy and upper endoscopy showed only polyps, with a repeat recommended in seven years. A CT scan of the chest, abdomen, and pelvis in November 2023 showed a borderline dilated pancreatic duct at 0.3 cm, with no obstructing stone or lesion. This is suspected to be physiological or due to aging, but further evaluation with MRCP is suggested if clinical concern is high. EUS was done for further evaluation , which was unremarkable. Moderate multifocal atherosclerotic calcification was observed in the abdominal aorta and femoral arteries, with no aneurysm. Calcium levels were only mildly elevated at 10.3, and ionized calcium was within normal limits on July 19, 2023. CA 19-9 was negative in November 2023. She was also evaluated by oncology.   Former smoker  Congratulated the patient on quitting smoking.    Return to work letter given. Recommend following up with her PCP and scheduling an appointment for PFT.  Diabetes  Restart metformin tomorrow.      MED REC REQUIRED{Post Medication Reconciliation Status:  Discharge medications reconciled, continue medications without change  Nicotine/Tobacco  Cessation  She reports that she has been smoking cigarettes. She has a 10 pack-year smoking history. She has never used smokeless tobacco.  Nicotine/Tobacco Cessation Plan  Patient stopped smoking last week            Eb Vazquez is a 69 year old, presenting for the following health issues:  Hospital F/U      6/19/2024    10:01 AM   Additional Questions   Roomed by Kate DÍAZ         6/19/2024    10:01 AM   Patient Reported Additional Medications   Patient reports taking the following new medications See List     HPI        Hospital Follow-up Visit:    Hospital/Nursing Home/IP Rehab Facility: [facility]  Date of Admission: 06/07/2024  Date of Discharge: 06/13/2024  Reason(s) for Admission: NSTEMI s/p PCI to LAD/D1 bifurcation on 6/8/2024  Coronary dissections involving the LAD and D1 during PCI( 4 stents on 6 /8 and 2 stents 6/18  Moderate pericardial effusion s/p pericardiocentesis  Thrombocytopenia  DM II w/ hyperosmolarity without coma   HLD   Pulmonary emphysema   Tobacco use disorder   Hx of hepatic steatosis/fibrosis on imaging   Hx of pancreatic duct dilation   Generalized fatigue   Morning joint and muscle stiffness   Hx of hypercalcemia   Hx of unintentional weight loss   Cachexia   Nocturia  Was the patient in the ICU or did the patient experience delirium during hospitalization?  No  Do you have any other stressors you would like to discuss with your provider? Health Concerns  Problems taking medications regularly:  None  Medication changes since discharge: See List  Problems adhering to non-medication therapy:  None    Summary of hospitalization:  [facility] discharge summary reviewed  Diagnostic Tests/Treatments reviewed.  Follow up needed: lab  Other Healthcare Providers Involved in Patient s Care:         Specialist appointment - cardiology , imaging, PFT  Update since discharge: improved.    Plan of care communicated with patient           Review of  Systems  Constitutional, neuro, ENT, endocrine, pulmonary, cardiac, gastrointestinal, genitourinary, musculoskeletal, integument and psychiatric systems are negative, except as otherwise noted.      Objective           Vitals:  No vitals were obtained today due to virtual visit.    Physical Exam   GENERAL: alert and no distress  EYES: Eyes grossly normal to inspection.  No discharge or erythema, or obvious scleral/conjunctival abnormalities.  RESP: No audible wheeze, cough, or visible cyanosis.    SKIN: Visible skin clear. No significant rash, abnormal pigmentation or lesions.  PSYCH: Appropriate affect, tone, and pace of words        Video-Visit Details    Type of service:  Video Visit   Originating Location (pt. Location): Home    Distant Location (provider location):  On-site  Platform used for Video Visit: Laila  Signed Electronically by: Aura Lagos MD

## 2024-06-19 NOTE — PHARMACY-ADMISSION MEDICATION HISTORY
Pharmacist Admission Medication History    Admission medication history is complete. The information provided in this note is only as accurate as the sources available at the time of the update.    Information Source(s): CareEverywhere/Ruddy and nurse completed during procedure  via N/A      PTA Med List   Medication Sig Last Dose    [START ON 6/19/2024] aspirin 81 MG EC tablet Take 1 tablet (81 mg) by mouth daily for 360 days Start tomorrow.     aspirin 81 MG EC tablet Take 1 tablet (81 mg) by mouth daily Start tomorrow. 6/18/2024    carvedilol (COREG) 3.125 MG tablet Take 1 tablet (3.125 mg) by mouth 2 times daily (with meals) 6/18/2024 at w    fluticasone (FLONASE) 50 MCG/ACT nasal spray Spray 1 spray into both nostrils daily (Patient taking differently: Spray 1 spray into both nostrils daily as needed for allergies) Past Week    lisinopril (ZESTRIL) 5 MG tablet Take 1 tablet (5 mg) by mouth daily 6/18/2024    metFORMIN (GLUCOPHAGE XR) 500 MG 24 hr tablet Take 2 tablets (1,000 mg) by mouth 2 times daily 6/17/2024    nicotine polacrilex (NICORETTE) 4 MG gum Chew one piece every 1- 2 hours for cravings as needed. Try to limit using the gum for strong craving only since you are taking the patch, and previous history of nausea and vomiting. Past Week    oxyBUTYnin ER (DITROPAN XL) 10 MG 24 hr tablet Take 1 tablet (10 mg) by mouth daily 6/17/2024    prasugrel (EFFIENT) 10 MG TABS tablet Take 1 tablet (10 mg) by mouth daily Do not crush or break tablet. Dose to start tomorrow. 6/18/2024    [START ON 6/19/2024] prasugrel (EFFIENT) 5 MG TABS tablet Take 1 tablet (5 mg) by mouth daily for 360 days Do not crush or break tablet.     PYRIDOXINE HCL PO Take 1 tablet by mouth daily Strength unknown 6/18/2024    rosuvastatin (CRESTOR) 40 MG tablet Take 1 tablet (40 mg) by mouth daily     rosuvastatin (CRESTOR) 40 MG tablet Take 1 tablet (40 mg) by mouth daily 6/17/2024

## 2024-06-19 NOTE — LETTER
June 19, 2024      Taylor Aburto  [address]  [place] [address]        To Whom It May Concern:    Taylor Aburto was seen in our clinic. She may return to work on 6/24/24 without restrictions .      Sincerely,        Aura Lagos MD

## 2024-06-19 NOTE — PATIENT INSTRUCTIONS
Tom Vazquez,    Thank you for allowing [facility] to manage your care.        For your convenience, test results are released as soon as they are available  Please allow 1-2 business days for me to send you a comment about your results.  If not done so, I encourage you to login into [facility] ([email or web address] to review your results in real time.     If you have any questions or concerns, please feel free to call us at [phone].    Sincerely,    Dr. Lagos    Did you know?      You can schedule a video visit for follow-up appointments as well as future appointments for certain conditions.  Please see the below link.     [email or web address]    If you have not already done so,  I encourage you to sign up for [facility] ([email or web address]).  This will allow you to review your results, securely communicate with a provider, and schedule virtual visits as well.

## 2024-06-19 NOTE — TELEPHONE ENCOUNTER
Patient was admitted to [facility] on 6/18/24 for OP coronary angiogram.    6/18/24: Coronary angiogram via RRA resulted KIM x 2 placed. Incomplete cath report so specifics limited.    Pt was continued on PTA Effient, ASA and NTG.    Pt is scheduled for an OV on 7/30/24 at 1355 with TUSHAR Aldo Bustillos at our [place] [facility].    Cardiac rehab is scheduled on 7/16/24 at 0745 at [facility].    Writer attempted to call pt for a cardiology post discharge phone call, but no answer. VM left to return my call. HIMANSHU Garvey RN.

## 2024-06-20 ENCOUNTER — CARE COORDINATION (OUTPATIENT)
Dept: CARDIOLOGY | Facility: CLINIC | Age: 69
End: 2024-06-20

## 2024-06-20 ENCOUNTER — LAB (OUTPATIENT)
Dept: LAB | Facility: CLINIC | Age: 69
End: 2024-06-20
Payer: COMMERCIAL

## 2024-06-20 DIAGNOSIS — I31.39 PERICARDIAL EFFUSION: Primary | ICD-10-CM

## 2024-06-20 DIAGNOSIS — I21.4 NSTEMI (NON-ST ELEVATED MYOCARDIAL INFARCTION) (H): ICD-10-CM

## 2024-06-20 DIAGNOSIS — I25.10 CORONARY ARTERY DISEASE INVOLVING NATIVE CORONARY ARTERY OF NATIVE HEART WITHOUT ANGINA PECTORIS: ICD-10-CM

## 2024-06-20 DIAGNOSIS — I25.5 ISCHEMIC CARDIOMYOPATHY: ICD-10-CM

## 2024-06-20 LAB
ANION GAP SERPL CALCULATED.3IONS-SCNC: 9 MMOL/L (ref 7–15)
BUN SERPL-MCNC: 10.9 MG/DL (ref 8–23)
CALCIUM SERPL-MCNC: 9.5 MG/DL (ref 8.8–10.2)
CHLORIDE SERPL-SCNC: 104 MMOL/L (ref 98–107)
CREAT SERPL-MCNC: 0.78 MG/DL (ref 0.51–0.95)
DEPRECATED HCO3 PLAS-SCNC: 27 MMOL/L (ref 22–29)
EGFRCR SERPLBLD CKD-EPI 2021: 82 ML/MIN/1.73M2
GLUCOSE SERPL-MCNC: 239 MG/DL (ref 70–99)
POTASSIUM SERPL-SCNC: 4.9 MMOL/L (ref 3.4–5.3)
SODIUM SERPL-SCNC: 140 MMOL/L (ref 135–145)

## 2024-06-20 PROCEDURE — 80048 BASIC METABOLIC PNL TOTAL CA: CPT | Performed by: NURSE PRACTITIONER

## 2024-06-20 PROCEDURE — 36415 COLL VENOUS BLD VENIPUNCTURE: CPT | Performed by: NURSE PRACTITIONER

## 2024-06-20 NOTE — PROGRESS NOTES
I called pt with pericardial fluid analysis, and bmp results. I let her know renal function is stable so she can resume metformin post cath. Pt said her radial site is healing well and doesn't have any swelling or oozing. I will have scheduling call pt to set up an echo for two weeks. Pt has her CT scan 6/27 and will follow up with TUSHAR Carlson on 7/30. She didn't have any other questions at this time. ESletteboe DOYLE June 20, 2024, 12:02 PM    Component      Latest Ref Rng 6/20/2024  8:58 AM   Sodium      135 - 145 mmol/L 140    Potassium      3.4 - 5.3 mmol/L 4.9    Chloride      98 - 107 mmol/L 104    Carbon Dioxide (CO2)      22 - 29 mmol/L 27    Anion Gap      7 - 15 mmol/L 9    Urea Nitrogen      8.0 - 23.0 mg/dL 10.9    Creatinine      0.51 - 0.95 mg/dL 0.78    GFR Estimate      >60 mL/min/1.73m2 82    Calcium      8.8 - 10.2 mg/dL 9.5    Glucose      70 - 99 mg/dL 239 (H)       Legend:  (H) High

## 2024-06-20 NOTE — PROGRESS NOTES
Tamra Redmond NP  6/20/2024  8:58 AM CDT Back to Top      Please let Taylor know the good news that the pericardial fluid analysis did not show any evidence of malignancy. I would like her to get a repeat echocardiogram in 2 weeks to make sure the fluid has not started to re accumulate. Thanks.            Pt currently has 7/30 follow up scheduled with TUSHAR Carlson. Pt is also having a post cath BMP this morning since she is holding metformin. Will wait for those results and call pt with recommendations once BMP is back. Manuela KWON June 20, 2024, 9:14 AM

## 2024-06-20 NOTE — PROGRESS NOTES
Tamra Redmond, NP  You1 hour ago (12:40 PM)       BMP results stable.    I believe a limited echo is sufficient for reassessment for effusion. Thank you.

## 2024-06-25 NOTE — TELEPHONE ENCOUNTER
Second attempt at trying to contact pt, but again, no answer. VM left to return my call. HIMANSHU Garvey RN.

## 2024-06-26 NOTE — TELEPHONE ENCOUNTER
"Writer returned pt's phone message.    Called patient to discuss any post hospital d/c questions, review medication changes, and confirm f/u appts. Patient denied any questions regarding new medications or changes to PTA medications.     RN confirmed with patient that she has an adequate supply of her PTA antiplatelet Effient, and reminded of importance of taking without interruption.     Pt has an Rx for PRN SL Nitroglycerin.     Patient denied any SOB, chest pain or light headedness now. States she experienced anterior chest \"pressure/tightness\" intermittently throughout this night, when laying a her side. This AM continued to have chest tightness rated as 3/10, midsternal, nonradiating with some lightheadedness. States she has been taking Effient without missed doses. States she has not smoked since 6/7/24. States she took NTG x 1 which improved the discomfort and eventually resolved after one hour. Reviewed with pt how to take PRN SL NTG. When to call the clinic and when to call 911. Will route this note to Dr. Greene and his Team RN for further review and follow up. Team 2 RN phone number provided.    RRA cardiac cath site is without bleeding, swelling, redness or signs of infection.     RN confirmed with patient that she is scheduled for an OV on 7/30/24 at 1355 with RITO Aldo Bustillos at our [place] [facility].     Cardiac rehab is scheduled on 7/16/24 at 0745 at [facility].     Patient advised to call clinic with any cardiac related questions or concerns prior to this rito't. Patient verbalized understanding and agreed with plan. HIMANSHU Garvey RN.   "

## 2024-06-27 ENCOUNTER — HOSPITAL ENCOUNTER (OUTPATIENT)
Dept: CT IMAGING | Facility: CLINIC | Age: 69
Discharge: HOME | End: 2024-06-27
Attending: NURSE PRACTITIONER | Admitting: NURSE PRACTITIONER
Payer: COMMERCIAL

## 2024-06-27 DIAGNOSIS — I77.3 FIBROMUSCULAR DYSPLASIA (H): ICD-10-CM

## 2024-06-27 PROCEDURE — 250N000011 HC RX IP 250 OP 636: Performed by: NURSE PRACTITIONER

## 2024-06-27 PROCEDURE — 70496 CT ANGIOGRAPHY HEAD: CPT

## 2024-06-27 PROCEDURE — 250N000009 HC RX 250: Performed by: NURSE PRACTITIONER

## 2024-06-27 PROCEDURE — 74174 CTA ABD&PLVS W/CONTRAST: CPT

## 2024-06-27 RX ORDER — IOPAMIDOL 755 MG/ML
100 INJECTION, SOLUTION INTRAVASCULAR ONCE
Status: COMPLETED | OUTPATIENT
Start: 2024-06-27 | End: 2024-06-27

## 2024-06-27 RX ADMIN — SODIUM CHLORIDE 80 ML: 9 INJECTION, SOLUTION INTRAVENOUS at 11:57

## 2024-06-27 RX ADMIN — IOPAMIDOL 100 ML: 755 INJECTION, SOLUTION INTRAVENOUS at 11:57

## 2024-06-27 NOTE — TELEPHONE ENCOUNTER
"Writer called patient to follow up after patient spoke with DOYLE Mcneill discharge nurse who reported patient was having chest pain and required nitroglycerin x 1. Patient reports she is feeling better today and denies any further chest pain or use of nitroglycerin. Patient does report feeling a little lightheaded and \"off\" yesterday. Reports likely feeling better today, but still early. Patient reports checking BP and pulse regularly with readings in the 130's systolic and pulse around 65. Patient denies headache, blurred vision, falls or any shortness of breath. Patient reports she thinks she has been eating and drinking adequately. Patient is a diabetic and reports blood sugars have been somewhat high overnight, but did eat a cookie. Reports she has been busy and returned to work.     Patient instructed on importance of eating small, frequent meals to prevent drops or spikes in blood sugar. Instructed on the importance of adequate hydration. Patient instructed to take activity slow, take frequent rests when needed and not overdue it. Patient has cardiac rehab scheduled 7/16/24, educated on importance of attending this and to look into sooner apts as this will be very helpful. Patient remided of follow up apt with Jewel Bustillos on 7/30/24. Patient is coming in today for a CT of the neck and head. Instructed to call clinic if she continues to feel \"off\" after all interventions. Patient verbalizes understanding of information.  "

## 2024-06-28 ENCOUNTER — CARE COORDINATION (OUTPATIENT)
Dept: CARDIOLOGY | Facility: CLINIC | Age: 69
End: 2024-06-28
Payer: COMMERCIAL

## 2024-06-28 DIAGNOSIS — I77.3 FIBROMUSCULAR DYSPLASIA (H): ICD-10-CM

## 2024-06-28 DIAGNOSIS — I77.3 FIBROMUSCULAR DYSPLASIA OF CAROTID ARTERY (H): ICD-10-CM

## 2024-06-28 DIAGNOSIS — I31.39 PERICARDIAL EFFUSION: Primary | ICD-10-CM

## 2024-06-28 DIAGNOSIS — I25.10 CORONARY ARTERY DISEASE INVOLVING NATIVE CORONARY ARTERY OF NATIVE HEART WITHOUT ANGINA PECTORIS: ICD-10-CM

## 2024-06-28 DIAGNOSIS — I21.4 NSTEMI (NON-ST ELEVATED MYOCARDIAL INFARCTION) (H): ICD-10-CM

## 2024-06-28 NOTE — PROGRESS NOTES
"Call out to pt, no answer. Left detailed VM asking pt to call back to review recent CT scan results and recommendations. Whitney Farah RN on 6/28/2024 at 9:43 AM    1:50 PM    Returned call to pt. Reviewed below. Discussed referral to Dr. Jimenez. Pt states she has noticed that a few hours after she takes her meds she feels \"off,\" eyes feel foggy, and she feels tired. Pt said she has checked her BP and it seemed OK. Pt will continue to monitor and if symptoms worsen or she notes abnormal HR or BP or signs/sx of bleeding she'll call our office as needed. Whitney Farah RN on 6/28/2024 at 1:58 PM      Message  CTA HEAD NECK W CONTRAST   Received: Today  Tamra Redmond NP  [facility] Heart Team 7  Can we get her set up for a new vascular cardiology visit with Dr. Jimenez as her head/neck CTA did show findings consistent with FMD. Thanks.    CTA Chest Abdomen Pelvis w Contrast  Tamra Redmond NP  [facility] Heart Team 7  No findings concerning for FMD on chest/abdomen/pelvis. I am reviewing the head/neck CT with Dr. Squires and will reach out to her following. It did show COPD changes within her lungs. This can be reviewed with her PCP. Please encourage her to make this appointment.              "

## 2024-07-07 ENCOUNTER — NURSE TRIAGE (OUTPATIENT)
Dept: NURSING | Facility: CLINIC | Age: 69
End: 2024-07-07
Payer: COMMERCIAL

## 2024-07-07 ENCOUNTER — HOSPITAL ENCOUNTER (EMERGENCY)
Facility: CLINIC | Age: 69
Discharge: HOME | End: 2024-07-07
Attending: EMERGENCY MEDICINE | Admitting: EMERGENCY MEDICINE
Payer: COMMERCIAL

## 2024-07-07 VITALS
TEMPERATURE: 98.6 F | HEART RATE: 80 BPM | DIASTOLIC BLOOD PRESSURE: 59 MMHG | RESPIRATION RATE: 14 BRPM | SYSTOLIC BLOOD PRESSURE: 122 MMHG | OXYGEN SATURATION: 95 %

## 2024-07-07 DIAGNOSIS — N39.0 URINARY TRACT INFECTION: ICD-10-CM

## 2024-07-07 DIAGNOSIS — R07.89 CHEST PRESSURE: ICD-10-CM

## 2024-07-07 LAB
ALBUMIN UR-MCNC: NEGATIVE MG/DL
ANION GAP SERPL CALCULATED.3IONS-SCNC: 9 MMOL/L (ref 7–15)
APPEARANCE UR: CLEAR
ATRIAL RATE - MUSE: 82 BPM
BACTERIA #/AREA URNS HPF: ABNORMAL /HPF
BASOPHILS # BLD AUTO: 0 10E3/UL (ref 0–0.2)
BASOPHILS NFR BLD AUTO: 0 %
BILIRUB UR QL STRIP: NEGATIVE
BUN SERPL-MCNC: 13.9 MG/DL (ref 8–23)
CALCIUM SERPL-MCNC: 9 MG/DL (ref 8.8–10.2)
CHLORIDE SERPL-SCNC: 97 MMOL/L (ref 98–107)
COLOR UR AUTO: ABNORMAL
CREAT SERPL-MCNC: 0.76 MG/DL (ref 0.51–0.95)
DEPRECATED HCO3 PLAS-SCNC: 25 MMOL/L (ref 22–29)
DIASTOLIC BLOOD PRESSURE - MUSE: NORMAL MMHG
EGFRCR SERPLBLD CKD-EPI 2021: 85 ML/MIN/1.73M2
EOSINOPHIL # BLD AUTO: 0.1 10E3/UL (ref 0–0.7)
EOSINOPHIL NFR BLD AUTO: 1 %
ERYTHROCYTE [DISTWIDTH] IN BLOOD BY AUTOMATED COUNT: 13.1 % (ref 10–15)
GLUCOSE SERPL-MCNC: 244 MG/DL (ref 70–99)
GLUCOSE UR STRIP-MCNC: NEGATIVE MG/DL
HCT VFR BLD AUTO: 32.4 % (ref 35–47)
HGB BLD-MCNC: 10.3 G/DL (ref 11.7–15.7)
HGB UR QL STRIP: ABNORMAL
HOLD SPECIMEN: NORMAL
IMM GRANULOCYTES # BLD: 0 10E3/UL
IMM GRANULOCYTES NFR BLD: 0 %
INTERPRETATION ECG - MUSE: NORMAL
KETONES UR STRIP-MCNC: NEGATIVE MG/DL
LEUKOCYTE ESTERASE UR QL STRIP: ABNORMAL
LYMPHOCYTES # BLD AUTO: 0.9 10E3/UL (ref 0.8–5.3)
LYMPHOCYTES NFR BLD AUTO: 10 %
MCH RBC QN AUTO: 29.5 PG (ref 26.5–33)
MCHC RBC AUTO-ENTMCNC: 31.8 G/DL (ref 31.5–36.5)
MCV RBC AUTO: 93 FL (ref 78–100)
MONOCYTES # BLD AUTO: 0.8 10E3/UL (ref 0–1.3)
MONOCYTES NFR BLD AUTO: 10 %
NEUTROPHILS # BLD AUTO: 6.5 10E3/UL (ref 1.6–8.3)
NEUTROPHILS NFR BLD AUTO: 79 %
NITRATE UR QL: NEGATIVE
NRBC # BLD AUTO: 0 10E3/UL
NRBC BLD AUTO-RTO: 0 /100
P AXIS - MUSE: 79 DEGREES
PH UR STRIP: 6 [PH] (ref 5–7)
PLATELET # BLD AUTO: 125 10E3/UL (ref 150–450)
POTASSIUM SERPL-SCNC: 4.2 MMOL/L (ref 3.4–5.3)
PR INTERVAL - MUSE: 132 MS
QRS DURATION - MUSE: 80 MS
QT - MUSE: 420 MS
QTC - MUSE: 490 MS
R AXIS - MUSE: 90 DEGREES
RBC # BLD AUTO: 3.49 10E6/UL (ref 3.8–5.2)
RBC URINE: 3 /HPF
SODIUM SERPL-SCNC: 131 MMOL/L (ref 135–145)
SP GR UR STRIP: 1.01 (ref 1–1.03)
SQUAMOUS EPITHELIAL: 1 /HPF
SYSTOLIC BLOOD PRESSURE - MUSE: NORMAL MMHG
T AXIS - MUSE: 147 DEGREES
TROPONIN T SERPL HS-MCNC: 10 NG/L
TROPONIN T SERPL HS-MCNC: 13 NG/L
UROBILINOGEN UR STRIP-MCNC: NORMAL MG/DL
VENTRICULAR RATE- MUSE: 82 BPM
WBC # BLD AUTO: 8.3 10E3/UL (ref 4–11)
WBC URINE: 39 /HPF

## 2024-07-07 PROCEDURE — 99284 EMERGENCY DEPT VISIT MOD MDM: CPT | Mod: 25

## 2024-07-07 PROCEDURE — 81003 URINALYSIS AUTO W/O SCOPE: CPT

## 2024-07-07 PROCEDURE — 96360 HYDRATION IV INFUSION INIT: CPT

## 2024-07-07 PROCEDURE — 258N000003 HC RX IP 258 OP 636

## 2024-07-07 PROCEDURE — 87186 SC STD MICRODIL/AGAR DIL: CPT

## 2024-07-07 PROCEDURE — 84484 ASSAY OF TROPONIN QUANT: CPT

## 2024-07-07 PROCEDURE — 80048 BASIC METABOLIC PNL TOTAL CA: CPT

## 2024-07-07 PROCEDURE — 36415 COLL VENOUS BLD VENIPUNCTURE: CPT | Performed by: EMERGENCY MEDICINE

## 2024-07-07 PROCEDURE — 36415 COLL VENOUS BLD VENIPUNCTURE: CPT

## 2024-07-07 PROCEDURE — 93005 ELECTROCARDIOGRAM TRACING: CPT

## 2024-07-07 PROCEDURE — 87086 URINE CULTURE/COLONY COUNT: CPT

## 2024-07-07 PROCEDURE — 85004 AUTOMATED DIFF WBC COUNT: CPT

## 2024-07-07 RX ORDER — CEPHALEXIN 500 MG/1
500 CAPSULE ORAL 3 TIMES DAILY
Qty: 21 CAPSULE | Refills: 0 | Status: SHIPPED | OUTPATIENT
Start: 2024-07-07 | End: 2024-07-14

## 2024-07-07 RX ADMIN — SODIUM CHLORIDE, POTASSIUM CHLORIDE, SODIUM LACTATE AND CALCIUM CHLORIDE 1000 ML: 600; 310; 30; 20 INJECTION, SOLUTION INTRAVENOUS at 07:30

## 2024-07-07 ASSESSMENT — COLUMBIA-SUICIDE SEVERITY RATING SCALE - C-SSRS
1. IN THE PAST MONTH, HAVE YOU WISHED YOU WERE DEAD OR WISHED YOU COULD GO TO SLEEP AND NOT WAKE UP?: NO
6. HAVE YOU EVER DONE ANYTHING, STARTED TO DO ANYTHING, OR PREPARED TO DO ANYTHING TO END YOUR LIFE?: NO
2. HAVE YOU ACTUALLY HAD ANY THOUGHTS OF KILLING YOURSELF IN THE PAST MONTH?: NO

## 2024-07-07 ASSESSMENT — ACTIVITIES OF DAILY LIVING (ADL)
ADLS_ACUITY_SCORE: 35

## 2024-07-07 NOTE — ED PROVIDER NOTES
Emergency Department Attending Supervision Note  6/4/2024  7:38 AM      I evaluated this patient in conjunction with RIMA Day CNP      Briefly, the patient is a 69-year-old with known coronary disease had stents placed in June a total of 6 comes in with some chest pain and lightheadedness since about 4:00 yesterday.  Overnight she had some nausea and has had burning frequency and urgency of urination and developed that lightheadedness.  She has not ate anything since 6 last night.  This is more of a tightness in her chest and it comes and goes and she does not have any symptoms at this time.  She did have a little bit of shortness of breath with it overnight.  She states it is different than the pain she had before her stents.  This pain does not radiate.  No heartburn symptoms and no abdominal pain.      On my exam, patient appears comfortable, no palpable tenderness in her epigastrium or chest wall.      ED Course: EKG came back showing some T wave inversion which actually is improved since the last EKG when she was in getting her stents.  Her troponin is normal at 13 and a repeat will be obtained in 2 hours.  Second troponin is unchanged.  Patient feels good at this time.  She has had a little bit of flank pain and her UA is suggestive of an infection.  Cultures pending.  Will treat with antibiotics.  I feel comfortable with her being discharged home with follow-up with cardiology.      I agree with Jeffry's impression and plan.      Diagnosis    ICD-10-CM    1. Urinary tract infection  N39.0       2. Chest pressure  R07.89               MD Javan Day, Traci Ramirez MD  07/07/24 1233

## 2024-07-07 NOTE — DISCHARGE INSTRUCTIONS
Please take your medication as prescribed.  Please reach out to your cardiology office tomorrow to schedule a follow-up appointment.      Call 911 anytime you think you may need emergency care. For example, call if:    You have chest pain or pressure. This may occur with:  Sweating.  Shortness of breath.  Nausea or vomiting.  Pain that spreads from the chest to the neck, jaw, or one or both shoulders or arms.  Dizziness or lightheadedness.  A fast or uneven pulse.  After calling 911, chew 1 adult-strength aspirin. Wait for an ambulance. Do not try to drive yourself.     You have sudden chest pain and shortness of breath, or you cough up blood.   Call your doctor now or seek immediate medical care if:    You have any trouble breathing.     Your chest pain gets worse.     Your chest pain occurs consistently with exercise and is relieved by rest.   Watch closely for changes in your health, and be sure to contact your doctor if:    Your chest pain does not get better after 1 week.

## 2024-07-07 NOTE — TELEPHONE ENCOUNTER
Pt's Significant other calling (with Pt present on speaker phone giving verbal C2C) with concerns about;    4 coronary stents put in on 24 and 2 more following week    Began with following symptoms on or about 24;  Pain in lower left side/back  Extreme nausea  Weakness - needs to hold onto something to stand  Very dizzy/lightheaded when standing  Dry mouth  Urinating every 15 minutes since afternoon of 24  Chest tightness this morning for approx 5 minutes but gone now  Heart palpitations  Feeling as though 'could pass out'    BP this mornin/64   BP last PM:         182/91     Denies;  Fever    According to the protocol, patient should call 911 now.  Care advice given. Caller verbalizes understanding and agrees with plan of care.     Stephy Rizo RN, Nurse Advisor 5:53 AM 2024  Reason for Disposition   Shock suspected (e.g., cold/pale/clammy skin, too weak to stand, low BP, rapid pulse)    Additional Information   Negative: Passed out (i.e., lost consciousness, collapsed and was not responding)    Protocols used: Flank Pain-A-AH

## 2024-07-07 NOTE — ED PROVIDER NOTES
Emergency Department Note      History of Present Illness     Chief Complaint   Chest Pain and Flank Pain      HPI   Taylor Aburto is a 69 year old female who presents emergency department for the evaluation of chest pressure and urinary frequency.  Past medical history includes but is not limited to type 2 diabetes, NSTEMI, coronary artery disease, chronic tobacco use.  Patient reports that she started experiencing midsternal chest pressure approximately 1 day prior.  She states that her pain is primarily located in her midsternal chest and describes it as if somebody is pushing on her chest.  She reports that her pain is exacerbated with any exertional movement.  She states that her pain is intermittent.  She states that the pain does not radiate anywhere.  In comparison to her recent NSTEMI she states that this pain is significantly more mild.  Over the past 2 days she also reports that she has been experiencing increased urinary frequency and burning sensation with urination.  She denies any hematuria.  She does report that her pain does radiate into her left posterior flank.  She states that she does not have a history of any kidney stone, she has had a prior UTI however she reports that it has been a significant amount of time since her last episode of urinary tract infection.    Independent Historian   None    Review of External Notes   None    Past Medical History     Medical History and Problem List   Past Medical History:   Diagnosis Date    Arthritis 6/27/2013    H/O ETOH abuse 2/6/2018    Moderate smoker (20 or less per day) 6/27/2013    Vitamin D deficiency 2/6/2018       Medications   cephALEXin (KEFLEX) 500 MG capsule  aspirin 81 MG EC tablet  aspirin 81 MG EC tablet  blood glucose (ACCU-CHEK GUIDE) test strip  blood glucose (NO BRAND SPECIFIED) lancets standard  blood glucose meter (GLUCOMETER)  carvedilol (COREG) 3.125 MG tablet  fluticasone (FLONASE) 50 MCG/ACT nasal spray  lisinopril (ZESTRIL)  5 MG tablet  metFORMIN (GLUCOPHAGE XR) 500 MG 24 hr tablet  nicotine polacrilex (NICORETTE) 4 MG gum  nitroGLYcerin (NITROSTAT) 0.4 MG sublingual tablet  oxyBUTYnin ER (DITROPAN XL) 10 MG 24 hr tablet  prasugrel (EFFIENT) 10 MG TABS tablet  prasugrel (EFFIENT) 5 MG TABS tablet  PYRIDOXINE HCL PO  rosuvastatin (CRESTOR) 40 MG tablet  rosuvastatin (CRESTOR) 40 MG tablet        Surgical History   Past Surgical History:   Procedure Laterality Date    COLONOSCOPY N/A 12/1/2023    Procedure: COLONOSCOPY, FLEXIBLE, WITH LESION REMOVAL USING SNARE;  Surgeon: Jake Thomas MD;  Location: [facility]    CV CORONARY ANGIOGRAM N/A 6/8/2024    Procedure: Coronary Angiogram;  Surgeon: Rick Agustin MD;  Location: [facility] CARDIAC CATH LAB    CV CORONARY ANGIOGRAM N/A 6/18/2024    Procedure: Coronary Angiogram;  Surgeon: Rick Agustin MD;  Location: [facility] CARDIAC CATH LAB    CV INTRAVASULAR ULTRASOUND N/A 6/8/2024    Procedure: Intravascular Ultrasound;  Surgeon: Rick Agustin MD;  Location: [facility] CARDIAC CATH LAB    CV INTRAVASULAR ULTRASOUND N/A 6/18/2024    Procedure: Intravascular Ultrasound;  Surgeon: Rick Agustin MD;  Location: [facility] CARDIAC CATH LAB    CV PCI ASPIRATION THROMECTOMY N/A 6/8/2024    Procedure: Percutaneous Coronary Intervention Aspiration Thrombectomy;  Surgeon: Rick Agustin MD;  Location: [facility] CARDIAC CATH LAB    CV PCI STENT DRUG ELUTING N/A 6/8/2024    Procedure: Percutaneous Coronary Intervention Stent;  Surgeon: Rick Agustin MD;  Location: [facility] CARDIAC CATH LAB    CV PCI STENT DRUG ELUTING N/A 6/18/2024    Procedure: Percutaneous Coronary Intervention Stent;  Surgeon: Rick Agustin MD;  Location: [facility] CARDIAC CATH LAB    CV PERICARDIOCENTESIS N/A 6/10/2024    Procedure: Pericardiocentesis;  Surgeon: Ayde Ruiz MD;  Location: [facility] CARDIAC CATH LAB    ENDOSCOPIC ULTRASOUND UPPER GASTROINTESTINAL  TRACT (GI) N/A 2/22/2024    Procedure: Endoscopic ultrasound upper gastrointestinal tract (GI);  Surgeon: Jake Thomas MD;  Location:  GI    ESOPHAGOSCOPY, GASTROSCOPY, DUODENOSCOPY (EGD), COMBINED N/A 2/22/2024    Procedure: ESOPHAGOGASTRODUODENOSCOPY, WITH BIOPSY;  Surgeon: Jake Thomas MD;  Location:  GI       Physical Exam     Patient Vitals for the past 24 hrs:   BP Temp Temp src Pulse Resp SpO2   07/07/24 0800 -- -- -- 80 14 95 %   07/07/24 0741 122/59 -- -- 85 14 98 %   07/07/24 0730 127/59 -- -- 80 10 97 %   07/07/24 0632 131/68 98.6  F (37  C) Oral 82 18 98 %     Physical Exam  General: Awake, alert, non-toxic.  Head:  Scalp is NC/AT  Eyes:  Conjunctiva normal, PERRL  ENT:  The external nose and ears are normal.     Oropharynx clear, uvula midline.  Neck:  Normal range of motion without rigidity.  CV:  Regular rate and rhythm    No pathologic murmur, rubs, or gallops.  Resp:  Breath sounds are clear bilaterally    Non-labored, no retractions or accessory muscle use  Abdomen: Abdomen is soft, no distension, no tenderness, no masses. No CVA tenderness.  MS:  No lower extremity edema/swelling. No midline cervical, thoracic, or lumbar tenderness.  Extremities without joint swelling or redness.  Skin:  Warm and dry, No rash or lesions noted.  Neuro:  Alert and oriented.  GCS 15 Moves all extremities normal.  No facial asymmetry. Gait normal.  Psych: Awake. Alert. Normal affect. Appropriate interactions.     Diagnostics     Lab Results   Labs Ordered and Resulted from Time of ED Arrival to Time of ED Departure   BASIC METABOLIC PANEL - Abnormal       Result Value    Sodium 131 (*)     Potassium 4.2      Chloride 97 (*)     Carbon Dioxide (CO2) 25      Anion Gap 9      Urea Nitrogen 13.9      Creatinine 0.76      GFR Estimate 85      Calcium 9.0      Glucose 244 (*)    ROUTINE UA WITH MICROSCOPIC REFLEX TO CULTURE - Abnormal    Color Urine Straw      Appearance Urine Clear       Glucose Urine Negative      Bilirubin Urine Negative      Ketones Urine Negative      Specific Gravity Urine 1.006      Blood Urine Small (*)     pH Urine 6.0      Protein Albumin Urine Negative      Urobilinogen Urine Normal      Nitrite Urine Negative      Leukocyte Esterase Urine Small (*)     Bacteria Urine Many (*)     RBC Urine 3 (*)     WBC Urine 39 (*)     Squamous Epithelials Urine 1     CBC WITH PLATELETS AND DIFFERENTIAL - Abnormal    WBC Count 8.3      RBC Count 3.49 (*)     Hemoglobin 10.3 (*)     Hematocrit 32.4 (*)     MCV 93      MCH 29.5      MCHC 31.8      RDW 13.1      Platelet Count 125 (*)     % Neutrophils 79      % Lymphocytes 10      % Monocytes 10      % Eosinophils 1      % Basophils 0      % Immature Granulocytes 0      NRBCs per 100 WBC 0      Absolute Neutrophils 6.5      Absolute Lymphocytes 0.9      Absolute Monocytes 0.8      Absolute Eosinophils 0.1      Absolute Basophils 0.0      Absolute Immature Granulocytes 0.0      Absolute NRBCs 0.0     TROPONIN T, HIGH SENSITIVITY - Normal    Troponin T, High Sensitivity 13     TROPONIN T, HIGH SENSITIVITY - Normal    Troponin T, High Sensitivity 10     URINE CULTURE       Imaging   No orders to display       EKG   ECG results from 06/18/24   EKG 12-lead, tracing only - Hospital locations:  [facility]  [facility] [place]     Value    Systolic Blood Pressure     Diastolic Blood Pressure     Ventricular Rate 71    Atrial Rate 71    ME Interval 134    QRS Duration 86        QTc 510    P Axis 78    R AXIS 75    T Axis 227    Interpretation ECG      Sinus rhythm  Marked T wave abnormality, consider anterolateral ischemia  Prolonged QT  Abnormal ECG  When compared with ECG of 10-VICTOR HUGO-2024 18:08,  Criteria for Septal infarct are no longer Present  T wave inversion now evident in Inferior leads  T wave inversion more evident in Anterolateral leads  Confirmed by MD SAMI, ALON ([id number]) on 6/19/2024 12:22:35 PM     EKG 12-lead, tracing only     Value    Systolic  Blood Pressure     Diastolic Blood Pressure     Ventricular Rate 63    Atrial Rate 63    NV Interval 128    QRS Duration 90        QTc 515    P Axis 71    R AXIS 75    T Axis 255    Interpretation ECG      Sinus rhythm  Diffuse marked ST-T abnormality  Prolonged QT  Abnormal ECG  When compared with ECG of 18-JUN-2024 09:57, (unconfirmed)  No significant change was found  Confirmed by MD SAMI, ALON ([id number]) on 6/19/2024 9:20:23 AM          Independent Interpretation   None    ED Course      Medications Administered   Medications   lactated ringers BOLUS 1,000 mL (0 mLs Intravenous Stopped 7/7/24 0812)       Procedures   Procedures     Discussion of Management   None    ED Course        Optional/Additional Documentation  None    Medical Decision Making / Diagnosis     CMS Diagnoses: None    MIPS       None    MDM   Taylor Aburto is a 69 year old female who presents emergency department for the evaluation of chest pressure and burning sensation with urination.  Patient reports that her chest pressure is primarily midsternal and does not radiate anywhere.  Of note the patient had recent stent placement.  EKG did not reveal any acute ST elevation or depression, when compared to previous EKG her ST depression had actually improved.  Troponin was negative x 2 for age and sex.  Patient states that her chest pressure is very intermittent and has actually subsided while being here in the emergency department.  At this time I have low suspicion for acute coronary syndrome.  I did tell her that this could be a spasm as a result of her recent stent placement.  I also discussed these findings with my attending Dr. Edouard who agreed with my assessment.  I encouraged her to follow-up with her cardiologist tomorrow she may need a repeat echocardiogram or further stress testing.  I did obtain a CBC, this was negative for any acute leukocytosis.  WBC count was 8.3.  Hemoglobin was stable at 10.3.  There was no electrolyte  abnormality found on her chemistry.  Urinalysis was sent.  There was only a small amount of RBC in the patient's urine, I have low suspicion for an acute kidney stone.  The urinalysis was nitrite negative, however there was found to have a large amount of bacteria within the urine.  Given the patient's presenting signs and symptoms and urinalysis I am going to treat her for a urinary tract infection.  Patient will prescribed Keflex 500 mg 3 times daily for 7 days.  All questions and concerns were addressed and the patient verbalized understanding.  Patient states that she will reach out to her cardiology office tomorrow to schedule a follow-up appointment.  Red flag symptoms and return criteria were discussed and patient verbalized understanding.  Patient was successfully discharged.    Disposition   The patient was discharged.     Diagnosis     ICD-10-CM    1. Urinary tract infection  N39.0       2. Chest pressure  R07.89            Discharge Medications   New Prescriptions    CEPHALEXIN (KEFLEX) 500 MG CAPSULE    Take 1 capsule (500 mg) by mouth 3 times daily for 7 days         RIMA Day CNP, Casey, APRN CNP  07/07/24 1007

## 2024-07-07 NOTE — ED TRIAGE NOTES
BIBA from home.  Chest pressure since 1600 yesterday with nausea and dizziness.  Also lefft flank pain and some pain with urination.  Had 4 cardia stents paced 6/8 and 2 more the following Wednesday.     Triage Assessment (Adult)       Row Name 07/07/24 0646          Triage Assessment    Airway WDL WDL        Respiratory WDL    Respiratory WDL WDL        Skin Circulation/Temperature WDL    Skin Circulation/Temperature WDL WDL        Cardiac WDL    Cardiac WDL X;chest pain     Cardiac Rhythm NSR        Peripheral/Neurovascular WDL    Peripheral Neurovascular WDL WDL        Cognitive/Neuro/Behavioral WDL    Cognitive/Neuro/Behavioral WDL WDL

## 2024-07-07 NOTE — ED NOTES
Bed: ED14  Expected date:   Expected time:   Means of arrival:   Comments:  [id number] chest discomfort recent stent placement eta 10

## 2024-07-08 LAB — BACTERIA UR CULT: ABNORMAL

## 2024-07-09 ENCOUNTER — TELEPHONE (OUTPATIENT)
Dept: NURSING | Facility: CLINIC | Age: 69
End: 2024-07-09
Payer: COMMERCIAL

## 2024-07-09 NOTE — TELEPHONE ENCOUNTER
[facility]    Reason for call: Lab Result Notification     Lab Result (including Rx patient on, if applicable).  If culture, copy of lab report at bottom.  Lab Result: Final Urine Culture Report on 7/8/24  [facility] Emergency Dept discharge antibiotic prescribed: Cephalexin (Keflex) 500 mg capsule, 1 capsule (500 mg) by mouth 3 times daily for 7 days.   Bacterial Growth: >100,000 CFU/ML Escherichia coli (Susceptible)    Patient's current Symptoms:   Still having urinary frequency  Burning with urinary sensation is gone    RN Recommendations/Instructions per [place] ED lab result protocol:   [facility] ED lab result protocol utilized: urine cx    Patient/care giver notified to contact your PCP clinic or return to the Emergency department if your:  Symptoms do not resolve after completing antibiotic.  Symptoms worsen or other concerning symptoms.        Kody Sprague RN

## 2024-07-09 NOTE — LETTER
July 9, 2024        Taylor Aburto  [address]  [place] [address]          Dear Taylor Aburto:    You were seen in the [facility] Emergency Department at [facility] on 7/7/2024.  We are unable to reach you by phone, so we are sending you this letter.     It is important that you call [facility] Emergency Department lab result nurse at [phone], as we have information to relay to you AND/OR we MAY have to make some changes in your treatment.    Best time to call back is between 9AM and 5:30PM, 7 days a week.      Sincerely,     [facility] Emergency Department Lab Result RN  [phone]

## 2024-07-09 NOTE — TELEPHONE ENCOUNTER
[facility]    Reason for call: Lab Result Notification     Lab Result (including Rx patient on, if applicable).  If culture, copy of lab report at bottom.  Lab Result: Urine Culture - See Below    ED Rx: cephALEXin (KEFLEX) 500 MG capsule - Take 1 capsule (500 mg) by mouth 3 times daily for 7 days (SUSCEPTIBLE)    Creatinine Level (mg/dl)   Creatinine   Date Value Ref Range Status   07/07/2024 0.76 0.51 - 0.95 mg/dL Final    Creatinine clearance (ml/min), if applicable    Serum creatinine: 0.76 mg/dL 07/07/24 0644  Estimated creatinine clearance: 55.8 mL/min   Dysuria, frequency, urgency,   RN Recommendations/Instructions per [place] ED lab result protocol:   [facility] ED lab result protocol utilized: Urine Culture        Unable to reach patient/caregiver.     Left voicemail message requesting a call back to [phone] between 9 a.m. and 5:30 p.m. for patient's ED/ lab results.      Letter pended to be sent via [employer].      SRIDEVI DAVIS RN

## 2024-07-10 ENCOUNTER — TELEPHONE (OUTPATIENT)
Dept: OTHER | Facility: CLINIC | Age: 69
End: 2024-07-10
Payer: COMMERCIAL

## 2024-07-10 NOTE — PROGRESS NOTES
Called out to pt w/recommendations. No answer, left detailed VM per below. Asked pt to call back to confirm understanding of the reasoning for change in recommendations that she see Dr. Mcgee instead of Dr. Jimenez. Also informed pt she may get a call from the [facility] to schedule an appt w/Dr. Mcgee. Whitney Farah RN on 7/10/2024 at 12:34 PM      Tamra Redmond NP  P [facility] Heart Team 7  Cc: Jared Squires MD  Reviewed with Dr. Jimenez. Given that patient only has evidence of FMD in carotid artery, she recommends patient be seen by Dr. Mcgee in vascular. Please assist with arranging and vascular cardiology referral/appt can be cancelled. We should still see her in cardiology clinic for follow up as scheduled on 7/30/24. Thanks.

## 2024-07-10 NOTE — TELEPHONE ENCOUNTER
Referral received via "[employer] on 7/10/24.    Pt referred to [facility] by Tamra Redmond NP for Fibromuscular dysplasia     Routing to scheduling to coordinate the following:    NEW VASCULAR PATIENT consult with Vascular Medicine  Please schedule this at next available      Appt note:  Pt referred to [facility] by Tamra Redmond NP for Fibromuscular dysplasia     Veronica TINSLEY, RN    [facility]  Office: [phone]  Fax: [phone]

## 2024-07-12 ENCOUNTER — HOSPITAL ENCOUNTER (OUTPATIENT)
Dept: CARDIOLOGY | Facility: HOSPITAL | Age: 69
Discharge: HOME | End: 2024-07-12
Attending: NURSE PRACTITIONER | Admitting: NURSE PRACTITIONER
Payer: COMMERCIAL

## 2024-07-12 DIAGNOSIS — I21.4 NSTEMI (NON-ST ELEVATED MYOCARDIAL INFARCTION) (H): ICD-10-CM

## 2024-07-12 DIAGNOSIS — I31.39 PERICARDIAL EFFUSION: ICD-10-CM

## 2024-07-12 DIAGNOSIS — I25.10 CORONARY ARTERY DISEASE INVOLVING NATIVE CORONARY ARTERY OF NATIVE HEART WITHOUT ANGINA PECTORIS: ICD-10-CM

## 2024-07-12 LAB — LVEF ECHO: NORMAL

## 2024-07-12 PROCEDURE — 93308 TTE F-UP OR LMTD: CPT | Mod: 26 | Performed by: INTERNAL MEDICINE

## 2024-07-12 PROCEDURE — 93325 DOPPLER ECHO COLOR FLOW MAPG: CPT

## 2024-07-12 PROCEDURE — 93321 DOPPLER ECHO F-UP/LMTD STD: CPT | Mod: 26 | Performed by: INTERNAL MEDICINE

## 2024-07-12 PROCEDURE — 93325 DOPPLER ECHO COLOR FLOW MAPG: CPT | Mod: 26 | Performed by: INTERNAL MEDICINE

## 2024-07-16 ENCOUNTER — HOSPITAL ENCOUNTER (OUTPATIENT)
Dept: CARDIAC REHAB | Facility: HOSPITAL | Age: 69
Discharge: HOME | End: 2024-07-16
Attending: STUDENT IN AN ORGANIZED HEALTH CARE EDUCATION/TRAINING PROGRAM
Payer: COMMERCIAL

## 2024-07-16 DIAGNOSIS — I25.10 CORONARY ARTERY DISEASE INVOLVING NATIVE CORONARY ARTERY OF NATIVE HEART WITHOUT ANGINA PECTORIS: ICD-10-CM

## 2024-07-16 DIAGNOSIS — I21.4 NSTEMI (NON-ST ELEVATED MYOCARDIAL INFARCTION) (H): ICD-10-CM

## 2024-07-16 PROCEDURE — 93797 PHYS/QHP OP CAR RHAB WO ECG: CPT | Mod: 59

## 2024-07-16 PROCEDURE — 93798 PHYS/QHP OP CAR RHAB W/ECG: CPT

## 2024-07-18 ENCOUNTER — HOSPITAL ENCOUNTER (OUTPATIENT)
Dept: CARDIAC REHAB | Facility: HOSPITAL | Age: 69
Discharge: HOME | End: 2024-07-18
Attending: INTERNAL MEDICINE
Payer: COMMERCIAL

## 2024-07-18 PROCEDURE — 93798 PHYS/QHP OP CAR RHAB W/ECG: CPT

## 2024-07-22 DIAGNOSIS — E11.9 TYPE 2 DIABETES MELLITUS WITHOUT COMPLICATION, WITHOUT LONG-TERM CURRENT USE OF INSULIN (H): ICD-10-CM

## 2024-07-23 ENCOUNTER — HOSPITAL ENCOUNTER (OUTPATIENT)
Dept: CARDIAC REHAB | Facility: HOSPITAL | Age: 69
Discharge: HOME | End: 2024-07-23
Attending: INTERNAL MEDICINE
Payer: COMMERCIAL

## 2024-07-23 ENCOUNTER — MYC REFILL (OUTPATIENT)
Dept: FAMILY MEDICINE | Facility: CLINIC | Age: 69
End: 2024-07-23
Payer: COMMERCIAL

## 2024-07-23 DIAGNOSIS — E11.9 TYPE 2 DIABETES MELLITUS WITHOUT COMPLICATION, WITHOUT LONG-TERM CURRENT USE OF INSULIN (H): ICD-10-CM

## 2024-07-23 PROCEDURE — 93798 PHYS/QHP OP CAR RHAB W/ECG: CPT

## 2024-07-24 RX ORDER — METFORMIN HCL 500 MG
1000 TABLET, EXTENDED RELEASE 24 HR ORAL 2 TIMES DAILY
Qty: 360 TABLET | Refills: 1 | Status: SHIPPED | OUTPATIENT
Start: 2024-07-24 | End: 2024-08-23

## 2024-07-24 RX ORDER — METFORMIN HCL 500 MG
1000 TABLET, EXTENDED RELEASE 24 HR ORAL 2 TIMES DAILY
Qty: 360 TABLET | Refills: 0 | OUTPATIENT
Start: 2024-07-24

## 2024-07-25 ENCOUNTER — HOSPITAL ENCOUNTER (OUTPATIENT)
Dept: CARDIAC REHAB | Facility: HOSPITAL | Age: 69
Discharge: HOME | End: 2024-07-25
Attending: INTERNAL MEDICINE
Payer: COMMERCIAL

## 2024-07-25 PROCEDURE — 93798 PHYS/QHP OP CAR RHAB W/ECG: CPT

## 2024-07-26 ENCOUNTER — OFFICE VISIT (OUTPATIENT)
Dept: OTHER | Facility: CLINIC | Age: 69
End: 2024-07-26
Attending: INTERNAL MEDICINE
Payer: COMMERCIAL

## 2024-07-26 ENCOUNTER — CARE COORDINATION (OUTPATIENT)
Dept: CARDIOLOGY | Facility: CLINIC | Age: 69
End: 2024-07-26
Payer: COMMERCIAL

## 2024-07-26 VITALS
SYSTOLIC BLOOD PRESSURE: 122 MMHG | OXYGEN SATURATION: 97 % | WEIGHT: 107.6 LBS | DIASTOLIC BLOOD PRESSURE: 64 MMHG | HEART RATE: 69 BPM | BODY MASS INDEX: 17.91 KG/M2

## 2024-07-26 DIAGNOSIS — I10 BENIGN ESSENTIAL HYPERTENSION: ICD-10-CM

## 2024-07-26 DIAGNOSIS — Z87.891 FORMER SMOKER: ICD-10-CM

## 2024-07-26 DIAGNOSIS — E11.9 TYPE 2 DIABETES MELLITUS WITHOUT COMPLICATION, WITHOUT LONG-TERM CURRENT USE OF INSULIN (H): ICD-10-CM

## 2024-07-26 DIAGNOSIS — I25.10 CORONARY ARTERY DISEASE INVOLVING NATIVE CORONARY ARTERY OF NATIVE HEART WITHOUT ANGINA PECTORIS: ICD-10-CM

## 2024-07-26 DIAGNOSIS — I31.39 PERICARDIAL EFFUSION: Primary | ICD-10-CM

## 2024-07-26 DIAGNOSIS — I77.3 FIBROMUSCULAR DYSPLASIA OF CAROTID ARTERY (H): Primary | ICD-10-CM

## 2024-07-26 DIAGNOSIS — E78.5 HYPERLIPIDEMIA LDL GOAL <70: ICD-10-CM

## 2024-07-26 PROCEDURE — 99213 OFFICE O/P EST LOW 20 MIN: CPT | Performed by: INTERNAL MEDICINE

## 2024-07-26 PROCEDURE — G2211 COMPLEX E/M VISIT ADD ON: HCPCS | Performed by: INTERNAL MEDICINE

## 2024-07-26 PROCEDURE — 99205 OFFICE O/P NEW HI 60 MIN: CPT | Performed by: INTERNAL MEDICINE

## 2024-07-26 NOTE — PATIENT INSTRUCTIONS
We will repeat Head and neck CTA in 3 months then few days later  Video or office visit     Avoid chiropractic neck manipulations or deep neck massages     Tight control of diabetes, take medications as prescribed     Follow up with cardiology service as planned and take all your pill bottles and clarify

## 2024-07-26 NOTE — PROGRESS NOTES
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 she was a smoker quit after the hospitalization.  History of hyperlipidemia currently on statin and also previous history of EtOH quit many years ago.  She has been dealing with generalized fatigue, tiredness.  She also has a history of pancreatic duct dilatation many years ago.   Type 2 diabetes mellitus poorly controlled after the discharge from the hospital she has not started taking metformin blood sugars are high even though it was prescribed and she has not picked up.  She denies any chest pain, shortness of breath or palpitations  She does undergo regular neck massage therapy    She is new to me reviewed available extensive records in the epic and updated chart          PAST MEDICAL HISTORY  Past Medical History:   Diagnosis Date    Arthritis 06/27/2013    CAD (coronary artery disease) s/p KIM 6/2024     H/O ETOH abuse 02/06/2018    Moderate smoker (20 or less per day) 06/27/2013    Vitamin D deficiency 02/06/2018       CURRENT MEDICATIONS  Current Outpatient Medications   Medication Sig Dispense Refill    aspirin 81 MG EC tablet Take 1 tablet (81 mg) by mouth daily for 360 days Start tomorrow. 90 tablet 3    blood glucose  (ACCU-CHEK GUIDE) test strip [ACCU-CHEK GUIDE TEST STRIPS STRIPS] TEST BLOOD GLUCOSE LEVELS TWICE A  strip 3    blood glucose (NO BRAND SPECIFIED) lancets standard Use to test blood sugar two times daily or as directed. 200 lancet 0    blood glucose meter (GLUCOMETER) [BLOOD GLUCOSE METER (GLUCOMETER)] Use 1 each As Directed as needed. Dispense glucometer brand per patient's insurance at pharmacy discretion. DX: E11.9 1 each 0    carvedilol (COREG) 3.125 MG tablet Take 1 tablet (3.125 mg) by mouth 2 times daily (with meals) 180 tablet 0    fluticasone (FLONASE) 50 MCG/ACT nasal spray Spray 1 spray into both nostrils daily (Patient taking differently: Spray 1 spray into both nostrils daily as needed for allergies) 16 g 4    lisinopril (ZESTRIL) 5 MG tablet Take 1 tablet (5 mg) by mouth daily 90 tablet 3    metFORMIN (GLUCOPHAGE XR) 500 MG 24 hr tablet Take 2 tablets (1,000 mg) by mouth 2 times daily 360 tablet 1    nicotine polacrilex (NICORETTE) 4 MG gum Chew one piece every 1- 2 hours for cravings as needed. Try to limit using the gum for strong craving only since you are taking the patch, and previous history of nausea and vomiting. 50 each 3    nitroGLYcerin (NITROSTAT) 0.4 MG sublingual tablet For chest pain place 1 tablet under the tongue every 5 minutes for 3 doses. If symptoms persist 5 minutes after 1st dose call 911. 30 tablet 0    oxyBUTYnin ER (DITROPAN XL) 10 MG 24 hr tablet Take 1 tablet (10 mg) by mouth daily 90 tablet 3    prasugrel (EFFIENT) 10 MG TABS tablet Take 1 tablet (10 mg) by mouth daily Do not crush or break tablet. Dose to start tomorrow. 90 tablet 3    prasugrel (EFFIENT) 5 MG TABS tablet Take 1 tablet (5 mg) by mouth daily for 360 days Do not crush or break tablet. 90 tablet 3    PYRIDOXINE HCL PO Take 1 tablet by mouth daily Strength unknown      rosuvastatin (CRESTOR) 40 MG tablet Take 1 tablet (40 mg) by mouth daily 90 tablet 3     No current facility-administered medications  for this visit.       PAST SURGICAL HISTORY:  Past Surgical History:   Procedure Laterality Date    COLONOSCOPY N/A 12/1/2023    Procedure: COLONOSCOPY, FLEXIBLE, WITH LESION REMOVAL USING SNARE;  Surgeon: Jake Thomas MD;  Location: [facility]    CV CORONARY ANGIOGRAM N/A 6/8/2024    Procedure: Coronary Angiogram;  Surgeon: Rick Agustin MD;  Location:  HEART CARDIAC CATH LAB    CV CORONARY ANGIOGRAM N/A 6/18/2024    Procedure: Coronary Angiogram;  Surgeon: Rick Agustin MD;  Location:  HEART CARDIAC CATH LAB    CV INTRAVASULAR ULTRASOUND N/A 6/8/2024    Procedure: Intravascular Ultrasound;  Surgeon: Rick Agustin MD;  Location:  HEART CARDIAC CATH LAB    CV INTRAVASULAR ULTRASOUND N/A 6/18/2024    Procedure: Intravascular Ultrasound;  Surgeon: Rick Augstin MD;  Location:  HEART CARDIAC CATH LAB    CV PCI ASPIRATION THROMECTOMY N/A 6/8/2024    Procedure: Percutaneous Coronary Intervention Aspiration Thrombectomy;  Surgeon: Rick Agustin MD;  Location:  HEART CARDIAC CATH LAB    CV PCI STENT DRUG ELUTING N/A 6/8/2024    Procedure: Percutaneous Coronary Intervention Stent;  Surgeon: Rick Agustin MD;  Location:  HEART CARDIAC CATH LAB    CV PCI STENT DRUG ELUTING N/A 6/18/2024    Procedure: Percutaneous Coronary Intervention Stent;  Surgeon: Rick Agustin MD;  Location: [facility] CARDIAC CATH LAB    CV PERICARDIOCENTESIS N/A 6/10/2024    Procedure: Pericardiocentesis;  Surgeon: Ayde Ruiz MD;  Location: [facility] CARDIAC CATH LAB    ENDOSCOPIC ULTRASOUND UPPER GASTROINTESTINAL TRACT (GI) N/A 2/22/2024    Procedure: Endoscopic ultrasound upper gastrointestinal tract (GI);  Surgeon: Jake Thomas MD;  Location:  GI    ESOPHAGOSCOPY, GASTROSCOPY, DUODENOSCOPY (EGD), COMBINED N/A 2/22/2024    Procedure: ESOPHAGOGASTRODUODENOSCOPY, WITH BIOPSY;  Surgeon: Jake Thomas MD;  Location:  GI        ALLERGIES     Allergies   Allergen Reactions    Prednisone Dizziness       FAMILY HISTORY  History reviewed. No pertinent family history.    VASCULAR FAMILY HISTORY  1st order relative with atherosclerotic PAD: No  1st order relative with AAA: No  Family history of Familial Hyperlipidemia No  Family History of Hypercoagulable state:No    VASCULAR RISK FACTORS  1. Diabetes:Yes uncontrolled  2. Smoking: recently quit smoking.  3. HTN: controlled  4.Hyperlipidemia: Yes - controlled      SOCIAL HISTORY  Social History     Socioeconomic History    Marital status: Single     Spouse name: Not on file    Number of children: Not on file    Years of education: Not on file    Highest education level: Not on file   Occupational History    Not on file   Tobacco Use    Smoking status: Former     Current packs/day: 0.00     Average packs/day: 0.3 packs/day for 40.0 years (10.0 ttl pk-yrs)     Types: Cigarettes     Quit date: 2024     Years since quittin.1    Smokeless tobacco: Never   Vaping Use    Vaping status: Never Used   Substance and Sexual Activity    Alcohol use: Not Currently     Comment: Sober    Drug use: Not Currently    Sexual activity: Not on file   Other Topics Concern    Not on file   Social History Narrative    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)     Social Determinants of Health     Financial Resource Strain: Low Risk  (2023)    Financial Resource Strain     Within the past 12 months, have you or your family members you live with been unable to get utilities (heat, electricity) when it was really needed?: No   Food Insecurity: Low Risk  (2023)    Food Insecurity     Within the past 12 months, did you worry that your food would run out before you got money to buy more?: No     Within the past 12 months, did the food you bought just not last and you didn t have money to get more?: No   Transportation Needs: Low Risk  (2023)    Transportation Needs     Within the past 12 months,  has lack of transportation kept you from medical appointments, getting your medicines, non-medical meetings or appointments, work, or from getting things that you need?: No   Physical Activity: Not on file   Stress: Not on file   Social Connections: Not on file   Interpersonal Safety: Low Risk  (11/6/2023)    Interpersonal Safety     Do you feel physically and emotionally safe where you currently live?: Yes     Within the past 12 months, have you been hit, slapped, kicked or otherwise physically hurt by someone?: No     Within the past 12 months, have you been humiliated or emotionally abused in other ways by your partner or ex-partner?: No   Housing Stability: Low Risk  (12/4/2023)    Housing Stability     Do you have housing? : Yes     Are you worried about losing your housing?: No       ROS:   General: No change in weight, sleep or appetite.  Normal energy.  No fever or chills  Eyes: Negative for vision changes or eye problems  ENT: No problems with ears, nose or throat.  No difficulty swallowing.  Resp: No coughing, wheezing or shortness of breath  CV: No chest pains or palpitations  GI: No nausea, vomiting,  heartburn, abdominal pain, diarrhea, constipation or change in bowel habits  : No urinary frequency or dysuria, bladder or kidney problems  Musculoskeletal: No significant muscle or joint pains  Neurologic: No headaches, numbness, tingling, weakness, problems with balance or coordination  Psychiatric: No problems with anxiety, depression or mental health  Heme/immune/allergy: No history of bleeding or clotting problems or anemia.  No allergies or immune system problems  Endocrine: No history of thyroid disease, diabetes or other endocrine disorders  Skin: No rashes,worrisome lesions or skin problems  Vascular:  No claudication, lifestyle limiting or otherwise; no ischemic rest pain; no non-healing ulcers. No weakness, No loss of sensation      EXAM:  /64 (BP Location: Left arm, Patient Position:  Chair, Cuff Size: Adult Small)   Pulse 69   Wt 107 lb 9.6 oz (48.8 kg)   LMP  (LMP Unknown)   SpO2 97%   BMI 17.91 kg/m    In general, the patient is a pleasant female in no apparent distress.    HEENT: NC/AT.  PERRLA.  EOMI.  Sclerae white, not injected.  Nares clear.  Pharynx without erythema or exudate.  Dentition intact.    Neck: No adenopathy.  No thyromegaly. Carotids +2/2 bilaterally without bruits.  No jugular venous distension.   Heart: RRR. Normal S1, S2 splits physiologically. No murmur, rub, click, or gallop. The PMI is in the 5th ICS in the midclavicular line. There is no heave.    Lungs: CTA.  No ronchi, wheezes, rales.  No dullness to percussion.   Abdomen: Soft, nontender, nondistended. No organomegaly. No AAA.  No bruits.   Extremities: No clubbing, cyanosis, or edema.  No wounds.       Labs:  LIPID RESULTS:  Lab Results   Component Value Date    CHOL 120 06/08/2024    HDL 66 06/08/2024    LDL 39 06/08/2024    TRIG 74 06/08/2024       LIVER ENZYME RESULTS:  Lab Results   Component Value Date    AST 24 06/08/2024    ALT 18 06/08/2024       CBC RESULTS:  Lab Results   Component Value Date    WBC 8.3 07/07/2024    RBC 3.49 (L) 07/07/2024    HGB 10.3 (L) 07/07/2024    HCT 32.4 (L) 07/07/2024    MCV 93 07/07/2024    MCH 29.5 07/07/2024    MCHC 31.8 07/07/2024    RDW 13.1 07/07/2024     (L) 07/07/2024       BMP RESULTS:  Lab Results   Component Value Date     (L) 07/07/2024    POTASSIUM 4.2 07/07/2024    POTASSIUM 4.0 03/21/2022    CHLORIDE 97 (L) 07/07/2024    CHLORIDE 104 03/21/2022    CO2 25 07/07/2024    CO2 20 (L) 03/21/2022    ANIONGAP 9 07/07/2024    ANIONGAP 16 03/21/2022     (H) 07/07/2024     (H) 06/13/2024     (H) 03/21/2022    BUN 13.9 07/07/2024    BUN 8 03/21/2022    CR 0.76 07/07/2024    GFRESTIMATED 85 07/07/2024    GFRESTIMATED >60 11/06/2023    GFRESTIMATED >60 11/09/2020    GFRESTBLACK >60 11/09/2020    CONSUELO 9.0 07/07/2024        A1C RESULTS:  Lab  Results   Component Value Date    A1C 6.4 (H) 11/06/2023       THYROID RESULTS:  Lab Results   Component Value Date    TSH 1.35 06/07/2024    TSH 1.62 03/21/2022       Procedures:     CT ANGIOGRAM OF THE HEAD AND NECK WITHOUT AND WITH CONTRAST  6/27/2024  12:11 PM      COMPARISON: None     HISTORY: Fibromuscular dysplasia (H24).     TECHNIQUE:  Precontrast localizing scans were followed by CT  angiography with an injection of 100mL Isovue-370 nonionic intravenous  contrast material with scans through the head and neck.  Images were  transferred to a separate 3-D workstation where multiplanar  reformations and 3-D images were created.  Estimates of carotid  stenoses are made relative to the distal internal carotid artery  diameters except as noted.       FINDINGS:   Neck CTA: The common carotid arteries bilaterally are patent without  stenosis. There is calcified atherosclerotic plaque at the origins of  the internal carotid arteries on both sides that does not result in  stenosis on either side. There is beaded irregularity in contour of  the distal cervical right internal carotid artery consistent with  vascular changes due to fibromuscular dysplasia. There is scattered  calcified plaque vertebral arteries bilaterally that does not result  in any measurable stenosis. The vertebral arteries bilaterally are  otherwise patent and unremarkable.     Head CTA: There is calcified atherosclerotic plaque of the  intracranial distal internal carotid arteries on both sides resulting  in mild narrowing of the supraclinoid segments bilaterally. The  basilar artery is patent and unremarkable. There is asymmetric  aneurysmal dilatation of the left carotid terminus near the left  posterior communicating origin. The bilateral anterior cerebral,  bilateral middle cerebral and bilateral posterior cerebral arteries  are otherwise patent and unremarkable.                                                                       IMPRESSION:  1. Beaded irregularity in contour of the distal cervical internal  carotid artery on the right consistent with vascular changes due to  fibromuscular dysplasia.  2. Plaque without stenosis of the internal carotid origins  bilaterally.  3. Mild narrowing of the supraclinoid segments of the internal carotid  arteries bilaterally.  4. 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  Continued surveillance recommended.  5. Otherwise, normal neck and head CTA.        Radiation dose for this scan was reduced using automated exposure  control, adjustment of the mA and/or kV according to patient size, or  iterative reconstruction technique     SHAHID HOLGUIN MD     Narrative   CT ANGIOGRAM OF THE HEAD AND NECK WITHOUT AND WITH CONTRAST  6/27/2024  12:11 PM    COMPARISON: None    HISTORY: Fibromuscular dysplasia (H24).    TECHNIQUE:  Precontrast localizing scans were followed by CT  angiography with an injection of 100mL Isovue-370 nonionic intravenous  contrast material with scans through the head and neck.  Images were ...   Impression   IMPRESSION:  1. Beaded irregularity in contour of the distal cervical internal  carotid artery on the right consistent with vascular changes due to  fibromuscular dysplasia.  2. Plaque without stenosis of the internal carotid origins  bilaterally.  3. Mild narrowing of the supraclinoid segments of the internal carotid  arteries bilaterally.  4. 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  Continued surveillance recommended. ...          Assessment and Plan:     1. 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    2. Coronary artery disease involving native coronary artery of native heart without angina pectoris s/p KIM 6/2024  Coronary dissections involving the LAD and D1 during PCI  Moderate pericardial effusion s/p pericardiocentesis    3. Benign essential  hypertension    4. Type 2 diabetes mellitus without complication, without long-term current use of insulin (H)    5. Former smoker    6. Hyperlipidemia LDL goal <70    This is a very pleasant 69-year-old female with complex and complicated past medical and past surgical history recent NSTEMI underwent multiple KIM stents coronary dissection involving the LAD and D1 during the PCI and moderate pericardial effusion requiring pericardiocentesis and subsequently she was admitted to the hospital and head and neck CTA revealed incidental finding of FMD of right internal carotid artery and presumed aneurysmal dilatation of the left carotid terminus.  She also underwent CT of the chest abdomen pelvis no evidence of FMD and mesenteric, renal vessels and other arterial bed. she gets periodic neck massages.  She is a former smoker quit approximately 6 weeks ago.  She has a history of EtOH quit many years ago.  Type 2 diabetes mellitus poorly controlled has not picked up the medications prescribed recently.  Reviewed recent hospitalization records ER evaluation multiple imaging studies laboratory tests etc.  She was prescribed baby aspirin and Effient but listed as a 5 mg tablets and 10 mg tablets and she is not sure which one she is taking?  She is scheduled to see cardiology service in 4 days.    At present my recommendations,  Continue aspirin and Effient per cardiology service then followed by aspirin lifelong  Continue statin  Maintain blood pressure in good range less than 130/80  Congratulated for quitting smoking and maintain the same  Will repeat head and neck CTA in 3 months then followed by video or office visit  Avoid any type of neck massage or chiropractic manipulations  Tight control of the diabetes  Take medications as prescribed  Suggested patient to take all the pill bottles to her next upcoming appointment in 4 days with cardiology service and clarify dosage and timing of the medications etc.       60 minutes  spent on the date of the encounter doing chart review, history and exam, documentation, and further activities as noted above.    The longitudinal care of plan for the above diagnoses was addressed during this visit. Due to added complexity of care, we will continue to supprt Taylor Aburto and the subsequent management of this/these conditions and with ongoing continuity of care for this/these conditions.     Thank you for the consultation  This note was dictated by utilizing Dragon software  Copy of this note to primary care physician and referring provider    Catarino Mcgee MD,FAHA,FSVM,FNLA, FACP  Vascular Medicine  Clinical Hypertension Specialist   Clinical Lipidologist

## 2024-07-26 NOTE — PROGRESS NOTES
RN called pt no answer. Left detailed VM w/recommendations. Let pt know a limited echo order was placed. She was directed to call [phone] to schedule. Did inform pt lead time on echo's is about 3-4 weeks so she should get that appt on the books soon. Whitney Farah RN on 7/26/2024 at 2:01 PM      From: Tamra Redmond NP  Sent: 7/23/2024   2:38 PM CDT  To: [facility] Team 7    Good news, the heart pumping function has normalized and there is no evidence of issues with the heart valves. There is a small recurrence of fluid around the heart so we should repeat a limited echo in 6 weeks to recheck this. Thanks.

## 2024-07-26 NOTE — PROGRESS NOTES
[facility]        Patient is here for a consult.    Pt is currently taking Aspirin and statin, praugrel.    /64 (BP Location: Left arm, Patient Position: Chair, Cuff Size: Adult Small)   Pulse 69   Wt 107 lb 9.6 oz (48.8 kg)   LMP  (LMP Unknown)   SpO2 97%   BMI 17.91 kg/m      The provider has been notified that the patient has no concerns.     Questions patient would like addressed today are: N/A.    Refills are needed: N/A    Has homecare services and agency name:  Sarah Beth Shepherd MA

## 2024-07-30 ENCOUNTER — OFFICE VISIT (OUTPATIENT)
Dept: CARDIOLOGY | Facility: CLINIC | Age: 69
End: 2024-07-30
Payer: COMMERCIAL

## 2024-07-30 VITALS
HEIGHT: 65 IN | SYSTOLIC BLOOD PRESSURE: 125 MMHG | OXYGEN SATURATION: 98 % | HEART RATE: 70 BPM | BODY MASS INDEX: 17.83 KG/M2 | WEIGHT: 107 LBS | DIASTOLIC BLOOD PRESSURE: 72 MMHG

## 2024-07-30 DIAGNOSIS — I31.39 PERICARDIAL EFFUSION: ICD-10-CM

## 2024-07-30 DIAGNOSIS — I21.4 NSTEMI (NON-ST ELEVATED MYOCARDIAL INFARCTION) (H): ICD-10-CM

## 2024-07-30 DIAGNOSIS — I25.5 ISCHEMIC CARDIOMYOPATHY: ICD-10-CM

## 2024-07-30 DIAGNOSIS — I25.10 CORONARY ARTERY DISEASE INVOLVING NATIVE CORONARY ARTERY OF NATIVE HEART WITHOUT ANGINA PECTORIS: ICD-10-CM

## 2024-07-30 DIAGNOSIS — E11.9 TYPE 2 DIABETES MELLITUS WITHOUT COMPLICATION, WITHOUT LONG-TERM CURRENT USE OF INSULIN (H): ICD-10-CM

## 2024-07-30 PROCEDURE — 99215 OFFICE O/P EST HI 40 MIN: CPT | Performed by: NURSE PRACTITIONER

## 2024-07-30 RX ORDER — ASPIRIN 81 MG/1
81 TABLET ORAL DAILY
Qty: 90 TABLET | Refills: 3 | Status: SHIPPED | OUTPATIENT
Start: 2024-07-30

## 2024-07-30 RX ORDER — PRASUGREL 5 MG/1
5 TABLET, FILM COATED ORAL DAILY
Qty: 90 TABLET | Refills: 3 | Status: SHIPPED | OUTPATIENT
Start: 2024-07-30 | End: 2024-09-20

## 2024-07-30 RX ORDER — ROSUVASTATIN CALCIUM 40 MG/1
40 TABLET, COATED ORAL DAILY
Qty: 90 TABLET | Refills: 3 | Status: SHIPPED | OUTPATIENT
Start: 2024-07-30

## 2024-07-30 RX ORDER — CARVEDILOL 3.12 MG/1
3.12 TABLET ORAL 2 TIMES DAILY WITH MEALS
Qty: 180 TABLET | Refills: 0 | Status: SHIPPED | OUTPATIENT
Start: 2024-07-30

## 2024-07-30 RX ORDER — LISINOPRIL 5 MG/1
5 TABLET ORAL DAILY
Qty: 90 TABLET | Refills: 3 | Status: SHIPPED | OUTPATIENT
Start: 2024-07-30

## 2024-07-30 NOTE — PROGRESS NOTES
Cardiology Clinic Progress Note    Service Date: July 30, 2024  Primary Cardiology Team: Dr. Greene  Reason for Visit: Hospital follow up    HISTORY OF PRESENT ILLNESS:  I had the pleasure of meeting Ms. Taylor Aburto in the clinic today. She is a very pleasant 69 year old female with a past medical history notable for former tobacco use, type 2 diabetes mellitus, and a previously noted small pericardial effusion. She was recently admitted to [facility] on 6/7/2024 after presenting with chest discomfort and being found to have a NSTEMI as well as severe left ventricular dysfunction with EF 25-30% with wall motion abnormalities in the LAD distribution and a moderate pericardial effusion with borderline tamponade. ESR and CRP levels were normal. She underwent coronary angiography on 6/9/2024 and successful IVUS guided PCI of the LAD with 2 drug-eluting stents and first diagonal with 2 drug-eluting stents. Separate coronary dissections occurred during PCI involving both the LAD and D1 suggestive of possible underlying connective tissue disorder. She later required pericardiocentesis and temporary placement of pericardial drain for her pericardial effusion.  She was discharged on dual antiplatelet therapy with aspirin 81 mg daily, prasrugrel (Effient), and a regimen of carvedilol 3.125 mg twice daily and lisinopril 5 mg daily.  She was also started on rosuvastatin 40 mg once daily.    Repeat echocardiogram in follow-up on 7/12/24 showed normalization of her ejection fraction to 60-65% and small anterior pericardial effusion with no evidence of tamponade.  A repeat echocardiogram was recommended in about 6 weeks for reevaluation of the pericardial effusion.  She was also referred to the vascular medicine team and met with Dr. Mcgee as a CTA of the head and neck demonstrated beaded irregularity in contour of the distal cervical internal carotid artery on the right consistent with vascular  changes consistent with fibromuscular dysplasia. CTA of the chest, abdomen, and pelvis did not show evidence of FMD.    Today, Taylor presents to the clinic accompanied by her  in follow up of her recent hospitalization. She tells me she has been attending cardiac rehab and is really enjoying the exercise and activity there.  She feels that she is tolerating the exercises and cardiac rehab well without exertional symptoms.  She has not had any recurrent symptoms of chest pain. She does note that she was recently treated for a UTI and she continues to have some burning discomfort with urination as well as some urinary urgency.  She also reports some intermittent dizziness and lightheadedness.  Symptoms have not been worse with changing positions from sitting down or lying down to standing up.  She has not had any associated palpitations or heart racing.  Her blood pressure has been stable in her checks at cardiac rehab primarily around the 120 systolic range.     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 She tries to go for walks throughout her workday and feels that she has been tolerating this well.  She will occasionally get mildly winded when going up the stairs, but otherwise has not had any exertional symptoms. She notes that she quit smoking and has not had a cigarette since she was in the hospital at [facility] recently.    ASSESSMENT:  1.  Coronary artery disease s/p PCI of the LAD with KIM x2 and first diagonal with KIM x2. Stable without anginal symptoms.  2.  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  3.  Fibromuscular dysplasia, noted in the right cervical internal carotid artery noted by CTA of the head and neck  4.  Pericardial effusion requiring pericardiocentesis and drain.  Small recurrent pericardial effusion noted by follow-up TTE 7/12/24.  5.  Ischemic cardiomyopathy with EF initially 25-30% in the setting of her recent NSTEMI  with subsequent normalization of her EF by repeat TTE 7/12/24.  6.  Hyperlipidemia, managed with rosuvastatin 40 mg once daily  7.  Hypertension, well-controlled on combination of carvedilol and lisinopril as noted above.  8.  Prior tobacco use. States that she quit smoking immediately since her recent hospitalization.    PLAN:  - Continue current cardiac regimen. There was some confusion regarding the dosing for the prasrugrel as their were duplicate orders on the medication list, one for 5 mg once daily and one for 10 mg once daily, but the patient received and has been taking 10 mg once daily. Reviewed database info recommending dosing of 5 mg once daily for prasugrel for weight under 60 kg so a refill was sent in for the 5 mg once daily dosing.  - Repeat echocardiogram on 8/13/2024 as previously planned for reassessment of the small pericardial effusion.  - Continue with outpatient cardiac rehab. Counseled on continued lifestyle modifications. Commended her on quitting smoking and encouraged continued avoidance of cigarettes.  - Follow-up with Nona Redmond NP in the cardiology clinic in September as previously planned. Recommend adding on a lab appointment for a repeat fasting lipid profile and ALT at some point before the visit for reassessment of her cholesterol levels after recent initiation of rosuvastatin 40 mg daily.  - Recommend follow-up with her PCP regarding her ongoing urinary symptoms despite recent antibiotics for UTI.  Possible some of the dizziness and lightheadedness could be secondary to continued UTI.    - If she continues to have issues with dizziness and lightheadedness going forward, could consider trial of decreasing carvedilol to half tablet (1.5625 mg) twice daily or discontinuing this if needed.    Thank you for the opportunity to participate in this pleasant patient's care. We would be happy to see her sooner if needed for any concerns in the meantime.    60 total minutes was spent today  including chart review, precharting, history and exam, post visit documentation, and reviewing studies as outlined above.     RIMA Pyle, CNP   Nurse Practitioner  [facility]  Pager: [phone]  Text Page  (8am - 5pm, M-F)    Orders this Visit:  Orders Placed This Encounter   Procedures    Lipid panel reflex to direct LDL Fasting    ALT     No orders of the defined types were placed in this encounter.    Medications Discontinued During This Encounter   Medication Reason    prasugrel (EFFIENT) 5 MG TABS tablet Duplicate Therapy (No AVS / No eCancel)     Encounter Diagnoses   Name Primary?    NSTEMI (non-ST elevated myocardial infarction) (H)     Pericardial effusion     Ischemic cardiomyopathy        CURRENT MEDICATIONS:  Current Outpatient Medications   Medication Sig Dispense Refill    aspirin 81 MG EC tablet Take 1 tablet (81 mg) by mouth daily for 360 days Start tomorrow. 90 tablet 3    blood glucose (ACCU-CHEK GUIDE) test strip [ACCU-CHEK GUIDE TEST STRIPS STRIPS] TEST BLOOD GLUCOSE LEVELS TWICE A  strip 3    blood glucose (NO BRAND SPECIFIED) lancets standard Use to test blood sugar two times daily or as directed. 200 lancet 0    blood glucose meter (GLUCOMETER) [BLOOD GLUCOSE METER (GLUCOMETER)] Use 1 each As Directed as needed. Dispense glucometer brand per patient's insurance at pharmacy discretion. DX: E11.9 1 each 0    carvedilol (COREG) 3.125 MG tablet Take 1 tablet (3.125 mg) by mouth 2 times daily (with meals) 180 tablet 0    fluticasone (FLONASE) 50 MCG/ACT nasal spray Spray 1 spray into both nostrils daily (Patient taking differently: Spray 1 spray into both nostrils daily as needed for allergies) 16 g 4    lisinopril (ZESTRIL) 5 MG tablet Take 1 tablet (5 mg) by mouth daily 90 tablet 3    metFORMIN (GLUCOPHAGE XR) 500 MG 24 hr tablet Take 2 tablets (1,000 mg) by mouth 2 times daily 360 tablet 1    nicotine polacrilex (NICORETTE) 4 MG gum Chew one piece every 1- 2  hours for cravings as needed. Try to limit using the gum for strong craving only since you are taking the patch, and previous history of nausea and vomiting. 50 each 3    nitroGLYcerin (NITROSTAT) 0.4 MG sublingual tablet For chest pain place 1 tablet under the tongue every 5 minutes for 3 doses. If symptoms persist 5 minutes after 1st dose call 911. 30 tablet 0    oxyBUTYnin ER (DITROPAN XL) 10 MG 24 hr tablet Take 1 tablet (10 mg) by mouth daily 90 tablet 3    prasugrel (EFFIENT) 10 MG TABS tablet Take 1 tablet (10 mg) by mouth daily Do not crush or break tablet. Dose to start tomorrow. 90 tablet 3    PYRIDOXINE HCL PO Take 1 tablet by mouth daily Strength unknown      rosuvastatin (CRESTOR) 40 MG tablet Take 1 tablet (40 mg) by mouth daily 90 tablet 3     ALLERGIES  Allergies   Allergen Reactions    Prednisone Dizziness     PAST MEDICAL, SURGICAL, FAMILY HISTORY:  History was reviewed and updated as needed, see medical record.    SOCIAL HISTORY:  Social History     Socioeconomic History    Marital status: Single     Spouse name: Not on file    Number of children: Not on file    Years of education: Not on file    Highest education level: Not on file   Occupational History    Not on file   Tobacco Use    Smoking status: Former     Current packs/day: 0.00     Average packs/day: 0.3 packs/day for 40.0 years (10.0 ttl pk-yrs)     Types: Cigarettes     Quit date: 2024     Years since quittin.1    Smokeless tobacco: Never   Vaping Use    Vaping status: Never Used   Substance and Sexual Activity    Alcohol use: Not Currently     Comment: Sober    Drug use: Not Currently    Sexual activity: Not on file   Other Topics Concern    Not on file   Social History Narrative    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)     Social Determinants of Health     Financial Resource Strain: Low Risk  (2023)    Financial Resource Strain     Within the past 12 months, have you or your family members you live with been unable to  "get utilities (heat, electricity) when it was really needed?: No   Food Insecurity: Low Risk  (12/4/2023)    Food Insecurity     Within the past 12 months, did you worry that your food would run out before you got money to buy more?: No     Within the past 12 months, did the food you bought just not last and you didn t have money to get more?: No   Transportation Needs: Low Risk  (12/4/2023)    Transportation Needs     Within the past 12 months, has lack of transportation kept you from medical appointments, getting your medicines, non-medical meetings or appointments, work, or from getting things that you need?: No   Physical Activity: Not on file   Stress: Not on file   Social Connections: Not on file   Interpersonal Safety: Low Risk  (11/6/2023)    Interpersonal Safety     Do you feel physically and emotionally safe where you currently live?: Yes     Within the past 12 months, have you been hit, slapped, kicked or otherwise physically hurt by someone?: No     Within the past 12 months, have you been humiliated or emotionally abused in other ways by your partner or ex-partner?: No   Housing Stability: Low Risk  (12/4/2023)    Housing Stability     Do you have housing? : Yes     Are you worried about losing your housing?: No     Review of Systems:  Focused cardiovascular and respiratory review of systems is negative other than the symptoms noted above in the HPI.     Physical Exam:  Vitals: /72 (BP Location: Right arm, Patient Position: Sitting, Cuff Size: Adult Small)   Pulse 70   Ht 1.651 m (5' 5\")   Wt 48.5 kg (107 lb)   LMP  (LMP Unknown)   SpO2 98%   BMI 17.81 kg/m     Wt Readings from Last 4 Encounters:   07/30/24 48.5 kg (107 lb)   07/26/24 48.8 kg (107 lb 9.6 oz)   06/18/24 49.9 kg (110 lb)   06/13/24 48.2 kg (106 lb 4.8 oz)     Constitutional: Alert and in no acute distress.  Neck: No JVD.  Cardiovascular:  Regular rate and rhythm. No murmur. No pericardial friction rub.   Respiratory: Breathing " non-labored. Lungs are clear to auscultation with no wheezes or crackles bilaterally.  Gastrointestinal: Abdomen non-distended.  Extremities: No lower extremity edema.   Neuropsychiatric: No gross focal deficits. Affect appropriate. Mentation normal.     Recent Lab Results:  LIPID RESULTS:  Lab Results   Component Value Date    CHOL 120 06/08/2024    HDL 66 06/08/2024    LDL 39 06/08/2024    TRIG 74 06/08/2024     LIVER ENZYME RESULTS:  Lab Results   Component Value Date    AST 24 06/08/2024    ALT 18 06/08/2024     CBC RESULTS:  Lab Results   Component Value Date    WBC 8.3 07/07/2024    RBC 3.49 (L) 07/07/2024    HGB 10.3 (L) 07/07/2024    HCT 32.4 (L) 07/07/2024    MCV 93 07/07/2024    MCH 29.5 07/07/2024    MCHC 31.8 07/07/2024    RDW 13.1 07/07/2024     (L) 07/07/2024     BMP RESULTS:  Lab Results   Component Value Date     (L) 07/07/2024    POTASSIUM 4.2 07/07/2024    POTASSIUM 4.0 03/21/2022    CHLORIDE 97 (L) 07/07/2024    CHLORIDE 104 03/21/2022    CO2 25 07/07/2024    CO2 20 (L) 03/21/2022    ANIONGAP 9 07/07/2024    ANIONGAP 16 03/21/2022     (H) 07/07/2024     (H) 06/13/2024     (H) 03/21/2022    BUN 13.9 07/07/2024    BUN 8 03/21/2022    CR 0.76 07/07/2024    GFRESTIMATED 85 07/07/2024    GFRESTIMATED >60 11/06/2023    GFRESTIMATED >60 11/09/2020    GFRESTBLACK >60 11/09/2020    CONSUELO 9.0 07/07/2024      A1C RESULTS:  Lab Results   Component Value Date    A1C 6.4 (H) 11/06/2023     INR RESULTS:  Lab Results   Component Value Date    INR 1.12 06/18/2024    INR 1.01 02/03/2023       CC  Tamra Redmond, MISHEL  [id number] [id number]    Please kindly note that this document was completed in part using Dragon voice recognition software. Although reviewed after completion, some word substitutions and typographical errors may occur. Please contact me if clarification is needed. [email or web address]

## 2024-07-30 NOTE — PATIENT INSTRUCTIONS
Thank you for your visit with the [facility] today.    Medication changes and/or recommendations from today:   - Refills were sent in for all of your medications today. Prasrugrel (Effient) was refilled at the 5 mg once daily dosing instead of 10 mg once daily dosing given your weight under 60 kg.     Follow up plan:   - Check fasting labs at some point in the next couple of months before seeing Nona to recheck your cholesterol levels.  - Complete the echocardiogram in August as planned for reassessment of the pericardial effusion.  - Check in with your primary care provider regarding your urinary symptoms to make sure that the UTI has cleared and that you do not need another antibiotic.  - Follow-up with Nona Redmond NP in the clinic as planned in September.  - If you continue to have issues with dizziness and lightheadedness in the meantime, please call the clinic and we can see if we should try making any adjustments with your medications.    If you have questions or concerns in the meantime please call the nurse team at [phone] or send a [employer] message.     Scheduling phone number: [phone]    It was a pleasure seeing you today!     RIMA Pyle, CNP  Nurse Practitioner  [facility]

## 2024-07-30 NOTE — LETTER
7/30/2024    Soraya Cherry,   [address] [facility] [address]    RE: Taylor Aburto       Dear Colleague,     I had the pleasure of seeing Taylor Aburto in the [facility].    Cardiology Clinic Progress Note    Service Date: July 30, 2024  Primary Cardiology Team: Dr. Greene  Reason for Visit: Hospital follow up    HISTORY OF PRESENT ILLNESS:  I had the pleasure of meeting Ms. Taylor Aburto in the clinic today. She is a very pleasant 69 year old female with a past medical history notable for former tobacco use, type 2 diabetes mellitus, and a previously noted small pericardial effusion. She was recently admitted to [facility] on 6/7/2024 after presenting with chest discomfort and being found to have a NSTEMI as well as severe left ventricular dysfunction with EF 25-30% with wall motion abnormalities in the LAD distribution and a moderate pericardial effusion with borderline tamponade. ESR and CRP levels were normal. She underwent coronary angiography on 6/9/2024 and successful IVUS guided PCI of the LAD with 2 drug-eluting stents and first diagonal with 2 drug-eluting stents. Separate coronary dissections occurred during PCI involving both the LAD and D1 suggestive of possible underlying connective tissue disorder. She later required pericardiocentesis and temporary placement of pericardial drain for her pericardial effusion.  She was discharged on dual antiplatelet therapy with aspirin 81 mg daily, prasrugrel (Effient), and a regimen of carvedilol 3.125 mg twice daily and lisinopril 5 mg daily.  She was also started on rosuvastatin 40 mg once daily.    Repeat echocardiogram in follow-up on 7/12/24 showed normalization of her ejection fraction to 60-65% and small anterior pericardial effusion with no evidence of tamponade.  A repeat echocardiogram was recommended in about 6 weeks for reevaluation of the pericardial effusion.  She was also referred to the  vascular medicine team and met with Dr. Mcgee as a CTA of the head and neck demonstrated beaded irregularity in contour of the distal cervical internal carotid artery on the right consistent with vascular changes consistent with fibromuscular dysplasia. CTA of the chest, abdomen, and pelvis did not show evidence of FMD.    Today, Taylor presents to the clinic accompanied by her  in follow up of her recent hospitalization. She tells me she has been attending cardiac rehab and is really enjoying the exercise and activity there.  She feels that she is tolerating the exercises and cardiac rehab well without exertional symptoms.  She has not had any recurrent symptoms of chest pain. She does note that she was recently treated for a UTI and she continues to have some burning discomfort with urination as well as some urinary urgency.  She also reports some intermittent dizziness and lightheadedness.  Symptoms have not been worse with changing positions from sitting down or lying down to standing up.  She has not had any associated palpitations or heart racing.  Her blood pressure has been stable in her checks at cardiac rehab primarily around the 120 systolic range.     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 She tries to go for walks throughout her workday and feels that she has been tolerating this well.  She will occasionally get mildly winded when going up the stairs, but otherwise has not had any exertional symptoms. She notes that she quit smoking and has not had a cigarette since she was in the hospital at [facility] recently.    ASSESSMENT:  1.  Coronary artery disease s/p PCI of the LAD with KIM x2 and first diagonal with KIM x2. Stable without anginal symptoms.  2.  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  3.  Fibromuscular dysplasia, noted in the right cervical internal carotid artery noted by CTA of the head and neck  4.  Pericardial  effusion requiring pericardiocentesis and drain.  Small recurrent pericardial effusion noted by follow-up TTE 7/12/24.  5.  Ischemic cardiomyopathy with EF initially 25-30% in the setting of her recent NSTEMI with subsequent normalization of her EF by repeat TTE 7/12/24.  6.  Hyperlipidemia, managed with rosuvastatin 40 mg once daily  7.  Hypertension, well-controlled on combination of carvedilol and lisinopril as noted above.  8.  Prior tobacco use. States that she quit smoking immediately since her recent hospitalization.    PLAN:  - Continue current cardiac regimen. There was some confusion regarding the dosing for the prasrugrel as their were duplicate orders on the medication list, one for 5 mg once daily and one for 10 mg once daily, but the patient received and has been taking 10 mg once daily. Reviewed database info recommending dosing of 5 mg once daily for prasugrel for weight under 60 kg so a refill was sent in for the 5 mg once daily dosing.  - Repeat echocardiogram on 8/13/2024 as previously planned for reassessment of the small pericardial effusion.  - Continue with outpatient cardiac rehab. Counseled on continued lifestyle modifications. Commended her on quitting smoking and encouraged continued avoidance of cigarettes.  - Follow-up with Nona Redmond NP in the cardiology clinic in September as previously planned. Recommend adding on a lab appointment for a repeat fasting lipid profile and ALT at some point before the visit for reassessment of her cholesterol levels after recent initiation of rosuvastatin 40 mg daily.  - Recommend follow-up with her PCP regarding her ongoing urinary symptoms despite recent antibiotics for UTI.  Possible some of the dizziness and lightheadedness could be secondary to continued UTI.    - If she continues to have issues with dizziness and lightheadedness going forward, could consider trial of decreasing carvedilol to half tablet (1.5625 mg) twice daily or discontinuing this  if needed.    Thank you for the opportunity to participate in this pleasant patient's care. We would be happy to see her sooner if needed for any concerns in the meantime.    60 total minutes was spent today including chart review, precharting, history and exam, post visit documentation, and reviewing studies as outlined above.     RIMA Pyle, CNP   Nurse Practitioner  [facility]  Pager: [phone]  Text Page  (8am - 5pm, M-F)    Orders this Visit:  Orders Placed This Encounter   Procedures    Lipid panel reflex to direct LDL Fasting    ALT     No orders of the defined types were placed in this encounter.    Medications Discontinued During This Encounter   Medication Reason    prasugrel (EFFIENT) 5 MG TABS tablet Duplicate Therapy (No AVS / No eCancel)     Encounter Diagnoses   Name Primary?    NSTEMI (non-ST elevated myocardial infarction) (H)     Pericardial effusion     Ischemic cardiomyopathy        CURRENT MEDICATIONS:  Current Outpatient Medications   Medication Sig Dispense Refill    aspirin 81 MG EC tablet Take 1 tablet (81 mg) by mouth daily for 360 days Start tomorrow. 90 tablet 3    blood glucose (ACCU-CHEK GUIDE) test strip [ACCU-CHEK GUIDE TEST STRIPS STRIPS] TEST BLOOD GLUCOSE LEVELS TWICE A  strip 3    blood glucose (NO BRAND SPECIFIED) lancets standard Use to test blood sugar two times daily or as directed. 200 lancet 0    blood glucose meter (GLUCOMETER) [BLOOD GLUCOSE METER (GLUCOMETER)] Use 1 each As Directed as needed. Dispense glucometer brand per patient's insurance at pharmacy discretion. DX: E11.9 1 each 0    carvedilol (COREG) 3.125 MG tablet Take 1 tablet (3.125 mg) by mouth 2 times daily (with meals) 180 tablet 0    fluticasone (FLONASE) 50 MCG/ACT nasal spray Spray 1 spray into both nostrils daily (Patient taking differently: Spray 1 spray into both nostrils daily as needed for allergies) 16 g 4    lisinopril (ZESTRIL) 5 MG tablet Take 1 tablet (5 mg) by  mouth daily 90 tablet 3    metFORMIN (GLUCOPHAGE XR) 500 MG 24 hr tablet Take 2 tablets (1,000 mg) by mouth 2 times daily 360 tablet 1    nicotine polacrilex (NICORETTE) 4 MG gum Chew one piece every 1- 2 hours for cravings as needed. Try to limit using the gum for strong craving only since you are taking the patch, and previous history of nausea and vomiting. 50 each 3    nitroGLYcerin (NITROSTAT) 0.4 MG sublingual tablet For chest pain place 1 tablet under the tongue every 5 minutes for 3 doses. If symptoms persist 5 minutes after 1st dose call 911. 30 tablet 0    oxyBUTYnin ER (DITROPAN XL) 10 MG 24 hr tablet Take 1 tablet (10 mg) by mouth daily 90 tablet 3    prasugrel (EFFIENT) 10 MG TABS tablet Take 1 tablet (10 mg) by mouth daily Do not crush or break tablet. Dose to start tomorrow. 90 tablet 3    PYRIDOXINE HCL PO Take 1 tablet by mouth daily Strength unknown      rosuvastatin (CRESTOR) 40 MG tablet Take 1 tablet (40 mg) by mouth daily 90 tablet 3     ALLERGIES  Allergies   Allergen Reactions    Prednisone Dizziness     PAST MEDICAL, SURGICAL, FAMILY HISTORY:  History was reviewed and updated as needed, see medical record.    SOCIAL HISTORY:  Social History     Socioeconomic History    Marital status: Single     Spouse name: Not on file    Number of children: Not on file    Years of education: Not on file    Highest education level: Not on file   Occupational History    Not on file   Tobacco Use    Smoking status: Former     Current packs/day: 0.00     Average packs/day: 0.3 packs/day for 40.0 years (10.0 ttl pk-yrs)     Types: Cigarettes     Quit date: 2024     Years since quittin.1    Smokeless tobacco: Never   Vaping Use    Vaping status: Never Used   Substance and Sexual Activity    Alcohol use: Not Currently     Comment: Sober    Drug use: Not Currently    Sexual activity: Not on file   Other Topics Concern    Not on file   Social History Narrative    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)  "    Social Determinants of Health     Financial Resource Strain: Low Risk  (12/4/2023)    Financial Resource Strain     Within the past 12 months, have you or your family members you live with been unable to get utilities (heat, electricity) when it was really needed?: No   Food Insecurity: Low Risk  (12/4/2023)    Food Insecurity     Within the past 12 months, did you worry that your food would run out before you got money to buy more?: No     Within the past 12 months, did the food you bought just not last and you didn t have money to get more?: No   Transportation Needs: Low Risk  (12/4/2023)    Transportation Needs     Within the past 12 months, has lack of transportation kept you from medical appointments, getting your medicines, non-medical meetings or appointments, work, or from getting things that you need?: No   Physical Activity: Not on file   Stress: Not on file   Social Connections: Not on file   Interpersonal Safety: Low Risk  (11/6/2023)    Interpersonal Safety     Do you feel physically and emotionally safe where you currently live?: Yes     Within the past 12 months, have you been hit, slapped, kicked or otherwise physically hurt by someone?: No     Within the past 12 months, have you been humiliated or emotionally abused in other ways by your partner or ex-partner?: No   Housing Stability: Low Risk  (12/4/2023)    Housing Stability     Do you have housing? : Yes     Are you worried about losing your housing?: No     Review of Systems:  Focused cardiovascular and respiratory review of systems is negative other than the symptoms noted above in the HPI.     Physical Exam:  Vitals: /72 (BP Location: Right arm, Patient Position: Sitting, Cuff Size: Adult Small)   Pulse 70   Ht 1.651 m (5' 5\")   Wt 48.5 kg (107 lb)   LMP  (LMP Unknown)   SpO2 98%   BMI 17.81 kg/m     Wt Readings from Last 4 Encounters:   07/30/24 48.5 kg (107 lb)   07/26/24 48.8 kg (107 lb 9.6 oz)   06/18/24 49.9 kg (110 lb) "   06/13/24 48.2 kg (106 lb 4.8 oz)     Constitutional: Alert and in no acute distress.  Neck: No JVD.  Cardiovascular:  Regular rate and rhythm. No murmur. No pericardial friction rub.   Respiratory: Breathing non-labored. Lungs are clear to auscultation with no wheezes or crackles bilaterally.  Gastrointestinal: Abdomen non-distended.  Extremities: No lower extremity edema.   Neuropsychiatric: No gross focal deficits. Affect appropriate. Mentation normal.     Recent Lab Results:  LIPID RESULTS:  Lab Results   Component Value Date    CHOL 120 06/08/2024    HDL 66 06/08/2024    LDL 39 06/08/2024    TRIG 74 06/08/2024     LIVER ENZYME RESULTS:  Lab Results   Component Value Date    AST 24 06/08/2024    ALT 18 06/08/2024     CBC RESULTS:  Lab Results   Component Value Date    WBC 8.3 07/07/2024    RBC 3.49 (L) 07/07/2024    HGB 10.3 (L) 07/07/2024    HCT 32.4 (L) 07/07/2024    MCV 93 07/07/2024    MCH 29.5 07/07/2024    MCHC 31.8 07/07/2024    RDW 13.1 07/07/2024     (L) 07/07/2024     BMP RESULTS:  Lab Results   Component Value Date     (L) 07/07/2024    POTASSIUM 4.2 07/07/2024    POTASSIUM 4.0 03/21/2022    CHLORIDE 97 (L) 07/07/2024    CHLORIDE 104 03/21/2022    CO2 25 07/07/2024    CO2 20 (L) 03/21/2022    ANIONGAP 9 07/07/2024    ANIONGAP 16 03/21/2022     (H) 07/07/2024     (H) 06/13/2024     (H) 03/21/2022    BUN 13.9 07/07/2024    BUN 8 03/21/2022    CR 0.76 07/07/2024    GFRESTIMATED 85 07/07/2024    GFRESTIMATED >60 11/06/2023    GFRESTIMATED >60 11/09/2020    GFRESTBLACK >60 11/09/2020    CONSUELO 9.0 07/07/2024      A1C RESULTS:  Lab Results   Component Value Date    A1C 6.4 (H) 11/06/2023     INR RESULTS:  Lab Results   Component Value Date    INR 1.12 06/18/2024    INR 1.01 02/03/2023       CC  Tamra Redmond, NP  [address] [address]    Please kindly note that this document was completed in part using Dragon voice recognition software. Although reviewed after  completion, some word substitutions and typographical errors may occur. Please contact me if clarification is needed. [email or web address]      Thank you for allowing me to participate in the care of your patient.      Sincerely,     Jewel Bustillos NP     [facility]

## 2024-08-05 ENCOUNTER — NURSE TRIAGE (OUTPATIENT)
Dept: FAMILY MEDICINE | Facility: CLINIC | Age: 69
End: 2024-08-05

## 2024-08-05 ENCOUNTER — OFFICE VISIT (OUTPATIENT)
Dept: PEDIATRICS | Facility: CLINIC | Age: 69
End: 2024-08-05
Payer: COMMERCIAL

## 2024-08-05 VITALS
BODY MASS INDEX: 18.35 KG/M2 | WEIGHT: 110.3 LBS | TEMPERATURE: 97.9 F | HEART RATE: 65 BPM | DIASTOLIC BLOOD PRESSURE: 65 MMHG | SYSTOLIC BLOOD PRESSURE: 124 MMHG | RESPIRATION RATE: 16 BRPM | OXYGEN SATURATION: 97 %

## 2024-08-05 DIAGNOSIS — I25.10 CORONARY ARTERY DISEASE INVOLVING NATIVE HEART WITHOUT ANGINA PECTORIS, UNSPECIFIED VESSEL OR LESION TYPE: ICD-10-CM

## 2024-08-05 DIAGNOSIS — H53.8 BLURRED VISION: Primary | ICD-10-CM

## 2024-08-05 DIAGNOSIS — E11.65 TYPE 2 DIABETES MELLITUS WITH HYPERGLYCEMIA, WITHOUT LONG-TERM CURRENT USE OF INSULIN (H): ICD-10-CM

## 2024-08-05 LAB
ALBUMIN SERPL-MCNC: 3.5 G/DL (ref 3.4–5)
ALBUMIN UR-MCNC: NEGATIVE MG/DL
ALP SERPL-CCNC: 80 U/L (ref 40–150)
ALT SERPL W P-5'-P-CCNC: 20 U/L (ref 0–50)
ANION GAP SERPL CALCULATED.3IONS-SCNC: 8 MMOL/L (ref 3–14)
APPEARANCE UR: CLEAR
AST SERPL W P-5'-P-CCNC: 21 U/L (ref 0–45)
BILIRUB SERPL-MCNC: 0.8 MG/DL (ref 0.2–1.3)
BILIRUB UR QL STRIP: NEGATIVE
BUN SERPL-MCNC: 18 MG/DL (ref 7–30)
CALCIUM SERPL-MCNC: 9.8 MG/DL (ref 8.5–10.1)
CHLORIDE BLD-SCNC: 103 MMOL/L (ref 94–109)
CO2 SERPL-SCNC: 25 MMOL/L (ref 20–32)
COLOR UR AUTO: YELLOW
CREAT SERPL-MCNC: 0.8 MG/DL (ref 0.52–1.04)
EGFRCR SERPLBLD CKD-EPI 2021: 80 ML/MIN/1.73M2
ERYTHROCYTE [DISTWIDTH] IN BLOOD BY AUTOMATED COUNT: 13.7 % (ref 10–15)
GLUCOSE BLD-MCNC: 330 MG/DL (ref 70–99)
GLUCOSE UR STRIP-MCNC: >=1000 MG/DL
HCT VFR BLD AUTO: 33.8 % (ref 35–47)
HGB BLD-MCNC: 11.1 G/DL (ref 11.7–15.7)
HGB UR QL STRIP: NEGATIVE
KETONES UR STRIP-MCNC: NEGATIVE MG/DL
LEUKOCYTE ESTERASE UR QL STRIP: NEGATIVE
MCH RBC QN AUTO: 28.8 PG (ref 26.5–33)
MCHC RBC AUTO-ENTMCNC: 32.8 G/DL (ref 31.5–36.5)
MCV RBC AUTO: 88 FL (ref 78–100)
NITRATE UR QL: NEGATIVE
PH UR STRIP: 5.5 [PH] (ref 5–8)
PLATELET # BLD AUTO: 113 10E3/UL (ref 150–450)
POTASSIUM BLD-SCNC: 4.7 MMOL/L (ref 3.4–5.3)
PROT SERPL-MCNC: 6.7 G/DL (ref 6.8–8.8)
RBC # BLD AUTO: 3.85 10E6/UL (ref 3.8–5.2)
SODIUM SERPL-SCNC: 136 MMOL/L (ref 135–145)
SP GR UR STRIP: 1.02 (ref 1–1.03)
UROBILINOGEN UR STRIP-ACNC: 0.2 E.U./DL
WBC # BLD AUTO: 4 10E3/UL (ref 4–11)

## 2024-08-05 PROCEDURE — 36415 COLL VENOUS BLD VENIPUNCTURE: CPT | Performed by: EMERGENCY MEDICINE

## 2024-08-05 PROCEDURE — 99214 OFFICE O/P EST MOD 30 MIN: CPT | Performed by: EMERGENCY MEDICINE

## 2024-08-05 PROCEDURE — 85027 COMPLETE CBC AUTOMATED: CPT | Performed by: EMERGENCY MEDICINE

## 2024-08-05 PROCEDURE — 80053 COMPREHEN METABOLIC PANEL: CPT | Performed by: EMERGENCY MEDICINE

## 2024-08-05 PROCEDURE — 81003 URINALYSIS AUTO W/O SCOPE: CPT | Performed by: EMERGENCY MEDICINE

## 2024-08-05 ASSESSMENT — PAIN SCALES - GENERAL: PAINLEVEL: NO PAIN (0)

## 2024-08-05 NOTE — TELEPHONE ENCOUNTER
Nurse Triage SBAR    Is this a 2nd Level Triage? YES, LICENSED PRACTITIONER REVIEW IS REQUIRED    Situation: high blood sugar    Background: Patient states her blood sugars have been very high in the mornings since she was discharged from the hospital.    Assessment: States her blood sugar this morning was 470. She also reports blurry vision and frequent urination. She attributes the frequent urination to drinking a lot of water in an effort to lower her blood sugars. She also states she has some light headedness, but does not feel it is relevant as she has had this on and off for a long time.    Protocol Recommended Disposition:   Discuss With PCP And Callback By Nurse Within 1 Hour    Recommendation: Patient does not know what to do. She would like a recommendation. States she is not currently on insulin and hopes to continue to stay off of it but is worried that she may now need it. States she was off her metformin while in the hospital but they did have her take insulin instead.     Routed to provider    Does the patient meet one of the following criteria for ADS visit consideration? 16+ years old, with an [facility] PCP     TIP  Providers, please consider if this condition is appropriate for management at one of our Acute and Diagnostic Services sites.     If patient is a good candidate, please use dotphrase <dot>triageresponse and select Refer to ADS to document.     Reason for Disposition   Blood glucose > 400 mg/dL (22.2 mmol/L)    Additional Information   Negative: Fever > 100.4 F (38.0 C)   Negative: Caller has URGENT medication or insulin pump question and triager unable to answer question   Negative: Blood glucose > 500 mg/dL (27.8 mmol/L)   Negative: Blood glucose > 240 mg/dL (13.3 mmol/L) AND urine ketones moderate-large (or more than 1+)   Negative: Blood glucose > 240 mg/dL (13.3 mmol/L) and blood ketones > 1.4 mmol/L   Negative: Blood glucose > 240 mg/dL (13.3 mmol/L) AND vomiting AND unable to check for  "ketones (in blood or urine)   Negative: Vomiting lasting > 4 hours   Negative: Patient sounds very sick or weak to the triager   Negative: Vomiting and signs of dehydration (e.g., very dry mouth, lightheaded, dark urine)   Negative: Blood glucose > 240 mg/dL (13.3 mmol/L) and rapid breathing   Negative: Unconscious or difficult to awaken   Negative: Acting confused (e.g., disoriented, slurred speech)   Negative: Very weak (can't stand)   Negative: Sounds like a life-threatening emergency to the triager    Answer Assessment - Initial Assessment Questions  1. BLOOD GLUCOSE: \"What is your blood glucose level?\"       470 this morning  2. ONSET: \"When did you check the blood glucose?\"      This morning  3. USUAL RANGE: \"What is your glucose level usually?\" (e.g., usual fasting morning value, usual evening value)      110-140 usually, 200-400 the last few weeks  4. KETONES: \"Do you check for ketones (urine or blood test strips)?\" If Yes, ask: \"What does the test show now?\"       no  5. TYPE 1 or 2:  \"Do you know what type of diabetes you have?\"  (e.g., Type 1, Type 2, Gestational; doesn't know)       Type 2  6. INSULIN: \"Do you take insulin?\" \"What type of insulin(s) do you use? What is the mode of delivery? (syringe, pen; injection or pump)?\"       denies  7. DIABETES PILLS: \"Do you take any pills for your diabetes?\" If Yes, ask: \"Have you missed taking any pills recently?\"      Metformin, missed while in the hospital but was on insulin  8. OTHER SYMPTOMS: \"Do you have any symptoms?\" (e.g., fever, frequent urination, difficulty breathing, dizziness, weakness, vomiting)      Blurry vision, frequent urination, light headed,   9. PREGNANCY: \"Is there any chance you are pregnant?\" \"When was your last menstrual period?\"      N/A    Protocols used: Diabetes - High Blood Sugar-A-OH    "

## 2024-08-05 NOTE — TELEPHONE ENCOUNTER
Called patient. Patient states due to transportation, she is unable to come in at 2:10 PM but is agreeable for an urgent care/ED later when she is able.     Patient reports BG of 343 just prior to call.     Patient will call back with any other concerns. Patient thanked for the call.

## 2024-08-05 NOTE — TELEPHONE ENCOUNTER
Please call back:     Is she taking her metformin now? What was she in the hospital for? I do not see records at this time. Has she repeated a blood sugar and if so, what was it? Has she been up above 400 for several days, or just this one finding? Is she still having blurry vision?     Christine Taveras DNP, APRN, FNP-C   (Covering for Dr. Cherry)

## 2024-08-05 NOTE — PROGRESS NOTES
Impression:  Type 2 diabetes with hyperglycemia and symptoms.  Patient was off of metformin for period of time and had an MRI and a urinary tract infection all of which may have contributed to her hyperglycemia.  But she has not been able to regulate since starting back on the metformin.  No evidence of urinary tract infection or significant acidosis or dehydration at this time.  She also has cardiac disease and recently had an MI    Plan:  Given the hyperglycemia on max dose of metformin, and con commitment coronary artery disease, the patient tolerated dulaglutide previously and that will be an excellent medication to improve her blood sugars as well as for the patient with known coronary artery disease.  She should check her blood sugars frequently at home, make a record of her blood sugars, and follow-up in clinic in 1 week to see if any further adjustments are needed      Chief Complaint:  Patient presents with:  High Blood Sugar         HPI:   Taylor Aburto is a 69 year old female who presents to this clinic for the evaluation of evaded blood sugars and blurry vision.  Patient was hospitalized in June for an MI and had stents put in.  During that time she was taken off her metformin.  She remained off it for couple weeks after discharge but started it back about 2 weeks ago.  However her blood sugars have remained elevated during that time.  She will often have a blood sugar over 400 when she gets up in the morning and then frequently 300s during the day.  This morning at 6:35 AM she had a blood sugar of 470.  She rechecked it at 8:07 AM and it was 377.  Later in the morning it was 339.  She does complain of polyuria and polydipsia.  Also has slight burning on urination.  She had a urinary tract infection a couple weeks ago that was treated.  She notes that when she was first diagnosed with diabetes she was treated with dulaglutide and did well but she did not need to continue on it.  She denies any chest  pain palpitations or shortness of breath or pedal edema      PMH:   Past Medical History:   Diagnosis Date    Arthritis 06/27/2013    Benign essential hypertension     CAD (coronary artery disease) s/p KIM 6/2024     H/O ETOH abuse 02/06/2018    Hyperlipidemia LDL goal <70     Moderate smoker (20 or less per day) 06/27/2013    Vitamin D deficiency 02/06/2018     Past Surgical History:   Procedure Laterality Date    COLONOSCOPY N/A 12/1/2023    Procedure: COLONOSCOPY, FLEXIBLE, WITH LESION REMOVAL USING SNARE;  Surgeon: Jake Thomas MD;  Location: [facility]    CV CORONARY ANGIOGRAM N/A 6/8/2024    Procedure: Coronary Angiogram;  Surgeon: Rick Agustin MD;  Location: [facility] CARDIAC CATH LAB    CV CORONARY ANGIOGRAM N/A 6/18/2024    Procedure: Coronary Angiogram;  Surgeon: Rick Agustin MD;  Location: [facility] CARDIAC CATH LAB    CV INTRAVASULAR ULTRASOUND N/A 6/8/2024    Procedure: Intravascular Ultrasound;  Surgeon: Rick Agustin MD;  Location: [facility] CARDIAC CATH LAB    CV INTRAVASULAR ULTRASOUND N/A 6/18/2024    Procedure: Intravascular Ultrasound;  Surgeon: Rick Agustin MD;  Location: [facility] CARDIAC CATH LAB    CV PCI ASPIRATION THROMECTOMY N/A 6/8/2024    Procedure: Percutaneous Coronary Intervention Aspiration Thrombectomy;  Surgeon: Rick Agustin MD;  Location: [facility] CARDIAC CATH LAB    CV PCI STENT DRUG ELUTING N/A 6/8/2024    Procedure: Percutaneous Coronary Intervention Stent;  Surgeon: Rick Agustin MD;  Location: [facility] CARDIAC CATH LAB    CV PCI STENT DRUG ELUTING N/A 6/18/2024    Procedure: Percutaneous Coronary Intervention Stent;  Surgeon: Rick Agustin MD;  Location: [facility] CARDIAC CATH LAB    CV PERICARDIOCENTESIS N/A 6/10/2024    Procedure: Pericardiocentesis;  Surgeon: Ayde Ruiz MD;  Location: [facility] CARDIAC CATH LAB    ENDOSCOPIC ULTRASOUND UPPER GASTROINTESTINAL TRACT (GI) N/A 2/22/2024     Procedure: Endoscopic ultrasound upper gastrointestinal tract (GI);  Surgeon: Jake Thomas MD;  Location:  GI    ESOPHAGOSCOPY, GASTROSCOPY, DUODENOSCOPY (EGD), COMBINED N/A 2/22/2024    Procedure: ESOPHAGOGASTRODUODENOSCOPY, WITH BIOPSY;  Surgeon: Jake Thomas MD;  Location:  GI         ROS:  All other systems negative    Meds:    Current Outpatient Medications:     aspirin 81 MG EC tablet, Take 1 tablet (81 mg) by mouth daily Start tomorrow., Disp: 90 tablet, Rfl: 3    blood glucose (ACCU-CHEK GUIDE) test strip, [ACCU-CHEK GUIDE TEST STRIPS STRIPS] TEST BLOOD GLUCOSE LEVELS TWICE A DAY, Disp: 200 strip, Rfl: 3    blood glucose (NO BRAND SPECIFIED) lancets standard, Use to test blood sugar two times daily or as directed., Disp: 200 lancet, Rfl: 0    blood glucose meter (GLUCOMETER), [BLOOD GLUCOSE METER (GLUCOMETER)] Use 1 each As Directed as needed. Dispense glucometer brand per patient's insurance at pharmacy discretion. DX: E11.9, Disp: 1 each, Rfl: 0    carvedilol (COREG) 3.125 MG tablet, Take 1 tablet (3.125 mg) by mouth 2 times daily (with meals), Disp: 180 tablet, Rfl: 0    fluticasone (FLONASE) 50 MCG/ACT nasal spray, Spray 1 spray into both nostrils daily (Patient taking differently: Spray 1 spray into both nostrils daily as needed for allergies), Disp: 16 g, Rfl: 4    lisinopril (ZESTRIL) 5 MG tablet, Take 1 tablet (5 mg) by mouth daily, Disp: 90 tablet, Rfl: 3    metFORMIN (GLUCOPHAGE XR) 500 MG 24 hr tablet, Take 2 tablets (1,000 mg) by mouth 2 times daily, Disp: 360 tablet, Rfl: 1    nicotine polacrilex (NICORETTE) 4 MG gum, Chew one piece every 1- 2 hours for cravings as needed. Try to limit using the gum for strong craving only since you are taking the patch, and previous history of nausea and vomiting., Disp: 50 each, Rfl: 3    nitroGLYcerin (NITROSTAT) 0.4 MG sublingual tablet, For chest pain place 1 tablet under the tongue every 5 minutes for 3 doses. If symptoms  persist 5 minutes after 1st dose call 911., Disp: 30 tablet, Rfl: 0    oxyBUTYnin ER (DITROPAN XL) 10 MG 24 hr tablet, Take 1 tablet (10 mg) by mouth daily, Disp: 90 tablet, Rfl: 3    prasugrel (EFFIENT) 5 MG TABS tablet, Take 1 tablet (5 mg) by mouth daily Do not crush or break tablet. Dose to start tomorrow., Disp: 90 tablet, Rfl: 3    PYRIDOXINE HCL PO, Take 1 tablet by mouth daily Strength unknown, Disp: , Rfl:     rosuvastatin (CRESTOR) 40 MG tablet, Take 1 tablet (40 mg) by mouth daily, Disp: 90 tablet, Rfl: 3        Social:  Social History     Socioeconomic History    Marital status: Single     Spouse name: Not on file    Number of children: Not on file    Years of education: Not on file    Highest education level: Not on file   Occupational History    Not on file   Tobacco Use    Smoking status: Former     Current packs/day: 0.00     Average packs/day: 0.3 packs/day for 40.0 years (10.0 ttl pk-yrs)     Types: Cigarettes     Quit date: 2024     Years since quittin.1    Smokeless tobacco: Never   Vaping Use    Vaping status: Never Used   Substance and Sexual Activity    Alcohol use: Not Currently     Comment: Sober    Drug use: Not Currently    Sexual activity: Not on file   Other Topics Concern    Not on file   Social History Narrative    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)     Social Determinants of Health     Financial Resource Strain: Low Risk  (2023)    Financial Resource Strain     Within the past 12 months, have you or your family members you live with been unable to get utilities (heat, electricity) when it was really needed?: No   Food Insecurity: Low Risk  (2023)    Food Insecurity     Within the past 12 months, did you worry that your food would run out before you got money to buy more?: No     Within the past 12 months, did the food you bought just not last and you didn t have money to get more?: No   Transportation Needs: Low Risk  (2023)    Transportation Needs      Within the past 12 months, has lack of transportation kept you from medical appointments, getting your medicines, non-medical meetings or appointments, work, or from getting things that you need?: No   Physical Activity: Not on file   Stress: Not on file   Social Connections: Not on file   Interpersonal Safety: Low Risk  (11/6/2023)    Interpersonal Safety     Do you feel physically and emotionally safe where you currently live?: Yes     Within the past 12 months, have you been hit, slapped, kicked or otherwise physically hurt by someone?: No     Within the past 12 months, have you been humiliated or emotionally abused in other ways by your partner or ex-partner?: No   Housing Stability: Low Risk  (12/4/2023)    Housing Stability     Do you have housing? : Yes     Are you worried about losing your housing?: No         Physical Exam:  Vitals:    08/05/24 1408   BP: 124/65   BP Location: Right arm   Patient Position: Sitting   Cuff Size: Adult Small   Pulse: 65   Resp: 16   Temp: 97.9  F (36.6  C)   TempSrc: Oral   SpO2: 97%   Weight: 50 kg (110 lb 4.8 oz)      Patient is awake, alert, no distress  Eyes: PERRL, EOMI  Head: Atraumatic and normocephalic  Lungs: Clear without distress  CV: Regular without murmur  Abdomen: Nontender without mass  Extremities: No tenderness or deformity  Skin: No lesions or rash  Neuro: Normal motor and sensory function in all extremities  Psych: Awake, alert, normally responsive      Results:    Results for orders placed or performed in visit on 08/05/24 (from the past 24 hour(s))   CBC with platelets   Result Value Ref Range    WBC Count 4.0 4.0 - 11.0 10e3/uL    RBC Count 3.85 3.80 - 5.20 10e6/uL    Hemoglobin 11.1 (L) 11.7 - 15.7 g/dL    Hematocrit 33.8 (L) 35.0 - 47.0 %    MCV 88 78 - 100 fL    MCH 28.8 26.5 - 33.0 pg    MCHC 32.8 31.5 - 36.5 g/dL    RDW 13.7 10.0 - 15.0 %    Platelet Count 113 (L) 150 - 450 10e3/uL   Comprehensive metabolic panel   Result Value Ref Range    Sodium  136 135 - 145 mmol/L    Potassium 4.7 3.4 - 5.3 mmol/L    Chloride 103 94 - 109 mmol/L    Carbon Dioxide (CO2) 25 20 - 32 mmol/L    Anion Gap 8 3 - 14 mmol/L    Urea Nitrogen 18 7 - 30 mg/dL    Creatinine 0.80 0.52 - 1.04 mg/dL    GFR Estimate 80 >60 mL/min/1.73m2    Calcium 9.8 8.5 - 10.1 mg/dL    Glucose 330 (H) 70 - 99 mg/dL    Alkaline Phosphatase 80 40 - 150 U/L    AST 21 0 - 45 U/L    ALT 20 0 - 50 U/L    Protein Total 6.7 (L) 6.8 - 8.8 g/dL    Albumin 3.5 3.4 - 5.0 g/dL    Bilirubin Total 0.8 0.2 - 1.3 mg/dL   UA Macroscopic with reflex to Microscopic and Culture    Specimen: Urine, Clean Catch   Result Value Ref Range    Color Urine Yellow Colorless, Straw, Light Yellow, Yellow    Appearance Urine Clear Clear    Glucose Urine >=1000 (A) Negative mg/dL    Bilirubin Urine Negative Negative    Ketones Urine Negative Negative mg/dL    Specific Gravity Urine 1.025 1.005 - 1.030    Blood Urine Negative Negative    pH Urine 5.5 5.0 - 8.0    Protein Albumin Urine Negative Negative mg/dL    Urobilinogen Urine 0.2 0.2, 1.0 E.U./dL    Nitrite Urine Negative Negative    Leukocyte Esterase Urine Negative Negative    Narrative    Microscopic not indicated        No results found for this or any previous visit (from the past 24 hour(s)).       Julio Fairchild MD

## 2024-08-05 NOTE — TELEPHONE ENCOUNTER
Discussed case with [facility] provider, they will see patient for elevated blood sugars and blurry vision. Notified RN with [facility] of MRN number.

## 2024-08-05 NOTE — TELEPHONE ENCOUNTER
Called patient back.     Patient states she takes her metformin daily. States she was without it for about a week but has been back on it since 7/24.     Patient states her blood glucose has been high in this range for some time now and is not a one time incident. Patient states the most recent blood sugar reading was 377.     Patient reports she always has constant blurry vision. Pt did not go to work today due to the rain and vision blurriness.     Informed will route back to covering provider for review and reach back out to the patient.     Patient verbalized understanding and thanked for call.

## 2024-08-05 NOTE — TELEPHONE ENCOUNTER
Patient returning call stating she would like the offered appt time with Bharati for 2:10 PM today. Provider Bharati discussed with [facility] about taking this patient today for same appt time. [facility] is agreeable to see the patient.     Patient will be scheduled with [facility] for today.

## 2024-08-05 NOTE — PROGRESS NOTES
Acute and Diagnostic Services Clinic Visit    {PROVIDER CHARTING PREFERENCE:894649}    Eb Vazquez is a 69 year old, presenting for the following health issues:  High Blood Sugar  {(!) Visit Details have not yet been documented.  Please enter Visit Details and then use this list to pull in documentation. (Optional):876671}  HPI     {ADS SUPERLIST :428736}      {ROS Picklists (Optional):732045}      Objective    /65 (BP Location: Right arm, Patient Position: Sitting, Cuff Size: Adult Small)   Pulse 65   Temp 97.9  F (36.6  C) (Oral)   Resp 16   Wt 50 kg (110 lb 4.8 oz)   LMP  (LMP Unknown)   SpO2 97%   BMI 18.35 kg/m    Body mass index is 18.35 kg/m .  Physical Exam   {Exam List (Optional):745256}    {Diagnostic Test Results (Optional):186605}        Signed Electronically by: SWAPNA GREEN MD  {Email feedback regarding this note to [email or web address]   :[id number]}

## 2024-08-08 ENCOUNTER — HOSPITAL ENCOUNTER (OUTPATIENT)
Dept: CARDIAC REHAB | Facility: HOSPITAL | Age: 69
Discharge: HOME | End: 2024-08-08
Attending: INTERNAL MEDICINE
Payer: COMMERCIAL

## 2024-08-08 LAB — GLUCOSE BLDC GLUCOMTR-MCNC: 435 MG/DL (ref 70–99)

## 2024-08-08 PROCEDURE — 93798 PHYS/QHP OP CAR RHAB W/ECG: CPT

## 2024-08-10 ENCOUNTER — HEALTH MAINTENANCE LETTER (OUTPATIENT)
Age: 69
End: 2024-08-10

## 2024-08-13 ENCOUNTER — HOSPITAL ENCOUNTER (OUTPATIENT)
Dept: CARDIAC REHAB | Facility: HOSPITAL | Age: 69
Discharge: HOME | End: 2024-08-13
Attending: INTERNAL MEDICINE
Payer: COMMERCIAL

## 2024-08-13 PROCEDURE — 93798 PHYS/QHP OP CAR RHAB W/ECG: CPT

## 2024-08-15 ENCOUNTER — HOSPITAL ENCOUNTER (OUTPATIENT)
Dept: CARDIAC REHAB | Facility: HOSPITAL | Age: 69
Discharge: HOME | End: 2024-08-15
Attending: INTERNAL MEDICINE
Payer: COMMERCIAL

## 2024-08-15 PROCEDURE — 93798 PHYS/QHP OP CAR RHAB W/ECG: CPT

## 2024-08-20 ENCOUNTER — HOSPITAL ENCOUNTER (OUTPATIENT)
Dept: CARDIAC REHAB | Facility: HOSPITAL | Age: 69
Discharge: HOME | End: 2024-08-20
Attending: INTERNAL MEDICINE
Payer: COMMERCIAL

## 2024-08-20 PROCEDURE — 93798 PHYS/QHP OP CAR RHAB W/ECG: CPT

## 2024-08-22 ENCOUNTER — HOSPITAL ENCOUNTER (OUTPATIENT)
Dept: CARDIAC REHAB | Facility: HOSPITAL | Age: 69
Discharge: HOME | End: 2024-08-22
Attending: INTERNAL MEDICINE
Payer: COMMERCIAL

## 2024-08-22 PROCEDURE — 93798 PHYS/QHP OP CAR RHAB W/ECG: CPT

## 2024-08-23 ENCOUNTER — OFFICE VISIT (OUTPATIENT)
Dept: FAMILY MEDICINE | Facility: CLINIC | Age: 69
End: 2024-08-23
Payer: COMMERCIAL

## 2024-08-23 VITALS
DIASTOLIC BLOOD PRESSURE: 68 MMHG | RESPIRATION RATE: 16 BRPM | BODY MASS INDEX: 17.59 KG/M2 | SYSTOLIC BLOOD PRESSURE: 132 MMHG | TEMPERATURE: 98 F | HEIGHT: 65 IN | HEART RATE: 76 BPM | WEIGHT: 105.6 LBS | OXYGEN SATURATION: 96 %

## 2024-08-23 DIAGNOSIS — Z87.891 PERSONAL HISTORY OF TOBACCO USE: ICD-10-CM

## 2024-08-23 DIAGNOSIS — I77.3 FIBROMUSCULAR DYSPLASIA (H): ICD-10-CM

## 2024-08-23 DIAGNOSIS — E11.9 TYPE 2 DIABETES MELLITUS WITHOUT COMPLICATION, WITHOUT LONG-TERM CURRENT USE OF INSULIN (H): ICD-10-CM

## 2024-08-23 DIAGNOSIS — I31.39 PERICARDIAL EFFUSION: ICD-10-CM

## 2024-08-23 DIAGNOSIS — R35.0 URINARY FREQUENCY: ICD-10-CM

## 2024-08-23 DIAGNOSIS — Z72.0 TOBACCO ABUSE: ICD-10-CM

## 2024-08-23 DIAGNOSIS — J43.9 PULMONARY EMPHYSEMA, UNSPECIFIED EMPHYSEMA TYPE (H): ICD-10-CM

## 2024-08-23 DIAGNOSIS — R63.4 UNINTENTIONAL WEIGHT LOSS: ICD-10-CM

## 2024-08-23 DIAGNOSIS — I21.4 NSTEMI (NON-ST ELEVATED MYOCARDIAL INFARCTION) (H): Primary | ICD-10-CM

## 2024-08-23 DIAGNOSIS — F41.9 ANXIETY: ICD-10-CM

## 2024-08-23 DIAGNOSIS — I51.81 TAKOTSUBO CARDIOMYOPATHY: ICD-10-CM

## 2024-08-23 DIAGNOSIS — D69.6 THROMBOCYTOPENIA (H): ICD-10-CM

## 2024-08-23 DIAGNOSIS — I25.10 CORONARY ARTERY DISEASE INVOLVING NATIVE HEART WITHOUT ANGINA PECTORIS, UNSPECIFIED VESSEL OR LESION TYPE: ICD-10-CM

## 2024-08-23 LAB
ALBUMIN UR-MCNC: ABNORMAL MG/DL
APPEARANCE UR: CLEAR
BACTERIA #/AREA URNS HPF: ABNORMAL /HPF
BILIRUB UR QL STRIP: NEGATIVE
COLOR UR AUTO: YELLOW
ERYTHROCYTE [DISTWIDTH] IN BLOOD BY AUTOMATED COUNT: 13.1 % (ref 10–15)
GLUCOSE UR STRIP-MCNC: >=1000 MG/DL
HBA1C MFR BLD: 10.7 % (ref 0–5.6)
HCT VFR BLD AUTO: 38.9 % (ref 35–47)
HGB BLD-MCNC: 12.4 G/DL (ref 11.7–15.7)
HGB UR QL STRIP: ABNORMAL
KETONES UR STRIP-MCNC: NEGATIVE MG/DL
LEUKOCYTE ESTERASE UR QL STRIP: NEGATIVE
MCH RBC QN AUTO: 27.9 PG (ref 26.5–33)
MCHC RBC AUTO-ENTMCNC: 31.9 G/DL (ref 31.5–36.5)
MCV RBC AUTO: 88 FL (ref 78–100)
NITRATE UR QL: NEGATIVE
PH UR STRIP: 6.5 [PH] (ref 5–8)
PLATELET # BLD AUTO: 148 10E3/UL (ref 150–450)
RBC # BLD AUTO: 4.44 10E6/UL (ref 3.8–5.2)
RBC #/AREA URNS AUTO: ABNORMAL /HPF
SP GR UR STRIP: 1.01 (ref 1–1.03)
SQUAMOUS #/AREA URNS AUTO: ABNORMAL /LPF
UROBILINOGEN UR STRIP-ACNC: 1 E.U./DL
WBC # BLD AUTO: 4.4 10E3/UL (ref 4–11)
WBC #/AREA URNS AUTO: ABNORMAL /HPF

## 2024-08-23 PROCEDURE — 85027 COMPLETE CBC AUTOMATED: CPT | Performed by: STUDENT IN AN ORGANIZED HEALTH CARE EDUCATION/TRAINING PROGRAM

## 2024-08-23 PROCEDURE — 82728 ASSAY OF FERRITIN: CPT | Performed by: STUDENT IN AN ORGANIZED HEALTH CARE EDUCATION/TRAINING PROGRAM

## 2024-08-23 PROCEDURE — 82043 UR ALBUMIN QUANTITATIVE: CPT | Performed by: STUDENT IN AN ORGANIZED HEALTH CARE EDUCATION/TRAINING PROGRAM

## 2024-08-23 PROCEDURE — G2211 COMPLEX E/M VISIT ADD ON: HCPCS | Performed by: STUDENT IN AN ORGANIZED HEALTH CARE EDUCATION/TRAINING PROGRAM

## 2024-08-23 PROCEDURE — 80053 COMPREHEN METABOLIC PANEL: CPT | Performed by: STUDENT IN AN ORGANIZED HEALTH CARE EDUCATION/TRAINING PROGRAM

## 2024-08-23 PROCEDURE — 82570 ASSAY OF URINE CREATININE: CPT | Performed by: STUDENT IN AN ORGANIZED HEALTH CARE EDUCATION/TRAINING PROGRAM

## 2024-08-23 PROCEDURE — 80061 LIPID PANEL: CPT | Performed by: STUDENT IN AN ORGANIZED HEALTH CARE EDUCATION/TRAINING PROGRAM

## 2024-08-23 PROCEDURE — 81001 URINALYSIS AUTO W/SCOPE: CPT | Performed by: STUDENT IN AN ORGANIZED HEALTH CARE EDUCATION/TRAINING PROGRAM

## 2024-08-23 PROCEDURE — 36415 COLL VENOUS BLD VENIPUNCTURE: CPT | Performed by: STUDENT IN AN ORGANIZED HEALTH CARE EDUCATION/TRAINING PROGRAM

## 2024-08-23 PROCEDURE — 83036 HEMOGLOBIN GLYCOSYLATED A1C: CPT | Performed by: STUDENT IN AN ORGANIZED HEALTH CARE EDUCATION/TRAINING PROGRAM

## 2024-08-23 PROCEDURE — 99215 OFFICE O/P EST HI 40 MIN: CPT | Performed by: STUDENT IN AN ORGANIZED HEALTH CARE EDUCATION/TRAINING PROGRAM

## 2024-08-23 RX ORDER — ESCITALOPRAM OXALATE 10 MG/1
10 TABLET ORAL DAILY
Qty: 90 TABLET | Refills: 0 | Status: SHIPPED | OUTPATIENT
Start: 2024-08-23 | End: 2024-09-22

## 2024-08-23 RX ORDER — BLOOD SUGAR DIAGNOSTIC
STRIP MISCELLANEOUS
Qty: 200 STRIP | Refills: 3 | Status: SHIPPED | OUTPATIENT
Start: 2024-08-23

## 2024-08-23 RX ORDER — METFORMIN HCL 500 MG
1000 TABLET, EXTENDED RELEASE 24 HR ORAL 2 TIMES DAILY
Qty: 360 TABLET | Refills: 1 | Status: SHIPPED | OUTPATIENT
Start: 2024-08-23 | End: 2024-09-22

## 2024-08-23 ASSESSMENT — PAIN SCALES - GENERAL: PAINLEVEL: NO PAIN (0)

## 2024-08-23 NOTE — PATIENT INSTRUCTIONS
We discussed the following today:      Please call the following number [phone]   Please call to make an appointment for pulmonary function testing  Please start Lexapro and make an appointment with a therapist  Continue to stay off the smoking and use the Nicorette gum  Will get baseline blood work and then decide on starting insulin  Please discontinue the Trulicity, you have already lost 5 pounds since starting it

## 2024-08-23 NOTE — PROGRESS NOTES
Assessment & Plan      Diagnosis Comments   1. NSTEMI (non-ST elevated myocardial infarction) (H)  Comprehensive metabolic panel (BMP + Alb, Alk Phos, ALT, AST, Total. Bili, TP), Lipid Profile (Chol, Trig, HDL, LDL calc), CBC with platelets, Ferritin       2. Type 2 diabetes mellitus without complication, without long-term current use of insulin (H)  HEMOGLOBIN A1C, Albumin Random Urine Quantitative with Creat Ratio, blood glucose (ACCU-CHEK GUIDE) test strip, blood glucose (NO BRAND SPECIFIED) lancets standard, metFORMIN (GLUCOPHAGE XR) 500 MG 24 hr tablet       3. Coronary artery disease involving native heart without angina pectoris, unspecified vessel or lesion type        4. Tobacco abuse  nicotine polacrilex (NICORETTE) 4 MG gum, General PFT Lab (Please always keep checked), Pulmonary Function Test       5. Pulmonary emphysema, unspecified emphysema type (H)  General PFT Lab (Please always keep checked), Pulmonary Function Test       6. Personal history of tobacco use        7. Unintentional weight loss  escitalopram (LEXAPRO) 10 MG tablet       8. Urinary frequency  UA Macroscopic with reflex to Microscopic and Culture - Clinic Collect, UA Microscopic with Reflex to Culture       9. Pericardial effusion        10. Takotsubo cardiomyopathy        11. Fibromuscular dysplasia (H24)        12. Thrombocytopenia (H24)        13. Anxiety  escitalopram (LEXAPRO) 10 MG tablet, Adult Mental Health [facility] Referral             NSTEMI s/p KIM to LAD x 2 and KIM in D1 6/8/2024 complicated by Coronary dissection occurred during PCI involving both LAD and D1  Takotsubo cardiomyopathy  History of tobacco abuse  As noted below, repeat cardiac catheterization 6/18 which showed excellent stent expansion and apposition and good lumen size throughout throughout.  Echo shows recovered EF and she is asymptomatic from a cardiac standpoint  Is doing well with cardiac rehab  Has discontinued smoking and is working on stress  management  Patient is doing well on her Effient, aspirin, lisinopril, Coreg and Crestor  Plan:  - Recheck BMP today  - Continue cardiac rehab and follow-up with cardiology in September  - Congratulated on tobacco cessation strongly encouraged her to keep off of the tobacco.  She does not have any cravings and does not need any additional resources regarding this.  - Continue meds without changes  - Patient has nitro to take as needed at home but has not needed it  - See discussion below regarding stress management    Pericardial effusion s/p pericardiocentesis 6/10/2024  S/p placement of pericardial drain that was removed 6/12/2024.  Has been getting serial echoes with improvement of the anterior pericardial effusion.  EF has also recovered and is now at 60 to 65%.  Patient is aware that she needs to get an echo done 6 weeks from 7/12 per cardiology    Unintentional weight loss:   As noted in prior notes, extensive workup has been negative for malignancy.  No obvious cause found.  My hope is that the Lantus will be anabolic and help with weight gain.  She is also stopped smoking + started on antidepressant and that should help improve her appetite as well    Uncontrolled type 2 diabetes:  Patient is reporting very elevated sugars at home  Reviewed role of diabetes and progression of CAD and an increase in risk of heart attack/strokes  GLP-1 was recently started on the patient.  This is a good option for her given her CAD.  However, patient has historically lost significant amount of weight on GLP-1's.  Today, her weight is down 5 pounds since starting the GLP-1.  Plan:  - Get updated A1c  - Discontinue GLP-1  - Start Lantus instead  - She does not check her sugars consistently.  Advised her to start checking her sugars in the morning  - Continue metformin  - May add an SGLT2 in the future  -Follow-up in 2 to 3 months  - Get updated diabetic eye exam  -Is experiencing some urinary frequency, will get  "UA.    Fibromuscular dysplasia  CTA chest/abdomen 6/7/2024 showed beaded irregularity of the distal cervical internal carotid artery on the right consistent with fibromuscular dysplasia.  Patient is currently asymptomatic  Has not followed with vascular in the past  Has not had evaluation of her kidneys in the past  Was seen by a vascular provider 7/26/2024 and they recommended coming back for repeat imaging in 3 months.  In the interim, she should avoid any type of neck manipulation.    Chronic thrombocytopenia:  Issue for the patient since 2020  Was seen by hematology/oncology 2023.  Workup at that time was negative and they did not feel further follow-up was necessary  Plan:  - Repeat CBC today    Anxiety:  Patient has been feeling overwhelmed with all the recent health changes and had has been doing some \"searching\" regarding her mortality and how she takes care of herself  She is trying to prioritize her health  She reports some negative self talk  Plan:  - Supportive listening provided  - I think patient would benefit from medications.  Has historically done well on Lexapro.  Will resume low-dose today  - Patient also feels like therapy would be helpful, referral placed     Pulmonary emphysema  History of tobacco use  Had historically ordered PFTs on her but she did not get them done  Reviewed the importance of getting the PFTs done to assess for COPD and whether or not she would benefit from inhalers.  Untreated COPD puts a lot of stress on the heart.  Patient is receptive to this information will get this done.      The longitudinal plan of care for the diagnosis(es)/condition(s) as documented were addressed during this visit. Due to the added complexity in care, I will continue to support Taylor in the subsequent management and with ongoing continuity of care.    42 minutes spent by me on the date of the encounter doing chart review, history and exam, documentation and further activities per the " "note      Subjective   Taylor is a 69 year old, presenting for the following health issues:  Hospital F/U (Hospital follow up. She states things are finally starting to get better. Burning sensation when urinating and sore smell. Is she getting a UTI again? )        8/23/2024     2:03 PM   Additional Questions   Roomed by Christopher CUNNINGHAM MA   Accompanied by Self     HPI       Patient is a 69-year-old female who is admitted to [facility] 6/7/2024 to 6/13/2024 for NSTEMI s/p KIM to LAD x 2 and KIM in D1 6/8/2024.  Coronary dissection occurred during PCI involving both LAD and D1.  Hematomas resolved over the next few days ended recommended repeat CT angiogram 1 to 2 weeks post KIM placement to assess stent placement.  Patient underwent repeat cardiac catheterization 6/18 which showed excellent stent expansion and apposition and good lumen size throughout throughout.    Echo on presentation also showed moderately severe HFrEF with EF 30 to 35% with wall motion abnormalities.  Most likely stress (Takotsubo) cardiomyopathy.     Patient also had enlargement of pericardial effusion and underwent pericardiocentesis 6/10/2024 with placement of pericardial drain that was removed 6/12/2024.  Has been getting serial echoes with improvement of the anterior pericardial effusion.  EF has also recovered and is now at 60 to 65%.    Patient is currently on aspirin, Effient, lisinopril, Coreg and Crestor    Remainder of her chronic disease burden was stable    Today, patient reports that she is feeling better than she did in the hospital.  She has been recovering.  Has quit smoking since she got discharged.  Is slightly teary-eyed in the room and notes that she is \"so angry\" at herself for not \"taking care of herself\".  Is starting to come to terms with her health and prior to rising herself.    She had significant brain fog after the hospitalization but that starting to clear.  She is not significantly short of breath, having chest " "pressure/chest pain, lightheadedness, dizziness at this time.  She is trying to eat consistently but has not gained any significant weight back.  She is going to cardiac rehab and that is been helpful.    Since the hospitalization, she is also had very highSugars.  Her sugars were in the 300s, she was having polyuria and also had some vision changes.  Became concerned and did not end up in the emergency room 7/7.  At that time, she was discharged with antibiotics for presumed UTI.  Then, she was seen outpatient by provider 8/5/2024-they resumed her on Trulicity.  Has been on it for 2 to 3 weeks.      Review of Systems  As per HPI       Objective    /68 (BP Location: Right arm, Patient Position: Sitting, Cuff Size: Adult Regular)   Pulse 76   Temp 98  F (36.7  C) (Oral)   Resp 16   Ht 1.651 m (5' 5\")   Wt 47.9 kg (105 lb 9.6 oz)   LMP  (LMP Unknown)   SpO2 96%   BMI 17.57 kg/m    Body mass index is 17.57 kg/m .  Physical Exam   GENERAL: alert and no distress  NECK: no adenopathy, no asymmetry, masses, or scars  RESP: lungs clear to auscultation - no rales, rhonchi or wheezes  CV: regular rate and rhythm, no murmur, click or rub, no peripheral edema  MS: no gross musculoskeletal defects noted, no edema  PSYCH: mentation appears normal, affect  slightly anxious, teary eyed and some insight       Signed Electronically by: Soraya Cherry DO    "

## 2024-08-24 LAB
ALBUMIN SERPL BCG-MCNC: 4.5 G/DL (ref 3.5–5.2)
ALP SERPL-CCNC: 72 U/L (ref 40–150)
ALT SERPL W P-5'-P-CCNC: 13 U/L (ref 0–50)
ANION GAP SERPL CALCULATED.3IONS-SCNC: 11 MMOL/L (ref 7–15)
AST SERPL W P-5'-P-CCNC: 17 U/L (ref 0–45)
BILIRUB SERPL-MCNC: 0.5 MG/DL
BUN SERPL-MCNC: 14.1 MG/DL (ref 8–23)
CALCIUM SERPL-MCNC: 10.4 MG/DL (ref 8.8–10.4)
CHLORIDE SERPL-SCNC: 101 MMOL/L (ref 98–107)
CHOLEST SERPL-MCNC: 124 MG/DL
CREAT SERPL-MCNC: 0.9 MG/DL (ref 0.51–0.95)
CREAT UR-MCNC: 20.7 MG/DL
EGFRCR SERPLBLD CKD-EPI 2021: 69 ML/MIN/1.73M2
FASTING STATUS PATIENT QL REPORTED: ABNORMAL
FASTING STATUS PATIENT QL REPORTED: NORMAL
FERRITIN SERPL-MCNC: 32 NG/ML (ref 11–328)
GLUCOSE SERPL-MCNC: 248 MG/DL (ref 70–99)
HCO3 SERPL-SCNC: 26 MMOL/L (ref 22–29)
HDLC SERPL-MCNC: 67 MG/DL
LDLC SERPL CALC-MCNC: 40 MG/DL
MICROALBUMIN UR-MCNC: 14.8 MG/L
MICROALBUMIN/CREAT UR: 71.5 MG/G CR (ref 0–25)
NONHDLC SERPL-MCNC: 57 MG/DL
POTASSIUM SERPL-SCNC: 4.6 MMOL/L (ref 3.4–5.3)
PROT SERPL-MCNC: 7.7 G/DL (ref 6.4–8.3)
SODIUM SERPL-SCNC: 138 MMOL/L (ref 135–145)
TRIGL SERPL-MCNC: 85 MG/DL

## 2024-08-26 ENCOUNTER — OFFICE VISIT (OUTPATIENT)
Dept: PULMONOLOGY | Facility: CLINIC | Age: 69
End: 2024-08-26
Attending: STUDENT IN AN ORGANIZED HEALTH CARE EDUCATION/TRAINING PROGRAM
Payer: COMMERCIAL

## 2024-08-26 DIAGNOSIS — Z72.0 TOBACCO ABUSE: ICD-10-CM

## 2024-08-26 DIAGNOSIS — J43.9 PULMONARY EMPHYSEMA, UNSPECIFIED EMPHYSEMA TYPE (H): ICD-10-CM

## 2024-08-26 PROCEDURE — 99207 PR NO BILLABLE SERVICE THIS VISIT: CPT | Performed by: INTERNAL MEDICINE

## 2024-08-26 PROCEDURE — 94729 DIFFUSING CAPACITY: CPT | Mod: 26 | Performed by: INTERNAL MEDICINE

## 2024-08-26 PROCEDURE — 94726 PLETHYSMOGRAPHY LUNG VOLUMES: CPT | Mod: 26 | Performed by: INTERNAL MEDICINE

## 2024-08-26 PROCEDURE — 94375 RESPIRATORY FLOW VOLUME LOOP: CPT | Mod: 26 | Performed by: INTERNAL MEDICINE

## 2024-08-26 NOTE — LETTER
September 5, 2024      Taylor Aburto  [address]  [facility] [address]        Dear ,    We are writing to inform you of your test results.      Good news! No evidence of COPD on the PFTs. Lung capacity appears normal     Resulted Orders   General PFT Lab (Please always keep checked)   Result Value Ref Range    FVC-Pred 2.77 L    FVC-Pre 2.83 L    FVC-%Pred-Pre 102 %    FEV1-Pre 2.06 L    FEV1-%Pred-Pre 94 %    FEV1FVC-Pred 79 %    FEV1FVC-Pre 73 %    FEFMax-Pred 5.92 L/sec    FEFMax-Pre 5.41 L/sec    FEFMax-%Pred-Pre 91 %    FEF2575-Pred 1.87 L/sec    FEF2575-Pre 1.42 L/sec    LXX2823-%Pred-Pre 75 %    ExpTime-Pre 6.23 sec    FIFMax-Pre 4.26 L/sec    VC-Pred 3.23 L    VC-Pre 2.86 L    VC-%Pred-Pre 88 %    IC-Pred 2.10 L    IC-Pre 1.93 L    IC-%Pred-Pre 92 %    ERV-Pred 1.05 L    ERV-Pre 0.93 L    ERV-%Pred-Pre 88 %    FEV1FEV6-Pred 79 %    FEV1FEV6-Pre 73 %    FRCPleth-Pred 2.77 L    FRCPleth-Pre 3.46 L    FRCPleth-%Pred-Pre 125 %    RVPleth-Pred 2.08 L    RVPleth-Pre 2.53 L    RVPleth-%Pred-Pre 121 %    TLCPleth-Pred 5.11 L    TLCPleth-Pre 5.39 L    TLCPleth-%Pred-Pre 105 %    DLCOunc-Pred 19.59 ml/min/mmHg    DLCOunc-Pre 14.76 ml/min/mmHg    DLCOunc-%Pred-Pre 75 %    DLCOcor-Pre 15.25 ml/min/mmHg    DLCOcor-%Pred-Pre 77 %    VA-Pre 4.59 L    VA-%Pred-Pre 96 %    FEV1SVC-Pred 67 %    FEV1SVC-Pre 72 %    Narrative    The FVC, FEV1, FEV1/FVC ratio and WNF43-43% are within normal limits.  The inspiratory flow rates are within normal limits.  Lung volumes are within normal limits.  The diffusing capacity is normal.        The results are within normal limits.        IMPRESSION:    Normal Pulmonary Function                This preliminary report should not be used clinically unless reviewed and signed by a physician.                ____________________________________________M.D.              This interpretation has been electronically signed:  Trey Carlton 08/27/2024  10:38:20 AM         If you have any  questions or concerns, please call the clinic at the number listed above.       Sincerely,      Soraya Cherry, DO             Her/She

## 2024-08-27 ENCOUNTER — HOSPITAL ENCOUNTER (OUTPATIENT)
Dept: CARDIAC REHAB | Facility: HOSPITAL | Age: 69
Discharge: HOME | End: 2024-08-27
Attending: INTERNAL MEDICINE
Payer: COMMERCIAL

## 2024-08-27 ENCOUNTER — TELEPHONE (OUTPATIENT)
Dept: FAMILY MEDICINE | Facility: CLINIC | Age: 69
End: 2024-08-27
Payer: COMMERCIAL

## 2024-08-27 DIAGNOSIS — E11.65 UNCONTROLLED TYPE 2 DIABETES MELLITUS WITH HYPERGLYCEMIA (H): Primary | ICD-10-CM

## 2024-08-27 DIAGNOSIS — E11.65 UNCONTROLLED TYPE 2 DIABETES MELLITUS WITH HYPERGLYCEMIA (H): ICD-10-CM

## 2024-08-27 PROBLEM — I31.39 PERICARDIAL EFFUSION: Status: ACTIVE | Noted: 2024-08-27

## 2024-08-27 PROBLEM — I51.81 TAKOTSUBO CARDIOMYOPATHY: Status: ACTIVE | Noted: 2024-08-27

## 2024-08-27 PROBLEM — I77.3 FIBROMUSCULAR DYSPLASIA (H): Status: ACTIVE | Noted: 2024-08-27

## 2024-08-27 LAB
DLCOCOR-%PRED-PRE: 77 %
DLCOCOR-PRE: 15.25 ML/MIN/MMHG
DLCOUNC-%PRED-PRE: 75 %
DLCOUNC-PRE: 14.76 ML/MIN/MMHG
DLCOUNC-PRED: 19.59 ML/MIN/MMHG
ERV-%PRED-PRE: 88 %
ERV-PRE: 0.93 L
ERV-PRED: 1.05 L
EXPTIME-PRE: 6.23 SEC
FEF2575-%PRED-PRE: 75 %
FEF2575-PRE: 1.42 L/SEC
FEF2575-PRED: 1.87 L/SEC
FEFMAX-%PRED-PRE: 91 %
FEFMAX-PRE: 5.41 L/SEC
FEFMAX-PRED: 5.92 L/SEC
FEV1-%PRED-PRE: 94 %
FEV1-PRE: 2.06 L
FEV1FEV6-PRE: 73 %
FEV1FEV6-PRED: 79 %
FEV1FVC-PRE: 73 %
FEV1FVC-PRED: 79 %
FEV1SVC-PRE: 72 %
FEV1SVC-PRED: 67 %
FIFMAX-PRE: 4.26 L/SEC
FRCPLETH-%PRED-PRE: 125 %
FRCPLETH-PRE: 3.46 L
FRCPLETH-PRED: 2.77 L
FVC-%PRED-PRE: 102 %
FVC-PRE: 2.83 L
FVC-PRED: 2.77 L
IC-%PRED-PRE: 92 %
IC-PRE: 1.93 L
IC-PRED: 2.1 L
RVPLETH-%PRED-PRE: 121 %
RVPLETH-PRE: 2.53 L
RVPLETH-PRED: 2.08 L
TLCPLETH-%PRED-PRE: 105 %
TLCPLETH-PRE: 5.39 L
TLCPLETH-PRED: 5.11 L
VA-%PRED-PRE: 96 %
VA-PRE: 4.59 L
VC-%PRED-PRE: 88 %
VC-PRE: 2.86 L
VC-PRED: 3.23 L

## 2024-08-27 PROCEDURE — 93798 PHYS/QHP OP CAR RHAB W/ECG: CPT

## 2024-08-27 RX ORDER — LANCETS
EACH MISCELLANEOUS
Qty: 100 EACH | Refills: 6 | Status: SHIPPED | OUTPATIENT
Start: 2024-08-27

## 2024-08-27 RX ORDER — LANCETS
EACH MISCELLANEOUS
Qty: 100 EACH | Refills: 6 | Status: SHIPPED | OUTPATIENT
Start: 2024-08-27 | End: 2024-08-27

## 2024-08-27 RX ORDER — INSULIN DEGLUDEC 100 U/ML
10 INJECTION, SOLUTION SUBCUTANEOUS DAILY
Qty: 15 ML | Refills: 0 | Status: SHIPPED | OUTPATIENT
Start: 2024-08-27 | End: 2024-09-06

## 2024-08-27 NOTE — TELEPHONE ENCOUNTER
Fax from pharmacy asking how many times per day patient uses lancet- resent Rx for two times daily per previous instructions

## 2024-08-29 ENCOUNTER — HOSPITAL ENCOUNTER (OUTPATIENT)
Dept: CARDIAC REHAB | Facility: HOSPITAL | Age: 69
Discharge: HOME | End: 2024-08-29
Attending: INTERNAL MEDICINE
Payer: COMMERCIAL

## 2024-08-29 PROCEDURE — 93798 PHYS/QHP OP CAR RHAB W/ECG: CPT

## 2024-09-05 ENCOUNTER — HOSPITAL ENCOUNTER (OUTPATIENT)
Dept: CARDIAC REHAB | Facility: HOSPITAL | Age: 69
Discharge: HOME | End: 2024-09-05
Attending: INTERNAL MEDICINE
Payer: COMMERCIAL

## 2024-09-05 PROCEDURE — 93798 PHYS/QHP OP CAR RHAB W/ECG: CPT

## 2024-09-06 ENCOUNTER — TELEPHONE (OUTPATIENT)
Dept: FAMILY MEDICINE | Facility: CLINIC | Age: 69
End: 2024-09-06
Payer: COMMERCIAL

## 2024-09-06 DIAGNOSIS — E11.65 UNCONTROLLED TYPE 2 DIABETES MELLITUS WITH HYPERGLYCEMIA (H): ICD-10-CM

## 2024-09-06 DIAGNOSIS — R35.0 URINARY FREQUENCY: ICD-10-CM

## 2024-09-06 DIAGNOSIS — K92.1 BLACK STOOL: Primary | ICD-10-CM

## 2024-09-06 NOTE — TELEPHONE ENCOUNTER
Pt calling in regards to her insulin prescription.    Per pharmacy Insulin is not covered by pt insurance due to the way order was written.    From pharmacy asking how many times per day patient uses lancet- resent Rx for two times daily per previous instructions.    Pt wondering if PCP received message from pharmacy to change prescription order.    Pt also reports black and very foul smelling stool for the past week. Pt reports frequent stool (more than usual), pt reports taking pepto bismol within the last week, she is unsure if the black stools started before or after taking pepto bismol.    Please advise.     DOYLE Iraheta.

## 2024-09-06 NOTE — TELEPHONE ENCOUNTER
Note from pharmacy that insurance will not cover the quantity of insulin degludec (TRESIBA FLEXTOUCH) 100 UNIT/ML pen  as prescribed.  It would be $600 for pt as written.     Requires PA or alternative     Sending to PA team to complete auth & to provider to advise if alternative

## 2024-09-06 NOTE — TELEPHONE ENCOUNTER
Callback    Alternate Rx for the insulin sent to the pharmacy    Patient needs to come in today for blood work to make sure she is not having a GI bleed.  She also needs to submit urine for UTI assessment

## 2024-09-10 ENCOUNTER — HOSPITAL ENCOUNTER (OUTPATIENT)
Dept: CARDIAC REHAB | Facility: HOSPITAL | Age: 69
Discharge: HOME | End: 2024-09-10
Attending: INTERNAL MEDICINE
Payer: COMMERCIAL

## 2024-09-10 PROCEDURE — 93798 PHYS/QHP OP CAR RHAB W/ECG: CPT

## 2024-09-10 NOTE — TELEPHONE ENCOUNTER
LMTCB     When pt calls back, please assist in scheduling lab only appt.       Ryan Cortes Jr., CMA on 9/10/2024 at 12:42 PM

## 2024-09-10 NOTE — TELEPHONE ENCOUNTER
Prior Authorization Not Needed per Insurance    Medication:   Insurance Company: [employer] - Phone [phone] Fax [phone]  Expected CoPay:      Pharmacy Filling the Rx: [id number] [place] [facility] [place], [place] - [address] [place] [address]  Pharmacy Notified:  yes  Patient Notified:  yes    Called pharmacy regarding medication PA request-    Per telephone note from 9/6 provider sent in alternative insulin Lantus.  Tresiba is no longer on patient's active medication list.   Pharmacy confirmed provider sent in alternative, no further action needed.

## 2024-09-12 ENCOUNTER — HOSPITAL ENCOUNTER (OUTPATIENT)
Dept: CARDIAC REHAB | Facility: HOSPITAL | Age: 69
Discharge: HOME | End: 2024-09-12
Attending: INTERNAL MEDICINE
Payer: COMMERCIAL

## 2024-09-12 PROCEDURE — 93798 PHYS/QHP OP CAR RHAB W/ECG: CPT

## 2024-09-13 ENCOUNTER — TELEPHONE (OUTPATIENT)
Dept: CARDIOLOGY | Facility: CLINIC | Age: 69
End: 2024-09-13
Payer: COMMERCIAL

## 2024-09-13 NOTE — TELEPHONE ENCOUNTER
1st attempt- Sent MyChart for the patient to call back and schedule the following:    Appointment type:  Testing only- No clinic visit  Provider:  Nona Redmond  Return date:  next avail  Additional appointment(s) needed:    Additonal Notes:  pt doesn't need this prior to appt if no openings- can be next avail-    Specialty phone number: [phone]

## 2024-09-15 ENCOUNTER — MYC REFILL (OUTPATIENT)
Dept: FAMILY MEDICINE | Facility: CLINIC | Age: 69
End: 2024-09-15
Payer: COMMERCIAL

## 2024-09-15 DIAGNOSIS — F41.9 ANXIETY: ICD-10-CM

## 2024-09-15 DIAGNOSIS — R63.4 UNINTENTIONAL WEIGHT LOSS: ICD-10-CM

## 2024-09-16 ENCOUNTER — LAB (OUTPATIENT)
Dept: LAB | Facility: CLINIC | Age: 69
End: 2024-09-16
Payer: COMMERCIAL

## 2024-09-16 DIAGNOSIS — R35.0 URINARY FREQUENCY: ICD-10-CM

## 2024-09-16 DIAGNOSIS — K92.1 BLACK STOOL: ICD-10-CM

## 2024-09-16 LAB
ALBUMIN UR-MCNC: NEGATIVE MG/DL
APPEARANCE UR: CLEAR
BACTERIA #/AREA URNS HPF: ABNORMAL /HPF
BILIRUB UR QL STRIP: NEGATIVE
COLOR UR AUTO: YELLOW
ERYTHROCYTE [DISTWIDTH] IN BLOOD BY AUTOMATED COUNT: 13.4 % (ref 10–15)
GLUCOSE UR STRIP-MCNC: >=1000 MG/DL
HCT VFR BLD AUTO: 35.3 % (ref 35–47)
HGB BLD-MCNC: 11.4 G/DL (ref 11.7–15.7)
HGB UR QL STRIP: NEGATIVE
KETONES UR STRIP-MCNC: ABNORMAL MG/DL
LEUKOCYTE ESTERASE UR QL STRIP: NEGATIVE
MCH RBC QN AUTO: 27.8 PG (ref 26.5–33)
MCHC RBC AUTO-ENTMCNC: 32.3 G/DL (ref 31.5–36.5)
MCV RBC AUTO: 86 FL (ref 78–100)
NITRATE UR QL: NEGATIVE
PH UR STRIP: 5.5 [PH] (ref 5–8)
PLATELET # BLD AUTO: 124 10E3/UL (ref 150–450)
RBC # BLD AUTO: 4.1 10E6/UL (ref 3.8–5.2)
RBC #/AREA URNS AUTO: ABNORMAL /HPF
SP GR UR STRIP: >=1.03 (ref 1–1.03)
SQUAMOUS #/AREA URNS AUTO: ABNORMAL /LPF
UROBILINOGEN UR STRIP-ACNC: 0.2 E.U./DL
WBC # BLD AUTO: 3.9 10E3/UL (ref 4–11)
WBC #/AREA URNS AUTO: ABNORMAL /HPF

## 2024-09-16 PROCEDURE — 85027 COMPLETE CBC AUTOMATED: CPT

## 2024-09-16 PROCEDURE — 36415 COLL VENOUS BLD VENIPUNCTURE: CPT

## 2024-09-16 PROCEDURE — 81001 URINALYSIS AUTO W/SCOPE: CPT

## 2024-09-16 RX ORDER — ESCITALOPRAM OXALATE 10 MG/1
10 TABLET ORAL DAILY
Qty: 90 TABLET | Refills: 0 | OUTPATIENT
Start: 2024-09-16

## 2024-09-19 ENCOUNTER — TELEPHONE (OUTPATIENT)
Dept: CARDIOLOGY | Facility: CLINIC | Age: 69
End: 2024-09-19

## 2024-09-19 ENCOUNTER — OFFICE VISIT (OUTPATIENT)
Dept: CARDIOLOGY | Facility: CLINIC | Age: 69
End: 2024-09-19
Payer: COMMERCIAL

## 2024-09-19 VITALS
WEIGHT: 106 LBS | DIASTOLIC BLOOD PRESSURE: 74 MMHG | HEIGHT: 65 IN | HEART RATE: 82 BPM | OXYGEN SATURATION: 99 % | SYSTOLIC BLOOD PRESSURE: 138 MMHG | BODY MASS INDEX: 17.66 KG/M2

## 2024-09-19 DIAGNOSIS — I21.4 NSTEMI (NON-ST ELEVATED MYOCARDIAL INFARCTION) (H): ICD-10-CM

## 2024-09-19 DIAGNOSIS — I77.3 FIBROMUSCULAR DYSPLASIA (H): ICD-10-CM

## 2024-09-19 DIAGNOSIS — I77.3 FIBROMUSCULAR DYSPLASIA OF CAROTID ARTERY (H): ICD-10-CM

## 2024-09-19 DIAGNOSIS — I25.10 CORONARY ARTERY DISEASE INVOLVING NATIVE CORONARY ARTERY OF NATIVE HEART WITHOUT ANGINA PECTORIS: ICD-10-CM

## 2024-09-19 DIAGNOSIS — I31.39 PERICARDIAL EFFUSION: ICD-10-CM

## 2024-09-19 DIAGNOSIS — K92.1 BLACK TARRY STOOLS: ICD-10-CM

## 2024-09-19 DIAGNOSIS — I25.10 CORONARY ARTERY DISEASE INVOLVING NATIVE CORONARY ARTERY OF NATIVE HEART WITHOUT ANGINA PECTORIS: Primary | ICD-10-CM

## 2024-09-19 DIAGNOSIS — R07.9 CHEST PAIN: Primary | ICD-10-CM

## 2024-09-19 DIAGNOSIS — R07.1 CHEST PAIN ON BREATHING: ICD-10-CM

## 2024-09-19 PROCEDURE — 99214 OFFICE O/P EST MOD 30 MIN: CPT | Performed by: NURSE PRACTITIONER

## 2024-09-19 PROCEDURE — 93000 ELECTROCARDIOGRAM COMPLETE: CPT | Performed by: NURSE PRACTITIONER

## 2024-09-19 NOTE — PATIENT INSTRUCTIONS
Today's Recommendations    I will talk to Dr. Greene about a possible stress test and your antiplatelet medications  I would like you to have your echocardiogram done sooner   I recommend you call vascular surgery regarding your follow up CTA and appointment, this is due in October   Continue all other medications without changes.  Please follow up with Dr. Greene in 3 months.    Please send a [employer] message or call [phone] to the RN team with questions or concerns.     Scheduling number [phone]  RIMA Zavala, CNP

## 2024-09-19 NOTE — PROGRESS NOTES
Cardiology Clinic Progress Note  Taylor Aburto MRN# [id number]   YOB: 1955 Age: 69 year old   Primary Cardiologist: Dr. Greene Reason for visit: 2 month follow up             Assessment and Plan:     1.  Coronary artery disease s/p PCI of the LAD with KIM x2 and first diagonal with KIM x2  -Recent episodes of chest pressure with atypical presentation, EKG today showing sinus rhythm without acute ST abnormalities    2.  Coronary artery dissections during PCI of the LAD and D1 likely secondary to FMD    3.  Fibromuscular dysplasia, noted in the right cervical internal carotid artery noted by CTA of the head and neck  -Following with vascular, repeat head neck CTA planned for    4.  Pericardial effusion requiring pericardiocentesis and drain   -Small recurrent pericardial effusion noted by follow-up TTE 7/12/24    5.  Possible GI bleeding       Thrombocytopenia  -Hemoglobin drop from 12.4-11.4 over about 3 weeks, platelets have remained relatively stable, tarry stool stopped following discontinuation of Effient   -Has remained on aspirin without issues    6.   Ischemic cardiomyopathy,  with EF initially 25-30% in the setting of NSTEMI (6/2024) with subsequent normalization of her EF (7/2024)    7.  Hyperlipidemia, managed with rosuvastatin 40 mg once daily  -8/2024 LDL 40     8. Hypertension, well-controlled     9.  Prior tobacco use  -Quit in June 2024 following hospitalization     10.  Type 2 diabetes  -8/2024 Hemoglobin A1c 10.7%, working with PCP      Plan:  Continue aspirin 81mg daily   Repeat echo to reassess pericardial effusion, however recommend moving appointment sooner  Will review plan for antiplatelet/PPI/GI referral and possible stress testing with Dr. Greene  Continue carvedilol 3.125 mg twice daily, lisinopril 5 mg daily, rosuvastatin 40 mg daily    Follow up: Follow-up with Dr. Greene in 3 months or sooner based on plan following review of today's visit with Dr. Greene         History of  Presenting Illness:    Taylor Aburto is a very pleasant 69 year old female with a history of former tobacco use, type 2 diabetes mellitus, and a previously noted small pericardial effusion.     Patient was admitted this summer at [facility] after presenting with chest discomfort and found to have an NSTEMI as well as severe LV dysfunction with an ejection fraction of 25-30 percent with wall motion abnormalities in the LAD distribution and a moderate pericardial effusion reportedly tamponade.  She had normal ESR and CRP levels.  She underwent coronary angiography 6/9/2024 with a successful IVUS guided PCI of the LAD and 2 drug-eluting stents as well as PCI first diagonal and placement of 2 drug-eluting stents.  Separate coronary dissections occurred during PCI involving both the LAD and D1 suggestive of possible underlying connective tissue disorder.  She later underwent a pericardiocentesis due to enlarging effusion and temporary placement of a pericardial drain.    She had a repeat echocardiogram on 7/12/2024 which showed normalization of her to 60 to 65% of the small anterior pericardial effusion with no evidence of tamponade.  She met with Dr. Mcgee with vascular medicine as a CTA done of the neck demonstrated irregularity in the contour of the distal cervical to carotid artery on the right consistent with fibromuscular dysplasia.  Her CTA of the chest, abdomen, and pelvis did not show any evidence of FMD.    She was seen in clinic follow-up in late July 2024 doing well from a cardiac standpoint.  She was having some dizziness and lightheadedness with presyncope or syncope and stable blood pressures.  He was questioned if this was related to previous UTI or ongoing UTI symptoms.     Patient is here today for a 2 month follow up. Patient reports feeling good. She has continued to participate in cardiac rehab which she has enjoyed.  She has been having sporadic chest pressure with associated  shortness of breath lasting up to an hour, occurring about 2 times per week. She does not have it at rehab, sometimes occurs later in the day following exercise. These have been occurring for the last month. She will also have intermittent chest pressure with deep breaths    She has not been taking prasugrel for the last 2 weeks as she was having dark stools for a week. 3 days after she stopped taking it, her stools returned to their normal color. She believes she took 1-2 months worth.     Denies tachycardia or palpitations. Denies issues with edema.  Denies any further UTI symptoms.  Her PCP recently repeated a urine specimen which did not show any evidence of infection.    She notes she has continued to have weight loss and chronic diarrhea. She is starting on insulin for her diabetes.     Most recent labs from 2024 show white blood cell count 3.9, hemoglobin 11.4, platelet 124.     Blood pressure 138/74 and HR 82 in clinic today.        Recent Hospitalizations   As above           Social History      Social History     Socioeconomic History    Marital status: Single     Spouse name: Not on file    Number of children: Not on file    Years of education: Not on file    Highest education level: Not on file   Occupational History    Not on file   Tobacco Use    Smoking status: Former     Current packs/day: 0.00     Average packs/day: 0.3 packs/day for 40.0 years (10.0 ttl pk-yrs)     Types: Cigarettes     Quit date: 2024     Years since quittin.2    Smokeless tobacco: Never   Vaping Use    Vaping status: Never Used   Substance and Sexual Activity    Alcohol use: Not Currently     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)  "    Social Determinants of Health     Financial Resource Strain: Low Risk  (12/4/2023)    Financial Resource Strain     Within the past 12 months, have you or your family members you live with been unable to get utilities (heat, electricity) when it was really needed?: No   Food Insecurity: Low Risk  (12/4/2023)    Food Insecurity     Within the past 12 months, did you worry that your food would run out before you got money to buy more?: No     Within the past 12 months, did the food you bought just not last and you didn t have money to get more?: No   Transportation Needs: Low Risk  (12/4/2023)    Transportation Needs     Within the past 12 months, has lack of transportation kept you from medical appointments, getting your medicines, non-medical meetings or appointments, work, or from getting things that you need?: No   Physical Activity: Not on file   Stress: Not on file   Social Connections: Not on file   Interpersonal Safety: Low Risk  (8/23/2024)    Interpersonal Safety     Do you feel physically and emotionally safe where you currently live?: Yes     Within the past 12 months, have you been hit, slapped, kicked or otherwise physically hurt by someone?: No     Within the past 12 months, have you been humiliated or emotionally abused in other ways by your partner or ex-partner?: No   Housing Stability: Low Risk  (12/4/2023)    Housing Stability     Do you have housing? : Yes     Are you worried about losing your housing?: No            Review of Systems:   Skin:        Eyes:       ENT:       Respiratory:  Positive for shortness of breath  Cardiovascular:    Positive for;chest pain;dizziness;fatigue;lightheadedness  Gastroenterology:      Genitourinary:       Musculoskeletal:       Neurologic:       Psychiatric:       Heme/Lymph/Imm:       Endocrine:  Positive for diabetes         Physical Exam:   Vitals: /74   Pulse 82   Ht 1.651 m (5' 5\")   Wt 48.1 kg (106 lb)   LMP  (LMP Unknown)   SpO2 99%   BMI " 17.64 kg/m     Wt Readings from Last 4 Encounters:   09/19/24 48.1 kg (106 lb)   08/23/24 47.9 kg (105 lb 9.6 oz)   08/05/24 50 kg (110 lb 4.8 oz)   07/30/24 48.5 kg (107 lb)     GEN: well nourished, in no acute distress.  HEENT:  Pupils equal, round. Sclerae nonicteric.   NECK: Supple, no masses appreciated.   C/V:  Regular rate and rhythm, no murmur, rub or gallop.    RESP: Respirations are unlabored. Clear to auscultation bilaterally without wheezing, rales, or rhonchi.  GI: Abdomen soft, nontender.  EXTREM: No LE edema.  NEURO: Alert and oriented, cooperative.  SKIN: Warm and dry.        Data:     LIPID RESULTS:  Lab Results   Component Value Date    CHOL 124 08/23/2024    HDL 67 08/23/2024    LDL 40 08/23/2024    TRIG 85 08/23/2024     LIVER ENZYME RESULTS:  Lab Results   Component Value Date    AST 17 08/23/2024    ALT 13 08/23/2024     CBC RESULTS:  Lab Results   Component Value Date    WBC 3.9 (L) 09/16/2024    RBC 4.10 09/16/2024    HGB 11.4 (L) 09/16/2024    HCT 35.3 09/16/2024    MCV 86 09/16/2024    MCH 27.8 09/16/2024    MCHC 32.3 09/16/2024    RDW 13.4 09/16/2024     (L) 09/16/2024     BMP RESULTS:  Lab Results   Component Value Date     08/23/2024    POTASSIUM 4.6 08/23/2024    POTASSIUM 4.7 08/05/2024    CHLORIDE 101 08/23/2024    CHLORIDE 103 08/05/2024    CO2 26 08/23/2024    CO2 25 08/05/2024    ANIONGAP 11 08/23/2024    ANIONGAP 8 08/05/2024     (H) 08/23/2024     (H) 08/08/2024     (H) 08/05/2024    BUN 14.1 08/23/2024    BUN 18 08/05/2024    CR 0.90 08/23/2024    GFRESTIMATED 69 08/23/2024    GFRESTIMATED >60 11/06/2023    GFRESTIMATED >60 11/09/2020    GFRESTBLACK >60 11/09/2020    CONSUELO 10.4 08/23/2024      A1C RESULTS:  Lab Results   Component Value Date    A1C 10.7 (H) 08/23/2024     INR RESULTS:  Lab Results   Component Value Date    INR 1.12 06/18/2024    INR 1.01 02/03/2023            Medications     Current Outpatient Medications   Medication Sig Dispense  Refill    aspirin 81 MG EC tablet Take 1 tablet (81 mg) by mouth daily Start tomorrow. 90 tablet 3    blood glucose (ACCU-CHEK GUIDE) test strip [ACCU-CHEK GUIDE TEST STRIPS STRIPS] TEST BLOOD GLUCOSE LEVELS TWICE A  strip 3    blood glucose (NO BRAND SPECIFIED) lancets standard Use to test blood sugar two times daily or as directed. 200 Lancet 0    blood glucose (NO BRAND SPECIFIED) test strip Use to test blood sugar 2 times daily or as directed. To accompany: Blood Glucose Monitor Brands: per insurance. 100 strip 6    blood glucose meter (GLUCOMETER) [BLOOD GLUCOSE METER (GLUCOMETER)] Use 1 each As Directed as needed. Dispense glucometer brand per patient's insurance at pharmacy discretion. DX: E11.9 1 each 0    blood glucose monitoring (NO BRAND SPECIFIED) meter device kit Use to test blood sugar 2 times daily or as directed. Preferred blood glucose meter OR supplies to accompany: Blood Glucose Monitor Brands: per insurance. 1 kit 0    carvedilol (COREG) 3.125 MG tablet Take 1 tablet (3.125 mg) by mouth 2 times daily (with meals) 180 tablet 0    escitalopram (LEXAPRO) 10 MG tablet Take 1 tablet (10 mg) by mouth daily. 90 tablet 0    fluticasone (FLONASE) 50 MCG/ACT nasal spray Spray 1 spray into both nostrils daily (Patient taking differently: Spray 1 spray into both nostrils daily as needed for allergies.) 16 g 4    lisinopril (ZESTRIL) 5 MG tablet Take 1 tablet (5 mg) by mouth daily 90 tablet 3    metFORMIN (GLUCOPHAGE XR) 500 MG 24 hr tablet Take 2 tablets (1,000 mg) by mouth 2 times daily. 360 tablet 1    nicotine polacrilex (NICORETTE) 4 MG gum Chew one piece every 1- 2 hours for cravings as needed. Try to limit using the gum for strong craving only since you are taking the patch, and previous history of nausea and vomiting. 50 each 3    nitroGLYcerin (NITROSTAT) 0.4 MG sublingual tablet For chest pain place 1 tablet under the tongue every 5 minutes for 3 doses. If symptoms persist 5 minutes after 1st  dose call 911. 30 tablet 0    oxyBUTYnin ER (DITROPAN XL) 10 MG 24 hr tablet Take 1 tablet (10 mg) by mouth daily 90 tablet 3    prasugrel (EFFIENT) 5 MG TABS tablet Take 1 tablet (5 mg) by mouth daily Do not crush or break tablet. Dose to start tomorrow. 90 tablet 3    PYRIDOXINE HCL PO Take 1 tablet by mouth daily Strength unknown      rosuvastatin (CRESTOR) 40 MG tablet Take 1 tablet (40 mg) by mouth daily 90 tablet 3    thin (NO BRAND SPECIFIED) lancets Use with lanceting device to test blood sugar two times per day or as directed. To accompany: Blood Glucose Monitor Brands: per insurance. 100 each 6    insulin glargine (LANTUS PEN) 100 UNIT/ML pen Inject 10 Units subcutaneously at bedtime. (Patient not taking: Reported on 9/19/2024) 15 mL 2          Past Medical History     Past Medical History:   Diagnosis Date    Arthritis 06/27/2013    Benign essential hypertension     CAD (coronary artery disease) s/p KIM 6/2024     Cerebral infarction (H) possible 5891-3088    COPD (chronic obstructive pulmonary disease) (H) 2023    Depressive disorder 5383-9163    Diabetes (H) approx 9192-2834    H/O ETOH abuse 02/06/2018    Hyperlipidemia LDL goal <70     Moderate smoker (20 or less per day) 06/27/2013    Vitamin D deficiency 02/06/2018     Past Surgical History:   Procedure Laterality Date    CARDIAC SURGERY  6/8/2024    COLONOSCOPY N/A 12/01/2023    Procedure: COLONOSCOPY, FLEXIBLE, WITH LESION REMOVAL USING SNARE;  Surgeon: Jake Thomas MD;  Location: [facility]    CV CORONARY ANGIOGRAM N/A 06/08/2024    Procedure: Coronary Angiogram;  Surgeon: Rick Agustin MD;  Location: [facility] CARDIAC CATH LAB    CV CORONARY ANGIOGRAM N/A 06/18/2024    Procedure: Coronary Angiogram;  Surgeon: Rick Agustin MD;  Location: [facility] CARDIAC CATH LAB    CV INTRAVASULAR ULTRASOUND N/A 06/08/2024    Procedure: Intravascular Ultrasound;  Surgeon: Rick Agustin MD;  Location: [facility] CARDIAC CATH  LAB    CV INTRAVASULAR ULTRASOUND N/A 06/18/2024    Procedure: Intravascular Ultrasound;  Surgeon: Rick Agustin MD;  Location:  HEART CARDIAC CATH LAB    CV PCI ASPIRATION THROMECTOMY N/A 06/08/2024    Procedure: Percutaneous Coronary Intervention Aspiration Thrombectomy;  Surgeon: Rick Agustin MD;  Location:  HEART CARDIAC CATH LAB    CV PCI STENT DRUG ELUTING N/A 06/08/2024    Procedure: Percutaneous Coronary Intervention Stent;  Surgeon: Rick Agustin MD;  Location:  HEART CARDIAC CATH LAB    CV PCI STENT DRUG ELUTING N/A 06/18/2024    Procedure: Percutaneous Coronary Intervention Stent;  Surgeon: Rick Agustin MD;  Location:  HEART CARDIAC CATH LAB    CV PERICARDIOCENTESIS N/A 06/10/2024    Procedure: Pericardiocentesis;  Surgeon: Ayde Ruiz MD;  Location:  HEART CARDIAC CATH LAB    ENDOSCOPIC ULTRASOUND UPPER GASTROINTESTINAL TRACT (GI) N/A 02/22/2024    Procedure: Endoscopic ultrasound upper gastrointestinal tract (GI);  Surgeon: Jake Thomas MD;  Location:  GI    ESOPHAGOSCOPY, GASTROSCOPY, DUODENOSCOPY (EGD), COMBINED N/A 02/22/2024    Procedure: ESOPHAGOGASTRODUODENOSCOPY, WITH BIOPSY;  Surgeon: Jake Thomas MD;  Location:  GI    GYN SURGERY  ? 2009    1 ovary     Family History   Problem Relation Age of Onset    Other Cancer Mother         Bladder    Depression Mother         l    Mental Illness Mother         age dementia & other    Diabetes Father     Hypertension Father     Hyperlipidemia Father     Prostate Cancer Father     Mental Illness Father         age dementia & other    Other Cancer Maternal Grandfather     Other Cancer Other     Other Cancer Cousin         Brain    Other Cancer Cousin     Obesity Cousin     Depression Son     Depression Sister     Depression Brother         PTSD - [place] war    Substance Abuse Brother         alcohol - cannabis    Depression Brother         PTSD - multiple wars     Depression Daughter     Anxiety Disorder Other     Anesthesia Reaction Daughter         nausea    Thyroid Disease Daughter     Obesity Other             Allergies   Prednisone        Tamra Redmond NP  [facility]  Pager: [phone]

## 2024-09-19 NOTE — TELEPHONE ENCOUNTER
I left message for pt to call back to review Nona Redmond's recommendations. I will also send a my chart update. Manuela KWON September 19, 2024, 4:39 PM

## 2024-09-19 NOTE — LETTER
9/19/2024    Soraya Cherry,   [address] [address]    RE: Taylor Aburto       Dear Colleague,     I had the pleasure of seeing Taylor Aburto in the [facility].    Cardiology Clinic Progress Note  Taylor Aburto MRN# [id number]   YOB: 1955 Age: 69 year old   Primary Cardiologist: Dr. Greene Reason for visit: 2 month follow up             Assessment and Plan:     1.  Coronary artery disease s/p PCI of the LAD with KIM x2 and first diagonal with KIM x2  -Recent episodes of chest pressure with atypical presentation, EKG today showing sinus rhythm without acute ST abnormalities    2.  Coronary artery dissections during PCI of the LAD and D1 likely secondary to FMD    3.  Fibromuscular dysplasia, noted in the right cervical internal carotid artery noted by CTA of the head and neck  -Following with vascular, repeat head neck CTA planned for    4.  Pericardial effusion requiring pericardiocentesis and drain   -Small recurrent pericardial effusion noted by follow-up TTE 7/12/24    5.  Possible GI bleeding       Thrombocytopenia  -Hemoglobin drop from 12.4-11.4 over about 3 weeks, platelets have remained relatively stable, tarry stool stopped following discontinuation of Effient   -Has remained on aspirin without issues    6.   Ischemic cardiomyopathy,  with EF initially 25-30% in the setting of NSTEMI (6/2024) with subsequent normalization of her EF (7/2024)    7.  Hyperlipidemia, managed with rosuvastatin 40 mg once daily  -8/2024 LDL 40     8. Hypertension, well-controlled     9.  Prior tobacco use  -Quit in June 2024 following hospitalization     10.  Type 2 diabetes  -8/2024 Hemoglobin A1c 10.7%, working with PCP      Plan:  Continue aspirin 81mg daily   Repeat echo to reassess pericardial effusion, however recommend moving appointment sooner  Will review plan for antiplatelet/PPI/GI referral and possible stress testing with Dr. Greene  Continue carvedilol 3.125 mg  twice daily, lisinopril 5 mg daily, rosuvastatin 40 mg daily    Follow up: Follow-up with Dr. Greene in 3 months or sooner based on plan following review of today's visit with Dr. Greene         History of Presenting Illness:    Taylor Aburto is a very pleasant 69 year old female with a history of former tobacco use, type 2 diabetes mellitus, and a previously noted small pericardial effusion.     Patient was admitted this summer at [facility] after presenting with chest discomfort and found to have an NSTEMI as well as severe LV dysfunction with an ejection fraction of 25-30 percent with wall motion abnormalities in the LAD distribution and a moderate pericardial effusion reportedly tamponade.  She had normal ESR and CRP levels.  She underwent coronary angiography 6/9/2024 with a successful IVUS guided PCI of the LAD and 2 drug-eluting stents as well as PCI first diagonal and placement of 2 drug-eluting stents.  Separate coronary dissections occurred during PCI involving both the LAD and D1 suggestive of possible underlying connective tissue disorder.  She later underwent a pericardiocentesis due to enlarging effusion and temporary placement of a pericardial drain.    She had a repeat echocardiogram on 7/12/2024 which showed normalization of her to 60 to 65% of the small anterior pericardial effusion with no evidence of tamponade.  She met with Dr. Mcgee with vascular medicine as a CTA done of the neck demonstrated irregularity in the contour of the distal cervical to carotid artery on the right consistent with fibromuscular dysplasia.  Her CTA of the chest, abdomen, and pelvis did not show any evidence of FMD.    She was seen in clinic follow-up in late July 2024 doing well from a cardiac standpoint.  She was having some dizziness and lightheadedness with presyncope or syncope and stable blood pressures.  He was questioned if this was related to previous UTI or ongoing UTI symptoms.     Patient is  here today for a 2 month follow up. Patient reports feeling good. She has continued to participate in cardiac rehab which she has enjoyed.  She has been having sporadic chest pressure with associated shortness of breath lasting up to an hour, occurring about 2 times per week. She does not have it at rehab, sometimes occurs later in the day following exercise. These have been occurring for the last month. She will also have intermittent chest pressure with deep breaths    She has not been taking prasugrel for the last 2 weeks as she was having dark stools for a week. 3 days after she stopped taking it, her stools returned to their normal color. She believes she took 1-2 months worth.     Denies tachycardia or palpitations. Denies issues with edema.  Denies any further UTI symptoms.  Her PCP recently repeated a urine specimen which did not show any evidence of infection.    She notes she has continued to have weight loss and chronic diarrhea. She is starting on insulin for her diabetes.     Most recent labs from 2024 show white blood cell count 3.9, hemoglobin 11.4, platelet 124.     Blood pressure 138/74 and HR 82 in clinic today.        Recent Hospitalizations   As above           Social History      Social History     Socioeconomic History     Marital status: Single     Spouse name: Not on file     Number of children: Not on file     Years of education: Not on file     Highest education level: Not on file   Occupational History     Not on file   Tobacco Use     Smoking status: Former     Current packs/day: 0.00     Average packs/day: 0.3 packs/day for 40.0 years (10.0 ttl pk-yrs)     Types: Cigarettes     Quit date: 2024     Years since quittin.2     Smokeless tobacco: Never   Vaping Use     Vaping status: Never Used   Substance and Sexual Activity     Alcohol use: Not Currently     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)     Social Determinants of Health     Financial Resource Strain: Low Risk  (12/4/2023)    Financial Resource Strain      Within the past 12 months, have you or your family members you live with been unable to get utilities (heat, electricity) when it was really needed?: No   Food Insecurity: Low Risk  (12/4/2023)    Food Insecurity      Within the past 12 months, did you worry that your food would run out before you got money to buy more?: No      Within the past 12 months, did the food you bought just not last and you didn t have money to get more?: No   Transportation Needs: Low Risk  (12/4/2023)    Transportation Needs      Within the past 12 months, has lack of transportation kept you from medical appointments, getting your medicines, non-medical meetings or appointments, work, or from getting things that you need?: No   Physical Activity: Not on file   Stress: Not on file   Social Connections: Not on file   Interpersonal Safety: Low Risk  (8/23/2024)    Interpersonal Safety      Do you feel physically and emotionally safe where you currently live?: Yes      Within the past 12 months, have you been hit, slapped, kicked or otherwise physically hurt by someone?: No      Within the past 12 months, have you been humiliated or emotionally abused in other ways by your partner or ex-partner?: No   Housing Stability: Low Risk  (12/4/2023)    Housing Stability      Do you have housing? : Yes      Are you worried about losing your housing?: No            Review of Systems:   Skin:        Eyes:       ENT:       Respiratory:  Positive for shortness of breath  Cardiovascular:    Positive for;chest pain;dizziness;fatigue;lightheadedness  Gastroenterology:      Genitourinary:       Musculoskeletal:       Neurologic:    "    Psychiatric:       Heme/Lymph/Imm:       Endocrine:  Positive for diabetes         Physical Exam:   Vitals: /74   Pulse 82   Ht 1.651 m (5' 5\")   Wt 48.1 kg (106 lb)   LMP  (LMP Unknown)   SpO2 99%   BMI 17.64 kg/m     Wt Readings from Last 4 Encounters:   09/19/24 48.1 kg (106 lb)   08/23/24 47.9 kg (105 lb 9.6 oz)   08/05/24 50 kg (110 lb 4.8 oz)   07/30/24 48.5 kg (107 lb)     GEN: well nourished, in no acute distress.  HEENT:  Pupils equal, round. Sclerae nonicteric.   NECK: Supple, no masses appreciated.   C/V:  Regular rate and rhythm, no murmur, rub or gallop.    RESP: Respirations are unlabored. Clear to auscultation bilaterally without wheezing, rales, or rhonchi.  GI: Abdomen soft, nontender.  EXTREM: No LE edema.  NEURO: Alert and oriented, cooperative.  SKIN: Warm and dry.        Data:     LIPID RESULTS:  Lab Results   Component Value Date    CHOL 124 08/23/2024    HDL 67 08/23/2024    LDL 40 08/23/2024    TRIG 85 08/23/2024     LIVER ENZYME RESULTS:  Lab Results   Component Value Date    AST 17 08/23/2024    ALT 13 08/23/2024     CBC RESULTS:  Lab Results   Component Value Date    WBC 3.9 (L) 09/16/2024    RBC 4.10 09/16/2024    HGB 11.4 (L) 09/16/2024    HCT 35.3 09/16/2024    MCV 86 09/16/2024    MCH 27.8 09/16/2024    MCHC 32.3 09/16/2024    RDW 13.4 09/16/2024     (L) 09/16/2024     BMP RESULTS:  Lab Results   Component Value Date     08/23/2024    POTASSIUM 4.6 08/23/2024    POTASSIUM 4.7 08/05/2024    CHLORIDE 101 08/23/2024    CHLORIDE 103 08/05/2024    CO2 26 08/23/2024    CO2 25 08/05/2024    ANIONGAP 11 08/23/2024    ANIONGAP 8 08/05/2024     (H) 08/23/2024     (H) 08/08/2024     (H) 08/05/2024    BUN 14.1 08/23/2024    BUN 18 08/05/2024    CR 0.90 08/23/2024    GFRESTIMATED 69 08/23/2024    GFRESTIMATED >60 11/06/2023    GFRESTIMATED >60 11/09/2020    GFRESTBLACK >60 11/09/2020    CONSUELO 10.4 08/23/2024      A1C RESULTS:  Lab Results   Component " Value Date    A1C 10.7 (H) 08/23/2024     INR RESULTS:  Lab Results   Component Value Date    INR 1.12 06/18/2024    INR 1.01 02/03/2023            Medications     Current Outpatient Medications   Medication Sig Dispense Refill     aspirin 81 MG EC tablet Take 1 tablet (81 mg) by mouth daily Start tomorrow. 90 tablet 3     blood glucose (ACCU-CHEK GUIDE) test strip [ACCU-CHEK GUIDE TEST STRIPS STRIPS] TEST BLOOD GLUCOSE LEVELS TWICE A  strip 3     blood glucose (NO BRAND SPECIFIED) lancets standard Use to test blood sugar two times daily or as directed. 200 Lancet 0     blood glucose (NO BRAND SPECIFIED) test strip Use to test blood sugar 2 times daily or as directed. To accompany: Blood Glucose Monitor Brands: per insurance. 100 strip 6     blood glucose meter (GLUCOMETER) [BLOOD GLUCOSE METER (GLUCOMETER)] Use 1 each As Directed as needed. Dispense glucometer brand per patient's insurance at pharmacy discretion. DX: E11.9 1 each 0     blood glucose monitoring (NO BRAND SPECIFIED) meter device kit Use to test blood sugar 2 times daily or as directed. Preferred blood glucose meter OR supplies to accompany: Blood Glucose Monitor Brands: per insurance. 1 kit 0     carvedilol (COREG) 3.125 MG tablet Take 1 tablet (3.125 mg) by mouth 2 times daily (with meals) 180 tablet 0     escitalopram (LEXAPRO) 10 MG tablet Take 1 tablet (10 mg) by mouth daily. 90 tablet 0     fluticasone (FLONASE) 50 MCG/ACT nasal spray Spray 1 spray into both nostrils daily (Patient taking differently: Spray 1 spray into both nostrils daily as needed for allergies.) 16 g 4     lisinopril (ZESTRIL) 5 MG tablet Take 1 tablet (5 mg) by mouth daily 90 tablet 3     metFORMIN (GLUCOPHAGE XR) 500 MG 24 hr tablet Take 2 tablets (1,000 mg) by mouth 2 times daily. 360 tablet 1     nicotine polacrilex (NICORETTE) 4 MG gum Chew one piece every 1- 2 hours for cravings as needed. Try to limit using the gum for strong craving only since you are taking  the patch, and previous history of nausea and vomiting. 50 each 3     nitroGLYcerin (NITROSTAT) 0.4 MG sublingual tablet For chest pain place 1 tablet under the tongue every 5 minutes for 3 doses. If symptoms persist 5 minutes after 1st dose call 911. 30 tablet 0     oxyBUTYnin ER (DITROPAN XL) 10 MG 24 hr tablet Take 1 tablet (10 mg) by mouth daily 90 tablet 3     prasugrel (EFFIENT) 5 MG TABS tablet Take 1 tablet (5 mg) by mouth daily Do not crush or break tablet. Dose to start tomorrow. 90 tablet 3     PYRIDOXINE HCL PO Take 1 tablet by mouth daily Strength unknown       rosuvastatin (CRESTOR) 40 MG tablet Take 1 tablet (40 mg) by mouth daily 90 tablet 3     thin (NO BRAND SPECIFIED) lancets Use with lanceting device to test blood sugar two times per day or as directed. To accompany: Blood Glucose Monitor Brands: per insurance. 100 each 6     insulin glargine (LANTUS PEN) 100 UNIT/ML pen Inject 10 Units subcutaneously at bedtime. (Patient not taking: Reported on 9/19/2024) 15 mL 2          Past Medical History     Past Medical History:   Diagnosis Date     Arthritis 06/27/2013     Benign essential hypertension      CAD (coronary artery disease) s/p KIM 6/2024      Cerebral infarction (H) possible [id number]     COPD (chronic obstructive pulmonary disease) (H) 2023     Depressive disorder 3197-2955     Diabetes (H) approx 4604-6311     H/O ETOH abuse 02/06/2018     Hyperlipidemia LDL goal <70      Moderate smoker (20 or less per day) 06/27/2013     Vitamin D deficiency 02/06/2018     Past Surgical History:   Procedure Laterality Date     CARDIAC SURGERY  6/8/2024     COLONOSCOPY N/A 12/01/2023    Procedure: COLONOSCOPY, FLEXIBLE, WITH LESION REMOVAL USING SNARE;  Surgeon: Jake Thomas MD;  Location:  GI     CV CORONARY ANGIOGRAM N/A 06/08/2024    Procedure: Coronary Angiogram;  Surgeon: Rick Agustin MD;  Location: [facility] CARDIAC CATH LAB     CV CORONARY ANGIOGRAM N/A 06/18/2024     Procedure: Coronary Angiogram;  Surgeon: Rick Agustin MD;  Location:  HEART CARDIAC CATH LAB     CV INTRAVASULAR ULTRASOUND N/A 06/08/2024    Procedure: Intravascular Ultrasound;  Surgeon: Rick Agustin MD;  Location:  HEART CARDIAC CATH LAB     CV INTRAVASULAR ULTRASOUND N/A 06/18/2024    Procedure: Intravascular Ultrasound;  Surgeon: Rick Agustin MD;  Location: [facility] CARDIAC CATH LAB     CV PCI ASPIRATION THROMECTOMY N/A 06/08/2024    Procedure: Percutaneous Coronary Intervention Aspiration Thrombectomy;  Surgeon: Rick Agustin MD;  Location: [facility] CARDIAC CATH LAB     CV PCI STENT DRUG ELUTING N/A 06/08/2024    Procedure: Percutaneous Coronary Intervention Stent;  Surgeon: Rick Agustin MD;  Location: [facility] CARDIAC CATH LAB     CV PCI STENT DRUG ELUTING N/A 06/18/2024    Procedure: Percutaneous Coronary Intervention Stent;  Surgeon: Rick Agustin MD;  Location: [facility] CARDIAC CATH LAB     CV PERICARDIOCENTESIS N/A 06/10/2024    Procedure: Pericardiocentesis;  Surgeon: Ayde Ruiz MD;  Location:  HEART CARDIAC CATH LAB     ENDOSCOPIC ULTRASOUND UPPER GASTROINTESTINAL TRACT (GI) N/A 02/22/2024    Procedure: Endoscopic ultrasound upper gastrointestinal tract (GI);  Surgeon: Jake Thomas MD;  Location:  GI     ESOPHAGOSCOPY, GASTROSCOPY, DUODENOSCOPY (EGD), COMBINED N/A 02/22/2024    Procedure: ESOPHAGOGASTRODUODENOSCOPY, WITH BIOPSY;  Surgeon: Jake Thomas MD;  Location:  GI     GYN SURGERY  ? 2009    1 ovary     Family History   Problem Relation Age of Onset     Other Cancer Mother         Bladder     Depression Mother         l     Mental Illness Mother         age dementia & other     Diabetes Father      Hypertension Father      Hyperlipidemia Father      Prostate Cancer Father      Mental Illness Father         age dementia & other     Other Cancer Maternal Grandfather      Other Cancer Other       Other Cancer Cousin         Brain     Other Cancer Cousin      Obesity Cousin      Depression Son      Depression Sister      Depression Brother         PTSD - [place] war     Substance Abuse Brother         alcohol - cannabis     Depression Brother         PTSD - multiple wars     Depression Daughter      Anxiety Disorder Other      Anesthesia Reaction Daughter         nausea     Thyroid Disease Daughter      Obesity Other             Allergies   Prednisone        Tamra Redmond NP  [facility]  Pager: [phone]       Thank you for allowing me to participate in the care of your patient.      Sincerely,     Tamra Redmond NP     [facility]  cc:   Referred Self, MD  No address on file

## 2024-09-19 NOTE — TELEPHONE ENCOUNTER
I reviewed her concerns from our visit today with Dr. Greene who agreed with adding plavix 75mg daily (instead of effient) to her aspirin. She should closely monitor for any dark/tarry stools recurring or other signs of bleeding. She should start omeprazole 20mg in the morning before she eats to protect her stomach. Let's recheck her hemoglobin in 2 weeks as well.     We should place a GI referral to further evaluate the source of the tarry stools she was having previously.     In regard to her chest pain, let's get a lexiscan nuclear stress test. Thanks.

## 2024-09-20 RX ORDER — OMEPRAZOLE 20 MG/1
20 TABLET, DELAYED RELEASE ORAL
Qty: 90 TABLET | Refills: 3 | Status: SHIPPED | OUTPATIENT
Start: 2024-09-20 | End: 2024-09-22

## 2024-09-20 RX ORDER — CLOPIDOGREL BISULFATE 75 MG/1
75 TABLET ORAL DAILY
Qty: 90 TABLET | Refills: 3 | Status: SHIPPED | OUTPATIENT
Start: 2024-09-20

## 2024-09-22 ENCOUNTER — MYC REFILL (OUTPATIENT)
Dept: CARDIOLOGY | Facility: CLINIC | Age: 69
End: 2024-09-22
Payer: COMMERCIAL

## 2024-09-22 ENCOUNTER — MYC REFILL (OUTPATIENT)
Dept: FAMILY MEDICINE | Facility: CLINIC | Age: 69
End: 2024-09-22
Payer: COMMERCIAL

## 2024-09-22 DIAGNOSIS — E11.9 TYPE 2 DIABETES MELLITUS WITHOUT COMPLICATION, WITHOUT LONG-TERM CURRENT USE OF INSULIN (H): ICD-10-CM

## 2024-09-22 DIAGNOSIS — F41.9 ANXIETY: ICD-10-CM

## 2024-09-22 DIAGNOSIS — E11.65 UNCONTROLLED TYPE 2 DIABETES MELLITUS WITH HYPERGLYCEMIA (H): ICD-10-CM

## 2024-09-22 DIAGNOSIS — R63.4 UNINTENTIONAL WEIGHT LOSS: ICD-10-CM

## 2024-09-22 DIAGNOSIS — I25.10 CORONARY ARTERY DISEASE INVOLVING NATIVE CORONARY ARTERY OF NATIVE HEART WITHOUT ANGINA PECTORIS: ICD-10-CM

## 2024-09-23 ENCOUNTER — TELEPHONE (OUTPATIENT)
Dept: OTHER | Facility: CLINIC | Age: 69
End: 2024-09-23
Payer: COMMERCIAL

## 2024-09-23 DIAGNOSIS — I77.3 FIBROMUSCULAR DYSPLASIA OF CAROTID ARTERY (H): Primary | ICD-10-CM

## 2024-09-23 DIAGNOSIS — E78.5 HYPERLIPIDEMIA LDL GOAL <70: ICD-10-CM

## 2024-09-23 RX ORDER — OMEPRAZOLE 20 MG/1
20 TABLET, DELAYED RELEASE ORAL
Qty: 90 TABLET | Refills: 3 | Status: SHIPPED | OUTPATIENT
Start: 2024-09-23

## 2024-09-23 NOTE — TELEPHONE ENCOUNTER
Routing to scheduling to coordinate the following:  CTA head/neck with contrast  Please schedule this in the next month  Follow up virtually with Dr. Mcgee after CTA     Appt note:  follow up to 7/26/24 appt and CTA    Thank you  Juan Manuel Redding RN on 9/23/2024 at 10:22 AM

## 2024-09-24 ENCOUNTER — HOSPITAL ENCOUNTER (OUTPATIENT)
Dept: CARDIAC REHAB | Facility: HOSPITAL | Age: 69
Discharge: HOME | End: 2024-09-24
Attending: INTERNAL MEDICINE
Payer: COMMERCIAL

## 2024-09-24 DIAGNOSIS — E11.65 UNCONTROLLED TYPE 2 DIABETES MELLITUS WITH HYPERGLYCEMIA (H): Primary | ICD-10-CM

## 2024-09-24 PROCEDURE — 82274 ASSAY TEST FOR BLOOD FECAL: CPT

## 2024-09-24 PROCEDURE — 93798 PHYS/QHP OP CAR RHAB W/ECG: CPT

## 2024-09-24 NOTE — TELEPHONE ENCOUNTER
Attempted to call pharmacy to get more information in regards to below however there were more then 4 callers ahead of writer.     Please try to call again

## 2024-09-24 NOTE — TELEPHONE ENCOUNTER
Attempted to call [facility] pharmacy again. Waited more than 15 minutes with no answer. Will need to try and call again.

## 2024-09-25 ENCOUNTER — TELEPHONE (OUTPATIENT)
Dept: GASTROENTEROLOGY | Facility: CLINIC | Age: 69
End: 2024-09-25

## 2024-09-25 NOTE — TELEPHONE ENCOUNTER
Patient will call [facility] and schedule her CTA. Pt will then call us back to schedule her Virtual follow up with Dr Mcgee.

## 2024-09-25 NOTE — TELEPHONE ENCOUNTER
Left voicemail for patient to schedule appointment(s), stated this our 2nd attempt at scheduling and provided [facility] telephone number for patient to call back to schedule.    CTA head/neck with contrast  Please schedule this in the next month  Follow up virtually with Dr. Mcgee after CTA

## 2024-09-25 NOTE — TELEPHONE ENCOUNTER
[facility] Call Center    Phone Message    May a detailed message be left on voicemail: Yes    Reason for Call: Other: Patient is currently scheduled on 12/02/2024, as visit type New GI Urgent. This is outside the expected timeline for this referral. Patient has been added to the waitlist.      Action Taken: Message routed to:  Other: GI REFERRAL TRIAGE POOL     Travel Screening: Not Applicable

## 2024-09-26 ENCOUNTER — HOSPITAL ENCOUNTER (OUTPATIENT)
Dept: CARDIAC REHAB | Facility: HOSPITAL | Age: 69
Discharge: HOME | End: 2024-09-26
Attending: INTERNAL MEDICINE
Payer: COMMERCIAL

## 2024-09-26 ENCOUNTER — HOSPITAL ENCOUNTER (OUTPATIENT)
Dept: CARDIOLOGY | Facility: HOSPITAL | Age: 69
Discharge: HOME | End: 2024-09-26
Attending: NURSE PRACTITIONER | Admitting: NURSE PRACTITIONER
Payer: COMMERCIAL

## 2024-09-26 ENCOUNTER — TELEPHONE (OUTPATIENT)
Dept: FAMILY MEDICINE | Facility: CLINIC | Age: 69
End: 2024-09-26
Payer: COMMERCIAL

## 2024-09-26 DIAGNOSIS — I31.39 PERICARDIAL EFFUSION: ICD-10-CM

## 2024-09-26 LAB
HEMOCCULT STL QL IA: NEGATIVE
LVEF ECHO: NORMAL

## 2024-09-26 PROCEDURE — 93325 DOPPLER ECHO COLOR FLOW MAPG: CPT | Mod: 26 | Performed by: INTERNAL MEDICINE

## 2024-09-26 PROCEDURE — 93325 DOPPLER ECHO COLOR FLOW MAPG: CPT

## 2024-09-26 PROCEDURE — 93321 DOPPLER ECHO F-UP/LMTD STD: CPT | Mod: 26 | Performed by: INTERNAL MEDICINE

## 2024-09-26 PROCEDURE — 93308 TTE F-UP OR LMTD: CPT | Mod: 26 | Performed by: INTERNAL MEDICINE

## 2024-09-26 PROCEDURE — 93798 PHYS/QHP OP CAR RHAB W/ECG: CPT

## 2024-09-26 NOTE — TELEPHONE ENCOUNTER
MTM referral from: [facility] visit (referral by provider)    MTM referral outreach attempt #2 on September 26, 2024 at 1:15 PM      Outcome: Patient not reachable after several attempts, sent [employer] message    Use   [facility] fv emp grp    for the carrier/Plan on the flowsheet      YEOXIN VMallhart Message Sent    Maryellen Steinberg [facility]    [phone]

## 2024-09-27 ENCOUNTER — HOSPITAL ENCOUNTER (OUTPATIENT)
Dept: CARDIOLOGY | Facility: HOSPITAL | Age: 69
Discharge: HOME | End: 2024-09-27
Attending: NURSE PRACTITIONER
Payer: COMMERCIAL

## 2024-09-27 ENCOUNTER — HOSPITAL ENCOUNTER (OUTPATIENT)
Dept: NUCLEAR MEDICINE | Facility: HOSPITAL | Age: 69
Discharge: HOME | End: 2024-09-27
Attending: NURSE PRACTITIONER
Payer: COMMERCIAL

## 2024-09-27 DIAGNOSIS — R07.9 CHEST PAIN: ICD-10-CM

## 2024-09-27 DIAGNOSIS — I25.10 CORONARY ARTERY DISEASE INVOLVING NATIVE CORONARY ARTERY OF NATIVE HEART WITHOUT ANGINA PECTORIS: ICD-10-CM

## 2024-09-27 LAB
CV STRESS CURRENT BP HE: NORMAL
CV STRESS CURRENT HR HE: 102
CV STRESS CURRENT HR HE: 102
CV STRESS CURRENT HR HE: 103
CV STRESS CURRENT HR HE: 104
CV STRESS CURRENT HR HE: 55
CV STRESS CURRENT HR HE: 64
CV STRESS CURRENT HR HE: 75
CV STRESS CURRENT HR HE: 76
CV STRESS CURRENT HR HE: 76
CV STRESS CURRENT HR HE: 80
CV STRESS CURRENT HR HE: 85
CV STRESS CURRENT HR HE: 85
CV STRESS CURRENT HR HE: 87
CV STRESS CURRENT HR HE: 88
CV STRESS CURRENT HR HE: 92
CV STRESS CURRENT HR HE: 97
CV STRESS CURRENT HR HE: 97
CV STRESS CURRENT HR HE: 99
CV STRESS DEVIATION TIME HE: NORMAL
CV STRESS ECHO PERCENT HR HE: NORMAL
CV STRESS EXERCISE STAGE HE: NORMAL
CV STRESS FINAL RESTING BP HE: NORMAL
CV STRESS FINAL RESTING HR HE: 75
CV STRESS MAX HR HE: 104
CV STRESS MAX TREADMILL GRADE HE: 0
CV STRESS MAX TREADMILL SPEED HE: 0
CV STRESS PEAK DIA BP HE: NORMAL
CV STRESS PEAK SYS BP HE: NORMAL
CV STRESS PHASE HE: NORMAL
CV STRESS PROTOCOL HE: NORMAL
CV STRESS RESTING PT POSITION HE: NORMAL
CV STRESS ST DEVIATION AMOUNT HE: NORMAL
CV STRESS ST DEVIATION ELEVATION HE: NORMAL
CV STRESS ST EVELATION AMOUNT HE: NORMAL
CV STRESS TEST TYPE HE: NORMAL
CV STRESS TOTAL STAGE TIME MIN 1 HE: NORMAL
NUC STRESS EJECTION FRACTION: 58 %
RATE PRESSURE PRODUCT: NORMAL
STRESS ECHO BASELINE DIASTOLIC HE: 77
STRESS ECHO BASELINE HR: 59
STRESS ECHO BASELINE SYSTOLIC BP: 169
STRESS ECHO CALCULATED PERCENT HR: 68 %
STRESS ECHO LAST STRESS DIASTOLIC BP: 93
STRESS ECHO LAST STRESS HR: 97
STRESS ECHO LAST STRESS SYSTOLIC BP: 201
STRESS ECHO TARGET HR: 152

## 2024-09-27 PROCEDURE — 343N000001 HC RX 343: Performed by: NURSE PRACTITIONER

## 2024-09-27 PROCEDURE — 78452 HT MUSCLE IMAGE SPECT MULT: CPT

## 2024-09-27 PROCEDURE — 93017 CV STRESS TEST TRACING ONLY: CPT

## 2024-09-27 PROCEDURE — 250N000011 HC RX IP 250 OP 636: Performed by: NURSE PRACTITIONER

## 2024-09-27 PROCEDURE — A9500 TC99M SESTAMIBI: HCPCS | Performed by: NURSE PRACTITIONER

## 2024-09-27 PROCEDURE — 93018 CV STRESS TEST I&R ONLY: CPT | Performed by: INTERNAL MEDICINE

## 2024-09-27 PROCEDURE — 78452 HT MUSCLE IMAGE SPECT MULT: CPT | Mod: 26 | Performed by: INTERNAL MEDICINE

## 2024-09-27 PROCEDURE — 93016 CV STRESS TEST SUPVJ ONLY: CPT | Performed by: INTERNAL MEDICINE

## 2024-09-27 RX ORDER — REGADENOSON 0.08 MG/ML
0.4 INJECTION, SOLUTION INTRAVENOUS ONCE
Status: COMPLETED | OUTPATIENT
Start: 2024-09-27 | End: 2024-09-27

## 2024-09-27 RX ORDER — AMINOPHYLLINE 25 MG/ML
50-100 INJECTION, SOLUTION INTRAVENOUS
Status: COMPLETED | OUTPATIENT
Start: 2024-09-27 | End: 2024-09-27

## 2024-09-27 RX ADMIN — Medication 30.5 MILLICURIE: at 15:01

## 2024-09-27 RX ADMIN — AMINOPHYLLINE 50 MG: 25 INJECTION, SOLUTION INTRAVENOUS at 14:07

## 2024-09-27 RX ADMIN — REGADENOSON 0.4 MG: 0.08 INJECTION, SOLUTION INTRAVENOUS at 14:00

## 2024-09-27 RX ADMIN — Medication 8.3 MILLICURIE: at 12:38

## 2024-09-27 NOTE — TELEPHONE ENCOUNTER
Attempted to contact [facility] again for the 3rd time, held for >25 min with no answer  Called back [facility] & left voicemail for clarification on Metformin & Lexapro as well as Insulin

## 2024-10-01 ENCOUNTER — CARE COORDINATION (OUTPATIENT)
Dept: CARDIOLOGY | Facility: CLINIC | Age: 69
End: 2024-10-01
Payer: COMMERCIAL

## 2024-10-01 RX ORDER — METFORMIN HYDROCHLORIDE 500 MG/1
1000 TABLET, EXTENDED RELEASE ORAL 2 TIMES DAILY
Qty: 360 TABLET | Refills: 1 | Status: SHIPPED | OUTPATIENT
Start: 2024-10-01 | End: 2024-10-08

## 2024-10-01 RX ORDER — ESCITALOPRAM OXALATE 10 MG/1
10 TABLET ORAL DAILY
Qty: 90 TABLET | Refills: 0 | Status: SHIPPED | OUTPATIENT
Start: 2024-10-01 | End: 2024-10-08

## 2024-10-01 NOTE — PROGRESS NOTES
RN called pt for update. NO answer, left detailed VM w/below information and asked pt call back with an update on her chest pain symptoms. Whitney Farah RN on 10/1/2024 at 2:02 PM      From: Tamra Redmond NP  Sent: 9/30/2024   7:59 AM CDT  To: [facility] Heart Team 7    Her stress test did not show any evidence of ischemia.  Please let her know.  Can we also find out how she has been feeling since her visit in regards to chest pain?  Thanks.

## 2024-10-03 ENCOUNTER — HOSPITAL ENCOUNTER (OUTPATIENT)
Dept: CARDIAC REHAB | Facility: HOSPITAL | Age: 69
Discharge: HOME | End: 2024-10-03
Attending: INTERNAL MEDICINE
Payer: COMMERCIAL

## 2024-10-03 PROCEDURE — 93798 PHYS/QHP OP CAR RHAB W/ECG: CPT

## 2024-10-03 NOTE — TELEPHONE ENCOUNTER
Clinic Navigator sent a [employer] message to inform the Pt that Pt is on the wait list for a sooner appointment. Clinic Navigator will reach out to the Pt via phone call or [employer]Im[employer]hart when a sooner appointment becomes available.

## 2024-10-08 ENCOUNTER — HOSPITAL ENCOUNTER (OUTPATIENT)
Dept: CARDIAC REHAB | Facility: HOSPITAL | Age: 69
Discharge: HOME | End: 2024-10-08
Attending: INTERNAL MEDICINE
Payer: COMMERCIAL

## 2024-10-08 PROCEDURE — 93798 PHYS/QHP OP CAR RHAB W/ECG: CPT

## 2024-10-08 RX ORDER — INSULIN GLARGINE 100 [IU]/ML
10 INJECTION, SOLUTION SUBCUTANEOUS AT BEDTIME
Qty: 10.8 ML | Refills: 0 | Status: SHIPPED | OUTPATIENT
Start: 2024-10-08 | End: 2024-10-11

## 2024-10-08 RX ORDER — INSULIN GLARGINE 100 [IU]/ML
10 INJECTION, SOLUTION SUBCUTANEOUS AT BEDTIME
Qty: 10.8 ML | Refills: 0 | Status: SHIPPED | OUTPATIENT
Start: 2024-10-08 | End: 2024-10-08

## 2024-10-08 RX ORDER — ESCITALOPRAM OXALATE 10 MG/1
10 TABLET ORAL DAILY
Qty: 90 TABLET | Refills: 0 | Status: SHIPPED | OUTPATIENT
Start: 2024-10-08

## 2024-10-08 RX ORDER — METFORMIN HYDROCHLORIDE 500 MG/1
1000 TABLET, EXTENDED RELEASE ORAL 2 TIMES DAILY
Qty: 360 TABLET | Refills: 1 | Status: SHIPPED | OUTPATIENT
Start: 2024-10-08

## 2024-10-10 ENCOUNTER — HOSPITAL ENCOUNTER (OUTPATIENT)
Dept: CARDIAC REHAB | Facility: HOSPITAL | Age: 69
Discharge: HOME | End: 2024-10-10
Attending: INTERNAL MEDICINE
Payer: COMMERCIAL

## 2024-10-10 PROCEDURE — 93798 PHYS/QHP OP CAR RHAB W/ECG: CPT

## 2024-10-11 ENCOUNTER — OFFICE VISIT (OUTPATIENT)
Dept: FAMILY MEDICINE | Facility: CLINIC | Age: 69
End: 2024-10-11
Payer: COMMERCIAL

## 2024-10-11 VITALS
HEART RATE: 84 BPM | WEIGHT: 108 LBS | BODY MASS INDEX: 17.97 KG/M2 | RESPIRATION RATE: 18 BRPM | TEMPERATURE: 98 F | DIASTOLIC BLOOD PRESSURE: 58 MMHG | OXYGEN SATURATION: 97 % | SYSTOLIC BLOOD PRESSURE: 130 MMHG

## 2024-10-11 DIAGNOSIS — K92.1 BLACK TARRY STOOLS: ICD-10-CM

## 2024-10-11 DIAGNOSIS — I25.5 ISCHEMIC CARDIOMYOPATHY: ICD-10-CM

## 2024-10-11 DIAGNOSIS — K92.1 BLOOD IN STOOL: ICD-10-CM

## 2024-10-11 DIAGNOSIS — Z23 NEED FOR PROPHYLACTIC VACCINATION AND INOCULATION AGAINST INFLUENZA: ICD-10-CM

## 2024-10-11 DIAGNOSIS — K52.9 CHRONIC DIARRHEA: ICD-10-CM

## 2024-10-11 DIAGNOSIS — I25.10 CORONARY ARTERY DISEASE INVOLVING NATIVE CORONARY ARTERY OF NATIVE HEART WITHOUT ANGINA PECTORIS: ICD-10-CM

## 2024-10-11 DIAGNOSIS — I21.4 NSTEMI (NON-ST ELEVATED MYOCARDIAL INFARCTION) (H): ICD-10-CM

## 2024-10-11 DIAGNOSIS — Z23 ENCOUNTER FOR IMMUNIZATION: ICD-10-CM

## 2024-10-11 DIAGNOSIS — I77.3 FIBROMUSCULAR DYSPLASIA (H): ICD-10-CM

## 2024-10-11 DIAGNOSIS — I25.2 HISTORY OF NON-ST ELEVATION MYOCARDIAL INFARCTION (NSTEMI): ICD-10-CM

## 2024-10-11 DIAGNOSIS — I31.39 PERICARDIAL EFFUSION: ICD-10-CM

## 2024-10-11 DIAGNOSIS — E11.65 UNCONTROLLED TYPE 2 DIABETES MELLITUS WITH HYPERGLYCEMIA (H): Primary | ICD-10-CM

## 2024-10-11 LAB
ALBUMIN SERPL BCG-MCNC: 4.3 G/DL (ref 3.5–5.2)
ALP SERPL-CCNC: 79 U/L (ref 40–150)
ALT SERPL W P-5'-P-CCNC: 13 U/L (ref 0–50)
ANION GAP SERPL CALCULATED.3IONS-SCNC: 10 MMOL/L (ref 7–15)
AST SERPL W P-5'-P-CCNC: 18 U/L (ref 0–45)
BILIRUB SERPL-MCNC: 0.5 MG/DL
BUN SERPL-MCNC: 14.8 MG/DL (ref 8–23)
CALCIUM SERPL-MCNC: 10 MG/DL (ref 8.8–10.4)
CHLORIDE SERPL-SCNC: 101 MMOL/L (ref 98–107)
CHOLEST SERPL-MCNC: 127 MG/DL
CREAT SERPL-MCNC: 0.73 MG/DL (ref 0.51–0.95)
EGFRCR SERPLBLD CKD-EPI 2021: 89 ML/MIN/1.73M2
ERYTHROCYTE [DISTWIDTH] IN BLOOD BY AUTOMATED COUNT: 13.8 % (ref 10–15)
FASTING STATUS PATIENT QL REPORTED: NO
FASTING STATUS PATIENT QL REPORTED: NO
GLUCOSE SERPL-MCNC: 279 MG/DL (ref 70–99)
HCO3 SERPL-SCNC: 25 MMOL/L (ref 22–29)
HCT VFR BLD AUTO: 38.6 % (ref 35–47)
HDLC SERPL-MCNC: 69 MG/DL
HGB BLD-MCNC: 12.2 G/DL (ref 11.7–15.7)
LDLC SERPL CALC-MCNC: 9 MG/DL
MCH RBC QN AUTO: 26.9 PG (ref 26.5–33)
MCHC RBC AUTO-ENTMCNC: 31.6 G/DL (ref 31.5–36.5)
MCV RBC AUTO: 85 FL (ref 78–100)
NONHDLC SERPL-MCNC: 58 MG/DL
PLATELET # BLD AUTO: 132 10E3/UL (ref 150–450)
POTASSIUM SERPL-SCNC: 4.6 MMOL/L (ref 3.4–5.3)
PROT SERPL-MCNC: 7.2 G/DL (ref 6.4–8.3)
RBC # BLD AUTO: 4.53 10E6/UL (ref 3.8–5.2)
SODIUM SERPL-SCNC: 136 MMOL/L (ref 135–145)
TRIGL SERPL-MCNC: 246 MG/DL
WBC # BLD AUTO: 3.8 10E3/UL (ref 4–11)

## 2024-10-11 PROCEDURE — 90471 IMMUNIZATION ADMIN: CPT | Performed by: STUDENT IN AN ORGANIZED HEALTH CARE EDUCATION/TRAINING PROGRAM

## 2024-10-11 PROCEDURE — 36415 COLL VENOUS BLD VENIPUNCTURE: CPT | Performed by: STUDENT IN AN ORGANIZED HEALTH CARE EDUCATION/TRAINING PROGRAM

## 2024-10-11 PROCEDURE — 80053 COMPREHEN METABOLIC PANEL: CPT | Performed by: STUDENT IN AN ORGANIZED HEALTH CARE EDUCATION/TRAINING PROGRAM

## 2024-10-11 PROCEDURE — 90662 IIV NO PRSV INCREASED AG IM: CPT | Performed by: STUDENT IN AN ORGANIZED HEALTH CARE EDUCATION/TRAINING PROGRAM

## 2024-10-11 PROCEDURE — 90750 HZV VACC RECOMBINANT IM: CPT | Performed by: STUDENT IN AN ORGANIZED HEALTH CARE EDUCATION/TRAINING PROGRAM

## 2024-10-11 PROCEDURE — 85027 COMPLETE CBC AUTOMATED: CPT | Performed by: STUDENT IN AN ORGANIZED HEALTH CARE EDUCATION/TRAINING PROGRAM

## 2024-10-11 PROCEDURE — 91320 SARSCV2 VAC 30MCG TRS-SUC IM: CPT | Performed by: STUDENT IN AN ORGANIZED HEALTH CARE EDUCATION/TRAINING PROGRAM

## 2024-10-11 PROCEDURE — 90480 ADMN SARSCOV2 VAC 1/ONLY CMP: CPT | Performed by: STUDENT IN AN ORGANIZED HEALTH CARE EDUCATION/TRAINING PROGRAM

## 2024-10-11 PROCEDURE — 99214 OFFICE O/P EST MOD 30 MIN: CPT | Mod: 25 | Performed by: STUDENT IN AN ORGANIZED HEALTH CARE EDUCATION/TRAINING PROGRAM

## 2024-10-11 PROCEDURE — 90472 IMMUNIZATION ADMIN EACH ADD: CPT | Performed by: STUDENT IN AN ORGANIZED HEALTH CARE EDUCATION/TRAINING PROGRAM

## 2024-10-11 PROCEDURE — 80061 LIPID PANEL: CPT | Performed by: STUDENT IN AN ORGANIZED HEALTH CARE EDUCATION/TRAINING PROGRAM

## 2024-10-11 RX ORDER — INSULIN GLARGINE-YFGN 100 [IU]/ML
15 INJECTION, SOLUTION SUBCUTANEOUS AT BEDTIME
Qty: 19 ML | Refills: 1 | Status: SHIPPED | OUTPATIENT
Start: 2024-10-11 | End: 2025-01-09

## 2024-10-11 RX ORDER — L. ACIDOPHILUS/L.BULGARICUS 1MM CELL
1 TABLET ORAL DAILY
Qty: 90 TABLET | Refills: 3 | Status: SHIPPED | OUTPATIENT
Start: 2024-10-11

## 2024-10-11 RX ORDER — INSULIN GLARGINE-YFGN 100 [IU]/ML
INJECTION, SOLUTION SUBCUTANEOUS
COMMUNITY
Start: 2024-10-08 | End: 2024-10-11

## 2024-10-11 ASSESSMENT — PAIN SCALES - GENERAL: PAINLEVEL: NO PAIN (0)

## 2024-10-11 NOTE — PROGRESS NOTES
Assessment & Plan        Diagnosis Comments   1. Uncontrolled type 2 diabetes mellitus with hyperglycemia (H)  SEMGLEE, YFGN, 100 UNIT/ML SOLN, empagliflozin (JARDIANCE) 10 MG TABS tablet       2. Chronic diarrhea  Enteric Bacteria and Virus Panel by MARCELLA Stool, Lactobacillus Probiotic TABS       3. Fibromuscular dysplasia (H)        4. Blood in stool  CBC with platelets, Comprehensive metabolic panel (BMP + Alb, Alk Phos, ALT, AST, Total. Bili, TP)       5. Need for prophylactic vaccination and inoculation against influenza        6. Coronary artery disease involving native coronary artery of native heart without angina pectoris        7. NSTEMI (non-ST elevated myocardial infarction) (H)        8. Pericardial effusion        9. Ischemic cardiomyopathy        10. Black tarry stools        11. Encounter for immunization  INFLUENZA HIGH DOSE, TRIVALENT, PF (FLUZONE), COVID-19 12+ (PFIZER), ZOSTER RECOMBINANT ADJUVANTED (SHINGRIX)       12. History of non-ST elevation myocardial infarction (NSTEMI)                Fibromuscular dysplasia  CTA chest/abdomen 6/7/2024 showed beaded irregularity of the distal cervical internal carotid artery on the right consistent with fibromuscular dysplasia.  Has been seeing spine.  Has a future CTA head and neck ordered but patient has not scheduled this.  Advised her to schedule this after October 26.    Type 2 diabetes:  Patient's last A1c 8/23/2024 is 10.7.  Last time she was here, GLP-1 was discontinued due to ongoing weight loss and she was started on Lantus.  However, she had significant difficulty getting Lantus to her insurance and Semglee was sent to the [facility].  Today, she reports that she is only on metformin twice daily and she is not on any other medications.  The pharmacy just sent her a message this morning saying that the Semglee is available.  She is going to go pick it up right after this appointment.  Notes that her sugars are 200s to 300s in the morning.  " No hypoglycemic episodes.  No vision changes, polyneuropathy symptoms, polydipsia or polyuria.  Plan:  - Initially had sent in Semglee 10 units but I think she will need a higher dose.  15 units will be started on current nightly  - Will add Jardiance   -We will see her back shortly in 6 weeks  - Continue morning fasting sugars  - Continue to stay active    Concern for GI bleed  Had an an isolated episode of black tarry stools at the beginning of September.  Most recent CBC showed downtrending hemoglobin.  Patient was on DAPT.Concern for GI bleed. Recently saw cardiology on 9/19 and Effient was discontinued.  She is now on chronic aspirin. Fecal occult was negative on 9/24/2024 and she has not had any recurrent episodes of tarry stools.  She is reporting some diarrhea (see discussion below)  Plan:  - Recheck CBC  -Continue PPI    Diarrhea:  Patient has been struggling with looser stools.  Has been going to the bathroom several times throughout the day.  Notes that her stools have been much more malodorous than normal.  It does not matter what she eats she just \"goes to the bathroom\".  She has no abdominal pain, fevers or bloody bowel movements (except for the one isolated episode of tarry stools).  Plan:  - Will order stool studies  - Check CMP  - Start probiotic  - Did review with patient that blood can be a cathartic and if stool studies are negative, we should perhaps move ahead with colonoscopy and EGD.    MDD  Low-dose Lexapro was started last time and patient has been doing  Much better from a mood standpoint since then.  She feels \"much lighter\" and less overwhelmed by her chronic disease burden.  She is motivated to go to her appointments and keep up with cardiac rehab.    NSTEMI s/p KIM to LAD x 2 and KIM in D1 6/8/2024 complicated by Coronary dissection occurred during PCI involving both LAD and D1  Takotsubo cardiomyopathy  Stable, doing well with cardiac rehab  Is on chronic aspirin and statin  Is " following with cardiology    Pulmonary emphysema:  Noted on imaging in the past  PFTs done were normal and she has no evidence of asthma or COPD at this time    History of tobacco abuse:  Continues to be abstinent.  Congratulated patient on staying away from smoking.      Eb Vazquez is a 69 year old, presenting for the following health issues:  Diabetes, RECHECK, and Gastrointestinal Problem (More frequent BM following meals for about 6 weeks )      10/11/2024    11:32 AM   Additional Questions   Roomed by padmini         Review of Systems  As per HPI       Objective    /58 (BP Location: Right arm, Patient Position: Sitting)   Pulse 84   Temp 98  F (36.7  C) (Oral)   Resp 18   Wt 49 kg (108 lb)   LMP  (LMP Unknown)   SpO2 97%   BMI 17.97 kg/m    Body mass index is 17.97 kg/m .  Physical Exam   GENERAL: alert and no distress  NECK: no adenopathy, no asymmetry, masses, or scars  RESP: lungs clear to auscultation - no rales, rhonchi or wheezes  CV: regular rate and rhythm,, no murmur, click or rub, no peripheral edema  MS: no gross musculoskeletal defects noted, no edema  PSYCH: mentation appears normal, affect normal/bright      Signed Electronically by: Soraya Cherry DO

## 2024-10-11 NOTE — LETTER
October 22, 2024      Taylor Aburto  [address]  [facility] [address]        Dear ,    We are writing to inform you of your test results.    Your stool tests are negative for any viral or bacterial causes to diarrhea. No immediate recommendations based on these results, please continue to work with Dr. Cherry in regards to the diarrhea.  negative stool tests. Noted the elevated glucose on the CMP from another provider. Looks like you two are actively managing but just wanted you to be in the loop.     Please do not hesitate to reach out with any questions or concerns    Resulted Orders   Enteric Bacteria and Virus Panel by MARCELLA Stool   Result Value Ref Range    Campylobacter species Negative Negative    Salmonella species Negative Negative    Vibrio species Negative Negative    Vibrio cholerae Negative Negative    Yersinia enterocolitica Negative Negative    Enteropathogenic E. coli (EPEC) Negative Negative, NA    Shiga-like toxin-producing E. coli (STEC) Negative Negative    Shigella/Enteroinvasive E. coli (EIEC) Negative Negative    Cryptosporidium species Negative Negative    Giardia lamblia Negative Negative    Norovirus Gl/Gll Negative Negative    Rotavirus A Negative Negative    Plesiomonas shigelloides Negative Negative    Enteroaggregative E. coli (EAEC) Negative Negative    Enterotoxigenic E. coli (ETEC) Negative Negative    E. coli O157 NA Negative, NA    Cyclospora cayetanensis Negative Negative    Entamoeba histolytica Negative Negative    Adenovirus F40/41 Negative Negative    Astrovirus Negative Negative    Sapovirus Negative Negative    Narrative    Assay performed using the FDA-cleared FilmArray GI Panel from [employer]  A negative result should not rule out infection in patients with a probability for gastrointestinal infection. The assay does not test for all potential infectious agents of diarrheal disease.  Positive results do not distinguish between a viable or  replicating organism and the presence of a nonviable organism or nucleic acid, nor do they exclude the possibility of coinfection by organisms not in the panel.  Results are intended to aid in the diagnosis of illness and are meant to be used in conjunction with other clinical findings.  This test has been verified and is performed by the [facility] at [facility]. This laboratory is certified under the Clinical Laboratory Improvement Amendments of 1988 (CLIA-88) as qualified to perform high complexity clinical laboratory testing.       If you have any questions or concerns, please call the clinic at the number listed above.       Sincerely,      Soraya Cherry, DO

## 2024-10-14 ENCOUNTER — TELEPHONE (OUTPATIENT)
Dept: CARDIOLOGY | Facility: CLINIC | Age: 69
End: 2024-10-14
Payer: COMMERCIAL

## 2024-10-14 LAB
ADV 40+41 DNA STL QL NAA+NON-PROBE: NEGATIVE
ASTRO TYP 1-8 RNA STL QL NAA+NON-PROBE: NEGATIVE
C CAYETANENSIS DNA STL QL NAA+NON-PROBE: NEGATIVE
CAMPYLOBACTER DNA SPEC NAA+PROBE: NEGATIVE
CRYPTOSP DNA STL QL NAA+NON-PROBE: NEGATIVE
E COLI O157 DNA STL QL NAA+NON-PROBE: NORMAL
E HISTOLYT DNA STL QL NAA+NON-PROBE: NEGATIVE
EAEC ASTA GENE ISLT QL NAA+PROBE: NEGATIVE
EC STX1+STX2 GENES STL QL NAA+NON-PROBE: NEGATIVE
EPEC EAE GENE STL QL NAA+NON-PROBE: NEGATIVE
ETEC LTA+ST1A+ST1B TOX ST NAA+NON-PROBE: NEGATIVE
G LAMBLIA DNA STL QL NAA+NON-PROBE: NEGATIVE
NOROVIRUS GI+II RNA STL QL NAA+NON-PROBE: NEGATIVE
P SHIGELLOIDES DNA STL QL NAA+NON-PROBE: NEGATIVE
RVA RNA STL QL NAA+NON-PROBE: NEGATIVE
SALMONELLA SP RPOD STL QL NAA+PROBE: NEGATIVE
SAPO I+II+IV+V RNA STL QL NAA+NON-PROBE: NEGATIVE
SHIGELLA SP+EIEC IPAH ST NAA+NON-PROBE: NEGATIVE
V CHOLERAE DNA SPEC QL NAA+PROBE: NEGATIVE
VIBRIO DNA SPEC NAA+PROBE: NEGATIVE
Y ENTEROCOL DNA STL QL NAA+PROBE: NEGATIVE

## 2024-10-14 PROCEDURE — 87507 IADNA-DNA/RNA PROBE TQ 12-25: CPT | Performed by: STUDENT IN AN ORGANIZED HEALTH CARE EDUCATION/TRAINING PROGRAM

## 2024-10-14 NOTE — TELEPHONE ENCOUNTER
"Team received update from Aldo Bustillos regarding lab results from Friday.     \"Her LDL cholesterol is quite low (9), so would recommend decreasing rosuvastatin from 40 mg down to 20 mg once daily. \"    Writer called and had to [facility] for Taylor.  Informed her of Aldo instructions.  Requested that she either call us or send a MyChart message to us, does she need a new prescription right now, or does she have enough tablets to cut them in half for quite a while.    Writer also informed Taylor of Aldo message about noting high glucose and high triglycerides.  His recommendation to see PCP and try to get blood sugar under better control.      Left our phone number for Team 3, and said she could also MyChart message us.    Melissa Meredith RN on 10/14/2024 at 4:43 PM    "

## 2024-10-15 ENCOUNTER — HOSPITAL ENCOUNTER (OUTPATIENT)
Dept: CARDIAC REHAB | Facility: HOSPITAL | Age: 69
Discharge: HOME | End: 2024-10-15
Attending: INTERNAL MEDICINE
Payer: COMMERCIAL

## 2024-10-15 LAB — GLUCOSE BLDC GLUCOMTR-MCNC: 115 MG/DL (ref 70–99)

## 2024-10-15 PROCEDURE — 93798 PHYS/QHP OP CAR RHAB W/ECG: CPT

## 2024-10-22 ENCOUNTER — TELEPHONE (OUTPATIENT)
Dept: FAMILY MEDICINE | Facility: CLINIC | Age: 69
End: 2024-10-22
Payer: COMMERCIAL

## 2024-10-22 ENCOUNTER — HOSPITAL ENCOUNTER (OUTPATIENT)
Dept: CARDIAC REHAB | Facility: HOSPITAL | Age: 69
Discharge: HOME | End: 2024-10-22
Attending: INTERNAL MEDICINE
Payer: COMMERCIAL

## 2024-10-22 PROCEDURE — 93798 PHYS/QHP OP CAR RHAB W/ECG: CPT

## 2024-10-22 NOTE — TELEPHONE ENCOUNTER
Left message for patient to call back. Please relay message from Christine Taveras:     negative stool tests. Noted the elevated glucose on the CMP from another provider. Looks like you two are Your stool tests are negative for any viral or bacterial causes to diarrhea. No immediate recommendations based on these results, please continue to work with Dr. Cherry in regards to the diarrhea. actively managing but just wanted you to be in the loop.     Letter sent via mail.

## 2024-10-22 NOTE — TELEPHONE ENCOUNTER
----- Message from Soraya Cherry sent at 10/21/2024  7:28 AM CDT -----  MyC message not read. Please call x 2 and if no response, please send letter.

## 2024-10-24 ENCOUNTER — HOSPITAL ENCOUNTER (OUTPATIENT)
Dept: CARDIAC REHAB | Facility: HOSPITAL | Age: 69
Discharge: HOME | End: 2024-10-24
Attending: INTERNAL MEDICINE
Payer: COMMERCIAL

## 2024-10-24 PROCEDURE — 93798 PHYS/QHP OP CAR RHAB W/ECG: CPT

## 2024-11-03 ENCOUNTER — APPOINTMENT (OUTPATIENT)
Dept: CT IMAGING | Facility: CLINIC | Age: 69
End: 2024-11-03
Attending: BEHAVIOR TECHNICIAN
Payer: COMMERCIAL

## 2024-11-03 ENCOUNTER — HOSPITAL ENCOUNTER (EMERGENCY)
Facility: CLINIC | Age: 69
Discharge: HOME | End: 2024-11-03
Attending: EMERGENCY MEDICINE | Admitting: EMERGENCY MEDICINE
Payer: COMMERCIAL

## 2024-11-03 VITALS
DIASTOLIC BLOOD PRESSURE: 59 MMHG | WEIGHT: 106 LBS | SYSTOLIC BLOOD PRESSURE: 106 MMHG | HEART RATE: 80 BPM | TEMPERATURE: 97.4 F | RESPIRATION RATE: 16 BRPM | OXYGEN SATURATION: 95 % | HEIGHT: 66 IN | BODY MASS INDEX: 17.04 KG/M2

## 2024-11-03 DIAGNOSIS — R10.9 LEFT FLANK PAIN: ICD-10-CM

## 2024-11-03 DIAGNOSIS — R10.9 ABDOMINAL PAIN: ICD-10-CM

## 2024-11-03 DIAGNOSIS — K59.00 CONSTIPATION: ICD-10-CM

## 2024-11-03 LAB
ALBUMIN SERPL BCG-MCNC: 4.2 G/DL (ref 3.5–5.2)
ALBUMIN UR-MCNC: NEGATIVE MG/DL
ALP SERPL-CCNC: 58 U/L (ref 40–150)
ALT SERPL W P-5'-P-CCNC: 14 U/L (ref 0–50)
ANION GAP SERPL CALCULATED.3IONS-SCNC: 14 MMOL/L (ref 7–15)
APPEARANCE UR: CLEAR
AST SERPL W P-5'-P-CCNC: 25 U/L (ref 0–45)
ATRIAL RATE - MUSE: 96 BPM
BASOPHILS # BLD AUTO: 0 10E3/UL (ref 0–0.2)
BASOPHILS NFR BLD AUTO: 1 %
BILIRUB SERPL-MCNC: 1 MG/DL
BILIRUB UR QL STRIP: NEGATIVE
BUN SERPL-MCNC: 17.4 MG/DL (ref 8–23)
CALCIUM SERPL-MCNC: 9.8 MG/DL (ref 8.8–10.4)
CHLORIDE SERPL-SCNC: 100 MMOL/L (ref 98–107)
COLOR UR AUTO: YELLOW
CREAT SERPL-MCNC: 0.74 MG/DL (ref 0.51–0.95)
DIASTOLIC BLOOD PRESSURE - MUSE: NORMAL MMHG
EGFRCR SERPLBLD CKD-EPI 2021: 88 ML/MIN/1.73M2
EOSINOPHIL # BLD AUTO: 0.1 10E3/UL (ref 0–0.7)
EOSINOPHIL NFR BLD AUTO: 2 %
ERYTHROCYTE [DISTWIDTH] IN BLOOD BY AUTOMATED COUNT: 14.3 % (ref 10–15)
GLUCOSE SERPL-MCNC: 107 MG/DL (ref 70–99)
GLUCOSE UR STRIP-MCNC: >=1000 MG/DL
HCO3 SERPL-SCNC: 21 MMOL/L (ref 22–29)
HCT VFR BLD AUTO: 41.3 % (ref 35–47)
HGB BLD-MCNC: 13.1 G/DL (ref 11.7–15.7)
HGB UR QL STRIP: NEGATIVE
HYALINE CASTS: 1 /LPF
IMM GRANULOCYTES # BLD: 0 10E3/UL
IMM GRANULOCYTES NFR BLD: 0 %
INTERPRETATION ECG - MUSE: NORMAL
KETONES UR STRIP-MCNC: 10 MG/DL
LEUKOCYTE ESTERASE UR QL STRIP: NEGATIVE
LIPASE SERPL-CCNC: 35 U/L (ref 13–60)
LYMPHOCYTES # BLD AUTO: 1.5 10E3/UL (ref 0.8–5.3)
LYMPHOCYTES NFR BLD AUTO: 28 %
MCH RBC QN AUTO: 26.5 PG (ref 26.5–33)
MCHC RBC AUTO-ENTMCNC: 31.7 G/DL (ref 31.5–36.5)
MCV RBC AUTO: 83 FL (ref 78–100)
MONOCYTES # BLD AUTO: 0.6 10E3/UL (ref 0–1.3)
MONOCYTES NFR BLD AUTO: 10 %
NEUTROPHILS # BLD AUTO: 3.2 10E3/UL (ref 1.6–8.3)
NEUTROPHILS NFR BLD AUTO: 60 %
NITRATE UR QL: NEGATIVE
NRBC # BLD AUTO: 0 10E3/UL
NRBC BLD AUTO-RTO: 0 /100
P AXIS - MUSE: 81 DEGREES
PH UR STRIP: 6 [PH] (ref 5–7)
PLATELET # BLD AUTO: 145 10E3/UL (ref 150–450)
POTASSIUM SERPL-SCNC: 4.5 MMOL/L (ref 3.4–5.3)
PR INTERVAL - MUSE: 134 MS
PROT SERPL-MCNC: 7.5 G/DL (ref 6.4–8.3)
QRS DURATION - MUSE: 72 MS
QT - MUSE: 370 MS
QTC - MUSE: 467 MS
R AXIS - MUSE: 88 DEGREES
RBC # BLD AUTO: 4.95 10E6/UL (ref 3.8–5.2)
RBC URINE: <1 /HPF
SODIUM SERPL-SCNC: 135 MMOL/L (ref 135–145)
SP GR UR STRIP: 1.02 (ref 1–1.03)
SYSTOLIC BLOOD PRESSURE - MUSE: NORMAL MMHG
T AXIS - MUSE: 88 DEGREES
TROPONIN T SERPL HS-MCNC: 7 NG/L
UROBILINOGEN UR STRIP-MCNC: NORMAL MG/DL
VENTRICULAR RATE- MUSE: 96 BPM
WBC # BLD AUTO: 5.3 10E3/UL (ref 4–11)
WBC URINE: 1 /HPF

## 2024-11-03 PROCEDURE — 96374 THER/PROPH/DIAG INJ IV PUSH: CPT

## 2024-11-03 PROCEDURE — 81001 URINALYSIS AUTO W/SCOPE: CPT | Performed by: EMERGENCY MEDICINE

## 2024-11-03 PROCEDURE — 84484 ASSAY OF TROPONIN QUANT: CPT | Performed by: BEHAVIOR TECHNICIAN

## 2024-11-03 PROCEDURE — 99285 EMERGENCY DEPT VISIT HI MDM: CPT | Mod: 25

## 2024-11-03 PROCEDURE — 81001 URINALYSIS AUTO W/SCOPE: CPT | Performed by: STUDENT IN AN ORGANIZED HEALTH CARE EDUCATION/TRAINING PROGRAM

## 2024-11-03 PROCEDURE — 82310 ASSAY OF CALCIUM: CPT | Performed by: BEHAVIOR TECHNICIAN

## 2024-11-03 PROCEDURE — 83690 ASSAY OF LIPASE: CPT | Performed by: BEHAVIOR TECHNICIAN

## 2024-11-03 PROCEDURE — 93005 ELECTROCARDIOGRAM TRACING: CPT

## 2024-11-03 PROCEDURE — 36415 COLL VENOUS BLD VENIPUNCTURE: CPT | Performed by: BEHAVIOR TECHNICIAN

## 2024-11-03 PROCEDURE — 85025 COMPLETE CBC W/AUTO DIFF WBC: CPT | Performed by: BEHAVIOR TECHNICIAN

## 2024-11-03 PROCEDURE — 250N000011 HC RX IP 250 OP 636: Performed by: BEHAVIOR TECHNICIAN

## 2024-11-03 PROCEDURE — 74176 CT ABD & PELVIS W/O CONTRAST: CPT

## 2024-11-03 RX ORDER — KETOROLAC TROMETHAMINE 15 MG/ML
15 INJECTION, SOLUTION INTRAMUSCULAR; INTRAVENOUS ONCE
Status: COMPLETED | OUTPATIENT
Start: 2024-11-03 | End: 2024-11-03

## 2024-11-03 RX ORDER — ONDANSETRON 2 MG/ML
4 INJECTION INTRAMUSCULAR; INTRAVENOUS EVERY 30 MIN PRN
Status: DISCONTINUED | OUTPATIENT
Start: 2024-11-03 | End: 2024-11-03 | Stop reason: HOSPADM

## 2024-11-03 RX ADMIN — KETOROLAC TROMETHAMINE 15 MG: 15 INJECTION, SOLUTION INTRAMUSCULAR; INTRAVENOUS at 16:08

## 2024-11-03 ASSESSMENT — ACTIVITIES OF DAILY LIVING (ADL)
ADLS_ACUITY_SCORE: 0

## 2024-11-03 ASSESSMENT — COLUMBIA-SUICIDE SEVERITY RATING SCALE - C-SSRS
2. HAVE YOU ACTUALLY HAD ANY THOUGHTS OF KILLING YOURSELF IN THE PAST MONTH?: NO
6. HAVE YOU EVER DONE ANYTHING, STARTED TO DO ANYTHING, OR PREPARED TO DO ANYTHING TO END YOUR LIFE?: NO
1. IN THE PAST MONTH, HAVE YOU WISHED YOU WERE DEAD OR WISHED YOU COULD GO TO SLEEP AND NOT WAKE UP?: NO

## 2024-11-03 NOTE — ED TRIAGE NOTES
Pt reports flank pain starting Friday radiating to lt mid abd. Pt states she felt dehydrated at home and drank three bottles of water.pt states then she was going to the bathroom frequently. Pt states no pain with urination and no blood in urine. Pt states had cramping in legs the last two days  pt ate bananas and cramping better. Pt threw up this morning     Triage Assessment (Adult)       Row Name 11/03/24 1323          Triage Assessment    Airway WDL WDL        Respiratory WDL    Respiratory WDL WDL        Cardiac WDL    Cardiac WDL X;chest pain        Chest Pain Assessment    Chest Pain Location midsternal     Character pressure     Chest Pain Intervention 12-lead ECG obtained        Peripheral/Neurovascular WDL    Peripheral Neurovascular WDL WDL        Cognitive/Neuro/Behavioral WDL    Cognitive/Neuro/Behavioral WDL X  headache        Wallace Coma Scale    Best Eye Response 4-->(E4) spontaneous     Best Motor Response 6-->(M6) obeys commands     Best Verbal Response 5-->(V5) oriented     [place]ly Coma Scale Score 15

## 2024-11-03 NOTE — ED PROVIDER NOTES
Emergency Department Note      History of Present Illness     Chief Complaint   Flank Pain and Abdominal Pain      HPI   Taylor Aburto is a 69 year old female with history of CAD s/p KIM 6/2024, CVA, type 2 diabetes, hypertension who presents to the ED for evaluation of flank pain. Patient reports sudden onset of left flank pain that developed 3 days ago. At the time, she felt nauseated. The next day the pain was dull and less severe. She states that she woke up today and the pain was worse. She endorses nausea and one episode of emesis. She states that the pain now radiates to her left lower quadrant. She denies fever, chills. She one bowel movement this morning that consisted of loose stool. No blood. She denies dysuria, hematuria, or burning/irritation. She denies history of kidney stones. She's had a partial hysterectomy in the past.    Independent Historian   None    Review of External Notes   None    Past Medical History     Medical History and Problem List   Past Medical History:   Diagnosis Date    Arthritis 06/27/2013    Benign essential hypertension     CAD (coronary artery disease) s/p KIM 6/2024     Cerebral infarction (H) possible [id number]    COPD (chronic obstructive pulmonary disease) (H) 2023    Depressive disorder 8725-2841    Diabetes (H) approx 4545-0896    H/O ETOH abuse 02/06/2018    Hyperlipidemia LDL goal <70     Moderate smoker (20 or less per day) 06/27/2013    Vitamin D deficiency 02/06/2018       Medications   aspirin 81 MG EC tablet  blood glucose (ACCU-CHEK GUIDE) test strip  blood glucose (NO BRAND SPECIFIED) lancets standard  blood glucose (NO BRAND SPECIFIED) test strip  blood glucose meter (GLUCOMETER)  blood glucose monitoring (NO BRAND SPECIFIED) meter device kit  carvedilol (COREG) 3.125 MG tablet  clopidogrel (PLAVIX) 75 MG tablet  empagliflozin (JARDIANCE) 10 MG TABS tablet  escitalopram (LEXAPRO) 10 MG tablet  fluticasone (FLONASE) 50 MCG/ACT nasal spray  Lactobacillus  Probiotic TABS  lisinopril (ZESTRIL) 5 MG tablet  metFORMIN (GLUCOPHAGE XR) 500 MG 24 hr tablet  nicotine polacrilex (NICORETTE) 4 MG gum  nitroGLYcerin (NITROSTAT) 0.4 MG sublingual tablet  omeprazole (PRILOSEC OTC) 20 MG EC tablet  oxyBUTYnin ER (DITROPAN XL) 10 MG 24 hr tablet  PYRIDOXINE HCL PO  rosuvastatin (CRESTOR) 40 MG tablet  SEMGLEE, YFGN, 100 UNIT/ML SOLN  thin (NO BRAND SPECIFIED) lancets        Surgical History   Past Surgical History:   Procedure Laterality Date    CARDIAC SURGERY  6/8/2024    COLONOSCOPY N/A 12/01/2023    Procedure: COLONOSCOPY, FLEXIBLE, WITH LESION REMOVAL USING SNARE;  Surgeon: Jake Thomas MD;  Location: [facility]    CV CORONARY ANGIOGRAM N/A 06/08/2024    Procedure: Coronary Angiogram;  Surgeon: Rick Agustin MD;  Location: [facility] CARDIAC CATH LAB    CV CORONARY ANGIOGRAM N/A 06/18/2024    Procedure: Coronary Angiogram;  Surgeon: Rick Agustin MD;  Location:  HEART CARDIAC CATH LAB    CV INTRAVASULAR ULTRASOUND N/A 06/08/2024    Procedure: Intravascular Ultrasound;  Surgeon: Rick Agustin MD;  Location:  HEART CARDIAC CATH LAB    CV INTRAVASULAR ULTRASOUND N/A 06/18/2024    Procedure: Intravascular Ultrasound;  Surgeon: Rick Agustin MD;  Location:  HEART CARDIAC CATH LAB    CV PCI ASPIRATION THROMECTOMY N/A 06/08/2024    Procedure: Percutaneous Coronary Intervention Aspiration Thrombectomy;  Surgeon: Rick Agustin MD;  Location: [facility] CARDIAC CATH LAB    CV PCI STENT DRUG ELUTING N/A 06/08/2024    Procedure: Percutaneous Coronary Intervention Stent;  Surgeon: Rick Agustin MD;  Location:  HEART CARDIAC CATH LAB    CV PCI STENT DRUG ELUTING N/A 06/18/2024    Procedure: Percutaneous Coronary Intervention Stent;  Surgeon: Rick Agustin MD;  Location: [facility] CARDIAC CATH LAB    CV PERICARDIOCENTESIS N/A 06/10/2024    Procedure: Pericardiocentesis;  Surgeon: Ayde Ruiz MD;   "Location:  HEART CARDIAC CATH LAB    ENDOSCOPIC ULTRASOUND UPPER GASTROINTESTINAL TRACT (GI) N/A 02/22/2024    Procedure: Endoscopic ultrasound upper gastrointestinal tract (GI);  Surgeon: Jake Thomas MD;  Location:  GI    ESOPHAGOSCOPY, GASTROSCOPY, DUODENOSCOPY (EGD), COMBINED N/A 02/22/2024    Procedure: ESOPHAGOGASTRODUODENOSCOPY, WITH BIOPSY;  Surgeon: Jake Thomas MD;  Location:  GI    GYN SURGERY  ? 2009 1 ovary       Physical Exam     Patient Vitals for the past 24 hrs:   BP Temp Temp src Pulse Resp SpO2 Height Weight   11/03/24 1819 106/59 -- -- 80 16 95 % -- --   11/03/24 1318 129/75 97.4  F (36.3  C) Temporal 95 16 99 % 1.676 m (5' 6\") 48.1 kg (106 lb)     Physical Exam  Physical Exam:  General: lying comfortably on hospital bed  Head: normocephalic, atraumatic  Eyes: PERRLA, EOMI  Ears: External ears appear normal.   Nose: no signs of bleeding   Throat: moist mucous membranes  Neck: No JVD  CV: regular rate and rhythm  Pulm: lungs clear to ausculation bilaterally, normal respiratory effort, normal chest expansion with breathing   Abdomen: soft, left flank and LLQ tenderness. Non-distended. No rigidity, guarding, or rebound tenderness.   MSK: No midline tenderness  Ext: normal range of motion of all extremities. No gross deformities  Skin: warm, dry, no rashes  Neuro: alert and oriented  Psych: Appropriate mood. Cooperative      Diagnostics     Lab Results   Labs Ordered and Resulted from Time of ED Arrival to Time of ED Departure   ROUTINE UA WITH MICROSCOPIC REFLEX TO CULTURE - Abnormal       Result Value    Color Urine Yellow      Appearance Urine Clear      Glucose Urine >=1000 (*)     Bilirubin Urine Negative      Ketones Urine 10 (*)     Specific Gravity Urine 1.023      Blood Urine Negative      pH Urine 6.0      Protein Albumin Urine Negative      Urobilinogen Urine Normal      Nitrite Urine Negative      Leukocyte Esterase Urine Negative      RBC Urine <1   "    WBC Urine 1      Hyaline Casts Urine 1     COMPREHENSIVE METABOLIC PANEL - Abnormal    Sodium 135      Potassium 4.5      Carbon Dioxide (CO2) 21 (*)     Anion Gap 14      Urea Nitrogen 17.4      Creatinine 0.74      GFR Estimate 88      Calcium 9.8      Chloride 100      Glucose 107 (*)     Alkaline Phosphatase 58      AST 25      ALT 14      Protein Total 7.5      Albumin 4.2      Bilirubin Total 1.0     CBC WITH PLATELETS AND DIFFERENTIAL - Abnormal    WBC Count 5.3      RBC Count 4.95      Hemoglobin 13.1      Hematocrit 41.3      MCV 83      MCH 26.5      MCHC 31.7      RDW 14.3      Platelet Count 145 (*)     % Neutrophils 60      % Lymphocytes 28      % Monocytes 10      % Eosinophils 2      % Basophils 1      % Immature Granulocytes 0      NRBCs per 100 WBC 0      Absolute Neutrophils 3.2      Absolute Lymphocytes 1.5      Absolute Monocytes 0.6      Absolute Eosinophils 0.1      Absolute Basophils 0.0      Absolute Immature Granulocytes 0.0      Absolute NRBCs 0.0     TROPONIN T, HIGH SENSITIVITY - Normal    Troponin T, High Sensitivity 7     LIPASE - Normal    Lipase 35         Imaging   Abd/pelvis CT no contrast - Stone Protocol   Final Result   IMPRESSION:    1. No acute findings in the abdomen or pelvis. No urinary stone.   2. Large volume stool scattered throughout the colon.          EKG   ECG taken at 1311, ECG read at 1322  Normal Sinus Rhythm   Rate 96 bpm. FL interval 134 ms. QRS duration 72 ms. QT/QTc 370/467 ms. P-R-T axes 81 88 88.    Independent Interpretation   None    ED Course      Medications Administered   Medications   ondansetron (ZOFRAN) injection 4 mg (has no administration in time range)   ketorolac (TORADOL) injection 15 mg (15 mg Intravenous $Given 11/3/24 1608)       Procedures   Procedures     Discussion of Management   None    ED Course   ED Course as of 11/03/24 1851   Sun Nov 03, 2024   1513 I evaluated patient and obtained history.       Additional  Documentation  None    Medical Decision Making / Diagnosis     CMS Diagnoses: None    MIPS       None    MDM   Taylor Aburto is a 69 year old female who presents to the ED for evaluation of abdominal pain and left flank pain.  She states that she has had intermittent left flank and left lower quadrant abdominal pain for 3 days.  She states that the pain was initially sharp and located in the left flank.  The pain became a dull ache yesterday.  The pain returned today and now radiates to the left lower quadrant of the abdomen.  She also endorses nausea and one episode of emesis.  She denies any fever, pleuritic chest pain, or shortness of breath.  She had one loose stool this morning.  No blood in stool.  See further HPI details above.  The above workup was undertaken.  Broad differential was considered including UTI, pyelonephritis, kidney stone, diverticulitis, ACS, PE, herpes zoster, musculoskeletal pain, pancreatitis, gastroenteritis, colitis, constipation.  On exam, patient is well-appearing.  Her vitals are reassuring without evidence of fever, tachycardia, or hypoxia.  Exam is notable for mild left flank and left lower quadrant tenderness.  No peritoneal signs. No vesicular lesions or ecchymosis. Labs are overall reassuring.  No leukocytosis.  Normal LFTs.  Low suspicion for biliary etiology.  Normal lipase.  Low suspicion for pancreatitis.  EKG shows normal sinus rhythm.  No ischemic changes.  Troponin is normal.  Low suspicion for ACS given reassuring EKG, reassuring vital signs, and normal troponin.  No hypoxia, tachycardia, or pleuritic chest pain therefore I doubt PE.  CT abdomen/pelvis shows large volume of stool throughout the colon.  No evidence of obstruction. No evidence of ureteral stone.  UA does not show evidence of infection.  No evidence of pyelonephritis. No evidence of a more concerning intra-abdominal pathology.  Patient was given Zofran and Toradol with improvement of symptoms.  Discussed  CT results.  Patient feels comfortable discharging home.  Recommended that she drink lots of fluids, increase fiber intake, taking MiraLAX and stool softeners.  Also discussed follow-up with her primary care provider for reassessment.  Patient was understanding is agreeable to plan of care.  Return precautions were given.    Disposition   The patient was discharged.     Diagnosis     ICD-10-CM    1. Constipation  K59.00       2. Abdominal pain  R10.9       3. Left flank pain  R10.9            Discharge Medications   Discharge Medication List as of 11/3/2024  6:21 PM            [address] Kausar, PA-C  11/03/24 1901

## 2024-11-04 ENCOUNTER — PATIENT OUTREACH (OUTPATIENT)
Dept: FAMILY MEDICINE | Facility: CLINIC | Age: 69
End: 2024-11-04
Payer: COMMERCIAL

## 2024-11-04 NOTE — TELEPHONE ENCOUNTER
Patient is now scheduled within the appropriate time frame of one month for urgent triaged referrals. No rescheduling is needed anymore.    Scheduled with Gastroenterology (Holden Menchaca PA-C)  12/02/2024 at 7:30 AM     Closing encounter.      Nicol Calvin, [facility]

## 2024-11-04 NOTE — ED PROVIDER NOTES
ED APC SUPERVISION NOTE:   I evaluated this patient in conjunction with [address]  I have participated in the care of the patient and personally performed key elements of the history, exam, and medical decision making.      HPI:   Taylor Aburto is a 69 year old female who presents with abdominal pain.  She has had this for several months, but more recently over the past 3 days.  She reports drinking more water.  She reports urinating more because of this.  She reports that she is struggled with constipation since starting medications a while ago.    Independent Historian:   Granddaughter at bedside who contributes to the above.    Review of External Notes:   none      EXAM:   General:  Sitting on bed.  HENT:  No obvious trauma to head  Right Ear:  External ear normal.   Left Ear:  External ear normal.   Nose:  Nose normal.   Eyes:  Conjunctivae and EOM are normal. Pupils are equal, round, and reactive.   Neck: Normal range of motion. Neck supple. No tracheal deviation present.   Pulm/Chest: Effort normal  Abd: Soft. No distension. There very slight left upper quadrant tenderness, but otherwise there is no tenderness. There is no rigidity, no rebound and no guarding.   M/S: Normal range of motion.   Neuro: Awake and alert.   Skin: Skin is warm and dry. No rash noted. Not diaphoretic.   Psych: Normal mood and affect. Behavior is normal.     Independent Interpretation (X-rays, CTs, rhythm strip):  None    Consultations/Discussion of Management or Tests:  None     MEDICAL DECISION MAKING/ASSESSMENT AND PLAN:   Taylor Aburto is a very pleasant 69 year old year old patient who presents to the emergency department with concern of abdominal pain.  Labs are unremarkable.  CT shows a lot of stool in the ascending, transverse and descending colon.  There is no signs of appendicitis, cholecystitis, diverticulitis or bowel obstruction.  I recommended she follow-up with their PCP and also to consider follow-up with GI  for another colonoscopy.  She has had them in the past.  Discussed rare chance that there could be a colonic tumor causing a partial obstruction is not seen on CT today.  She agrees.  Recommended return with any fever, blood in stool or concern.  When she gets home she will take a dose of MiraLAX and a second dose before bed in addition to senna tablet.  Recommended daily MiraLAX going forward.    The treatment plan was discussed with the patient and they expressed understanding of this plan and consented to the plan.  In addition, the patient will return to the emergency department if their symptoms persist, worsen, if new symptoms arise or if there is any concern as other pathology may be present that is not evident at this time. They also understand the importance of close follow up in the clinic and if unable to do so will return to the emergency department for a reevaluation. All questions were answered.      DIAGNOSIS:     ICD-10-CM    1. Constipation  K59.00       2. Abdominal pain  R10.9       3. Left flank pain  R10.9             Luciano Arnett, DO  11/3/2024  [facility] EMERGENCY DEPT     Luciano Arnett, DO  11/03/24 1814

## 2024-11-04 NOTE — DISCHARGE INSTRUCTIONS
As discussed, there are no concerning findings noted on your CT. It does note large amounts of stool throughout your colon. Recommend drinking lots of water and increasing your fiber intake to help with constipation. Also recommend taking a cup of MiraLax and stool softeners. Please follow up with your primary care provider for reassessment.    Discharge Instructions  Abdominal Pain    Abdominal pain (belly pain) can be caused by many things. Your evaluation today does not show the exact cause for your pain. Your provider today has decided that it is unlikely your pain is due to a life threatening problem, or a problem requiring surgery or hospital admission. Sometimes those problems cannot be found right away, so it is very important that you follow up as directed.  Sometimes only the changes which occur over time allow the cause of your pain to be found.    Generally, every Emergency Department visit should have a follow-up clinic visit with either a primary or a specialty clinic/provider. Please follow-up as instructed by your emergency provider today. With abdominal pain, we often recommend very close follow-up, such as the following day.    ADULTS:  Return to the Emergency Department right away if:    You get an oral temperature above 102oF or as directed by your provider.  You have blood in your stools. This may be bright red or appear as black, tarry stools.    You keep vomiting (throwing up) or cannot drink liquids.  You see blood when you vomit.   You cannot have a bowel movement or you cannot pass gas.  Your stomach gets bloated or bigger.  Your skin or the whites of your eyes look yellow.  You faint.  You have bloody, frequent or painful urination (peeing).  You have new symptoms or anything that worries you.    CHILDREN:  Return to the Emergency Department right away if your child has any of the above-listed symptoms or the following:    Pushes your hand away or screams/cries when his/her belly is  touched.  You notice your child is very fussy or weak.  Your child is very tired and is too tired to eat or drink.  Your child is dehydrated.  Signs of dehydration can be:  Significant change in the amount of wet diapers/urine.  Your infant or child starts to have dry mouth and lips, or no saliva (spit) or tears.    PREGNANT WOMEN:  Return to the Emergency Department right away if you have any of the above-listed symptoms or the following:    You have bleeding, leaking fluid or passing tissue from the vagina.  You have worse pain or cramping, or pain in your shoulder or back.  You have vomiting that will not stop.  You have a temperature of 100oF or more.  Your baby is not moving as much as usual.  You faint.  You get a bad headache with or without eye problems and abdominal pain.  You have a seizure.  You have unusual discharge from your vagina and abdominal pain.    Abdominal pain is pretty common during pregnancy.  Your pain may or may not be related to your pregnancy. You should follow-up closely with your OB provider so they can evaluate you and your baby.  Until you follow-up with your regular provider, do the following:     Avoid sex and do not put anything in your vagina.  Drink clear fluids.  Only take medications approved by your provider.    MORE INFORMATION:    Appendicitis:  A possible cause of abdominal pain in any person who still has their appendix is acute appendicitis. Appendicitis is often hard to diagnose.  Testing does not always rule out early appendicitis or other causes of abdominal pain. Close follow-up with your provider and re-evaluations may be needed to figure out the reason for your abdominal pain.    Follow-up:  It is very important that you make an appointment with your clinic and go to the appointment.  If you do not follow-up with your primary provider, it may result in missing an important development which could result in permanent injury or disability and/or lasting pain.  If  "there is any problem keeping your appointment, call your provider or return to the Emergency Department.    Medications:  Take your medications as directed by your provider today.  Before using over-the-counter medications, ask your provider and make sure to take the medications as directed.  If you have any questions about medications, ask your provider.    Diet:  Resume your normal diet as much as possible, but do not eat fried, fatty or spicy foods while you have pain.  Do not drink alcohol or have caffeine.  Do not smoke tobacco.    Probiotics: If you have been given an antibiotic, you may want to also take a probiotic pill or eat yogurt with live cultures. Probiotics have \"good bacteria\" to help your intestines stay healthy. Studies have shown that probiotics help prevent diarrhea (loose stools) and other intestine problems (including C. diff infection) when you take antibiotics. You can buy these without a prescription in the pharmacy section of the store.     If you were given a prescription for medicine here today, be sure to read all of the information (including the package insert) that comes with your prescription.  This will include important information about the medicine, its side effects, and any warnings that you need to know about.  The pharmacist who fills the prescription can provide more information and answer questions you may have about the medicine.  If you have questions or concerns that the pharmacist cannot address, please call or return to the Emergency Department.       Remember that you can always come back to the Emergency Department if you are not able to see your regular provider in the amount of time listed above, if you get any new symptoms, or if there is anything that worries you.    "

## 2024-11-04 NOTE — TELEPHONE ENCOUNTER
"  Transitions of Care Outreach  Chief Complaint   Patient presents with    Hospital F/U       Most Recent Admission Date: 11/3/2024   Most Recent Admission Diagnosis:      Most Recent Discharge Date: 11/3/2024   Most Recent Discharge Diagnosis: Constipation - K59.00  Abdominal pain - R10.9  Left flank pain - R10.9     Transitions of Care Assessment    Discharge Assessment  How are you doing now that you are home?: She is doing \"okay\". She is staying home from work today while using Miralax for the first time. She sees PCP 12/6/24 and will call back if she decides she wants to be seen before then.  How are your symptoms? (Red Flag symptoms escalate to triage hotline per guidelines): Improved  Do you know how to contact your clinic care team if you have future questions or changes to your health status? : Yes  Does the patient have their discharge instructions? : Yes  Does the patient have questions regarding their discharge instructions? : No  Were you started on any new medications or were there changes to any of your previous medications? : Yes  Does the patient have all of their medications?: Yes  Do you have questions regarding any of your medications? : No  Do you have all of your needed medical supplies or equipment (DME)?  (i.e. oxygen tank, CPAP, cane, etc.): Yes    Follow up Plan     Discharge Follow-Up  Discharge follow up appointment scheduled in alignment with recommended follow up timeframe or Transitions of Risk Category? (Low = within 30 days; Moderate= within 14 days; High= within 7 days): Yes  Discharge Follow Up Appointment Date: 12/06/24  Discharge Follow Up Appointment Scheduled with?: Primary Care Provider    Future Appointments   Date Time Provider Department Center   11/5/2024 10:00 AM [facility] 1 [facility]   11/7/2024 10:00 AM [facility]   11/12/2024 10:00 AM [facility] RESOURCE 1 [facility]   11/14/2024 10:00 AM [facility] CARDIAC " REHAB RESOURCE 1 [facility]   11/19/2024 10:00 AM N CARDIAC REHAB RESOURCE 1 [facility]   11/21/2024 10:00 AM N CARDIAC REHAB RESOURCE 1 [facility]   11/26/2024 10:00 AM N CARDIAC REHAB RESOURCE 1 [facility]   12/2/2024  7:30 AM Holden Menchaca PA-C [facility]   12/3/2024 10:00 AM N CARDIAC REHAB RESOURCE 1 [facility]   12/5/2024 10:00 AM [facility] CARDIAC REHAB RESOURCE 1 [facility]   12/6/2024  9:00 AM Soraya Cherry DO [facility]   12/12/2024 10:00 AM [facility] CARDIAC REHAB RESOURCE 2 [facility]   12/17/2024 10:45 AM Mouna Greene MD [facility]   1/24/2025 11:00 AM Dianna Reynaga [facility]   1/31/2025  1:00 PM Dianna Reynaga CCE [facility]       Outpatient Plan as outlined on AVS reviewed with patient.    For any urgent concerns, please contact our 24 hour nurse triage line: [phone] ([phone])

## 2024-11-14 ENCOUNTER — MYC REFILL (OUTPATIENT)
Dept: FAMILY MEDICINE | Facility: CLINIC | Age: 69
End: 2024-11-14
Payer: COMMERCIAL

## 2024-11-14 DIAGNOSIS — R63.4 UNINTENTIONAL WEIGHT LOSS: ICD-10-CM

## 2024-11-14 DIAGNOSIS — F41.9 ANXIETY: ICD-10-CM

## 2024-11-14 RX ORDER — ESCITALOPRAM OXALATE 10 MG/1
10 TABLET ORAL DAILY
Qty: 90 TABLET | Refills: 0 | OUTPATIENT
Start: 2024-11-14

## 2024-12-02 ENCOUNTER — VIRTUAL VISIT (OUTPATIENT)
Dept: GASTROENTEROLOGY | Facility: CLINIC | Age: 69
End: 2024-12-02
Attending: NURSE PRACTITIONER
Payer: COMMERCIAL

## 2024-12-02 DIAGNOSIS — R10.12 LUQ ABDOMINAL PAIN: Primary | ICD-10-CM

## 2024-12-02 DIAGNOSIS — K59.00 CONSTIPATION, UNSPECIFIED CONSTIPATION TYPE: ICD-10-CM

## 2024-12-02 PROCEDURE — 99204 OFFICE O/P NEW MOD 45 MIN: CPT | Mod: 95 | Performed by: PHYSICIAN ASSISTANT

## 2024-12-02 NOTE — NURSING NOTE
Current patient location: [address]  [facility] [address]    Is the patient currently in the state of [place]? YES    Visit mode:VIDEO    If the visit is dropped, the patient can be reconnected by:VIDEO VISIT: Text to cell phone:   Telephone Information:   Mobile [phone]       Will anyone else be joining the visit? NO  (If patient encounters technical issues they should call [phone]    Are changes needed to the allergy or medication list? No    Are refills needed on medications prescribed by this physician? NO    Rooming Documentation:  Not applicable    Reason for visit: Consult    Hollie FLORENCE

## 2024-12-02 NOTE — PROGRESS NOTES
GASTROENTEROLOGY NEW PATIENT VIDEO VISIT    CC/REFERRING MD:    Soraya Cherry    REASON FOR CONSULTATION:   Tamra Redmond for   Chief Complaint   Patient presents with    Consult       HISTORY OF PRESENT ILLNESS:    Taylor Aburto is a 69 year old female with a past medical history significant for DM 2, NSTEMI status post KIM in June 2024 who is being evaluated via a billable video visit for ongoing left upper quadrant abdominal pain and altered bowel habits.  To review, the patient describes a several month history of recurring pain in the left upper quadrant near the ribs.  It waxes and wanes with no clear trigger or alleviating factor.  Can be bothersome both moving and at rest.  Sometimes sharp, sometimes dull.  No associated heartburn, reflux, regurgitation, nausea, or vomiting.  Was ultimately seen in the ER about a month ago, labs notable for normal CBC, CMP, lipase.  CTAP without contrast showing normal upper abdominal organs, large volume stool burden.  It was felt that she potentially had constipation as the source of her pain and was advised to use laxatives daily.  Since then, she has been using MiraLAX twice daily along with senna and she is getting soft but formed stool output multiple times per day, typically after meals.  No blood or mucus in the stool.  Stool volume is often smaller.    Of note, patient did have an episode of black tarry stool in September, was attributed to Effient and this was subsequently discontinued, now on aspirin and clopidogrel.  Negative enteric pathogen panel in the fall.    She did have a spell of unintentional weight loss dating back to 2022, workup included screening colonoscopy in December 2023 which was notable for 4 subcentimeter polyps, 3 of which were TA's.  EGD/EUS in February 2024 notable for normal-appearing esophagus, stomach, duodenum, pancreatic duct upper limit of normal throughout the pancreas without signs of chronic  pancreatitis.  Normal CBD and gallbladder.  Gastric and duodenal biopsies were normal.  Her weight has been largely stable over the past year.    Surgical history pertinent for ovarian cyst removal in 2009.  There is no pertinent family medical history of digestive tract cancers.  She quit smoking cigarettes following her MI in June.  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  There is no drug use.  She is currently on 20 mg omeprazole daily.  No NSAID use.      I have reviewed and updated the patient's Past Medical History, Social History, Family History and Medication List.    Exam:    General appearance:  Healthy appearing adult, in no acute distress  Eyes:  Sclera anicteric, Pupils round and reactive to light  Ears, nose, mouth and throat:  No obvious external lesions of ears and nose.  Hearing intact  Neck:  Symmetric, No obvious external lesions  Respiratory:  Normal respiration, no use of accessory muscles   MSK:  No visual upper extremity, neck or facial muscle atrophy  ABD:  No visual abdominal distention, no audible borborygmi  Skin:  No rashes or jaundice   Psychiatric:  Oriented to person, place and time, Appropriate mood and affect.   Neurologic:  Peripheral muscle function and dexterity appear to be intact      PERTINENT STUDIES have been reviewed.    ASSESSMENT/PLAN:    Taylor Aburto is a 69 year old female who presents for evaluation of chronic recurring left upper quadrant abdominal pain of unclear etiology.  Reviewed that her presentation of symptoms does not quite fit nicely into the typical presentations of gastritis/peptic ulcer disease, gastroparesis, dyspepsia, intestinal obstruction, or constipation.  Reviewed her recent CT findings which were largely reassuring.  I would like to update x-ray to reassess stool burden to make sure that we are appropriately addressing this, though it does sound like she has been getting pretty good stool output with daily laxative therapy over the  past month.  If x-ray does not show signs of constipation or intestinal obstruction, we will plan to proceed with repeat EGD to assess for gastritis/peptic ulcer disease.  Lastly, would consider gastric emptying scan to rule out gastroparesis as she does have diabetes.  An in person exam may be useful to further evaluate for musculoskeletal source as well.    1. Constipation, unspecified constipation type  - Adult GI  Referral - Consult Only  - XR Abdomen 2 Views; Future    2. LUQ abdominal pain (Primary)  - XR Abdomen 2 Views; Future        Video-Visit Details    Video Visit Time: 18 minutes    Type of service:  Video Visit    Originating Location (pt. Location): Home    Distant Location (provider location):  Off-site    Platform used for Video Visit: Laila Menchaca PA-C    Thank you for this consultation.  It was a pleasure to participate in the care of this patient; please contact us with any further questions.  A total of 28 minutes was spent with reviewing the chart, discussing with the patient, documentation and coordination of care on 12/2/2024.    This note was created with voice recognition software, and while reviewed for accuracy, typos may remain.     Holden Menchaca PA-C  Division of Gastroenterology, Hepatology and Nutrition  [facility]  [phone]

## 2024-12-03 ENCOUNTER — HOSPITAL ENCOUNTER (OUTPATIENT)
Dept: CARDIAC REHAB | Facility: HOSPITAL | Age: 69
Discharge: HOME | End: 2024-12-03
Attending: INTERNAL MEDICINE
Payer: COMMERCIAL

## 2024-12-03 PROCEDURE — 93798 PHYS/QHP OP CAR RHAB W/ECG: CPT

## 2024-12-05 ENCOUNTER — HOSPITAL ENCOUNTER (OUTPATIENT)
Dept: CARDIAC REHAB | Facility: HOSPITAL | Age: 69
Discharge: HOME | End: 2024-12-05
Attending: INTERNAL MEDICINE
Payer: COMMERCIAL

## 2024-12-05 DIAGNOSIS — I21.4 NSTEMI (NON-ST ELEVATED MYOCARDIAL INFARCTION) (H): ICD-10-CM

## 2024-12-05 LAB
GLUCOSE BLDC GLUCOMTR-MCNC: 105 MG/DL (ref 70–99)
GLUCOSE BLDC GLUCOMTR-MCNC: 133 MG/DL (ref 70–99)

## 2024-12-05 PROCEDURE — 93798 PHYS/QHP OP CAR RHAB W/ECG: CPT

## 2024-12-05 RX ORDER — CARVEDILOL 3.12 MG/1
3.12 TABLET ORAL 2 TIMES DAILY WITH MEALS
Qty: 180 TABLET | Refills: 2 | Status: SHIPPED | OUTPATIENT
Start: 2024-12-05

## 2024-12-06 ENCOUNTER — HOSPITAL ENCOUNTER (OUTPATIENT)
Dept: GENERAL RADIOLOGY | Facility: HOSPITAL | Age: 69
Discharge: HOME | End: 2024-12-06
Attending: STUDENT IN AN ORGANIZED HEALTH CARE EDUCATION/TRAINING PROGRAM | Admitting: STUDENT IN AN ORGANIZED HEALTH CARE EDUCATION/TRAINING PROGRAM
Payer: COMMERCIAL

## 2024-12-06 DIAGNOSIS — K59.00 CONSTIPATION, UNSPECIFIED CONSTIPATION TYPE: ICD-10-CM

## 2024-12-06 PROCEDURE — 74018 RADEX ABDOMEN 1 VIEW: CPT

## 2024-12-08 DIAGNOSIS — N30.00 ACUTE CYSTITIS WITHOUT HEMATURIA: Primary | ICD-10-CM

## 2024-12-08 DIAGNOSIS — E11.65 UNCONTROLLED TYPE 2 DIABETES MELLITUS WITH HYPERGLYCEMIA (H): ICD-10-CM

## 2024-12-08 RX ORDER — CEPHALEXIN 500 MG/1
500 CAPSULE ORAL 2 TIMES DAILY
Qty: 6 CAPSULE | Refills: 0 | Status: SHIPPED | OUTPATIENT
Start: 2024-12-08

## 2024-12-10 ENCOUNTER — HOSPITAL ENCOUNTER (OUTPATIENT)
Dept: CARDIAC REHAB | Facility: HOSPITAL | Age: 69
Discharge: HOME | End: 2024-12-10
Attending: INTERNAL MEDICINE
Payer: COMMERCIAL

## 2024-12-10 PROCEDURE — 93798 PHYS/QHP OP CAR RHAB W/ECG: CPT

## 2024-12-11 PROBLEM — Z12.4 CERVICAL CANCER SCREENING: Status: ACTIVE | Noted: 2024-12-11

## 2025-01-18 ENCOUNTER — LAB (OUTPATIENT)
Dept: LAB | Facility: CLINIC | Age: 70
End: 2025-01-18
Payer: COMMERCIAL

## 2025-01-18 DIAGNOSIS — R30.0 DYSURIA: ICD-10-CM

## 2025-01-18 LAB
ALBUMIN UR-MCNC: NEGATIVE MG/DL
APPEARANCE UR: CLEAR
BILIRUB UR QL STRIP: NEGATIVE
COLOR UR AUTO: YELLOW
GLUCOSE UR STRIP-MCNC: >=1000 MG/DL
HGB UR QL STRIP: NEGATIVE
KETONES UR STRIP-MCNC: NEGATIVE MG/DL
LEUKOCYTE ESTERASE UR QL STRIP: NEGATIVE
NITRATE UR QL: NEGATIVE
PH UR STRIP: 5.5 [PH] (ref 5–8)
SP GR UR STRIP: 1.01 (ref 1–1.03)
UROBILINOGEN UR STRIP-ACNC: 0.2 E.U./DL

## 2025-01-18 PROCEDURE — 81003 URINALYSIS AUTO W/O SCOPE: CPT

## 2025-02-19 DIAGNOSIS — Z72.0 TOBACCO ABUSE: ICD-10-CM

## 2025-02-19 NOTE — TELEPHONE ENCOUNTER
Medication Question or Refill        What medication are you calling about (include dose and sig)?:    Disp Refills Start End CHEIKH   nicotine polacrilex (NICORETTE) 4 MG gum 50 each 3 8/23/2024 -- No   Sig: Chew one piece every 1- 2 hours for cravings as needed. Try to limit using the gum for strong craving only since you are taking the patch, and previous history of nausea and vomiting.   Sent to pharmacy as: Nicotine Polacrilex 4 MG Mouth/Throat Gum (NICORETTE)   Class: E-Prescribe   Order: [id number]   E-Prescribing Status: Receipt confirmed by pharmacy (8/23/2024  2:58 PM CDT)       Preferred Pharmacy:   [facility] [id number] [facility] [address] [place] [address]  [address] [place] [address]  Phone: [phone] Fax: [phone]    [place] [id number] [address]  Phone: [phone] Fax: [phone]      Controlled Substance Agreement on file:   CSA -- Patient Level:    CSA: None found at the patient level.       Who prescribed the medication?: PCP    Do you need a refill? Yes    When did you use the medication last? 1/17/2025

## 2025-03-22 ENCOUNTER — HEALTH MAINTENANCE LETTER (OUTPATIENT)
Age: 70
End: 2025-03-22

## 2025-03-30 DIAGNOSIS — E11.65 UNCONTROLLED TYPE 2 DIABETES MELLITUS WITH HYPERGLYCEMIA (H): ICD-10-CM

## 2025-04-14 ENCOUNTER — ANCILLARY PROCEDURE (OUTPATIENT)
Dept: MAMMOGRAPHY | Facility: CLINIC | Age: 70
End: 2025-04-14
Attending: STUDENT IN AN ORGANIZED HEALTH CARE EDUCATION/TRAINING PROGRAM
Payer: COMMERCIAL

## 2025-04-14 DIAGNOSIS — Z00.00 ROUTINE GENERAL MEDICAL EXAMINATION AT A HEALTH CARE FACILITY: ICD-10-CM

## 2025-04-14 PROCEDURE — 77063 BREAST TOMOSYNTHESIS BI: CPT

## 2025-04-14 PROCEDURE — 77063 BREAST TOMOSYNTHESIS BI: CPT | Mod: 26 | Performed by: RADIOLOGY

## 2025-04-14 PROCEDURE — 77067 SCR MAMMO BI INCL CAD: CPT

## 2025-04-14 PROCEDURE — 77067 SCR MAMMO BI INCL CAD: CPT | Mod: 26 | Performed by: RADIOLOGY

## 2025-04-24 ENCOUNTER — ANCILLARY PROCEDURE (OUTPATIENT)
Dept: MAMMOGRAPHY | Facility: CLINIC | Age: 70
End: 2025-04-24
Attending: STUDENT IN AN ORGANIZED HEALTH CARE EDUCATION/TRAINING PROGRAM
Payer: COMMERCIAL

## 2025-04-24 DIAGNOSIS — R92.8 ABNORMAL MAMMOGRAM OF LEFT BREAST: ICD-10-CM

## 2025-04-24 PROCEDURE — G0279 TOMOSYNTHESIS, MAMMO: HCPCS | Performed by: RADIOLOGY

## 2025-04-24 PROCEDURE — 76642 ULTRASOUND BREAST LIMITED: CPT | Mod: LT | Performed by: RADIOLOGY

## 2025-04-24 PROCEDURE — 77065 DX MAMMO INCL CAD UNI: CPT | Mod: LT | Performed by: RADIOLOGY

## 2025-05-01 ENCOUNTER — TELEPHONE (OUTPATIENT)
Dept: FAMILY MEDICINE | Facility: CLINIC | Age: 70
End: 2025-05-01
Payer: COMMERCIAL

## 2025-05-01 NOTE — TELEPHONE ENCOUNTER
Left message for patient to call back. Patient has appt schedule via video on 5.2.25 that needs to be in person. If patient is able to come in person please change the mode of the appointment and notify her that the arrival time is 940am.     If she is unable to come in person please reschedule appt.

## 2025-05-02 ENCOUNTER — ORDERS ONLY (AUTO-RELEASED) (OUTPATIENT)
Dept: FAMILY MEDICINE | Facility: CLINIC | Age: 70
End: 2025-05-02

## 2025-05-02 DIAGNOSIS — R00.0 RACING HEART BEAT: ICD-10-CM

## 2025-05-05 DIAGNOSIS — E11.65 TYPE 2 DIABETES MELLITUS WITH HYPERGLYCEMIA, WITH LONG-TERM CURRENT USE OF INSULIN (H): Primary | ICD-10-CM

## 2025-05-05 DIAGNOSIS — Z79.4 TYPE 2 DIABETES MELLITUS WITH HYPERGLYCEMIA, WITH LONG-TERM CURRENT USE OF INSULIN (H): Primary | ICD-10-CM

## 2025-05-13 ENCOUNTER — RESULTS FOLLOW-UP (OUTPATIENT)
Dept: PEDIATRICS | Facility: CLINIC | Age: 70
End: 2025-05-13

## 2025-05-13 DIAGNOSIS — I21.4 NSTEMI (NON-ST ELEVATED MYOCARDIAL INFARCTION) (H): ICD-10-CM

## 2025-05-13 NOTE — LETTER
May 16, 2025      Taylor Aburto  [address]  [facility] [address]        Dear ,    We are writing to inform you of your test results.    Bloodwork doesn't show any obvious cause for the chest pressure    Your A1c is better but still above goal. Let's add another oral medication called Januvia. This will hopefully continue to bring down the A1c    Your hemoglobin has dropped again. Are you taking a multivitamin?          Resulted Orders   Comprehensive metabolic panel (BMP + Alb, Alk Phos, ALT, AST, Total. Bili, TP)   Result Value Ref Range    Sodium 139 135 - 145 mmol/L    Potassium 4.3 3.4 - 5.3 mmol/L    Carbon Dioxide (CO2) 23 22 - 29 mmol/L    Anion Gap 12 7 - 15 mmol/L    Urea Nitrogen 14.2 8.0 - 23.0 mg/dL    Creatinine 0.72 0.51 - 0.95 mg/dL    GFR Estimate 90 >60 mL/min/1.73m2      Comment:      eGFR calculated using 2021 CKD-EPI equation.    Calcium 9.7 8.8 - 10.4 mg/dL    Chloride 104 98 - 107 mmol/L    Glucose 103 (H) 70 - 99 mg/dL    Alkaline Phosphatase 55 40 - 150 U/L    AST 19 0 - 45 U/L    ALT 10 0 - 50 U/L    Protein Total 7.2 6.4 - 8.3 g/dL    Albumin 4.3 3.5 - 5.2 g/dL    Bilirubin Total 0.5 <=1.2 mg/dL   CBC with platelets   Result Value Ref Range    WBC Count 4.4 4.0 - 11.0 10e3/uL    RBC Count 4.54 3.80 - 5.20 10e6/uL    Hemoglobin 11.1 (L) 11.7 - 15.7 g/dL    Hematocrit 37.1 35.0 - 47.0 %    MCV 82 78 - 100 fL    MCH 24.4 (L) 26.5 - 33.0 pg    MCHC 29.9 (L) 31.5 - 36.5 g/dL    RDW 14.7 10.0 - 15.0 %    Platelet Count 141 (L) 150 - 450 10e3/uL   Hemoglobin A1c   Result Value Ref Range    Estimated Average Glucose 151 (H) <117 mg/dL    Hemoglobin A1C 6.9 (H) 0.0 - 5.6 %      Comment:      Normal <5.7%   Prediabetes 5.7-6.4%    Diabetes 6.5% or higher     Note: Adopted from ADA consensus guidelines.       If you have any questions or concerns, please call the clinic at the number listed above.       Sincerely,      Soraya Cherry, DO    Electronically signed

## 2025-05-13 NOTE — LETTER
June 24, 2025      Taylor Aburto  [address]  [facility] [address]        Dear ,    We are writing to inform you of your test results.    Overall, your heart rate is reassuring.  You are having some rare runs of SVT (supraventricular tachycardia)-this is a rhythm where your heart races fast for few beats .your reported symptoms correlated to a normal rhythm or slightly faster heartbeat.   To help with your symptoms, we can increase carvedilol dose to 6.25 twice a day and see if that helps      Resulted Orders   Comprehensive metabolic panel (BMP + Alb, Alk Phos, ALT, AST, Total. Bili, TP)   Result Value Ref Range    Sodium 139 135 - 145 mmol/L    Potassium 4.3 3.4 - 5.3 mmol/L    Carbon Dioxide (CO2) 23 22 - 29 mmol/L    Anion Gap 12 7 - 15 mmol/L    Urea Nitrogen 14.2 8.0 - 23.0 mg/dL    Creatinine 0.72 0.51 - 0.95 mg/dL    GFR Estimate 90 >60 mL/min/1.73m2      Comment:      eGFR calculated using 2021 CKD-EPI equation.    Calcium 9.7 8.8 - 10.4 mg/dL    Chloride 104 98 - 107 mmol/L    Glucose 103 (H) 70 - 99 mg/dL    Alkaline Phosphatase 55 40 - 150 U/L    AST 19 0 - 45 U/L    ALT 10 0 - 50 U/L    Protein Total 7.2 6.4 - 8.3 g/dL    Albumin 4.3 3.5 - 5.2 g/dL    Bilirubin Total 0.5 <=1.2 mg/dL   CBC with platelets   Result Value Ref Range    WBC Count 4.4 4.0 - 11.0 10e3/uL    RBC Count 4.54 3.80 - 5.20 10e6/uL    Hemoglobin 11.1 (L) 11.7 - 15.7 g/dL    Hematocrit 37.1 35.0 - 47.0 %    MCV 82 78 - 100 fL    MCH 24.4 (L) 26.5 - 33.0 pg    MCHC 29.9 (L) 31.5 - 36.5 g/dL    RDW 14.7 10.0 - 15.0 %    Platelet Count 141 (L) 150 - 450 10e3/uL   Hemoglobin A1c   Result Value Ref Range    Estimated Average Glucose 151 (H) <117 mg/dL    Hemoglobin A1C 6.9 (H) 0.0 - 5.6 %      Comment:      Normal <5.7%   Prediabetes 5.7-6.4%    Diabetes 6.5% or higher     Note: Adopted from ADA consensus guidelines.   ZIO PATCH MAIL OUT   Result Value Ref Range    Zio Prelim Results       Patient had a min HR of 54 bpm, max HR  of 158 bpm, and avg HR of 79 bpm. Predominant underlying rhythm was Sinus Rhythm. 1 run of Ventricular Tachycardia occurred lasting 20 beats with a max rate of 122 bpm (avg 109 bpm). 4 Supraventricular Tachycardia runs occurred, the run with the fastest interval lasting 4 beats with a max rate of 158 bpm, the longest lasting 8 beats with an avg rate of 118 bpm. Isolated SVEs were rare (<1.0%), SVE Couplets were rare (<1.0%), and SVE Triplets were rare (<1.0%). Isolated VEs were rare (<1.0%), VE Couplets were rare (<1.0%), and no VE Triplets were present. Inverted QRS complexes possibly due to inverted placement of device.      Narrative    Zio monitoring from 5/12/2025 to 5/26/2025 (duration 14d).  Predominant underlying rhythm was sinus rhythm, 54 to 124bpm, average   79bpm.  No atrial fibrillation.  There were no pauses of greater than 3 seconds.  Rare supraventricular ectopic beats (<1%).  4 Supraventricular Tachycardia runs occurred, the run with the fastest   interval lasting 4 beats with a max rate of 158 bpm, the longest lasting 8   beats with an avg rate of 118 bpm.  Rare premature ventricular contractions (<1%).  1 run of NSVT occurred lasting 20 beats with a max rate of 122 bpm (avg   109 bpm).  Symptom triggers correlated with normal sinus rhythm  or sinus   tachycardia.    Electronically signed by Jennifer Andrade MD  6/16/2025  7:20 AM       If you have any questions or concerns, please call the clinic at the number listed above.       Sincerely,      Soraya Cherry, DO    Electronically signed

## 2025-05-19 ENCOUNTER — TELEPHONE (OUTPATIENT)
Dept: FAMILY MEDICINE | Facility: CLINIC | Age: 70
End: 2025-05-19
Payer: COMMERCIAL

## 2025-05-19 NOTE — TELEPHONE ENCOUNTER
Patient Returning Call    Reason for call:  Missed call     Information relayed to patient:      Soraya Cherry DO  SR    5/5/25  6:23 AM  Result Comment  Bloodwork doesn't show any obvious cause for the chest pressure    Your A1c is better but still above goal. Let's add another oral medication called Januvia. This will hopefully continue to bring down the A1c    Your hemoglobin has dropped again. Are you taking a multivitamin?      Comprehensive metabolic panel (BMP + Alb, Alk Phos, ALT, AST, Total. Bili, TP); CBC w    -Patient stated she isn't taking vitamins anymore     Patient has additional questions:  Yes    What are your questions/concerns:      -Patient is asking if she should go back on them?    Could we send this information to you in [facility] or would you prefer to receive a phone call?:   Patient would prefer a phone call   Okay to leave a detailed message?: Yes at Cell number on file:    Telephone Information:   Mobile [phone]

## 2025-06-16 LAB — CV ZIO PRELIM RESULTS: NORMAL

## 2025-06-17 RX ORDER — CARVEDILOL 6.25 MG/1
6.25 TABLET ORAL 2 TIMES DAILY WITH MEALS
Qty: 180 TABLET | Refills: 2 | Status: SHIPPED | OUTPATIENT
Start: 2025-06-17

## 2025-06-24 NOTE — TELEPHONE ENCOUNTER
Letter mailed out as well    Contacts       Contact Date/Time Type Contact Phone/Fax    05/19/2025 11:13 AM CDT Phone (Incoming) Taylor Aburto (Self) [phone] (M)    06/24/2025 03:33 PM CDT Phone (Outgoing) Taylor Aburto (Self) [phone] (M)    Left Message           Attempted to reach patient to: Relay a message    Regarding: Zio Patch results    Action to take: OK to relay (verbatim): Overall, your heart rate is reassuring.  You are having some rare runs of SVT (supraventricular tachycardia)-this is a rhythm where your heart races fast for few beats .your reported symptoms correlated to a normal rhythm or slightly faster heartbeat.   To help with your symptoms, we can increase carvedilol dose to 6.25 twice a day and see if that helps

## 2025-08-02 ENCOUNTER — MYC REFILL (OUTPATIENT)
Dept: PEDIATRICS | Facility: CLINIC | Age: 70
End: 2025-08-02
Payer: COMMERCIAL

## 2025-08-02 ENCOUNTER — MYC REFILL (OUTPATIENT)
Dept: CARDIOLOGY | Facility: CLINIC | Age: 70
End: 2025-08-02
Payer: COMMERCIAL

## 2025-08-02 DIAGNOSIS — I25.10 CORONARY ARTERY DISEASE INVOLVING NATIVE CORONARY ARTERY OF NATIVE HEART WITHOUT ANGINA PECTORIS: ICD-10-CM

## 2025-08-02 DIAGNOSIS — I21.4 NSTEMI (NON-ST ELEVATED MYOCARDIAL INFARCTION) (H): ICD-10-CM

## 2025-08-02 DIAGNOSIS — E11.9 TYPE 2 DIABETES MELLITUS WITHOUT COMPLICATION, WITHOUT LONG-TERM CURRENT USE OF INSULIN (H): ICD-10-CM

## 2025-08-04 ENCOUNTER — TELEPHONE (OUTPATIENT)
Dept: CARDIOLOGY | Facility: CLINIC | Age: 70
End: 2025-08-04
Payer: COMMERCIAL

## 2025-08-04 ENCOUNTER — E-VISIT (OUTPATIENT)
Dept: FAMILY MEDICINE | Facility: CLINIC | Age: 70
End: 2025-08-04
Payer: COMMERCIAL

## 2025-08-04 DIAGNOSIS — G43.909 MIGRAINE WITHOUT STATUS MIGRAINOSUS, NOT INTRACTABLE, UNSPECIFIED MIGRAINE TYPE: Primary | ICD-10-CM

## 2025-08-04 DIAGNOSIS — I25.10 CORONARY ARTERY DISEASE INVOLVING NATIVE CORONARY ARTERY OF NATIVE HEART WITHOUT ANGINA PECTORIS: ICD-10-CM

## 2025-08-04 DIAGNOSIS — E11.9 TYPE 2 DIABETES MELLITUS WITHOUT COMPLICATION, WITHOUT LONG-TERM CURRENT USE OF INSULIN (H): ICD-10-CM

## 2025-08-04 PROCEDURE — 99207 PR NON-BILLABLE SERV PER CHARTING: CPT

## 2025-08-04 RX ORDER — LISINOPRIL 5 MG/1
5 TABLET ORAL DAILY
Qty: 90 TABLET | Refills: 0 | Status: SHIPPED | OUTPATIENT
Start: 2025-08-04

## 2025-08-04 RX ORDER — CARVEDILOL 6.25 MG/1
6.25 TABLET ORAL 2 TIMES DAILY WITH MEALS
Qty: 180 TABLET | Refills: 0 | Status: SHIPPED | OUTPATIENT
Start: 2025-08-04

## 2025-08-04 RX ORDER — ASPIRIN 81 MG/1
81 TABLET ORAL DAILY
Qty: 90 TABLET | Refills: 0 | Status: SHIPPED | OUTPATIENT
Start: 2025-08-04

## 2025-08-04 RX ORDER — ASPIRIN 81 MG/1
81 TABLET ORAL DAILY
Qty: 90 TABLET | Refills: 3 | OUTPATIENT
Start: 2025-08-04

## 2025-08-04 RX ORDER — LISINOPRIL 5 MG/1
5 TABLET ORAL DAILY
Qty: 90 TABLET | Refills: 3 | OUTPATIENT
Start: 2025-08-04

## 2025-08-05 ENCOUNTER — HOSPITAL ENCOUNTER (EMERGENCY)
Facility: CLINIC | Age: 70
Discharge: HOME | End: 2025-08-06
Attending: EMERGENCY MEDICINE | Admitting: EMERGENCY MEDICINE
Payer: COMMERCIAL

## 2025-08-05 ENCOUNTER — E-VISIT (OUTPATIENT)
Dept: URGENT CARE | Facility: CLINIC | Age: 70
End: 2025-08-05
Payer: COMMERCIAL

## 2025-08-05 ENCOUNTER — MYC MEDICAL ADVICE (OUTPATIENT)
Dept: CARDIOLOGY | Facility: CLINIC | Age: 70
End: 2025-08-05
Payer: COMMERCIAL

## 2025-08-05 DIAGNOSIS — I25.10 CORONARY ARTERY DISEASE INVOLVING NATIVE CORONARY ARTERY OF NATIVE HEART WITHOUT ANGINA PECTORIS: Primary | ICD-10-CM

## 2025-08-05 DIAGNOSIS — I21.4 NSTEMI (NON-ST ELEVATED MYOCARDIAL INFARCTION) (H): ICD-10-CM

## 2025-08-05 DIAGNOSIS — I25.5 ISCHEMIC CARDIOMYOPATHY: ICD-10-CM

## 2025-08-05 DIAGNOSIS — I25.10 CORONARY ARTERY DISEASE INVOLVING NATIVE CORONARY ARTERY OF NATIVE HEART WITHOUT ANGINA PECTORIS: ICD-10-CM

## 2025-08-05 DIAGNOSIS — R30.0 DYSURIA: Primary | ICD-10-CM

## 2025-08-05 DIAGNOSIS — N39.0 URINARY TRACT INFECTION WITHOUT HEMATURIA, SITE UNSPECIFIED: Primary | ICD-10-CM

## 2025-08-05 DIAGNOSIS — E78.5 HYPERLIPIDEMIA LDL GOAL <70: ICD-10-CM

## 2025-08-05 LAB
ALBUMIN UR-MCNC: 10 MG/DL
ANION GAP SERPL CALCULATED.3IONS-SCNC: 14 MMOL/L (ref 7–15)
APPEARANCE UR: CLEAR
ATRIAL RATE - MUSE: 99 BPM
BACTERIA #/AREA URNS HPF: ABNORMAL /HPF
BASE EXCESS BLDV CALC-SCNC: 1 MMOL/L (ref -3–3)
BASOPHILS # BLD AUTO: 0 10E3/UL (ref 0–0.2)
BASOPHILS NFR BLD AUTO: 0 %
BILIRUB UR QL STRIP: NEGATIVE
BUN SERPL-MCNC: 15.5 MG/DL (ref 8–23)
CALCIUM SERPL-MCNC: 9.6 MG/DL (ref 8.8–10.4)
CHLORIDE SERPL-SCNC: 98 MMOL/L (ref 98–107)
COLOR UR AUTO: ABNORMAL
CREAT SERPL-MCNC: 0.94 MG/DL (ref 0.51–0.95)
DIASTOLIC BLOOD PRESSURE - MUSE: NORMAL MMHG
EGFRCR SERPLBLD CKD-EPI 2021: 65 ML/MIN/1.73M2
EOSINOPHIL # BLD AUTO: 0 10E3/UL (ref 0–0.7)
EOSINOPHIL NFR BLD AUTO: 0 %
ERYTHROCYTE [DISTWIDTH] IN BLOOD BY AUTOMATED COUNT: 16.1 % (ref 10–15)
GLUCOSE SERPL-MCNC: 223 MG/DL (ref 70–99)
GLUCOSE UR STRIP-MCNC: >=1000 MG/DL
HCO3 BLDV-SCNC: 25 MMOL/L (ref 21–28)
HCO3 SERPL-SCNC: 21 MMOL/L (ref 22–29)
HCT VFR BLD AUTO: 37.7 % (ref 35–47)
HGB BLD-MCNC: 12.3 G/DL (ref 11.7–15.7)
HGB UR QL STRIP: ABNORMAL
IMM GRANULOCYTES # BLD: 0 10E3/UL
IMM GRANULOCYTES NFR BLD: 0 %
INTERPRETATION ECG - MUSE: NORMAL
KETONES UR STRIP-MCNC: NEGATIVE MG/DL
LACTATE BLD-SCNC: 0.8 MMOL/L (ref 0.7–2)
LEUKOCYTE ESTERASE UR QL STRIP: ABNORMAL
LYMPHOCYTES # BLD AUTO: 0.7 10E3/UL (ref 0.8–5.3)
LYMPHOCYTES NFR BLD AUTO: 13 %
MCH RBC QN AUTO: 27.6 PG (ref 26.5–33)
MCHC RBC AUTO-ENTMCNC: 32.6 G/DL (ref 31.5–36.5)
MCV RBC AUTO: 85 FL (ref 78–100)
MONOCYTES # BLD AUTO: 0.7 10E3/UL (ref 0–1.3)
MONOCYTES NFR BLD AUTO: 15 %
MUCOUS THREADS #/AREA URNS LPF: PRESENT /LPF
NEUTROPHILS # BLD AUTO: 3.5 10E3/UL (ref 1.6–8.3)
NEUTROPHILS NFR BLD AUTO: 71 %
NITRATE UR QL: NEGATIVE
NRBC # BLD AUTO: 0 10E3/UL
NRBC BLD AUTO-RTO: 0 /100
P AXIS - MUSE: 74 DEGREES
PCO2 BLDV: 35 MM HG (ref 40–50)
PH BLDV: 7.46 [PH] (ref 7.32–7.43)
PH UR STRIP: 6 [PH] (ref 5–7)
PLATELET # BLD AUTO: 115 10E3/UL (ref 150–450)
PO2 BLDV: 39 MM HG (ref 25–47)
POTASSIUM SERPL-SCNC: 4.5 MMOL/L (ref 3.4–5.3)
PR INTERVAL - MUSE: 134 MS
QRS DURATION - MUSE: 76 MS
QT - MUSE: 338 MS
QTC - MUSE: 433 MS
R AXIS - MUSE: 83 DEGREES
RBC # BLD AUTO: 4.45 10E6/UL (ref 3.8–5.2)
RBC URINE: 4 /HPF
SAO2 % BLDV: 77 % (ref 70–75)
SODIUM SERPL-SCNC: 133 MMOL/L (ref 135–145)
SP GR UR STRIP: 1.02 (ref 1–1.03)
SQUAMOUS EPITHELIAL: <1 /HPF
SYSTOLIC BLOOD PRESSURE - MUSE: NORMAL MMHG
T AXIS - MUSE: 75 DEGREES
TROPONIN T SERPL HS-MCNC: <6 NG/L
UROBILINOGEN UR STRIP-MCNC: NORMAL MG/DL
VENTRICULAR RATE- MUSE: 99 BPM
WBC # BLD AUTO: 4.9 10E3/UL (ref 4–11)
WBC CLUMPS #/AREA URNS HPF: PRESENT /HPF
WBC URINE: 104 /HPF

## 2025-08-05 PROCEDURE — 81001 URINALYSIS AUTO W/SCOPE: CPT | Performed by: EMERGENCY MEDICINE

## 2025-08-05 PROCEDURE — 85025 COMPLETE CBC W/AUTO DIFF WBC: CPT | Performed by: EMERGENCY MEDICINE

## 2025-08-05 PROCEDURE — 250N000011 HC RX IP 250 OP 636: Performed by: EMERGENCY MEDICINE

## 2025-08-05 PROCEDURE — 96361 HYDRATE IV INFUSION ADD-ON: CPT

## 2025-08-05 PROCEDURE — 84484 ASSAY OF TROPONIN QUANT: CPT | Performed by: EMERGENCY MEDICINE

## 2025-08-05 PROCEDURE — 82803 BLOOD GASES ANY COMBINATION: CPT

## 2025-08-05 PROCEDURE — 36415 COLL VENOUS BLD VENIPUNCTURE: CPT | Performed by: EMERGENCY MEDICINE

## 2025-08-05 PROCEDURE — 96365 THER/PROPH/DIAG IV INF INIT: CPT

## 2025-08-05 PROCEDURE — 87186 SC STD MICRODIL/AGAR DIL: CPT | Performed by: EMERGENCY MEDICINE

## 2025-08-05 PROCEDURE — 258N000003 HC RX IP 258 OP 636: Performed by: EMERGENCY MEDICINE

## 2025-08-05 PROCEDURE — 93005 ELECTROCARDIOGRAM TRACING: CPT

## 2025-08-05 PROCEDURE — 99284 EMERGENCY DEPT VISIT MOD MDM: CPT | Mod: 25 | Performed by: EMERGENCY MEDICINE

## 2025-08-05 PROCEDURE — 80048 BASIC METABOLIC PNL TOTAL CA: CPT | Performed by: EMERGENCY MEDICINE

## 2025-08-05 PROCEDURE — 250N000013 HC RX MED GY IP 250 OP 250 PS 637: Performed by: EMERGENCY MEDICINE

## 2025-08-05 RX ORDER — ROSUVASTATIN CALCIUM 40 MG/1
40 TABLET, COATED ORAL DAILY
Qty: 90 TABLET | Refills: 0 | Status: SHIPPED | OUTPATIENT
Start: 2025-08-05 | End: 2025-08-05

## 2025-08-05 RX ORDER — ACETAMINOPHEN 325 MG/1
975 TABLET ORAL ONCE
Status: COMPLETED | OUTPATIENT
Start: 2025-08-05 | End: 2025-08-05

## 2025-08-05 RX ORDER — CEFTRIAXONE 1 G/1
1 INJECTION, POWDER, FOR SOLUTION INTRAMUSCULAR; INTRAVENOUS ONCE
Status: COMPLETED | OUTPATIENT
Start: 2025-08-05 | End: 2025-08-06

## 2025-08-05 RX ADMIN — CEFTRIAXONE SODIUM 1 G: 1 INJECTION, POWDER, FOR SOLUTION INTRAMUSCULAR; INTRAVENOUS at 23:42

## 2025-08-05 RX ADMIN — SODIUM CHLORIDE 1000 ML: 0.9 INJECTION, SOLUTION INTRAVENOUS at 23:42

## 2025-08-05 RX ADMIN — ACETAMINOPHEN 975 MG: 325 TABLET ORAL at 23:42

## 2025-08-05 ASSESSMENT — COLUMBIA-SUICIDE SEVERITY RATING SCALE - C-SSRS
1. IN THE PAST MONTH, HAVE YOU WISHED YOU WERE DEAD OR WISHED YOU COULD GO TO SLEEP AND NOT WAKE UP?: NO
2. HAVE YOU ACTUALLY HAD ANY THOUGHTS OF KILLING YOURSELF IN THE PAST MONTH?: NO
6. HAVE YOU EVER DONE ANYTHING, STARTED TO DO ANYTHING, OR PREPARED TO DO ANYTHING TO END YOUR LIFE?: NO

## 2025-08-05 ASSESSMENT — ACTIVITIES OF DAILY LIVING (ADL)
ADLS_ACUITY_SCORE: 52

## 2025-08-06 VITALS
HEIGHT: 66 IN | TEMPERATURE: 99 F | HEART RATE: 95 BPM | DIASTOLIC BLOOD PRESSURE: 53 MMHG | WEIGHT: 122 LBS | RESPIRATION RATE: 18 BRPM | SYSTOLIC BLOOD PRESSURE: 132 MMHG | OXYGEN SATURATION: 95 % | BODY MASS INDEX: 19.61 KG/M2

## 2025-08-06 RX ORDER — CEPHALEXIN 500 MG/1
500 CAPSULE ORAL 2 TIMES DAILY
Qty: 14 CAPSULE | Refills: 0 | Status: SHIPPED | OUTPATIENT
Start: 2025-08-06 | End: 2025-08-13

## 2025-08-07 LAB — BACTERIA UR CULT: ABNORMAL

## 2025-08-16 ENCOUNTER — HEALTH MAINTENANCE LETTER (OUTPATIENT)
Age: 70
End: 2025-08-16

## (undated) DEVICE — VALVE HEMOSTASIS .096" COPILOT MECH 1003331

## (undated) DEVICE — CATH BALLOON EMERGE 3.0X8MM H7493918908300

## (undated) DEVICE — CATH BALLOON NC EUPHORA 3.50X12MM NCEUP3512X

## (undated) DEVICE — CATH IVUS OPTICROSS HD 3FR 1MM 135CML H74939352040

## (undated) DEVICE — GW VASC 145CM .035IN SS PTFE

## (undated) DEVICE — INFL DVC BASIXCOMPAK PLYCRB 30 ATM 13IN 20ML IN4530

## (undated) DEVICE — CATH BALLOON EMERGE 3.0X30MM H7493918930300

## (undated) DEVICE — CATHETER BALLOON DILATATION TAKERU RX 2X12MM DC-RZ2012UA2

## (undated) DEVICE — CATH BALLOON NC EMERGE 3.50X20MM H7493926720350

## (undated) DEVICE — GLOVE BIOGEL PI SZ 8.0 40880

## (undated) DEVICE — GLOVE BIOGEL PI MICRO INDICATOR UNDERGLOVE SZ 8.0 48980

## (undated) DEVICE — CATH GUIDING BLUE YELLOW PTFE XB3 6FRX100CM 67005200

## (undated) DEVICE — RAD INFLATOR BASIC COMPAK  IN4130

## (undated) DEVICE — CATH BALLOON NC EMERGE 3.00X15MM H7493926715300

## (undated) DEVICE — Device

## (undated) DEVICE — GUIDEWIRE VASC GLADIUS MONGO 0.014IN 190CM 3CM TIP 3GM STR

## (undated) DEVICE — MANIFOLD KIT ANGIO AUTOMATED 014613

## (undated) DEVICE — CATH BALLOON NC EMERGE 3.25X20MM H7493926720320

## (undated) DEVICE — CATH ANGIO INFINITI JR4 4FRX100CM 538421

## (undated) DEVICE — CATH BALLOON NC EUPHORA 3.00X12MM NCEUP3012X

## (undated) DEVICE — GUIDEWIRE VASC 14INX145CM GUARDWIRE EXPORTAP

## (undated) DEVICE — NDL PERC ENTRY 21GA 4CM G00280

## (undated) DEVICE — CATH RX TAKERU PTCA BALLOON 2.00MM X 15MM

## (undated) DEVICE — CATH BALLOON NC EMERGE 2.25X12MM H7493926712220

## (undated) DEVICE — CATH BALLOON EMERGE 3.0X20MM H7493918920300

## (undated) DEVICE — WIRE GUIDE 0.035"X260CM SAFE-T-J EXCHANGE G00517

## (undated) DEVICE — CATH BALLOON NC EMERGE 2.75X20MM H7493926720270

## (undated) DEVICE — CATH BALLOON EMERGE 4.0X8MM H7493918908400

## (undated) DEVICE — KIT CATHETER INDIGO RX 140CM WITH LG LUMEN ASP TUBING

## (undated) DEVICE — BLN CUT WOLVERINE 3.50MM X 6MM

## (undated) DEVICE — INTRO GLIDESHEATH SLENDER 6FR 10X45CM 60-1060

## (undated) DEVICE — KIT HAND CONTROL ANGIOTOUCH ACIST 65CM AT-P65

## (undated) DEVICE — INTRODUCER GUIDEWIRE 22290

## (undated) DEVICE — TOTE ANGIO CORP PC15AT SAN32CC83O

## (undated) DEVICE — CATH RX TAKERU OTW PTCA BALLOON 1.5X15MM DC-RY1515UA1

## (undated) DEVICE — GUIDEWIRE VASC 40CM .018IN NT PLAT TI

## (undated) DEVICE — DEFIB PRO-PADZ LVP LQD GEL ADULT 8900-2105-01

## (undated) DEVICE — RAD INTRODUCER KIT MICRO 5FRX10CM .018 NITINOL G/W

## (undated) DEVICE — CATH GUIDING GUIDELINER JR3.5 ST CURVE 5.5FR COAST 5270

## (undated) DEVICE — CUTTING BALLOON WOLVERINE 3X10MM

## (undated) DEVICE — DEVICE TORQUE TD II COR H74904487202

## (undated) DEVICE — STENT COR ONYX FRONTIER 12X2MM ONYXNG20012UX: Type: IMPLANTABLE DEVICE | Status: NON-FUNCTIONAL

## (undated) DEVICE — CATH BALLOON NC EMERGE 3.50X8MM H7493926708350

## (undated) DEVICE — CATH RX TAKERU PTCA BALLOON 1.5X12MM DC-RY1512UA1

## (undated) DEVICE — CATH DIAGNOSTIC RADIAL 5FR TIG 4.0

## (undated) DEVICE — GW MINAMO STRAIGHT 190CM

## (undated) DEVICE — CANISTER ENGINE FOR INDIGO SYSTEM

## (undated) DEVICE — SLEEVE TR BAND RADIAL COMPRESSION DEVICE 24CM TRB24-REG

## (undated) DEVICE — SLED PULLBACK DISPOSABLE A70200

## (undated) DEVICE — CATH BALLOON NC EUPHORA 2.00X12MM NCEUP2012X

## (undated) DEVICE — CATH BALLOON DILATING TAKERU PTCA  2.5X15MM RX DC-RZ2515UA2

## (undated) DEVICE — CUTTING BALLOON WOLVERINE 3.5X10MM

## (undated) DEVICE — FASTENER CATH STAYFIX 685ME

## (undated) DEVICE — CATH IVUS OPTICROSS HD 6 3.6FR 1.18MM DIA 135CML H7493935408

## (undated) DEVICE — GUIDEWIRE VASC 0.014INX180CM RUNTHROUGH 25-1011

## (undated) DEVICE — CATH BALLOON EMERGE 1.5X15MM H7493918915150

## (undated) DEVICE — GLIDEWIRE TERUMO .035X180CM 1.5,, J-TIP GR3525

## (undated) DEVICE — KIT DRAIN 6FR CATHETER PIGTAIL PERICARDIOCENTESIS PC101/A

## (undated) DEVICE — CATH BALLOON NC EMERGE 2.25X15MM H7493926715220

## (undated) RX ORDER — HEPARIN SODIUM 10000 [USP'U]/100ML
INJECTION, SOLUTION INTRAVENOUS
Status: DISPENSED
Start: 2024-06-08

## (undated) RX ORDER — HEPARIN SODIUM 1000 [USP'U]/ML
INJECTION, SOLUTION INTRAVENOUS; SUBCUTANEOUS
Status: DISPENSED
Start: 2024-06-08

## (undated) RX ORDER — FENTANYL CITRATE 50 UG/ML
INJECTION, SOLUTION INTRAMUSCULAR; INTRAVENOUS
Status: DISPENSED
Start: 2024-06-08

## (undated) RX ORDER — HEPARIN SODIUM 1000 [USP'U]/ML
INJECTION, SOLUTION INTRAVENOUS; SUBCUTANEOUS
Status: DISPENSED
Start: 2024-06-18

## (undated) RX ORDER — FENTANYL CITRATE 50 UG/ML
INJECTION, SOLUTION INTRAMUSCULAR; INTRAVENOUS
Status: DISPENSED
Start: 2024-06-18

## (undated) RX ORDER — FENTANYL CITRATE 50 UG/ML
INJECTION, SOLUTION INTRAMUSCULAR; INTRAVENOUS
Status: DISPENSED
Start: 2024-06-10

## (undated) RX ORDER — EPTIFIBATIDE 2 MG/ML
INJECTION, SOLUTION INTRAVENOUS
Status: DISPENSED
Start: 2024-06-08

## (undated) RX ORDER — NITROGLYCERIN 5 MG/ML
VIAL (ML) INTRAVENOUS
Status: DISPENSED
Start: 2024-06-08

## (undated) RX ORDER — HEPARIN SODIUM 200 [USP'U]/100ML
INJECTION, SOLUTION INTRAVENOUS
Status: DISPENSED
Start: 2024-06-10

## (undated) RX ORDER — HEPARIN SODIUM 200 [USP'U]/100ML
INJECTION, SOLUTION INTRAVENOUS
Status: DISPENSED
Start: 2024-06-18

## (undated) RX ORDER — LIDOCAINE HYDROCHLORIDE 10 MG/ML
INJECTION, SOLUTION EPIDURAL; INFILTRATION; INTRACAUDAL; PERINEURAL
Status: DISPENSED
Start: 2024-06-10

## (undated) RX ORDER — PRASUGREL 10 MG/1
TABLET, FILM COATED ORAL
Status: DISPENSED
Start: 2024-06-08

## (undated) RX ORDER — LIDOCAINE HYDROCHLORIDE 10 MG/ML
INJECTION, SOLUTION EPIDURAL; INFILTRATION; INTRACAUDAL; PERINEURAL
Status: DISPENSED
Start: 2024-06-18

## (undated) RX ORDER — METOPROLOL TARTRATE 1 MG/ML
INJECTION, SOLUTION INTRAVENOUS
Status: DISPENSED
Start: 2024-06-08

## (undated) RX ORDER — LIDOCAINE HYDROCHLORIDE 10 MG/ML
INJECTION, SOLUTION EPIDURAL; INFILTRATION; INTRACAUDAL; PERINEURAL
Status: DISPENSED
Start: 2024-06-08

## (undated) RX ORDER — HEPARIN SODIUM 1000 [USP'U]/ML
INJECTION, SOLUTION INTRAVENOUS; SUBCUTANEOUS
Status: DISPENSED
Start: 2024-06-10

## (undated) RX ORDER — HYDRALAZINE HYDROCHLORIDE 20 MG/ML
INJECTION INTRAMUSCULAR; INTRAVENOUS
Status: DISPENSED
Start: 2024-06-08

## (undated) RX ORDER — NITROGLYCERIN 5 MG/ML
VIAL (ML) INTRAVENOUS
Status: DISPENSED
Start: 2024-06-18

## (undated) RX ORDER — VERAPAMIL HYDROCHLORIDE 2.5 MG/ML
INJECTION, SOLUTION INTRAVENOUS
Status: DISPENSED
Start: 2024-06-08

## (undated) RX ORDER — NITROGLYCERIN 0.4 MG/1
TABLET SUBLINGUAL
Status: DISPENSED
Start: 2024-06-08

## (undated) RX ORDER — FENTANYL CITRATE 50 UG/ML
INJECTION, SOLUTION INTRAMUSCULAR; INTRAVENOUS
Status: DISPENSED
Start: 2023-12-01